# Patient Record
Sex: FEMALE | Race: WHITE | NOT HISPANIC OR LATINO | Employment: OTHER | URBAN - METROPOLITAN AREA
[De-identification: names, ages, dates, MRNs, and addresses within clinical notes are randomized per-mention and may not be internally consistent; named-entity substitution may affect disease eponyms.]

---

## 2017-08-04 ENCOUNTER — TELEPHONE (OUTPATIENT)
Dept: PRIMARY CARE CLINIC | Facility: CLINIC | Age: 62
End: 2017-08-04

## 2017-08-04 DIAGNOSIS — I10 ESSENTIAL HYPERTENSION, BENIGN: ICD-10-CM

## 2017-08-04 DIAGNOSIS — Z00.00 ANNUAL PHYSICAL EXAM: Primary | ICD-10-CM

## 2017-08-04 DIAGNOSIS — E78.2 MIXED HYPERLIPIDEMIA: ICD-10-CM

## 2017-08-04 NOTE — TELEPHONE ENCOUNTER
----- Message from Oma Huynh sent at 8/1/2017  4:28 PM CDT -----  Contact: self  Patient has appt on 8/16 for annual.  Would like to have lab work done prior to appt.  Please call back at [phone] (home)

## 2017-08-14 ENCOUNTER — TELEPHONE (OUTPATIENT)
Dept: FAMILY MEDICINE | Facility: CLINIC | Age: 62
End: 2017-08-14

## 2017-08-14 RX ORDER — ESTRADIOL 2 MG/1
2 TABLET ORAL DAILY
COMMUNITY
End: 2018-01-16 | Stop reason: SDUPTHER

## 2017-08-14 RX ORDER — OMEPRAZOLE 40 MG/1
40 CAPSULE, DELAYED RELEASE ORAL DAILY
COMMUNITY
End: 2017-11-02 | Stop reason: SDUPTHER

## 2017-08-14 RX ORDER — AMOXICILLIN 500 MG
1 CAPSULE ORAL DAILY
COMMUNITY

## 2017-08-14 RX ORDER — ATORVASTATIN CALCIUM 20 MG/1
20 TABLET, FILM COATED ORAL DAILY
COMMUNITY
End: 2017-11-02 | Stop reason: SDUPTHER

## 2017-08-14 RX ORDER — CELECOXIB 200 MG/1
200 CAPSULE ORAL DAILY
COMMUNITY
End: 2017-08-16

## 2017-08-14 RX ORDER — AMLODIPINE BESYLATE 10 MG/1
10 TABLET ORAL DAILY
COMMUNITY
End: 2017-11-02 | Stop reason: SDUPTHER

## 2017-08-16 ENCOUNTER — OFFICE VISIT (OUTPATIENT)
Dept: PRIMARY CARE CLINIC | Facility: CLINIC | Age: 62
End: 2017-08-16
Payer: COMMERCIAL

## 2017-08-16 VITALS
RESPIRATION RATE: 18 BRPM | SYSTOLIC BLOOD PRESSURE: 136 MMHG | OXYGEN SATURATION: 96 % | BODY MASS INDEX: 34.39 KG/M2 | TEMPERATURE: 98 F | WEIGHT: 214 LBS | DIASTOLIC BLOOD PRESSURE: 84 MMHG | HEIGHT: 66 IN | HEART RATE: 68 BPM

## 2017-08-16 DIAGNOSIS — I10 ESSENTIAL HYPERTENSION, BENIGN: Primary | ICD-10-CM

## 2017-08-16 DIAGNOSIS — M51.36 DDD (DEGENERATIVE DISC DISEASE), LUMBAR: ICD-10-CM

## 2017-08-16 DIAGNOSIS — R53.83 FATIGUE, UNSPECIFIED TYPE: ICD-10-CM

## 2017-08-16 DIAGNOSIS — E78.2 MIXED HYPERLIPIDEMIA: ICD-10-CM

## 2017-08-16 DIAGNOSIS — Z13.820 SCREENING FOR OSTEOPOROSIS: ICD-10-CM

## 2017-08-16 DIAGNOSIS — Z87.19 HISTORY OF ESOPHAGEAL STRICTURE: ICD-10-CM

## 2017-08-16 DIAGNOSIS — T75.3XXA SEA SICKNESS, INITIAL ENCOUNTER: ICD-10-CM

## 2017-08-16 DIAGNOSIS — R63.5 WEIGHT GAIN: ICD-10-CM

## 2017-08-16 DIAGNOSIS — M19.031 PRIMARY OSTEOARTHRITIS OF BOTH WRISTS: Chronic | ICD-10-CM

## 2017-08-16 DIAGNOSIS — M17.0 PRIMARY OSTEOARTHRITIS OF BOTH KNEES: ICD-10-CM

## 2017-08-16 DIAGNOSIS — Z83.3 FAMILY HISTORY OF DIABETES MELLITUS: ICD-10-CM

## 2017-08-16 DIAGNOSIS — M19.032 PRIMARY OSTEOARTHRITIS OF BOTH WRISTS: Chronic | ICD-10-CM

## 2017-08-16 DIAGNOSIS — R13.19 ESOPHAGEAL DYSPHAGIA: ICD-10-CM

## 2017-08-16 PROBLEM — K21.9 GERD (GASTROESOPHAGEAL REFLUX DISEASE): Status: ACTIVE | Noted: 2017-08-16

## 2017-08-16 PROBLEM — M51.369 DDD (DEGENERATIVE DISC DISEASE), LUMBAR: Status: ACTIVE | Noted: 2017-08-16

## 2017-08-16 PROCEDURE — 99214 OFFICE O/P EST MOD 30 MIN: CPT | Mod: S$GLB,,, | Performed by: FAMILY MEDICINE

## 2017-08-16 PROCEDURE — 3008F BODY MASS INDEX DOCD: CPT | Mod: S$GLB,,, | Performed by: FAMILY MEDICINE

## 2017-08-16 RX ORDER — DIAZEPAM 5 MG/1
5 TABLET ORAL DAILY PRN
COMMUNITY
End: 2019-04-18

## 2017-08-16 RX ORDER — SCOLOPAMINE TRANSDERMAL SYSTEM 1 MG/1
1 PATCH, EXTENDED RELEASE TRANSDERMAL
Qty: 4 PATCH | Refills: 1 | Status: SHIPPED | OUTPATIENT
Start: 2017-08-16 | End: 2017-09-11

## 2017-08-16 NOTE — PROGRESS NOTES
Subjective:       Patient ID: Lashanda Monaco is a 61 y.o. female.    Chief Complaint: Results (lab results ); Other (wants to know if you can document her ROM of wrist so she doesn't have to go to ortho in [place] ); and Fatigue    Recent labs all ok except slightly elevated TGs (179). HDL 64, CBC normal, CMP normal  Has been extremely fatigued, gaining weight despite diet (low-carb) and exercise  Chronic disseminated arthritic/orthopedic issues. Worsening mobility due to lumbar DDD and knee arthritis. Bilateral wrist pain and ROM limitations, has had some type of nerve ablation to left wrist w/o improvement. Also having cervical DJD issues, will ultimately need to see spine specialist/neurosurgeon  Also c/o recurrent dysphagia with solids past few months, no problem with liquids. No choking or SoB, no dyspepsia or heartburn. Hx of prior esophageal strictures with dilatation x 2, hasn't seen Dr. Keating in a few years.      Review of Systems   Constitutional: Positive for fatigue and unexpected weight change. Negative for chills and fever.   HENT: Negative for congestion.    Eyes: Negative for visual disturbance.   Respiratory: Negative for cough and shortness of breath.    Cardiovascular: Negative for chest pain.   Gastrointestinal: Negative for abdominal pain, nausea and vomiting.   Genitourinary: Positive for frequency.   Musculoskeletal: Positive for arthralgias, back pain, neck pain and neck stiffness.   Skin: Negative for rash.   Neurological: Positive for dizziness.   Psychiatric/Behavioral: Negative for sleep disturbance.       Objective:      Physical Exam   Constitutional: She is oriented to person, place, and time. She appears well-developed and well-nourished.   HENT:   Head: Normocephalic and atraumatic.   Neck: Neck supple.   Cardiovascular: Normal rate, regular rhythm and normal heart sounds.    Pulmonary/Chest: Effort normal and breath sounds normal.   Musculoskeletal: She exhibits no edema.         Right wrist: She exhibits decreased range of motion. She exhibits no swelling and no effusion.        Left wrist: She exhibits decreased range of motion. She exhibits no swelling, no effusion and no deformity.   Right wrist 15 degrees of flexion and extension; Left wrist <15 degrees flexion and extension   Neurological: She is alert and oriented to person, place, and time.   Skin: Skin is warm and dry.   Psychiatric: She has a normal mood and affect.   Vitals reviewed.      Assessment:       1. Essential hypertension, benign    2. Mixed hyperlipidemia Well controlled   3. DDD (degenerative disc disease), lumbar    4. Primary osteoarthritis of both knees    5. Primary osteoarthritis of both wrists    6. Fatigue, unspecified type    7. Weight gain    8. Screening for osteoporosis    9. Family history of diabetes mellitus    10. Sea sickness, initial encounter    11. History of esophageal stricture    12. Esophageal dysphagia        Plan:       Essential hypertension, benign  Comments:  continue current rx, monitor BP    Mixed hyperlipidemia  Comments:  continue current rx    DDD (degenerative disc disease), lumbar    Primary osteoarthritis of both knees    Primary osteoarthritis of both wrists    Fatigue, unspecified type  -     T4, free; Future; Expected date: 08/16/2017  -     TSH; Future; Expected date: 08/16/2017    Weight gain  -     T4, free; Future; Expected date: 08/16/2017  -     TSH; Future; Expected date: 08/16/2017  -     Hemoglobin A1c; Future; Expected date: 08/16/2017    Screening for osteoporosis  -     DXA Bone Density Spine And Hip; Future; Expected date: 08/23/2017    Family history of diabetes mellitus  -     Hemoglobin A1c; Future; Expected date: 08/16/2017    Sea sickness, initial encounter  -     scopolamine (TRANSDERM-SCOP) 1.5 mg (1 mg over 3 days); Place 1 patch (1.5 mg total) onto the skin every 72 hours.  Dispense: 4 patch; Refill: 1    History of esophageal stricture  -     Ambulatory  referral to General Surgery    Esophageal dysphagia  -     Ambulatory referral to General Surgery

## 2017-09-08 ENCOUNTER — TELEPHONE (OUTPATIENT)
Dept: PRIMARY CARE CLINIC | Facility: CLINIC | Age: 62
End: 2017-09-08

## 2017-09-08 ENCOUNTER — CLINICAL SUPPORT (OUTPATIENT)
Dept: PRIMARY CARE CLINIC | Facility: CLINIC | Age: 62
End: 2017-09-08
Payer: COMMERCIAL

## 2017-09-08 VITALS — DIASTOLIC BLOOD PRESSURE: 85 MMHG | SYSTOLIC BLOOD PRESSURE: 125 MMHG | HEART RATE: 78 BPM

## 2017-09-08 NOTE — TELEPHONE ENCOUNTER
Patient arrives to clinic elevated /94 in left arm.. Denies SOB, jaw or  chest pain. She took an extra dose of Norvasc 20 mg.. Was having symptoms lightheaded and headache.  3am tylenol and 9:30 am  and 10:30 am    12:23 pm right arm /72 [place] 77  12:31pm Left arm /85 [place] 78

## 2017-09-08 NOTE — PROGRESS NOTES
Patient arrives to clinic reports elevated BP  Checked /94, BP taken in left arm.. Denies SOB, jaw or  chest pain. There is no sign of distress..  She took an extra dose of Norvasc 20 mg.. Reports having symptoms lightheaded and frontal headache, took at 3am tylenol and Norvasc at 9:30 am  and extra dose of Norvasc taken at 10:30 am    12:23 pm right arm /72 DC 77  12:31pm Left arm /85 [place] 78  Advised per Dr Conteh patient is not to taken extra medication without contacting clinic. Advised to report to ED if symptoms become worse including increased HA pain, SOB, jaw, arm or chest pain or numbness in face or arm.. Appointment made for 9/11/17 at 8am..Verbalized understanding

## 2017-09-11 ENCOUNTER — OFFICE VISIT (OUTPATIENT)
Dept: PRIMARY CARE CLINIC | Facility: CLINIC | Age: 62
End: 2017-09-11
Payer: COMMERCIAL

## 2017-09-11 VITALS
SYSTOLIC BLOOD PRESSURE: 114 MMHG | BODY MASS INDEX: 33.12 KG/M2 | DIASTOLIC BLOOD PRESSURE: 77 MMHG | TEMPERATURE: 98 F | RESPIRATION RATE: 18 BRPM | OXYGEN SATURATION: 97 % | HEIGHT: 67 IN | WEIGHT: 211 LBS | HEART RATE: 81 BPM

## 2017-09-11 DIAGNOSIS — J01.00 ACUTE NON-RECURRENT MAXILLARY SINUSITIS: Primary | ICD-10-CM

## 2017-09-11 PROCEDURE — 99213 OFFICE O/P EST LOW 20 MIN: CPT | Mod: 25,S$GLB,, | Performed by: FAMILY MEDICINE

## 2017-09-11 PROCEDURE — 3008F BODY MASS INDEX DOCD: CPT | Mod: S$GLB,,, | Performed by: FAMILY MEDICINE

## 2017-09-11 PROCEDURE — 96372 THER/PROPH/DIAG INJ SC/IM: CPT | Mod: S$GLB,,, | Performed by: FAMILY MEDICINE

## 2017-09-11 RX ORDER — BENZONATATE 200 MG/1
200 CAPSULE ORAL 3 TIMES DAILY PRN
Qty: 30 CAPSULE | Refills: 1 | Status: SHIPPED | OUTPATIENT
Start: 2017-09-11 | End: 2018-01-16 | Stop reason: SDUPTHER

## 2017-09-11 RX ORDER — AMOXICILLIN AND CLAVULANATE POTASSIUM 875; 125 MG/1; MG/1
1 TABLET, FILM COATED ORAL EVERY 12 HOURS
Qty: 20 TABLET | Refills: 0 | Status: SHIPPED | OUTPATIENT
Start: 2017-09-11 | End: 2017-09-21

## 2017-09-11 RX ORDER — BETAMETHASONE SODIUM PHOSPHATE AND BETAMETHASONE ACETATE 3; 3 MG/ML; MG/ML
12 INJECTION, SUSPENSION INTRA-ARTICULAR; INTRALESIONAL; INTRAMUSCULAR; SOFT TISSUE
Status: COMPLETED | OUTPATIENT
Start: 2017-09-11 | End: 2017-09-11

## 2017-09-11 RX ADMIN — BETAMETHASONE SODIUM PHOSPHATE AND BETAMETHASONE ACETATE 12 MG: 3; 3 INJECTION, SUSPENSION INTRA-ARTICULAR; INTRALESIONAL; INTRAMUSCULAR; SOFT TISSUE at 08:09

## 2017-09-11 NOTE — PROGRESS NOTES
Subjective:       Patient ID: Lashanda Monaco is a 62 y.o. female.    Chief Complaint: Cough; Sore Throat; and Otalgia    Sinusitis   This is a new problem. The current episode started in the past 7 days. The problem has been gradually worsening since onset. There has been no fever. Associated symptoms include congestion, coughing, ear pain, headaches, a hoarse voice, sinus pressure and a sore throat. Pertinent negatives include no shortness of breath. Past treatments include oral decongestants. The treatment provided mild relief.     Review of Systems   HENT: Positive for congestion, ear pain, hoarse voice, sinus pressure and sore throat.    Respiratory: Positive for cough. Negative for shortness of breath.    Neurological: Positive for headaches.       Objective:       Vitals:    09/11/17 0816   BP: 114/77   Pulse: 81   Resp: 18   Temp: 97.8 °F (36.6 °C)       Physical Exam   Constitutional: She is oriented to person, place, and time. She appears well-developed and well-nourished.   HENT:   Head: Normocephalic and atraumatic.   Right Ear: Tympanic membrane normal.   Left Ear: Tympanic membrane normal.   Nose: Right sinus exhibits maxillary sinus tenderness. Left sinus exhibits maxillary sinus tenderness.   Mouth/Throat: Posterior oropharyngeal edema present. No oropharyngeal exudate.   Cardiovascular: Normal rate, regular rhythm and normal heart sounds.    Pulmonary/Chest: Effort normal and breath sounds normal.   Musculoskeletal: She exhibits no edema.   Neurological: She is alert and oriented to person, place, and time.   Skin: Skin is warm and dry.   Vitals reviewed.      Assessment:       1. Acute non-recurrent maxillary sinusitis        Plan:       Acute non-recurrent maxillary sinusitis  -     amoxicillin-clavulanate 875-125mg (AUGMENTIN) 875-125 mg per tablet; Take 1 tablet by mouth every 12 (twelve) hours.  Dispense: 20 tablet; Refill: 0  -     benzonatate (TESSALON) 200 MG capsule; Take 1 capsule (200  mg total) by mouth 3 (three) times daily as needed for Cough.  Dispense: 30 capsule; Refill: 1  -     betamethasone acetate-betamethasone sodium phosphate injection 12 mg; Inject 2 mLs (12 mg total) into the muscle one time.

## 2017-09-11 NOTE — PROGRESS NOTES
Patient ID by name and  injection of Celestone 12mg IM given right gluteal, tolerated well aseptic tech used.  Lot [id number], exp. Aug 2018. Patient site with no bleeding noted 15 min wait time with no adverse reaction noted.

## 2017-10-09 ENCOUNTER — TELEPHONE (OUTPATIENT)
Dept: PRIMARY CARE CLINIC | Facility: CLINIC | Age: 62
End: 2017-10-09

## 2017-11-02 RX ORDER — AMLODIPINE BESYLATE 10 MG/1
TABLET ORAL
Qty: 90 TABLET | Refills: 1 | Status: SHIPPED | OUTPATIENT
Start: 2017-11-02 | End: 2018-01-16 | Stop reason: SDUPTHER

## 2017-11-02 RX ORDER — OMEPRAZOLE 40 MG/1
CAPSULE, DELAYED RELEASE ORAL
Qty: 90 CAPSULE | Refills: 1 | Status: SHIPPED | OUTPATIENT
Start: 2017-11-02 | End: 2018-01-16 | Stop reason: SDUPTHER

## 2017-11-02 RX ORDER — ATORVASTATIN CALCIUM 20 MG/1
TABLET, FILM COATED ORAL
Qty: 90 TABLET | Refills: 1 | Status: SHIPPED | OUTPATIENT
Start: 2017-11-02 | End: 2018-01-16 | Stop reason: SDUPTHER

## 2018-01-16 ENCOUNTER — OFFICE VISIT (OUTPATIENT)
Dept: PRIMARY CARE CLINIC | Facility: CLINIC | Age: 63
End: 2018-01-16
Payer: COMMERCIAL

## 2018-01-16 VITALS
WEIGHT: 214 LBS | DIASTOLIC BLOOD PRESSURE: 79 MMHG | RESPIRATION RATE: 18 BRPM | HEIGHT: 66 IN | OXYGEN SATURATION: 96 % | SYSTOLIC BLOOD PRESSURE: 120 MMHG | HEART RATE: 78 BPM | TEMPERATURE: 98 F | BODY MASS INDEX: 34.39 KG/M2

## 2018-01-16 DIAGNOSIS — E78.2 MIXED HYPERLIPIDEMIA: ICD-10-CM

## 2018-01-16 DIAGNOSIS — J01.00 ACUTE NON-RECURRENT MAXILLARY SINUSITIS: Primary | ICD-10-CM

## 2018-01-16 DIAGNOSIS — Z83.3 FAMILY HISTORY OF DIABETES MELLITUS: ICD-10-CM

## 2018-01-16 DIAGNOSIS — R63.5 WEIGHT GAIN: ICD-10-CM

## 2018-01-16 DIAGNOSIS — K21.9 GASTROESOPHAGEAL REFLUX DISEASE, ESOPHAGITIS PRESENCE NOT SPECIFIED: ICD-10-CM

## 2018-01-16 DIAGNOSIS — I10 ESSENTIAL HYPERTENSION, BENIGN: ICD-10-CM

## 2018-01-16 PROCEDURE — 96372 THER/PROPH/DIAG INJ SC/IM: CPT | Mod: S$GLB,,, | Performed by: FAMILY MEDICINE

## 2018-01-16 PROCEDURE — 99214 OFFICE O/P EST MOD 30 MIN: CPT | Mod: 25,S$GLB,, | Performed by: FAMILY MEDICINE

## 2018-01-16 PROCEDURE — 99999 PR PBB SHADOW E&M-EST. PATIENT-LVL III: CPT | Mod: PBBFAC,,, | Performed by: FAMILY MEDICINE

## 2018-01-16 RX ORDER — BETAMETHASONE SODIUM PHOSPHATE AND BETAMETHASONE ACETATE 3; 3 MG/ML; MG/ML
12 INJECTION, SUSPENSION INTRA-ARTICULAR; INTRALESIONAL; INTRAMUSCULAR; SOFT TISSUE
Status: COMPLETED | OUTPATIENT
Start: 2018-01-16 | End: 2018-01-16

## 2018-01-16 RX ORDER — OMEPRAZOLE 40 MG/1
40 CAPSULE, DELAYED RELEASE ORAL DAILY
Qty: 90 CAPSULE | Refills: 1 | Status: SHIPPED | OUTPATIENT
Start: 2018-01-16 | End: 2018-08-02 | Stop reason: SDUPTHER

## 2018-01-16 RX ORDER — AMLODIPINE BESYLATE 10 MG/1
10 TABLET ORAL DAILY
Qty: 90 TABLET | Refills: 1 | Status: SHIPPED | OUTPATIENT
Start: 2018-01-16 | End: 2018-08-02 | Stop reason: SDUPTHER

## 2018-01-16 RX ORDER — PREDNISONE 20 MG/1
20 TABLET ORAL DAILY
Qty: 3 TABLET | Refills: 0 | Status: SHIPPED | OUTPATIENT
Start: 2018-01-16 | End: 2018-01-19

## 2018-01-16 RX ORDER — AMOXICILLIN AND CLAVULANATE POTASSIUM 875; 125 MG/1; MG/1
1 TABLET, FILM COATED ORAL EVERY 12 HOURS
Qty: 20 TABLET | Refills: 0 | Status: SHIPPED | OUTPATIENT
Start: 2018-01-16 | End: 2018-01-26

## 2018-01-16 RX ORDER — ESTRADIOL 2 MG/1
2 TABLET ORAL DAILY
Qty: 90 TABLET | Refills: 1 | Status: SHIPPED | OUTPATIENT
Start: 2018-01-16 | End: 2018-08-02 | Stop reason: SDUPTHER

## 2018-01-16 RX ORDER — ATORVASTATIN CALCIUM 20 MG/1
20 TABLET, FILM COATED ORAL DAILY
Qty: 90 TABLET | Refills: 1 | Status: SHIPPED | OUTPATIENT
Start: 2018-01-16 | End: 2018-08-02 | Stop reason: SDUPTHER

## 2018-01-16 RX ORDER — BENZONATATE 200 MG/1
200 CAPSULE ORAL 3 TIMES DAILY PRN
Qty: 30 CAPSULE | Refills: 1 | Status: SHIPPED | OUTPATIENT
Start: 2018-01-16 | End: 2019-04-18

## 2018-01-16 RX ADMIN — BETAMETHASONE SODIUM PHOSPHATE AND BETAMETHASONE ACETATE 12 MG: 3; 3 INJECTION, SUSPENSION INTRA-ARTICULAR; INTRALESIONAL; INTRAMUSCULAR; SOFT TISSUE at 08:01

## 2018-01-16 NOTE — PROGRESS NOTES
"Subjective:       Patient ID: Lasahnda Monaco is a 62 y.o. female.    Chief Complaint: Medication Refill; Otalgia; Cough (x17 days ); and Headache    Scratchy throat, congestion for 2+ weeks. Started with HA and myalgia, but no fever. Progressed to bilateral ear pain, sneezing and coughing. Symptoms markedly worse at night. Ear pain better. Cough minimally productive, but blowing out copious amounts of nasal mucus with streaks of blood.      Review of Systems   Constitutional: Positive for fatigue. Negative for fever.   HENT: Positive for congestion and ear pain.    Eyes: Negative for visual disturbance.   Respiratory: Positive for cough and shortness of breath. Negative for wheezing.    Cardiovascular: Negative for chest pain.   Gastrointestinal: Negative for nausea and vomiting.   Genitourinary: Negative for difficulty urinating.   Musculoskeletal: Negative for joint swelling.   Skin: Negative for rash.   Neurological: Positive for headaches.   Hematological: Negative for adenopathy. Does not bruise/bleed easily.   Psychiatric/Behavioral: Negative for agitation and confusion.       Objective:      Vitals:    01/16/18 0806   BP: 120/79   BP Location: Left arm   Patient Position: Sitting   BP Method: Large (Automatic)   Pulse: 78   Resp: 18   Temp: 97.7 °F (36.5 °C)   TempSrc: Oral   SpO2: 96%   Weight: 97.1 kg (214 lb)   Height: 5' 6" (1.676 m)     Physical Exam   Constitutional: She is oriented to person, place, and time. She appears well-developed and well-nourished.   HENT:   Head: Normocephalic and atraumatic.   Right Ear: Tympanic membrane is bulging. Tympanic membrane is not erythematous.   Left Ear: Tympanic membrane normal.   Nose: Right sinus exhibits maxillary sinus tenderness. Left sinus exhibits maxillary sinus tenderness.   Mouth/Throat: Oropharynx is clear and moist.   Eyes: EOM are normal. Pupils are equal, round, and reactive to light.   Neck: Neck supple. No JVD present.   Cardiovascular: Normal " rate, regular rhythm and normal heart sounds.    Pulmonary/Chest: Effort normal and breath sounds normal.   Musculoskeletal: She exhibits no edema.   Neurological: She is alert and oriented to person, place, and time.   Skin: Skin is warm and dry.   Nursing note and vitals reviewed.      Assessment:       1. Acute non-recurrent maxillary sinusitis    2. Gastroesophageal reflux disease, esophagitis presence not specified    3. Mixed hyperlipidemia    4. Essential hypertension, benign    5. Weight gain    6. Family history of diabetes mellitus        Plan:       Acute non-recurrent maxillary sinusitis  -     benzonatate (TESSALON) 200 MG capsule; Take 1 capsule (200 mg total) by mouth 3 (three) times daily as needed for Cough.  Dispense: 30 capsule; Refill: 1  -     betamethasone acetate-betamethasone sodium phosphate injection 12 mg; Inject 2 mLs (12 mg total) into the muscle one time.  -     amoxicillin-clavulanate 875-125mg (AUGMENTIN) 875-125 mg per tablet; Take 1 tablet by mouth every 12 (twelve) hours.  Dispense: 20 tablet; Refill: 0  -     predniSONE (DELTASONE) 20 MG tablet; Take 1 tablet (20 mg total) by mouth once daily. Start on 1/17  Dispense: 3 tablet; Refill: 0    Gastroesophageal reflux disease, esophagitis presence not specified  -     omeprazole (PRILOSEC) 40 MG capsule; Take 1 capsule (40 mg total) by mouth once daily.  Dispense: 90 capsule; Refill: 1    Mixed hyperlipidemia  -     atorvastatin (LIPITOR) 20 MG tablet; Take 1 tablet (20 mg total) by mouth once daily.  Dispense: 90 tablet; Refill: 1  -     Comprehensive metabolic panel; Future; Expected date: 01/16/2018  -     Lipid panel; Future; Expected date: 01/16/2018  -     TSH; Future; Expected date: 01/16/2018  -     T4, free; Future; Expected date: 01/16/2018    Essential hypertension, benign  -     amLODIPine (NORVASC) 10 MG tablet; Take 1 tablet (10 mg total) by mouth once daily.  Dispense: 90 tablet; Refill: 1  -     CBC auto differential;  Future; Expected date: 01/16/2018  -     TSH; Future; Expected date: 01/16/2018  -     T4, free; Future; Expected date: 01/16/2018    Weight gain  -     Hemoglobin A1c; Future; Expected date: 01/16/2018  -     TSH; Future; Expected date: 01/16/2018  -     T4, free; Future; Expected date: 01/16/2018    Family history of diabetes mellitus  -     Hemoglobin A1c; Future; Expected date: 01/16/2018    Other orders  -     estradiol (ESTRACE) 2 MG tablet; Take 1 tablet (2 mg total) by mouth once daily.  Dispense: 90 tablet; Refill: 1      Medication List with Changes/Refills   New Medications    AMOXICILLIN-CLAVULANATE 875-125MG (AUGMENTIN) 875-125 MG PER TABLET    Take 1 tablet by mouth every 12 (twelve) hours.    PREDNISONE (DELTASONE) 20 MG TABLET    Take 1 tablet (20 mg total) by mouth once daily. Start on 1/17   Current Medications    DIAZEPAM (VALIUM) 5 MG TABLET    Take 5 mg by mouth daily as needed for Anxiety.    FISH OIL-OMEGA-3 FATTY ACIDS 300-1,000 MG CAPSULE    Take 1 g by mouth once daily.   Changed and/or Refilled Medications    Modified Medication Previous Medication    AMLODIPINE (NORVASC) 10 MG TABLET amLODIPine (NORVASC) 10 MG tablet       Take 1 tablet (10 mg total) by mouth once daily.    TAKE ONE TABLET BY MOUTH ONCE DAILY    ATORVASTATIN (LIPITOR) 20 MG TABLET atorvastatin (LIPITOR) 20 MG tablet       Take 1 tablet (20 mg total) by mouth once daily.    TAKE ONE TABLET BY MOUTH ONCE DAILY    BENZONATATE (TESSALON) 200 MG CAPSULE benzonatate (TESSALON) 200 MG capsule       Take 1 capsule (200 mg total) by mouth 3 (three) times daily as needed for Cough.    Take 1 capsule (200 mg total) by mouth 3 (three) times daily as needed for Cough.    ESTRADIOL (ESTRACE) 2 MG TABLET estradiol (ESTRACE) 2 MG tablet       Take 1 tablet (2 mg total) by mouth once daily.    Take 2 mg by mouth once daily.    OMEPRAZOLE (PRILOSEC) 40 MG CAPSULE omeprazole (PRILOSEC) 40 MG capsule       Take 1 capsule (40 mg total) by  mouth once daily.    TAKE ONE CAPSULE BY MOUTH ONCE DAILY

## 2018-01-16 NOTE — PROGRESS NOTES
Patient ID by name and . Celestone 12 mg IM injection given in right ventrogluteal using aseptic technique. Aspirated with no blood noted. Patient tolerated well. Given per physicians order. No adverse reaction noted.

## 2018-08-02 DIAGNOSIS — K21.9 GASTROESOPHAGEAL REFLUX DISEASE, ESOPHAGITIS PRESENCE NOT SPECIFIED: ICD-10-CM

## 2018-08-02 DIAGNOSIS — E78.2 MIXED HYPERLIPIDEMIA: ICD-10-CM

## 2018-08-02 DIAGNOSIS — I10 ESSENTIAL HYPERTENSION, BENIGN: ICD-10-CM

## 2018-08-02 RX ORDER — AMLODIPINE BESYLATE 10 MG/1
TABLET ORAL
Qty: 90 TABLET | Refills: 1 | Status: SHIPPED | OUTPATIENT
Start: 2018-08-02 | End: 2019-01-29 | Stop reason: SDUPTHER

## 2018-08-02 RX ORDER — OMEPRAZOLE 40 MG/1
CAPSULE, DELAYED RELEASE ORAL
Qty: 90 CAPSULE | Refills: 1 | Status: SHIPPED | OUTPATIENT
Start: 2018-08-02 | End: 2019-01-29 | Stop reason: SDUPTHER

## 2018-08-02 RX ORDER — ATORVASTATIN CALCIUM 20 MG/1
TABLET, FILM COATED ORAL
Qty: 90 TABLET | Refills: 1 | Status: SHIPPED | OUTPATIENT
Start: 2018-08-02 | End: 2019-01-29 | Stop reason: SDUPTHER

## 2018-08-02 RX ORDER — ESTRADIOL 2 MG/1
TABLET ORAL
Qty: 90 TABLET | Refills: 1 | Status: SHIPPED | OUTPATIENT
Start: 2018-08-02 | End: 2019-01-29 | Stop reason: SDUPTHER

## 2018-11-29 ENCOUNTER — PES CALL (OUTPATIENT)
Dept: ADMINISTRATIVE | Facility: CLINIC | Age: 63
End: 2018-11-29

## 2019-01-29 DIAGNOSIS — K21.9 GASTROESOPHAGEAL REFLUX DISEASE, ESOPHAGITIS PRESENCE NOT SPECIFIED: ICD-10-CM

## 2019-01-29 DIAGNOSIS — I10 ESSENTIAL HYPERTENSION, BENIGN: ICD-10-CM

## 2019-01-29 DIAGNOSIS — E78.2 MIXED HYPERLIPIDEMIA: ICD-10-CM

## 2019-01-29 RX ORDER — ESTRADIOL 2 MG/1
2 TABLET ORAL DAILY
Qty: 90 TABLET | Refills: 0 | Status: SHIPPED | OUTPATIENT
Start: 2019-01-29 | End: 2019-07-12 | Stop reason: SDUPTHER

## 2019-01-29 RX ORDER — ATORVASTATIN CALCIUM 20 MG/1
20 TABLET, FILM COATED ORAL DAILY
Qty: 90 TABLET | Refills: 0 | Status: SHIPPED | OUTPATIENT
Start: 2019-01-29 | End: 2019-05-01 | Stop reason: SDUPTHER

## 2019-01-29 RX ORDER — OMEPRAZOLE 40 MG/1
40 CAPSULE, DELAYED RELEASE ORAL DAILY
Qty: 90 CAPSULE | Refills: 0 | Status: SHIPPED | OUTPATIENT
Start: 2019-01-29 | End: 2019-05-01 | Stop reason: SDUPTHER

## 2019-01-29 RX ORDER — AMLODIPINE BESYLATE 10 MG/1
10 TABLET ORAL DAILY
Qty: 90 TABLET | Refills: 0 | Status: SHIPPED | OUTPATIENT
Start: 2019-01-29 | End: 2019-05-01 | Stop reason: SDUPTHER

## 2019-01-29 NOTE — TELEPHONE ENCOUNTER
----- Message from Brenda Campos sent at 1/29/2019  1:22 PM CST -----  Type:  RX Refill Request    Who Called:  Patient  Refill or New Rx:  refill  RX Name and Strength:    amLODIPine (NORVASC) 10 MG tablet    estradiol (ESTRACE) 2 MG tablet   omeprazole (PRILOSEC) 40 MG capsule  atorvastatin (LIPITOR) 20 MG tablet  How is the patient currently taking it? (ex. 1XDay):Na  Is this a 30 day or 90 day RX:  90  Preferred Pharmacy with phone number:    [facility] - [address] [address]  Phone: [phone] Fax: [phone]    Local or Mail Order: mail order  Ordering Provider:  Tamika Calderon Call Back Number:  [phone] (home)     Additional Information:  Stating she is almost out of her prescriptions, please sent to her pharmacy, mail order

## 2019-02-22 DIAGNOSIS — Z12.39 BREAST CANCER SCREENING: ICD-10-CM

## 2019-02-22 DIAGNOSIS — Z12.11 COLON CANCER SCREENING: ICD-10-CM

## 2019-04-18 ENCOUNTER — OFFICE VISIT (OUTPATIENT)
Dept: SURGERY | Facility: CLINIC | Age: 64
End: 2019-04-18
Payer: MEDICARE

## 2019-04-18 VITALS
TEMPERATURE: 98 F | HEIGHT: 66 IN | SYSTOLIC BLOOD PRESSURE: 160 MMHG | WEIGHT: 214 LBS | BODY MASS INDEX: 34.39 KG/M2 | HEART RATE: 71 BPM | DIASTOLIC BLOOD PRESSURE: 94 MMHG

## 2019-04-18 DIAGNOSIS — D24.1 INTRADUCTAL PAPILLOMA OF BREAST, RIGHT: ICD-10-CM

## 2019-04-18 DIAGNOSIS — N63.41 SUBAREOLAR MASS OF RIGHT BREAST: Primary | ICD-10-CM

## 2019-04-18 PROCEDURE — 3080F PR MOST RECENT DIASTOLIC BLOOD PRESSURE >= 90 MM HG: ICD-10-PCS | Mod: ,,, | Performed by: SURGERY

## 2019-04-18 PROCEDURE — 3080F DIAST BP >= 90 MM HG: CPT | Mod: ,,, | Performed by: SURGERY

## 2019-04-18 PROCEDURE — 3077F PR MOST RECENT SYSTOLIC BLOOD PRESSURE >= 140 MM HG: ICD-10-PCS | Mod: ,,, | Performed by: SURGERY

## 2019-04-18 PROCEDURE — 3077F SYST BP >= 140 MM HG: CPT | Mod: ,,, | Performed by: SURGERY

## 2019-04-18 PROCEDURE — 3008F BODY MASS INDEX DOCD: CPT | Mod: ,,, | Performed by: SURGERY

## 2019-04-18 PROCEDURE — 99203 PR OFFICE/OUTPT VISIT, NEW, LEVL III, 30-44 MIN: ICD-10-PCS | Mod: ,,, | Performed by: SURGERY

## 2019-04-18 PROCEDURE — 3008F PR BODY MASS INDEX (BMI) DOCUMENTED: ICD-10-PCS | Mod: ,,, | Performed by: SURGERY

## 2019-04-18 PROCEDURE — 99203 OFFICE O/P NEW LOW 30 MIN: CPT | Mod: ,,, | Performed by: SURGERY

## 2019-04-18 RX ORDER — MELOXICAM 15 MG/1
15 TABLET ORAL DAILY
COMMUNITY
End: 2019-05-01 | Stop reason: SDUPTHER

## 2019-04-18 NOTE — LETTER
April 19, 2019      Wayne Lundberg MD  [address]  [address]  [place] [address]           [facility] General Surgery  [address]  [place] [address]  Phone: [phone]  Fax: [phone]          Patient: Lashanda Monaco   MR Number: [id number]   YOB: 1955   Date of Visit: 4/18/2019       Dear Dr. Wayne Lundberg:    Thank you for referring Lashanda Monaco to me for evaluation. Attached you will find relevant portions of my assessment and plan of care.    If you have questions, please do not hesitate to call me. I look forward to following Lashanda Monaco along with you.    Sincerely,    Richar Lowe III, MD    Enclosure  CC:  No Recipients    If you would like to receive this communication electronically, please contact [email or web address] or [phone] to request more information on [facility] access.    For providers and/or their staff who would like to refer a patient to [facility], please contact us through our one-stop-shop provider referral line, [facility], at [phone].    If you feel you have received this communication in error or would no longer like to receive these types of communications, please e-mail [email or web address]

## 2019-04-18 NOTE — PROGRESS NOTES
Subjective:       Patient ID: Lashanda Monaco is a 63 y.o. female.    Chief Complaint: Consult ([facility] referred for right breast bx )      HPI:  Patient is 63 year old female referred to the office with an enlarging right sided retro areolar mass.  It is a known lesion dating back to 2007.  At that time she had right sided blood discharge.  The lesion was biopsied and found to be and intraductal papilloma.  The lesion has been followed with imaging.  She had resolution of the blood discharge at that time.  She has had recurrent discharge from the right nipple starting seven months ago.  Repeat imaging showed enlargement of the lesion now measuring 17 mm and classified as BI RADS 4.  She stares the recurrent discharge is not bloody this time.  It is serous.  She has associated pain at the nipple and at the right axilla.  She cannot feel a mass at the nipple.  She has no family history of breast cancer.  She is scheduled for image guided biopsy of the lesion today.     Past Medical History:   Diagnosis Date    Arthritis     Hypercholesterolemia     Hypertension      Past Surgical History:   Procedure Laterality Date    CARPAL TUNNEL RELEASE Left     PARTIAL HIP ARTHROPLASTY Left     RHINOPLASTY      ROTATOR CUFF REPAIR Right     TONSILLECTOMY, ADENOIDECTOMY      TOTAL ABDOMINAL HYSTERECTOMY W/ BILATERAL SALPINGOOPHORECTOMY       Review of patient's allergies indicates:   Allergen Reactions    Hydrocodone bitartrate     Kiwi (actinidia chinensis)      Medication List with Changes/Refills   Current Medications    AMLODIPINE (NORVASC) 10 MG TABLET    Take 1 tablet (10 mg total) by mouth once daily.    ATORVASTATIN (LIPITOR) 20 MG TABLET    Take 1 tablet (20 mg total) by mouth once daily.    ESTRADIOL (ESTRACE) 2 MG TABLET    Take 1 tablet (2 mg total) by mouth once daily.    FISH OIL-OMEGA-3 FATTY ACIDS 300-1,000 MG CAPSULE    Take 1 g by mouth once daily.    MELOXICAM (MOBIC) 15 MG TABLET    Take 15 mg by  mouth once daily.    OMEPRAZOLE (PRILOSEC) 40 MG CAPSULE    Take 1 capsule (40 mg total) by mouth once daily.    TURMERIC ORAL    Take 1 tablet by mouth once daily.   Discontinued Medications    BENZONATATE (TESSALON) 200 MG CAPSULE    Take 1 capsule (200 mg total) by mouth 3 (three) times daily as needed for Cough.    DIAZEPAM (VALIUM) 5 MG TABLET    Take 5 mg by mouth daily as needed for Anxiety.     Family History   Problem Relation Age of Onset    Heart disease Sister     Lymphoma Brother     Heart disease Brother      Social History     Socioeconomic History    Marital status:      Spouse name: Not on file    Number of children: Not on file    Years of education: Not on file    Highest education level: Not on file   Occupational History    Not on file   Social Needs    Financial resource strain: Not on file    Food insecurity:     Worry: Not on file     Inability: Not on file    Transportation needs:     Medical: Not on file     Non-medical: Not on file   Tobacco Use    Smoking status: Never Smoker    Smokeless tobacco: Never Used   Substance and Sexual Activity    Alcohol use: Yes     Comment: Occasionally     Drug use: No    Sexual activity: Yes     Partners: Male   Lifestyle    Physical activity:     Days per week: Not on file     Minutes per session: Not on file    Stress: Not on file   Relationships    Social connections:     Talks on phone: Not on file     Gets together: Not on file     Attends Advent service: Not on file     Active member of club or organization: Not on file     Attends meetings of clubs or organizations: Not on file     Relationship status: Not on file   Other Topics Concern    Not on file   Social History Narrative    Not on file         Review of Systems   Constitutional: Negative for appetite change, chills, fever and unexpected weight change.   HENT: Negative for hearing loss, rhinorrhea, sore throat and voice change.    Eyes: Negative for photophobia  and visual disturbance.   Respiratory: Negative for cough, choking and shortness of breath.    Cardiovascular: Negative for chest pain, palpitations and leg swelling.   Gastrointestinal: Negative for abdominal pain, blood in stool, constipation, diarrhea, nausea and vomiting.   Endocrine: Negative for cold intolerance, heat intolerance, polydipsia and polyuria.   Musculoskeletal: Negative for arthralgias, back pain, joint swelling and neck stiffness.   Skin: Negative for color change, pallor and rash.   Neurological: Negative for dizziness, seizures, syncope and headaches.   Hematological: Negative for adenopathy. Does not bruise/bleed easily.   Psychiatric/Behavioral: Negative for agitation, behavioral problems and confusion.       Objective:      Physical Exam   Constitutional: She is oriented to person, place, and time. She appears well-developed and well-nourished.  Non-toxic appearance. No distress.   HENT:   Head: Normocephalic and atraumatic. Head is without abrasion and without laceration.   Right Ear: External ear normal.   Left Ear: External ear normal.   Nose: Nose normal.   Mouth/Throat: Oropharynx is clear and moist.   Eyes: Pupils are equal, round, and reactive to light. EOM are normal.   Neck: Trachea normal. Neck supple. No tracheal deviation and normal range of motion present.   Cardiovascular: Normal rate and regular rhythm.   Pulmonary/Chest: Effort normal. No accessory muscle usage. No tachypnea. No respiratory distress. Right breast exhibits nipple discharge and tenderness. Right breast exhibits no inverted nipple, no mass and no skin change. Left breast exhibits no inverted nipple, no mass and no skin change. No breast tenderness or discharge. Breasts are symmetrical.   Serous drainage from the right nipple.  No skin changes.  I did not appreciate a definite mass under the areola.  She has a lot of tenderness at the nipple and also in the axilla.  I did not appreciate any LAD.    Abdominal:  Soft. Normal appearance and bowel sounds are normal. She exhibits no distension and no mass. There is no tenderness. There is no rigidity, no rebound and no guarding.   Lymphadenopathy:     She has no cervical adenopathy.     She has no axillary adenopathy.        Right: No supraclavicular adenopathy present.        Left: No supraclavicular adenopathy present.   Neurological: She is alert and oriented to person, place, and time. Coordination and gait normal.   Skin: Skin is warm and intact.   Psychiatric: She has a normal mood and affect. Her speech is normal and behavior is normal.       Assessment/Plan:   Lashanda was seen today for consult.    Diagnoses and all orders for this visit:    Subareolar mass of right breast    Intraductal papilloma of breast, right    Lashanda was seen today for consult.    Diagnoses and all orders for this visit:    Subareolar mass of right breast    Intraductal papilloma of breast, right      She has image guided biopsy scheduled for today.  The lesion was biopsied in 2007 and was found to be benign. It is now BI RADS 4 on repeat imaging. Will plan resection of the lesion but agree with core biopsy first to see if there is now any malignant component.  She will follow up after biopsy to review results and schedule surgery.

## 2019-04-22 ENCOUNTER — TELEPHONE (OUTPATIENT)
Dept: SURGERY | Facility: CLINIC | Age: 64
End: 2019-04-22

## 2019-04-24 NOTE — TELEPHONE ENCOUNTER
Per Dr. Lowe, path benign. Just showed intraductal papilloma. Pt would like it to be removed, but will call back to schedule appt with office.

## 2019-05-01 ENCOUNTER — OFFICE VISIT (OUTPATIENT)
Dept: PRIMARY CARE CLINIC | Facility: CLINIC | Age: 64
End: 2019-05-01
Payer: MEDICARE

## 2019-05-01 ENCOUNTER — CLINICAL SUPPORT (OUTPATIENT)
Dept: PRIMARY CARE CLINIC | Facility: CLINIC | Age: 64
End: 2019-05-01
Payer: MEDICARE

## 2019-05-01 VITALS
DIASTOLIC BLOOD PRESSURE: 84 MMHG | HEART RATE: 75 BPM | OXYGEN SATURATION: 96 % | WEIGHT: 213 LBS | RESPIRATION RATE: 18 BRPM | BODY MASS INDEX: 34.23 KG/M2 | HEIGHT: 66 IN | SYSTOLIC BLOOD PRESSURE: 124 MMHG

## 2019-05-01 DIAGNOSIS — E78.2 MIXED HYPERLIPIDEMIA: ICD-10-CM

## 2019-05-01 DIAGNOSIS — Z11.59 NEED FOR HEPATITIS C SCREENING TEST: ICD-10-CM

## 2019-05-01 DIAGNOSIS — M51.36 DDD (DEGENERATIVE DISC DISEASE), LUMBAR: ICD-10-CM

## 2019-05-01 DIAGNOSIS — I10 ESSENTIAL HYPERTENSION, BENIGN: ICD-10-CM

## 2019-05-01 DIAGNOSIS — Z23 NEED FOR VACCINATION: ICD-10-CM

## 2019-05-01 DIAGNOSIS — Z00.00 ANNUAL PHYSICAL EXAM: ICD-10-CM

## 2019-05-01 DIAGNOSIS — Z00.00 ANNUAL PHYSICAL EXAM: Primary | ICD-10-CM

## 2019-05-01 DIAGNOSIS — F33.1 MODERATE EPISODE OF RECURRENT MAJOR DEPRESSIVE DISORDER: ICD-10-CM

## 2019-05-01 DIAGNOSIS — K21.9 GASTROESOPHAGEAL REFLUX DISEASE, ESOPHAGITIS PRESENCE NOT SPECIFIED: ICD-10-CM

## 2019-05-01 LAB
ALBUMIN SERPL BCP-MCNC: 3.8 G/DL (ref 3.5–5.2)
ALP SERPL-CCNC: 83 U/L (ref 38–126)
ALT SERPL W/O P-5'-P-CCNC: 16 U/L (ref 14–54)
ANION GAP SERPL CALC-SCNC: 8 MMOL/L (ref 8–16)
AST SERPL-CCNC: 23 U/L (ref 15–41)
BASOPHILS # BLD AUTO: 0 K/UL (ref 0–0.2)
BASOPHILS NFR BLD: 0.3 % (ref 0–1.9)
BILIRUB SERPL-MCNC: 0.8 MG/DL (ref 0.3–1.2)
BUN SERPL-MCNC: 19 MG/DL (ref 8–23)
CALCIUM SERPL-MCNC: 9.2 MG/DL (ref 8.6–10)
CHLORIDE SERPL-SCNC: 102 MMOL/L (ref 101–111)
CHOLEST SERPL-MCNC: 199 MG/DL (ref 80–200)
CHOLEST/HDLC SERPL: 2.5 {RATIO} (ref 2–5)
CO2 SERPL-SCNC: 27 MMOL/L (ref 23–29)
CREAT SERPL-MCNC: 0.9 MG/DL (ref 0.5–1.4)
DIFFERENTIAL METHOD: ABNORMAL
EOSINOPHIL # BLD AUTO: 0.1 K/UL (ref 0–0.5)
EOSINOPHIL NFR BLD: 1.3 % (ref 0–8)
ERYTHROCYTE [DISTWIDTH] IN BLOOD BY AUTOMATED COUNT: 13.6 % (ref 11.5–14.5)
EST. GFR  (AFRICAN AMERICAN): >60 ML/MIN/1.73 M^2
EST. GFR  (NON AFRICAN AMERICAN): >60 ML/MIN/1.73 M^2
GLUCOSE SERPL-MCNC: 90 MG/DL (ref 74–118)
HCT VFR BLD AUTO: 45.2 % (ref 37–48.5)
HDLC SERPL-MCNC: 81 MG/DL (ref 40–75)
HDLC SERPL: 40.7 % (ref 20–50)
HGB BLD-MCNC: 15 G/DL (ref 12–16)
LDLC SERPL CALC-MCNC: 85 MG/DL
LYMPHOCYTES # BLD AUTO: 1.5 K/UL (ref 1–4.8)
LYMPHOCYTES NFR BLD: 20.3 % (ref 18–48)
MCH RBC QN AUTO: 32.5 PG (ref 27–31)
MCHC RBC AUTO-ENTMCNC: 33.3 G/DL (ref 32–36)
MCV RBC AUTO: 98 FL (ref 82–98)
MONOCYTES # BLD AUTO: 0.8 K/UL (ref 0.3–1)
MONOCYTES NFR BLD: 10.5 % (ref 4–15)
NEUTROPHILS # BLD AUTO: 5 K/UL (ref 1.8–7.7)
NEUTROPHILS NFR BLD: 67.6 % (ref 38–73)
NONHDLC SERPL-MCNC: 118 MG/DL
PLATELET # BLD AUTO: 221 K/UL (ref 150–350)
PMV BLD AUTO: 8.9 FL (ref 9.2–12.9)
POTASSIUM SERPL-SCNC: 3.9 MMOL/L (ref 3.5–5.1)
PROT SERPL-MCNC: 7.7 G/DL (ref 6–8.4)
RBC # BLD AUTO: 4.62 M/UL (ref 4–5.4)
SODIUM SERPL-SCNC: 137 MMOL/L (ref 136–145)
TRIGL SERPL-MCNC: 164 MG/DL (ref 30–150)
TSH SERPL DL<=0.005 MIU/L-ACNC: 1.56 UIU/ML (ref 0.45–5.33)
WBC # BLD AUTO: 7.5 K/UL (ref 3.9–12.7)

## 2019-05-01 PROCEDURE — 93010 ELECTROCARDIOGRAM REPORT: CPT | Mod: S$GLB,,, | Performed by: INTERNAL MEDICINE

## 2019-05-01 PROCEDURE — 99999 PR PBB SHADOW E&M-EST. PATIENT-LVL IV: CPT | Mod: PBBFAC,,, | Performed by: FAMILY MEDICINE

## 2019-05-01 PROCEDURE — 3079F DIAST BP 80-89 MM HG: CPT | Mod: S$GLB,,, | Performed by: FAMILY MEDICINE

## 2019-05-01 PROCEDURE — 99214 OFFICE O/P EST MOD 30 MIN: CPT | Mod: 25,S$GLB,, | Performed by: FAMILY MEDICINE

## 2019-05-01 PROCEDURE — 80061 LIPID PANEL: CPT

## 2019-05-01 PROCEDURE — 80053 COMPREHEN METABOLIC PANEL: CPT

## 2019-05-01 PROCEDURE — 93010 EKG 12-LEAD: ICD-10-PCS | Mod: S$GLB,,, | Performed by: INTERNAL MEDICINE

## 2019-05-01 PROCEDURE — 90714 TD VACC NO PRESV 7 YRS+ IM: CPT | Mod: S$GLB,,, | Performed by: FAMILY MEDICINE

## 2019-05-01 PROCEDURE — 90471 TD VACCINE GREATER THAN OR EQUAL TO 7YO PRESERVATIVE FREE IM: ICD-10-PCS | Mod: S$GLB,,, | Performed by: FAMILY MEDICINE

## 2019-05-01 PROCEDURE — 99214 PR OFFICE/OUTPT VISIT, EST, LEVL IV, 30-39 MIN: ICD-10-PCS | Mod: 25,S$GLB,, | Performed by: FAMILY MEDICINE

## 2019-05-01 PROCEDURE — 90714 TD VACCINE GREATER THAN OR EQUAL TO 7YO PRESERVATIVE FREE IM: ICD-10-PCS | Mod: S$GLB,,, | Performed by: FAMILY MEDICINE

## 2019-05-01 PROCEDURE — 93005 EKG 12-LEAD: ICD-10-PCS | Mod: S$GLB,,, | Performed by: FAMILY MEDICINE

## 2019-05-01 PROCEDURE — 3079F PR MOST RECENT DIASTOLIC BLOOD PRESSURE 80-89 MM HG: ICD-10-PCS | Mod: S$GLB,,, | Performed by: FAMILY MEDICINE

## 2019-05-01 PROCEDURE — 3074F PR MOST RECENT SYSTOLIC BLOOD PRESSURE < 130 MM HG: ICD-10-PCS | Mod: S$GLB,,, | Performed by: FAMILY MEDICINE

## 2019-05-01 PROCEDURE — 90471 IMMUNIZATION ADMIN: CPT | Mod: S$GLB,,, | Performed by: FAMILY MEDICINE

## 2019-05-01 PROCEDURE — 99999 PR PBB SHADOW E&M-EST. PATIENT-LVL IV: ICD-10-PCS | Mod: PBBFAC,,, | Performed by: FAMILY MEDICINE

## 2019-05-01 PROCEDURE — 85025 COMPLETE CBC W/AUTO DIFF WBC: CPT

## 2019-05-01 PROCEDURE — 86803 HEPATITIS C AB TEST: CPT

## 2019-05-01 PROCEDURE — 84443 ASSAY THYROID STIM HORMONE: CPT

## 2019-05-01 PROCEDURE — 93005 ELECTROCARDIOGRAM TRACING: CPT | Mod: S$GLB,,, | Performed by: FAMILY MEDICINE

## 2019-05-01 PROCEDURE — 3074F SYST BP LT 130 MM HG: CPT | Mod: S$GLB,,, | Performed by: FAMILY MEDICINE

## 2019-05-01 RX ORDER — AMLODIPINE BESYLATE 10 MG/1
10 TABLET ORAL DAILY
Qty: 90 TABLET | Refills: 1 | Status: SHIPPED | OUTPATIENT
Start: 2019-05-01 | End: 2019-10-15 | Stop reason: SDUPTHER

## 2019-05-01 RX ORDER — DULOXETIN HYDROCHLORIDE 30 MG/1
30 CAPSULE, DELAYED RELEASE ORAL DAILY
Qty: 90 CAPSULE | Refills: 1 | Status: SHIPPED | OUTPATIENT
Start: 2019-05-01 | End: 2019-08-13

## 2019-05-01 RX ORDER — ATORVASTATIN CALCIUM 20 MG/1
20 TABLET, FILM COATED ORAL DAILY
Qty: 90 TABLET | Refills: 1 | Status: SHIPPED | OUTPATIENT
Start: 2019-05-01 | End: 2019-10-15 | Stop reason: SDUPTHER

## 2019-05-01 RX ORDER — MELOXICAM 15 MG/1
15 TABLET ORAL DAILY
Qty: 90 TABLET | Refills: 1 | Status: SHIPPED | OUTPATIENT
Start: 2019-05-01

## 2019-05-01 RX ORDER — OMEPRAZOLE 40 MG/1
40 CAPSULE, DELAYED RELEASE ORAL DAILY
Qty: 90 CAPSULE | Refills: 1 | Status: SHIPPED | OUTPATIENT
Start: 2019-05-01 | End: 2019-10-15 | Stop reason: SDUPTHER

## 2019-05-01 NOTE — PROGRESS NOTES
"Subjective:       Patient ID: Lashanda Monaco is a 63 y.o. female.    Chief Complaint: Depression and Check Up (fasting for labs )    Worsening depression over the past few years, feeling overwhelmed by her 's declining health, her son's issues. Feels like she is at the point where she can't take care of them adequately until she takes care of herself. Has been turning to food as a coping mechanism, eating very poorly especially late at night. Took Lexapro after Annamaria, says it helped but made her feel "numb."  Has been having nonexertional chest pain, thinks due to stress, sometimes makes her throw up. Chronic exertional dyspnea. Has an upcoming appointment with cardiologist for further eval. No known hx of heart disease.    Review of Systems   Constitutional: Positive for fatigue and unexpected weight change. Negative for chills and fever.   HENT: Negative for congestion.    Eyes: Negative for visual disturbance.   Respiratory: Negative for cough and shortness of breath.    Cardiovascular: Negative for chest pain.   Gastrointestinal: Negative for abdominal pain, nausea and vomiting.   Genitourinary: Negative for difficulty urinating.   Musculoskeletal: Positive for arthralgias and back pain.   Skin: Negative for rash.   Neurological: Negative for dizziness.   Psychiatric/Behavioral: Positive for dysphoric mood. Negative for self-injury and suicidal ideas.       Objective:      Vitals:    05/01/19 1127   BP: 124/84   BP Location: Left arm   Patient Position: Sitting   BP Method: Large (Automatic)   Pulse: 75   Resp: 18   SpO2: 96%   Weight: 96.6 kg (213 lb)   Height: 5' 6" (1.676 m)     Physical Exam   Constitutional: She is oriented to person, place, and time. She appears well-developed and well-nourished.   HENT:   Head: Normocephalic and atraumatic.   Eyes: Pupils are equal, round, and reactive to light. EOM are normal.   Neck: Neck supple. No JVD present. Carotid bruit is not present. "   Cardiovascular: Normal rate, regular rhythm and normal heart sounds.   Pulses:       Radial pulses are 2+ on the right side, and 2+ on the left side.   Pulmonary/Chest: Effort normal and breath sounds normal.   Abdominal: Soft. Bowel sounds are normal. She exhibits no distension. There is no tenderness.   Musculoskeletal: She exhibits no edema.   Neurological: She is alert and oriented to person, place, and time.   Skin: Skin is warm and dry.   Psychiatric: Her speech is normal and behavior is normal. Judgment and thought content normal. Cognition and memory are normal. She exhibits a depressed mood.   Nursing note and vitals reviewed.      Assessment:       1. Annual physical exam    2. Essential hypertension, benign    3. Mixed hyperlipidemia    4. Gastroesophageal reflux disease, esophagitis presence not specified    5. DDD (degenerative disc disease), lumbar    6. Moderate episode of recurrent major depressive disorder    7. Need for vaccination    8. Need for hepatitis C screening test        Plan:       Annual physical exam  -     CBC auto differential; Future; Expected date: 05/01/2019  -     Comprehensive metabolic panel; Future; Expected date: 05/01/2019  -     Lipid panel; Future; Expected date: 05/01/2019  -     TSH; Future; Expected date: 05/01/2019  -     Hepatitis C antibody; Future; Expected date: 05/01/2019  -     EKG 12-lead    Essential hypertension, benign  -     amLODIPine (NORVASC) 10 MG tablet; Take 1 tablet (10 mg total) by mouth once daily.  Dispense: 90 tablet; Refill: 1  -     CBC auto differential; Future; Expected date: 05/01/2019  -     Comprehensive metabolic panel; Future; Expected date: 05/01/2019  -     TSH; Future; Expected date: 05/01/2019  -     EKG 12-lead    Mixed hyperlipidemia  -     atorvastatin (LIPITOR) 20 MG tablet; Take 1 tablet (20 mg total) by mouth once daily.  Dispense: 90 tablet; Refill: 1  -     Comprehensive metabolic panel; Future; Expected date: 05/01/2019  -      Lipid panel; Future; Expected date: 05/01/2019    Gastroesophageal reflux disease, esophagitis presence not specified  -     omeprazole (PRILOSEC) 40 MG capsule; Take 1 capsule (40 mg total) by mouth once daily.  Dispense: 90 capsule; Refill: 1  -     CBC auto differential; Future; Expected date: 05/01/2019    DDD (degenerative disc disease), lumbar  -     meloxicam (MOBIC) 15 MG tablet; Take 1 tablet (15 mg total) by mouth once daily.  Dispense: 90 tablet; Refill: 1    Moderate episode of recurrent major depressive disorder  -     TSH; Future; Expected date: 05/01/2019  -     DULoxetine (CYMBALTA) 30 MG capsule; Take 1 capsule (30 mg total) by mouth once daily.  Dispense: 90 capsule; Refill: 1  -     Ambulatory referral to Psychology    Need for vaccination  -     Td Vaccine (Adult) - Preservative Free    Need for hepatitis C screening test  -     Hepatitis C antibody; Future; Expected date: 05/01/2019      Medication List with Changes/Refills   New Medications    DULOXETINE (CYMBALTA) 30 MG CAPSULE    Take 1 capsule (30 mg total) by mouth once daily.   Current Medications    ESTRADIOL (ESTRACE) 2 MG TABLET    Take 1 tablet (2 mg total) by mouth once daily.    FISH OIL-OMEGA-3 FATTY ACIDS 300-1,000 MG CAPSULE    Take 1 g by mouth once daily.    TURMERIC ORAL    Take 1 tablet by mouth once daily.   Changed and/or Refilled Medications    Modified Medication Previous Medication    AMLODIPINE (NORVASC) 10 MG TABLET amLODIPine (NORVASC) 10 MG tablet       Take 1 tablet (10 mg total) by mouth once daily.    Take 1 tablet (10 mg total) by mouth once daily.    ATORVASTATIN (LIPITOR) 20 MG TABLET atorvastatin (LIPITOR) 20 MG tablet       Take 1 tablet (20 mg total) by mouth once daily.    Take 1 tablet (20 mg total) by mouth once daily.    MELOXICAM (MOBIC) 15 MG TABLET meloxicam (MOBIC) 15 MG tablet       Take 1 tablet (15 mg total) by mouth once daily.    Take 15 mg by mouth once daily.    OMEPRAZOLE (PRILOSEC) 40 MG  CAPSULE omeprazole (PRILOSEC) 40 MG capsule       Take 1 capsule (40 mg total) by mouth once daily.    Take 1 capsule (40 mg total) by mouth once daily.

## 2019-05-01 NOTE — PROGRESS NOTES
Patient ID verified by name and . Allergies reviewed with patient. Tetanus vaccine administered IM in right deltoid using aseptic technique. Aspirated with no blood noted. Patient tolerated well. Given per physicians order. No adverse reaction noted.

## 2019-05-02 ENCOUNTER — TELEPHONE (OUTPATIENT)
Dept: PRIMARY CARE CLINIC | Facility: CLINIC | Age: 64
End: 2019-05-02

## 2019-05-02 LAB — HCV AB SERPL QL IA: NEGATIVE

## 2019-05-02 RX ORDER — LORAZEPAM 1 MG/1
1 TABLET ORAL DAILY PRN
Qty: 30 TABLET | Refills: 1 | Status: SHIPPED | OUTPATIENT
Start: 2019-05-02 | End: 2020-06-29 | Stop reason: CLARIF

## 2019-05-02 NOTE — TELEPHONE ENCOUNTER
----- Message from Wayne Lundberg MD sent at 5/2/2019  4:00 PM CDT -----  Labs all look good, continue current medications.  Follow-up as scheduled.

## 2019-06-04 ENCOUNTER — PATIENT OUTREACH (OUTPATIENT)
Dept: ADMINISTRATIVE | Facility: HOSPITAL | Age: 64
End: 2019-06-04

## 2019-06-04 NOTE — PROGRESS NOTES
Immunizations reviewed. Legacy reviewed. Placed call to patient to discuss overdue HM. Spoke w/ patient. Verified with patient that patient received Fit Kit. Inquired if patient has any questions and patient advised no. Patient states she will submit Fit Kit this week. Advised patient to contact clinic should she have any questions or concerns and patient verbalized understanding. Pre-visit chart review completed.

## 2019-06-12 ENCOUNTER — LAB VISIT (OUTPATIENT)
Dept: LAB | Facility: HOSPITAL | Age: 64
End: 2019-06-12
Attending: FAMILY MEDICINE
Payer: MEDICARE

## 2019-06-12 DIAGNOSIS — Z12.11 COLON CANCER SCREENING: ICD-10-CM

## 2019-06-12 PROCEDURE — 82274 ASSAY TEST FOR BLOOD FECAL: CPT

## 2019-06-14 LAB — HEMOCCULT STL QL IA: NEGATIVE

## 2019-07-12 RX ORDER — ESTRADIOL 2 MG/1
2 TABLET ORAL DAILY
Qty: 90 TABLET | Refills: 1 | Status: SHIPPED | OUTPATIENT
Start: 2019-07-12

## 2019-07-12 NOTE — TELEPHONE ENCOUNTER
----- Message from Sridevi Gordillo sent at 7/12/2019 10:53 AM CDT -----  Contact: Patient  Type:  RX Refill Request    Who Called:  Lashanda, patient  Refill or New Rx:  Refill  RX Name and Strength:  estradiol (ESTRACE) 2 MG tablet  How is the patient currently taking it? (ex. 1XDay):  Take 1 tablet (2 mg total) by mouth once daily  Is this a 30 day or 90 day RX:  90  Preferred Pharmacy with phone number:   [facility] [address] [address]  Phone: [phone] Fax: [phone]   Local or Mail Order:  Mail order  Ordering Provider:  Dr Tamika Calderon Call Back Number:  [phone]  Additional Information:  Please advise. Thanks.

## 2019-08-02 ENCOUNTER — PATIENT MESSAGE (OUTPATIENT)
Dept: PRIMARY CARE CLINIC | Facility: CLINIC | Age: 64
End: 2019-08-02

## 2019-08-13 ENCOUNTER — OFFICE VISIT (OUTPATIENT)
Dept: PRIMARY CARE CLINIC | Facility: CLINIC | Age: 64
End: 2019-08-13
Payer: MEDICARE

## 2019-08-13 VITALS
RESPIRATION RATE: 18 BRPM | WEIGHT: 216 LBS | HEART RATE: 93 BPM | TEMPERATURE: 98 F | DIASTOLIC BLOOD PRESSURE: 73 MMHG | OXYGEN SATURATION: 97 % | SYSTOLIC BLOOD PRESSURE: 106 MMHG | HEIGHT: 65 IN | BODY MASS INDEX: 35.99 KG/M2

## 2019-08-13 DIAGNOSIS — Z01.818 PREOPERATIVE EXAMINATION: Primary | ICD-10-CM

## 2019-08-13 DIAGNOSIS — M16.11 ARTHRITIS OF RIGHT HIP: ICD-10-CM

## 2019-08-13 PROCEDURE — 99213 PR OFFICE/OUTPT VISIT, EST, LEVL III, 20-29 MIN: ICD-10-PCS | Mod: S$GLB,,, | Performed by: FAMILY MEDICINE

## 2019-08-13 PROCEDURE — 3078F DIAST BP <80 MM HG: CPT | Mod: S$GLB,,, | Performed by: FAMILY MEDICINE

## 2019-08-13 PROCEDURE — 3008F PR BODY MASS INDEX (BMI) DOCUMENTED: ICD-10-PCS | Mod: S$GLB,,, | Performed by: FAMILY MEDICINE

## 2019-08-13 PROCEDURE — 3074F SYST BP LT 130 MM HG: CPT | Mod: S$GLB,,, | Performed by: FAMILY MEDICINE

## 2019-08-13 PROCEDURE — 99999 PR PBB SHADOW E&M-EST. PATIENT-LVL III: CPT | Mod: PBBFAC,,, | Performed by: FAMILY MEDICINE

## 2019-08-13 PROCEDURE — 99213 OFFICE O/P EST LOW 20 MIN: CPT | Mod: S$GLB,,, | Performed by: FAMILY MEDICINE

## 2019-08-13 PROCEDURE — 3078F PR MOST RECENT DIASTOLIC BLOOD PRESSURE < 80 MM HG: ICD-10-PCS | Mod: S$GLB,,, | Performed by: FAMILY MEDICINE

## 2019-08-13 PROCEDURE — 99999 PR PBB SHADOW E&M-EST. PATIENT-LVL III: ICD-10-PCS | Mod: PBBFAC,,, | Performed by: FAMILY MEDICINE

## 2019-08-13 PROCEDURE — 3074F PR MOST RECENT SYSTOLIC BLOOD PRESSURE < 130 MM HG: ICD-10-PCS | Mod: S$GLB,,, | Performed by: FAMILY MEDICINE

## 2019-08-13 PROCEDURE — 3008F BODY MASS INDEX DOCD: CPT | Mod: S$GLB,,, | Performed by: FAMILY MEDICINE

## 2019-08-13 RX ORDER — ASPIRIN 81 MG/1
81 TABLET ORAL DAILY
COMMUNITY
End: 2021-11-22

## 2019-08-13 NOTE — PROGRESS NOTES
"Subjective:       Patient ID: Lashanda Monaco is a 63 y.o. female.    Chief Complaint: Pre-op Exam (here to be cleared for hip sx )    Scheduled for right hip arthroplasty next Monday.  No recent illness.  Labs done at [facility] a few days ago reviewed, CBC and BMP unremarkable.  Cardiology records reviewed, as well, no acute abnormalities.    Review of Systems   Constitutional: Negative for activity change and unexpected weight change.   HENT: Negative for hearing loss, rhinorrhea and trouble swallowing.    Eyes: Negative for discharge and visual disturbance.   Respiratory: Negative for chest tightness and wheezing.    Cardiovascular: Negative for chest pain and palpitations.   Gastrointestinal: Negative for blood in stool, constipation, diarrhea and vomiting.   Endocrine: Negative for polydipsia and polyuria.   Genitourinary: Negative for difficulty urinating, dysuria, hematuria and menstrual problem.   Musculoskeletal: Positive for arthralgias and joint swelling. Negative for neck pain.   Skin: Negative for rash and wound.   Neurological: Negative for weakness and headaches.   Hematological: Does not bruise/bleed easily.   Psychiatric/Behavioral: Positive for dysphoric mood. Negative for confusion.       Objective:      Vitals:    08/13/19 1548   BP: 106/73   BP Location: Left arm   Patient Position: Sitting   BP Method: Large (Automatic)   Pulse: 93   Resp: 18   Temp: 97.8 °F (36.6 °C)   TempSrc: Oral   SpO2: 97%   Weight: 98 kg (216 lb)   Height: 5' 4.5" (1.638 m)     Physical Exam   Constitutional: She is oriented to person, place, and time. She appears well-developed and well-nourished.   HENT:   Head: Normocephalic and atraumatic.   Cardiovascular: Normal rate, regular rhythm and normal heart sounds.   Pulmonary/Chest: Effort normal and breath sounds normal.   Musculoskeletal: She exhibits no edema.   Neurological: She is alert and oriented to person, place, and time.   Skin: Skin is warm and dry. "   Psychiatric: She has a normal mood and affect. Her behavior is normal.   Nursing note and vitals reviewed.      Assessment:       1. Preoperative examination    2. Arthritis of right hip        Plan:       Preoperative examination  -     X-Ray Chest PA And Lateral; Future; Expected date: 08/13/2019  Labs, EKG, echo and chest x-ray all look good.  Patient is medically cleared for right hip arthroplasty.  Arthritis of right hip      Medication List with Changes/Refills   Current Medications    AMLODIPINE (NORVASC) 10 MG TABLET    Take 1 tablet (10 mg total) by mouth once daily.    ASPIRIN (ECOTRIN) 81 MG EC TABLET    Take 81 mg by mouth once daily.    ATORVASTATIN (LIPITOR) 20 MG TABLET    Take 1 tablet (20 mg total) by mouth once daily.    ESTRADIOL (ESTRACE) 2 MG TABLET    Take 1 tablet (2 mg total) by mouth once daily.    FISH OIL-OMEGA-3 FATTY ACIDS 300-1,000 MG CAPSULE    Take 1 g by mouth once daily.    LORAZEPAM (ATIVAN) 1 MG TABLET    Take 1 tablet (1 mg total) by mouth daily as needed for Anxiety.    MELOXICAM (MOBIC) 15 MG TABLET    Take 1 tablet (15 mg total) by mouth once daily.    OMEPRAZOLE (PRILOSEC) 40 MG CAPSULE    Take 1 capsule (40 mg total) by mouth once daily.    TURMERIC ORAL    Take 1 tablet by mouth once daily.   Discontinued Medications    DULOXETINE (CYMBALTA) 30 MG CAPSULE    Take 1 capsule (30 mg total) by mouth once daily.

## 2019-10-15 DIAGNOSIS — E78.2 MIXED HYPERLIPIDEMIA: ICD-10-CM

## 2019-10-15 DIAGNOSIS — K21.9 GASTROESOPHAGEAL REFLUX DISEASE, ESOPHAGITIS PRESENCE NOT SPECIFIED: ICD-10-CM

## 2019-10-15 DIAGNOSIS — I10 ESSENTIAL HYPERTENSION, BENIGN: ICD-10-CM

## 2019-10-15 RX ORDER — AMLODIPINE BESYLATE 10 MG/1
TABLET ORAL
Qty: 90 TABLET | Refills: 3 | Status: SHIPPED | OUTPATIENT
Start: 2019-10-15 | End: 2020-10-02

## 2019-10-15 RX ORDER — OMEPRAZOLE 40 MG/1
CAPSULE, DELAYED RELEASE ORAL
Qty: 90 CAPSULE | Refills: 3 | Status: SHIPPED | OUTPATIENT
Start: 2019-10-15 | End: 2020-07-07

## 2019-10-15 RX ORDER — ATORVASTATIN CALCIUM 20 MG/1
TABLET, FILM COATED ORAL
Qty: 90 TABLET | Refills: 3 | Status: SHIPPED | OUTPATIENT
Start: 2019-10-15 | End: 2020-10-02

## 2020-03-19 ENCOUNTER — PATIENT MESSAGE (OUTPATIENT)
Dept: PRIMARY CARE CLINIC | Facility: CLINIC | Age: 65
End: 2020-03-19

## 2020-03-21 ENCOUNTER — PATIENT MESSAGE (OUTPATIENT)
Dept: PRIMARY CARE CLINIC | Facility: CLINIC | Age: 65
End: 2020-03-21

## 2020-04-23 DIAGNOSIS — R13.10 DYSPHAGIA, UNSPECIFIED: Primary | ICD-10-CM

## 2020-05-01 ENCOUNTER — HOSPITAL ENCOUNTER (OUTPATIENT)
Dept: RADIOLOGY | Facility: HOSPITAL | Age: 65
Discharge: HOME | End: 2020-05-01
Attending: INTERNAL MEDICINE
Payer: MEDICARE

## 2020-05-01 DIAGNOSIS — R13.10 DYSPHAGIA, UNSPECIFIED: ICD-10-CM

## 2020-05-01 PROCEDURE — 74220 X-RAY XM ESOPHAGUS 1CNTRST: CPT | Mod: TC

## 2020-05-21 DIAGNOSIS — I10 ESSENTIAL HYPERTENSION, BENIGN: ICD-10-CM

## 2020-06-18 DIAGNOSIS — Z12.11 COLON CANCER SCREENING: ICD-10-CM

## 2020-07-07 ENCOUNTER — OFFICE VISIT (OUTPATIENT)
Dept: PRIMARY CARE CLINIC | Facility: CLINIC | Age: 65
End: 2020-07-07
Payer: MEDICARE

## 2020-07-07 VITALS
WEIGHT: 210.56 LBS | DIASTOLIC BLOOD PRESSURE: 84 MMHG | RESPIRATION RATE: 16 BRPM | HEIGHT: 64 IN | BODY MASS INDEX: 35.95 KG/M2 | HEART RATE: 78 BPM | OXYGEN SATURATION: 94 % | SYSTOLIC BLOOD PRESSURE: 122 MMHG | TEMPERATURE: 98 F

## 2020-07-07 DIAGNOSIS — R51.9 ACUTE NONINTRACTABLE HEADACHE, UNSPECIFIED HEADACHE TYPE: Primary | ICD-10-CM

## 2020-07-07 DIAGNOSIS — K21.9 GASTROESOPHAGEAL REFLUX DISEASE, ESOPHAGITIS PRESENCE NOT SPECIFIED: ICD-10-CM

## 2020-07-07 PROCEDURE — 99214 OFFICE O/P EST MOD 30 MIN: CPT | Mod: S$GLB,,, | Performed by: FAMILY MEDICINE

## 2020-07-07 PROCEDURE — 99214 PR OFFICE/OUTPT VISIT, EST, LEVL IV, 30-39 MIN: ICD-10-PCS | Mod: S$GLB,,, | Performed by: FAMILY MEDICINE

## 2020-07-07 PROCEDURE — 99999 PR PBB SHADOW E&M-EST. PATIENT-LVL IV: ICD-10-PCS | Mod: PBBFAC,,, | Performed by: FAMILY MEDICINE

## 2020-07-07 PROCEDURE — 3079F DIAST BP 80-89 MM HG: CPT | Mod: S$GLB,,, | Performed by: FAMILY MEDICINE

## 2020-07-07 PROCEDURE — 3008F PR BODY MASS INDEX (BMI) DOCUMENTED: ICD-10-PCS | Mod: S$GLB,,, | Performed by: FAMILY MEDICINE

## 2020-07-07 PROCEDURE — 99999 PR PBB SHADOW E&M-EST. PATIENT-LVL IV: CPT | Mod: PBBFAC,,, | Performed by: FAMILY MEDICINE

## 2020-07-07 PROCEDURE — 1101F PR PT FALLS ASSESS DOC 0-1 FALLS W/OUT INJ PAST YR: ICD-10-PCS | Mod: S$GLB,,, | Performed by: FAMILY MEDICINE

## 2020-07-07 PROCEDURE — 3074F PR MOST RECENT SYSTOLIC BLOOD PRESSURE < 130 MM HG: ICD-10-PCS | Mod: S$GLB,,, | Performed by: FAMILY MEDICINE

## 2020-07-07 PROCEDURE — 3008F BODY MASS INDEX DOCD: CPT | Mod: S$GLB,,, | Performed by: FAMILY MEDICINE

## 2020-07-07 PROCEDURE — 3079F PR MOST RECENT DIASTOLIC BLOOD PRESSURE 80-89 MM HG: ICD-10-PCS | Mod: S$GLB,,, | Performed by: FAMILY MEDICINE

## 2020-07-07 PROCEDURE — 3074F SYST BP LT 130 MM HG: CPT | Mod: S$GLB,,, | Performed by: FAMILY MEDICINE

## 2020-07-07 PROCEDURE — 1101F PT FALLS ASSESS-DOCD LE1/YR: CPT | Mod: S$GLB,,, | Performed by: FAMILY MEDICINE

## 2020-07-07 RX ORDER — OMEPRAZOLE 40 MG/1
40 CAPSULE, DELAYED RELEASE ORAL
Qty: 180 CAPSULE | Refills: 1 | Status: SHIPPED | OUTPATIENT
Start: 2020-07-07 | End: 2021-01-22

## 2020-07-07 NOTE — PROGRESS NOTES
"Subjective:       Patient ID: Lashanda Monaco is a 64 y.o. female.    Chief Complaint: Follow-up (from ER for severe headaches, nausea)    Has been having HA's off and on for the past several months, thinks may be related to weaning off HRT. Woke up last Thursday with severe HA worse than anything she has ever felt, so went to ER. Workup unremarkable, including labs, EKG, CXR and head CT. Called GYN, and started back on HRT the next day. Headaches are diffuse, non-localizing. Has been feeling lightheaded and dizzy for the past week. Nauseated, no vomiting. Has hx of migraines in the past, says this HA was worse and felt different. BP elevated in ER, and has been monitoring at home, mostly 140's/80s. Has been prescribed Fioricet, but hasn't taken    Review of Systems   Constitutional: Negative for fever.   HENT: Negative for tinnitus.    Eyes: Positive for photophobia. Negative for visual disturbance.   Respiratory: Negative for shortness of breath.    Cardiovascular: Negative for chest pain.   Gastrointestinal: Positive for nausea.   Genitourinary: Negative for difficulty urinating.   Skin: Negative for rash and wound.   Allergic/Immunologic: Negative for immunocompromised state.   Neurological: Positive for light-headedness and headaches.   Hematological: Does not bruise/bleed easily.   Psychiatric/Behavioral: Negative for agitation and confusion.       Objective:      Vitals:    07/07/20 1324   BP: 122/84   BP Location: Right arm   Patient Position: Sitting   BP Method: Large (Manual)   Pulse: 78   Resp: 16   Temp: 98.3 °F (36.8 °C)   TempSrc: Oral   SpO2: (!) 94%   Weight: 95.5 kg (210 lb 8.6 oz)   Height: 5' 4" (1.626 m)     Physical Exam  Vitals signs and nursing note reviewed.   Constitutional:       Appearance: She is well-developed.   HENT:      Head: Normocephalic and atraumatic.   Eyes:      Pupils: Pupils are equal, round, and reactive to light.   Neck:      Musculoskeletal: Neck supple.      Vascular: " No carotid bruit or JVD.   Cardiovascular:      Rate and Rhythm: Normal rate and regular rhythm.      Pulses:           Radial pulses are 2+ on the right side and 2+ on the left side.      Heart sounds: Normal heart sounds.   Pulmonary:      Effort: Pulmonary effort is normal.      Breath sounds: Normal breath sounds.   Abdominal:      General: Bowel sounds are normal. There is no distension.      Palpations: Abdomen is soft.      Tenderness: There is no abdominal tenderness.   Skin:     General: Skin is warm and dry.   Neurological:      Mental Status: She is alert and oriented to person, place, and time.   Psychiatric:         Mood and Affect: Mood normal.         Behavior: Behavior normal.         Lab Results   Component Value Date    WBC 7.50 06/29/2020    HGB 15.0 06/29/2020    HCT 44.7 06/29/2020     06/29/2020    CHOL 199 05/01/2019    TRIG 164 (H) 05/01/2019    HDL 81 (H) 05/01/2019    ALT 21 06/29/2020    AST 27 06/29/2020     (L) 06/29/2020    K 3.7 06/29/2020    CL 99 (L) 06/29/2020    CREATININE 0.7 06/29/2020    BUN 14 06/29/2020    CO2 25 06/29/2020    TSH 1.56 05/01/2019      Assessment:       1. Acute nonintractable headache, unspecified headache type    2. Gastroesophageal reflux disease, esophagitis presence not specified        Plan:       Acute nonintractable headache, unspecified headache type  -     MRI Brain W WO Contrast; Future; Expected date: 07/07/2020  -     Ambulatory referral/consult to Neurology; Future; Expected date: 07/14/2020  Advised to continue to monitoring BP closely. Needs additional neuroimaging, neurology f/u. Ok to use Fioricet as needed  Gastroesophageal reflux disease, esophagitis presence not specified  -     omeprazole (PRILOSEC) 40 MG capsule; Take 1 capsule (40 mg total) by mouth 2 (two) times daily before meals.  Dispense: 180 capsule; Refill: 1      Medication List with Changes/Refills   Current Medications    AMLODIPINE (NORVASC) 10 MG TABLET    TAKE 1  TABLET DAILY    ASPIRIN (ECOTRIN) 81 MG EC TABLET    Take 81 mg by mouth once daily.    ATORVASTATIN (LIPITOR) 20 MG TABLET    TAKE 1 TABLET DAILY    BUTALBITAL-ACETAMINOPHEN-CAFFEINE -40 MG (FIORICET, ESGIC) -40 MG PER TABLET    Take 1 tablet by mouth every 4 (four) hours as needed for Pain.    ESTRADIOL (ESTRACE) 2 MG TABLET    Take 1 tablet (2 mg total) by mouth once daily.    FISH OIL-OMEGA-3 FATTY ACIDS 300-1,000 MG CAPSULE    Take 1 g by mouth once daily.    MELOXICAM (MOBIC) 15 MG TABLET    Take 1 tablet (15 mg total) by mouth once daily.    ONDANSETRON (ZOFRAN) 4 MG TABLET    Take 1 tablet (4 mg total) by mouth every 6 (six) hours.    TURMERIC ORAL    Take 1 tablet by mouth once daily.   Changed and/or Refilled Medications    Modified Medication Previous Medication    OMEPRAZOLE (PRILOSEC) 40 MG CAPSULE omeprazole (PRILOSEC) 40 MG capsule       Take 1 capsule (40 mg total) by mouth 2 (two) times daily before meals.    TAKE 1 CAPSULE DAILY

## 2020-07-29 ENCOUNTER — NURSE TRIAGE (OUTPATIENT)
Dept: ADMINISTRATIVE | Facility: CLINIC | Age: 65
End: 2020-07-29

## 2020-07-29 NOTE — TELEPHONE ENCOUNTER
Covid-19 symptom tracker call back, patient reports SOB with minimal activity. Denies chest pain or pressure. C/o cough, improving. Denies fever and chills. Care advice given per protocol to go to ED for evaluation. Patient states that she has no one to bring her there at this time. Connected to PCP office for phone consultation.     Reason for Disposition   MILD difficulty breathing (e.g., minimal/no SOB at rest, SOB with walking, pulse <100)    Additional Information   Negative: SEVERE difficulty breathing (e.g., struggling for each breath, speaks in single words)   Negative: Difficult to awaken or acting confused (e.g., disoriented, slurred speech)   Negative: Bluish (or gray) lips or face now   Negative: Shock suspected (e.g., cold/pale/clammy skin, too weak to stand, low BP, rapid pulse)   Negative: Sounds like a life-threatening emergency to the triager   Negative: [1] COVID-19 exposure AND [2] NO symptoms   Negative: COVID-19 and Breastfeeding, questions about   Negative: [1] Adult with possible COVID-19 symptoms AND [2] triager concerned about severity of symptoms or other causes   Negative: SEVERE or constant chest pain or pressure (Exception: mild central chest pain, present only when coughing)   Negative: MODERATE difficulty breathing (e.g., speaks in phrases, SOB even at rest, pulse 100-120)    Protocols used: CORONAVIRUS (COVID-19) DIAGNOSED OR [id number]

## 2020-08-11 ENCOUNTER — PATIENT OUTREACH (OUTPATIENT)
Dept: ADMINISTRATIVE | Facility: OTHER | Age: 65
End: 2020-08-11

## 2020-08-11 DIAGNOSIS — Z12.31 BREAST CANCER SCREENING BY MAMMOGRAM: Primary | ICD-10-CM

## 2020-08-12 ENCOUNTER — OFFICE VISIT (OUTPATIENT)
Dept: OTOLARYNGOLOGY | Facility: CLINIC | Age: 65
End: 2020-08-12
Payer: MEDICARE

## 2020-08-12 VITALS
SYSTOLIC BLOOD PRESSURE: 125 MMHG | WEIGHT: 214.88 LBS | HEART RATE: 79 BPM | HEIGHT: 64 IN | DIASTOLIC BLOOD PRESSURE: 86 MMHG | BODY MASS INDEX: 36.69 KG/M2 | TEMPERATURE: 98 F

## 2020-08-12 DIAGNOSIS — R05.9 COUGH: Primary | ICD-10-CM

## 2020-08-12 DIAGNOSIS — Z86.16 HISTORY OF 2019 NOVEL CORONAVIRUS DISEASE (COVID-19): ICD-10-CM

## 2020-08-12 DIAGNOSIS — Z87.19 HISTORY OF GASTROESOPHAGEAL REFLUX (GERD): ICD-10-CM

## 2020-08-12 PROCEDURE — 99205 OFFICE O/P NEW HI 60 MIN: CPT | Mod: S$GLB,,, | Performed by: OTOLARYNGOLOGY

## 2020-08-12 PROCEDURE — 3074F SYST BP LT 130 MM HG: CPT | Mod: S$GLB,,, | Performed by: OTOLARYNGOLOGY

## 2020-08-12 PROCEDURE — 99205 PR OFFICE/OUTPT VISIT, NEW, LEVL V, 60-74 MIN: ICD-10-PCS | Mod: S$GLB,,, | Performed by: OTOLARYNGOLOGY

## 2020-08-12 PROCEDURE — 1101F PT FALLS ASSESS-DOCD LE1/YR: CPT | Mod: S$GLB,,, | Performed by: OTOLARYNGOLOGY

## 2020-08-12 PROCEDURE — 3008F PR BODY MASS INDEX (BMI) DOCUMENTED: ICD-10-PCS | Mod: S$GLB,,, | Performed by: OTOLARYNGOLOGY

## 2020-08-12 PROCEDURE — 3079F PR MOST RECENT DIASTOLIC BLOOD PRESSURE 80-89 MM HG: ICD-10-PCS | Mod: S$GLB,,, | Performed by: OTOLARYNGOLOGY

## 2020-08-12 PROCEDURE — 3074F PR MOST RECENT SYSTOLIC BLOOD PRESSURE < 130 MM HG: ICD-10-PCS | Mod: S$GLB,,, | Performed by: OTOLARYNGOLOGY

## 2020-08-12 PROCEDURE — 3008F BODY MASS INDEX DOCD: CPT | Mod: S$GLB,,, | Performed by: OTOLARYNGOLOGY

## 2020-08-12 PROCEDURE — 1101F PR PT FALLS ASSESS DOC 0-1 FALLS W/OUT INJ PAST YR: ICD-10-PCS | Mod: S$GLB,,, | Performed by: OTOLARYNGOLOGY

## 2020-08-12 PROCEDURE — 3079F DIAST BP 80-89 MM HG: CPT | Mod: S$GLB,,, | Performed by: OTOLARYNGOLOGY

## 2020-08-12 RX ORDER — MONTELUKAST SODIUM 10 MG/1
10 TABLET ORAL DAILY
Qty: 30 TABLET | Refills: 2 | Status: SHIPPED | OUTPATIENT
Start: 2020-08-12 | End: 2020-09-11

## 2020-08-12 RX ORDER — LEVOFLOXACIN 500 MG/1
500 TABLET, FILM COATED ORAL DAILY
Qty: 10 TABLET | Refills: 0 | Status: SHIPPED | OUTPATIENT
Start: 2020-08-12 | End: 2020-08-22

## 2020-08-12 RX ORDER — MV-MIN/VIT C/GLUT/LYSINE/HB124 250-12.5MG
TABLET,CHEWABLE ORAL
COMMUNITY
End: 2023-03-01

## 2020-08-12 NOTE — PROGRESS NOTES
Chart reviewed.   Immunizations: Triggered Imm Registry     Orders placed: Mammo  Upcoming appts to satisfy LY topics: n/a

## 2020-08-12 NOTE — PATIENT INSTRUCTIONS
Take Levaquin as prescribed once daily.  Take montelukast 10 mg daily midday.  Continue omeprazole twice a day.  Reflux precautions.  Continue cough suppressant per PCP.  Okay to add OTC Mucinex DM.  Follow up in 2 - 3 weeks.    Keep PCP follow up.

## 2020-09-01 NOTE — PROGRESS NOTES
"Subjective:       Patient ID: Lashanda Monaco is a 64 y.o. female.    Chief Complaint: Other (cough/had COVID in July)    She is a new patient for me here today complaining of persistent cough since diagnosed with COVID infection on 7/21/20 per ED where received medrol dose pack and Tessalon.  Prior to this had taken 10 day course of Augmentin for "summer cold".  States she and her  had symptoms around the same time, but he has cleared and her symptoms persist.  States released from quarantine as of 08/01/2020 but continues to generally stay isolated.  Symptoms include headaches, sore throat, lots of very dry coughing which is worse in the evening.  Some dyspnea on exertion which is improving.  No wheezing or personal or family history of asthma.  Positive history of GERD on omeprazole for years, recently increased to twice daily about 4 months ago.  No sinonasal complaints except tendency for dry nose for several years.  Had carpet removed about 2 months ago.  No pets.  No tobacco ever.      Evaluated for headache end of June with negative COVID swab at that time, and being evaluated by outside neuro for this and for abnormal MRI possibly developmental in nature.      Interestingly her  tested positive for influenza in March, and she empirically took Tamiflu as well at that time due to cough and headache.  Also around that time in March, their adult son tested positive for COVID-19.           Review of Systems     Constitutional: Negative for chills and fever.      HENT: Negative for ear discharge, ear infection, hearing loss, nosebleeds, runny nose, stuffy nose and trouble swallowing.      Eyes:  Negative for change in eyesight.     Respiratory:  Positive for cough and shortness of breath.      Cardiovascular:  Positive for chest pain.     Gastrointestinal:  Positive for acid reflux and heartburn. Negative for abdominal pain.     Genitourinary: Negative for difficulty urinating.     Musc: Positive " for aching joints.     Neurological: Negative for seizures.                Objective:        Vitals:    08/12/20 1028   BP: 125/86   Pulse: 79   Temp: 97.5 °F (36.4 °C)     Body mass index is 36.89 kg/m².  Physical Exam  Constitutional:       General: She is not in acute distress.     Appearance: She is well-developed.   HENT:      Head: Normocephalic and atraumatic.      Right Ear: Tympanic membrane, ear canal and external ear normal. There is impacted cerumen.      Left Ear: Tympanic membrane, ear canal and external ear normal. There is impacted cerumen.      Nose: No nasal deformity, mucosal edema or rhinorrhea.      Mouth/Throat:      Mouth: No oral lesions.      Pharynx: No oropharyngeal exudate, posterior oropharyngeal erythema or uvula swelling.   Neck:      Musculoskeletal: Neck supple.      Trachea: Phonation normal.   Pulmonary:      Effort: Pulmonary effort is normal. No respiratory distress.      Breath sounds: Normal breath sounds.   Lymphadenopathy:      Cervical: No cervical adenopathy.   Skin:     General: Skin is warm and dry.   Neurological:      Mental Status: She is alert and oriented to person, place, and time.   Psychiatric:         Mood and Affect: Mood normal.         Behavior: Behavior normal.         Tests / Results:    CT of head without contrast done 06/29/2020 reviewed as well as MRI of brain with and without contrast done 07/09/2020.  CXR done 6/29/20 no acute findings.        Assessment:       1. Cough    2. History of 2019 novel coronavirus disease (COVID-19)    3. History of gastroesophageal reflux (GERD)        Plan:       Reviewed all above and considerations and recommendations and answered questions.  Will proceed as follows:  Take Levaquin as prescribed once daily.  Take montelukast 10 mg daily midday.  Continue omeprazole twice a day.  Reflux precautions.  Continue cough suppressant per PCP.  Okay to add OTC Mucinex DM.  Follow up in 2 - 3 weeks.    Keep PCP follow up.       Face-to-face time greater than 1 hour with majority of time spent counseling regarding all of the above.

## 2020-09-02 ENCOUNTER — OFFICE VISIT (OUTPATIENT)
Dept: OTOLARYNGOLOGY | Facility: CLINIC | Age: 65
End: 2020-09-02
Payer: MEDICARE

## 2020-09-02 VITALS
HEART RATE: 75 BPM | DIASTOLIC BLOOD PRESSURE: 86 MMHG | SYSTOLIC BLOOD PRESSURE: 132 MMHG | WEIGHT: 214 LBS | BODY MASS INDEX: 36.54 KG/M2 | TEMPERATURE: 97 F | HEIGHT: 64 IN

## 2020-09-02 DIAGNOSIS — Z87.09 HISTORY OF SINUSITIS: ICD-10-CM

## 2020-09-02 DIAGNOSIS — R05.9 COUGH: Primary | ICD-10-CM

## 2020-09-02 DIAGNOSIS — Z86.16 HISTORY OF 2019 NOVEL CORONAVIRUS DISEASE (COVID-19): ICD-10-CM

## 2020-09-02 DIAGNOSIS — H61.23 BILATERAL IMPACTED CERUMEN: ICD-10-CM

## 2020-09-02 DIAGNOSIS — Z87.19 HISTORY OF GASTROESOPHAGEAL REFLUX (GERD): ICD-10-CM

## 2020-09-02 PROCEDURE — 99214 PR OFFICE/OUTPT VISIT, EST, LEVL IV, 30-39 MIN: ICD-10-PCS | Mod: 25,S$GLB,, | Performed by: OTOLARYNGOLOGY

## 2020-09-02 PROCEDURE — 69210 REMOVE IMPACTED EAR WAX UNI: CPT | Mod: S$GLB,,, | Performed by: OTOLARYNGOLOGY

## 2020-09-02 PROCEDURE — 1101F PT FALLS ASSESS-DOCD LE1/YR: CPT | Mod: S$GLB,,, | Performed by: OTOLARYNGOLOGY

## 2020-09-02 PROCEDURE — 99214 OFFICE O/P EST MOD 30 MIN: CPT | Mod: 25,S$GLB,, | Performed by: OTOLARYNGOLOGY

## 2020-09-02 PROCEDURE — 3075F PR MOST RECENT SYSTOLIC BLOOD PRESS GE 130-139MM HG: ICD-10-PCS | Mod: S$GLB,,, | Performed by: OTOLARYNGOLOGY

## 2020-09-02 PROCEDURE — 3079F PR MOST RECENT DIASTOLIC BLOOD PRESSURE 80-89 MM HG: ICD-10-PCS | Mod: S$GLB,,, | Performed by: OTOLARYNGOLOGY

## 2020-09-02 PROCEDURE — 3079F DIAST BP 80-89 MM HG: CPT | Mod: S$GLB,,, | Performed by: OTOLARYNGOLOGY

## 2020-09-02 PROCEDURE — 69210 EAR CERUMEN REMOVAL: ICD-10-PCS | Mod: S$GLB,,, | Performed by: OTOLARYNGOLOGY

## 2020-09-02 PROCEDURE — 1101F PR PT FALLS ASSESS DOC 0-1 FALLS W/OUT INJ PAST YR: ICD-10-PCS | Mod: S$GLB,,, | Performed by: OTOLARYNGOLOGY

## 2020-09-02 PROCEDURE — 3075F SYST BP GE 130 - 139MM HG: CPT | Mod: S$GLB,,, | Performed by: OTOLARYNGOLOGY

## 2020-09-02 PROCEDURE — 3008F BODY MASS INDEX DOCD: CPT | Mod: S$GLB,,, | Performed by: OTOLARYNGOLOGY

## 2020-09-02 PROCEDURE — 3008F PR BODY MASS INDEX (BMI) DOCUMENTED: ICD-10-PCS | Mod: S$GLB,,, | Performed by: OTOLARYNGOLOGY

## 2020-09-02 RX ORDER — OFLOXACIN 3 MG/ML
5 SOLUTION AURICULAR (OTIC) 2 TIMES DAILY
Qty: 10 ML | Refills: 0 | Status: SHIPPED | OUTPATIENT
Start: 2020-09-02 | End: 2021-10-18

## 2020-09-02 NOTE — PATIENT INSTRUCTIONS
Continue montelukast daily but take midday.  Continue omeprazole twice a day.  Continue to work on reflux precautions.    Moisturizing measures for the nose including saline nasal spray and Ayr nasal gel frequently/as needed.     Environmental controls.     Follow up in 3 weeks unless problems prior.    Take prescription ear drops as prescribed.

## 2020-09-02 NOTE — PROGRESS NOTES
Subjective:       Patient ID: Lashanda Monaco is a 64 y.o. female.    Chief Complaint: Follow-up (Much better)    Returns for follow-up.  States doing much better.  Slight mild cough mainly in the morning which is so much better.  Has been taking montelukast daily since last visit and course of Levaquin without difficulty as well as working on anti reflux measures.  GERD is better as well with the recommendations discussed at her last visit.  Sleeping better also.  Secretions remain clear.  Nose remains open with no rhinorrhea or AR symptoms.  No throat soreness or swallowing problems.  No wheezing or chest tightness.  Dyspnea on exertion back to baseline.  No new complaints.  Tendency for nasal dryness for years and using Ayr gel fairly regularly.        Review of Systems     Constitutional: Negative for chills and fever.      HENT: Negative for ear discharge, ear infection, hearing loss, nosebleeds, runny nose, sore throat, stuffy nose and trouble swallowing.      Eyes:  Negative for change in eyesight.     Respiratory:  Positive for cough.      Cardiovascular:  Negative for chest pain.     Gastrointestinal:  Positive for acid reflux. Negative for abdominal pain.     Genitourinary: Negative for difficulty urinating.     Musc: Positive for aching joints.     Neurological: Negative for seizures.      Psychiatric: Negative for sleep disturbance.                Objective:        Vitals:    09/02/20 1002   BP: 132/86   Pulse: 75   Temp: 97.4 °F (36.3 °C)     Body mass index is 36.73 kg/m².  Physical Exam  Constitutional:       General: She is not in acute distress.     Appearance: She is well-developed.   HENT:      Head: Normocephalic and atraumatic.      Right Ear: Tympanic membrane and external ear normal. There is impacted cerumen.      Left Ear: Tympanic membrane and external ear normal. There is impacted cerumen.      Ears:      Comments: See procedure note.     Nose: No nasal deformity, mucosal edema or  rhinorrhea.      Mouth/Throat:      Mouth: No oral lesions.      Pharynx: No oropharyngeal exudate, posterior oropharyngeal erythema or uvula swelling.   Neck:      Musculoskeletal: Neck supple.      Trachea: Phonation normal.   Pulmonary:      Effort: Pulmonary effort is normal. No respiratory distress.      Breath sounds: Normal breath sounds.   Lymphadenopathy:      Cervical: No cervical adenopathy.   Skin:     General: Skin is warm and dry.   Neurological:      Mental Status: She is alert and oriented to person, place, and time.   Psychiatric:         Mood and Affect: Mood normal.         Behavior: Behavior normal.         Tests / Results:    Previously noted CT of head done 06/29/2020 with air-fluid level left sphenoid.  Then MRI of brain done 07/09/2020 with mucosal thickening and had Augmentin in the interim.          Assessment:       1. Cough    2. History of 2019 novel coronavirus disease (COVID-19)    3. History of gastroesophageal reflux (GERD)    4. History of sinusitis    5. Bilateral impacted cerumen        Plan:       Reviewed all above and considerations and recommendations and answered questions.  Will proceed as follows:   Continue montelukast daily but take midday.  Continue omeprazole twice a day.  Continue to work on reflux precautions.    Moisturizing measures for the nose including saline nasal spray and Ayr nasal gel frequently/as needed.     Environmental controls.     Follow up in 3 weeks unless problems prior.    Ears cleaned as per procedure note.  See procedure note.  Take prescription ear drops as prescribed.

## 2020-09-04 NOTE — PROCEDURES
Ear Cerumen Removal    Date/Time: 9/2/2020 10:10 AM  Performed by: Nafisa Monaco MD  Authorized by: Nafisa Monaco MD     Consent Done?:  Yes (Verbal)  Location details:  Both ears  Cerumen  Removal Results:  Cerumen completely removed  Patient tolerance:  Patient tolerated the procedure well with no immediate complications     Ear canals occluded by cerumen, left much greater than right cleared with a combination of curette, Domeboro wash, and suction.  This was tolerated well with immediate improvement in hearing noted, and otherwise canals and TMs within normal limits AU.

## 2020-09-17 ENCOUNTER — PATIENT MESSAGE (OUTPATIENT)
Dept: ADMINISTRATIVE | Facility: HOSPITAL | Age: 65
End: 2020-09-17

## 2020-10-05 ENCOUNTER — PATIENT MESSAGE (OUTPATIENT)
Dept: ADMINISTRATIVE | Facility: HOSPITAL | Age: 65
End: 2020-10-05

## 2021-01-20 DIAGNOSIS — N63.10 MASS OF RIGHT BREAST: Primary | ICD-10-CM

## 2021-01-20 DIAGNOSIS — D24.1 BENIGN NEOPLASM OF RIGHT BREAST: ICD-10-CM

## 2021-01-21 DIAGNOSIS — K21.9 GASTROESOPHAGEAL REFLUX DISEASE: ICD-10-CM

## 2021-01-22 RX ORDER — OMEPRAZOLE 40 MG/1
CAPSULE, DELAYED RELEASE ORAL
Qty: 180 CAPSULE | Refills: 1 | Status: SHIPPED | OUTPATIENT
Start: 2021-01-22 | End: 2021-07-21

## 2021-02-02 ENCOUNTER — OFFICE VISIT (OUTPATIENT)
Dept: SURGERY | Facility: CLINIC | Age: 66
End: 2021-02-02
Payer: MEDICARE

## 2021-02-02 VITALS
WEIGHT: 214 LBS | SYSTOLIC BLOOD PRESSURE: 150 MMHG | HEART RATE: 78 BPM | HEIGHT: 64 IN | BODY MASS INDEX: 36.54 KG/M2 | TEMPERATURE: 97 F | DIASTOLIC BLOOD PRESSURE: 90 MMHG | RESPIRATION RATE: 20 BRPM

## 2021-02-02 DIAGNOSIS — R22.2 SUBCUTANEOUS NODULE OF ABDOMINAL WALL: ICD-10-CM

## 2021-02-02 DIAGNOSIS — D36.9 INTRADUCTAL PAPILLOMA: Primary | ICD-10-CM

## 2021-02-02 PROCEDURE — 1159F PR MEDICATION LIST DOCUMENTED IN MEDICAL RECORD: ICD-10-PCS | Mod: S$GLB,,, | Performed by: SURGERY

## 2021-02-02 PROCEDURE — 1101F PR PT FALLS ASSESS DOC 0-1 FALLS W/OUT INJ PAST YR: ICD-10-PCS | Mod: S$GLB,,, | Performed by: SURGERY

## 2021-02-02 PROCEDURE — 3080F PR MOST RECENT DIASTOLIC BLOOD PRESSURE >= 90 MM HG: ICD-10-PCS | Mod: S$GLB,,, | Performed by: SURGERY

## 2021-02-02 PROCEDURE — 3077F PR MOST RECENT SYSTOLIC BLOOD PRESSURE >= 140 MM HG: ICD-10-PCS | Mod: S$GLB,,, | Performed by: SURGERY

## 2021-02-02 PROCEDURE — 1126F PR PAIN SEVERITY QUANTIFIED, NO PAIN PRESENT: ICD-10-PCS | Mod: S$GLB,,, | Performed by: SURGERY

## 2021-02-02 PROCEDURE — 3288F FALL RISK ASSESSMENT DOCD: CPT | Mod: S$GLB,,, | Performed by: SURGERY

## 2021-02-02 PROCEDURE — 3077F SYST BP >= 140 MM HG: CPT | Mod: S$GLB,,, | Performed by: SURGERY

## 2021-02-02 PROCEDURE — 3008F BODY MASS INDEX DOCD: CPT | Mod: S$GLB,,, | Performed by: SURGERY

## 2021-02-02 PROCEDURE — 3288F PR FALLS RISK ASSESSMENT DOCUMENTED: ICD-10-PCS | Mod: S$GLB,,, | Performed by: SURGERY

## 2021-02-02 PROCEDURE — 99203 OFFICE O/P NEW LOW 30 MIN: CPT | Mod: S$GLB,,, | Performed by: SURGERY

## 2021-02-02 PROCEDURE — 3008F PR BODY MASS INDEX (BMI) DOCUMENTED: ICD-10-PCS | Mod: S$GLB,,, | Performed by: SURGERY

## 2021-02-02 PROCEDURE — 99203 PR OFFICE/OUTPT VISIT, NEW, LEVL III, 30-44 MIN: ICD-10-PCS | Mod: S$GLB,,, | Performed by: SURGERY

## 2021-02-02 PROCEDURE — 1126F AMNT PAIN NOTED NONE PRSNT: CPT | Mod: S$GLB,,, | Performed by: SURGERY

## 2021-02-02 PROCEDURE — 3080F DIAST BP >= 90 MM HG: CPT | Mod: S$GLB,,, | Performed by: SURGERY

## 2021-02-02 PROCEDURE — 1159F MED LIST DOCD IN RCRD: CPT | Mod: S$GLB,,, | Performed by: SURGERY

## 2021-02-02 PROCEDURE — 1101F PT FALLS ASSESS-DOCD LE1/YR: CPT | Mod: S$GLB,,, | Performed by: SURGERY

## 2021-02-02 RX ORDER — TRAMADOL HYDROCHLORIDE 50 MG/1
TABLET ORAL
COMMUNITY
Start: 2021-01-26 | End: 2021-10-18

## 2021-02-10 ENCOUNTER — HOSPITAL ENCOUNTER (OUTPATIENT)
Dept: RADIOLOGY | Facility: HOSPITAL | Age: 66
Discharge: HOME | End: 2021-02-10
Attending: SPECIALIST
Payer: MEDICARE

## 2021-02-10 VITALS — BODY MASS INDEX: 36.55 KG/M2 | WEIGHT: 214.06 LBS | HEIGHT: 64 IN

## 2021-02-10 DIAGNOSIS — N63.10 MASS OF RIGHT BREAST: ICD-10-CM

## 2021-02-10 DIAGNOSIS — D24.1 BENIGN NEOPLASM OF RIGHT BREAST: ICD-10-CM

## 2021-02-10 PROCEDURE — 77062 BREAST TOMOSYNTHESIS BI: CPT | Mod: TC,PO

## 2021-02-10 PROCEDURE — 76642 ULTRASOUND BREAST LIMITED: CPT | Mod: TC,PO,RT

## 2021-02-25 ENCOUNTER — IMMUNIZATION (OUTPATIENT)
Dept: PRIMARY CARE CLINIC | Facility: CLINIC | Age: 66
End: 2021-02-25
Payer: MEDICARE

## 2021-02-25 ENCOUNTER — TELEPHONE (OUTPATIENT)
Dept: PRIMARY CARE CLINIC | Facility: CLINIC | Age: 66
End: 2021-02-25

## 2021-02-25 ENCOUNTER — CLINICAL SUPPORT (OUTPATIENT)
Dept: PRIMARY CARE CLINIC | Facility: CLINIC | Age: 66
End: 2021-02-25
Payer: MEDICARE

## 2021-02-25 VITALS — OXYGEN SATURATION: 98 % | HEART RATE: 94 BPM | SYSTOLIC BLOOD PRESSURE: 130 MMHG | DIASTOLIC BLOOD PRESSURE: 80 MMHG

## 2021-02-25 DIAGNOSIS — Z23 NEED FOR VACCINATION: Primary | ICD-10-CM

## 2021-02-25 DIAGNOSIS — I10 ESSENTIAL HYPERTENSION, BENIGN: Primary | ICD-10-CM

## 2021-02-25 PROCEDURE — 99999 PR PBB SHADOW E&M-EST. PATIENT-LVL III: ICD-10-PCS | Mod: PBBFAC,,,

## 2021-02-25 PROCEDURE — 99999 PR PBB SHADOW E&M-EST. PATIENT-LVL III: CPT | Mod: PBBFAC,,,

## 2021-03-25 ENCOUNTER — IMMUNIZATION (OUTPATIENT)
Dept: PRIMARY CARE CLINIC | Facility: CLINIC | Age: 66
End: 2021-03-25
Payer: MEDICARE

## 2021-03-25 DIAGNOSIS — Z23 NEED FOR VACCINATION: Primary | ICD-10-CM

## 2021-03-25 PROCEDURE — 0012A COVID-19, MRNA, LNP-S, PF, 100 MCG/0.5 ML DOSE VACCINE: CPT | Mod: PBBFAC | Performed by: FAMILY MEDICINE

## 2021-04-05 ENCOUNTER — PATIENT MESSAGE (OUTPATIENT)
Dept: ADMINISTRATIVE | Facility: HOSPITAL | Age: 66
End: 2021-04-05

## 2021-08-25 ENCOUNTER — PATIENT MESSAGE (OUTPATIENT)
Dept: PRIMARY CARE CLINIC | Facility: CLINIC | Age: 66
End: 2021-08-25

## 2021-08-25 DIAGNOSIS — R35.0 URINARY FREQUENCY: Primary | ICD-10-CM

## 2021-08-27 ENCOUNTER — PATIENT OUTREACH (OUTPATIENT)
Dept: ADMINISTRATIVE | Facility: OTHER | Age: 66
End: 2021-08-27

## 2021-08-27 ENCOUNTER — OFFICE VISIT (OUTPATIENT)
Dept: UROLOGY | Facility: CLINIC | Age: 66
End: 2021-08-27
Payer: MEDICARE

## 2021-08-27 VITALS
WEIGHT: 208.56 LBS | SYSTOLIC BLOOD PRESSURE: 148 MMHG | HEART RATE: 86 BPM | HEIGHT: 64 IN | BODY MASS INDEX: 35.61 KG/M2 | DIASTOLIC BLOOD PRESSURE: 104 MMHG

## 2021-08-27 DIAGNOSIS — N39.41 URGE INCONTINENCE OF URINE: Primary | ICD-10-CM

## 2021-08-27 DIAGNOSIS — R35.1 NOCTURIA: ICD-10-CM

## 2021-08-27 DIAGNOSIS — Z12.11 COLON CANCER SCREENING: Primary | ICD-10-CM

## 2021-08-27 DIAGNOSIS — R31.29 MICROSCOPIC HEMATURIA: ICD-10-CM

## 2021-08-27 DIAGNOSIS — B37.89 CANDIDA RASH OF GROIN: ICD-10-CM

## 2021-08-27 DIAGNOSIS — R35.0 URINARY FREQUENCY: ICD-10-CM

## 2021-08-27 LAB
BACTERIA #/AREA URNS AUTO: NORMAL /HPF
MICROSCOPIC COMMENT: NORMAL
RBC #/AREA URNS AUTO: 1 /HPF (ref 0–4)
SQUAMOUS #/AREA URNS AUTO: 0 /HPF
WBC #/AREA URNS AUTO: 1 /HPF (ref 0–5)

## 2021-08-27 PROCEDURE — 81001 URINALYSIS AUTO W/SCOPE: CPT | Performed by: PHYSICIAN ASSISTANT

## 2021-08-27 PROCEDURE — 3080F DIAST BP >= 90 MM HG: CPT | Mod: S$GLB,,, | Performed by: PHYSICIAN ASSISTANT

## 2021-08-27 PROCEDURE — 3008F PR BODY MASS INDEX (BMI) DOCUMENTED: ICD-10-PCS | Mod: S$GLB,,, | Performed by: PHYSICIAN ASSISTANT

## 2021-08-27 PROCEDURE — 99203 PR OFFICE/OUTPT VISIT, NEW, LEVL III, 30-44 MIN: ICD-10-PCS | Mod: S$GLB,,, | Performed by: PHYSICIAN ASSISTANT

## 2021-08-27 PROCEDURE — 1101F PR PT FALLS ASSESS DOC 0-1 FALLS W/OUT INJ PAST YR: ICD-10-PCS | Mod: S$GLB,,, | Performed by: PHYSICIAN ASSISTANT

## 2021-08-27 PROCEDURE — 1101F PT FALLS ASSESS-DOCD LE1/YR: CPT | Mod: S$GLB,,, | Performed by: PHYSICIAN ASSISTANT

## 2021-08-27 PROCEDURE — 1159F PR MEDICATION LIST DOCUMENTED IN MEDICAL RECORD: ICD-10-PCS | Mod: S$GLB,,, | Performed by: PHYSICIAN ASSISTANT

## 2021-08-27 PROCEDURE — 3288F FALL RISK ASSESSMENT DOCD: CPT | Mod: S$GLB,,, | Performed by: PHYSICIAN ASSISTANT

## 2021-08-27 PROCEDURE — 87086 URINE CULTURE/COLONY COUNT: CPT | Performed by: PHYSICIAN ASSISTANT

## 2021-08-27 PROCEDURE — 1160F RVW MEDS BY RX/DR IN RCRD: CPT | Mod: S$GLB,,, | Performed by: PHYSICIAN ASSISTANT

## 2021-08-27 PROCEDURE — 1159F MED LIST DOCD IN RCRD: CPT | Mod: S$GLB,,, | Performed by: PHYSICIAN ASSISTANT

## 2021-08-27 PROCEDURE — 3008F BODY MASS INDEX DOCD: CPT | Mod: S$GLB,,, | Performed by: PHYSICIAN ASSISTANT

## 2021-08-27 PROCEDURE — 99999 PR PBB SHADOW E&M-EST. PATIENT-LVL III: ICD-10-PCS | Mod: PBBFAC,,, | Performed by: PHYSICIAN ASSISTANT

## 2021-08-27 PROCEDURE — 99999 PR PBB SHADOW E&M-EST. PATIENT-LVL III: CPT | Mod: PBBFAC,,, | Performed by: PHYSICIAN ASSISTANT

## 2021-08-27 PROCEDURE — 99203 OFFICE O/P NEW LOW 30 MIN: CPT | Mod: S$GLB,,, | Performed by: PHYSICIAN ASSISTANT

## 2021-08-27 PROCEDURE — 3288F PR FALLS RISK ASSESSMENT DOCUMENTED: ICD-10-PCS | Mod: S$GLB,,, | Performed by: PHYSICIAN ASSISTANT

## 2021-08-27 PROCEDURE — 3077F SYST BP >= 140 MM HG: CPT | Mod: S$GLB,,, | Performed by: PHYSICIAN ASSISTANT

## 2021-08-27 PROCEDURE — 1160F PR REVIEW ALL MEDS BY PRESCRIBER/CLIN PHARMACIST DOCUMENTED: ICD-10-PCS | Mod: S$GLB,,, | Performed by: PHYSICIAN ASSISTANT

## 2021-08-27 PROCEDURE — 3080F PR MOST RECENT DIASTOLIC BLOOD PRESSURE >= 90 MM HG: ICD-10-PCS | Mod: S$GLB,,, | Performed by: PHYSICIAN ASSISTANT

## 2021-08-27 PROCEDURE — 3077F PR MOST RECENT SYSTOLIC BLOOD PRESSURE >= 140 MM HG: ICD-10-PCS | Mod: S$GLB,,, | Performed by: PHYSICIAN ASSISTANT

## 2021-08-27 RX ORDER — TROSPIUM CHLORIDE ER 60 MG/1
60 CAPSULE ORAL DAILY
Qty: 30 CAPSULE | Refills: 11 | Status: SHIPPED | OUTPATIENT
Start: 2021-08-27 | End: 2023-03-01

## 2021-08-29 LAB — BACTERIA UR CULT: NO GROWTH

## 2021-10-05 ENCOUNTER — PATIENT MESSAGE (OUTPATIENT)
Dept: ADMINISTRATIVE | Facility: HOSPITAL | Age: 66
End: 2021-10-05

## 2021-10-18 ENCOUNTER — OFFICE VISIT (OUTPATIENT)
Dept: PRIMARY CARE CLINIC | Facility: CLINIC | Age: 66
End: 2021-10-18
Payer: MEDICARE

## 2021-10-18 VITALS
TEMPERATURE: 100 F | RESPIRATION RATE: 20 BRPM | DIASTOLIC BLOOD PRESSURE: 82 MMHG | WEIGHT: 208.31 LBS | BODY MASS INDEX: 35.56 KG/M2 | HEIGHT: 64 IN | SYSTOLIC BLOOD PRESSURE: 138 MMHG | OXYGEN SATURATION: 95 % | HEART RATE: 125 BPM

## 2021-10-18 DIAGNOSIS — E66.01 SEVERE OBESITY (BMI 35.0-39.9) WITH COMORBIDITY: ICD-10-CM

## 2021-10-18 DIAGNOSIS — M79.10 MUSCLE PAIN: ICD-10-CM

## 2021-10-18 DIAGNOSIS — R68.83 CHILLS: ICD-10-CM

## 2021-10-18 DIAGNOSIS — R06.02 SHORTNESS OF BREATH: ICD-10-CM

## 2021-10-18 DIAGNOSIS — F33.1 MODERATE EPISODE OF RECURRENT MAJOR DEPRESSIVE DISORDER: ICD-10-CM

## 2021-10-18 DIAGNOSIS — J18.9 PNEUMONIA OF RIGHT LUNG DUE TO INFECTIOUS ORGANISM, UNSPECIFIED PART OF LUNG: Primary | ICD-10-CM

## 2021-10-18 PROCEDURE — 1159F PR MEDICATION LIST DOCUMENTED IN MEDICAL RECORD: ICD-10-PCS | Mod: S$GLB,,, | Performed by: FAMILY MEDICINE

## 2021-10-18 PROCEDURE — 1159F MED LIST DOCD IN RCRD: CPT | Mod: S$GLB,,, | Performed by: FAMILY MEDICINE

## 2021-10-18 PROCEDURE — 1125F PR PAIN SEVERITY QUANTIFIED, PAIN PRESENT: ICD-10-PCS | Mod: S$GLB,,, | Performed by: FAMILY MEDICINE

## 2021-10-18 PROCEDURE — 99999 PR PBB SHADOW E&M-EST. PATIENT-LVL IV: CPT | Mod: PBBFAC,,, | Performed by: FAMILY MEDICINE

## 2021-10-18 PROCEDURE — 1160F RVW MEDS BY RX/DR IN RCRD: CPT | Mod: S$GLB,,, | Performed by: FAMILY MEDICINE

## 2021-10-18 PROCEDURE — 99999 PR PBB SHADOW E&M-EST. PATIENT-LVL IV: ICD-10-PCS | Mod: PBBFAC,,, | Performed by: FAMILY MEDICINE

## 2021-10-18 PROCEDURE — 99214 PR OFFICE/OUTPT VISIT, EST, LEVL IV, 30-39 MIN: ICD-10-PCS | Mod: S$GLB,,, | Performed by: FAMILY MEDICINE

## 2021-10-18 PROCEDURE — 1160F PR REVIEW ALL MEDS BY PRESCRIBER/CLIN PHARMACIST DOCUMENTED: ICD-10-PCS | Mod: S$GLB,,, | Performed by: FAMILY MEDICINE

## 2021-10-18 PROCEDURE — 3008F PR BODY MASS INDEX (BMI) DOCUMENTED: ICD-10-PCS | Mod: S$GLB,,, | Performed by: FAMILY MEDICINE

## 2021-10-18 PROCEDURE — 3008F BODY MASS INDEX DOCD: CPT | Mod: S$GLB,,, | Performed by: FAMILY MEDICINE

## 2021-10-18 PROCEDURE — 3288F PR FALLS RISK ASSESSMENT DOCUMENTED: ICD-10-PCS | Mod: S$GLB,,, | Performed by: FAMILY MEDICINE

## 2021-10-18 PROCEDURE — 3075F PR MOST RECENT SYSTOLIC BLOOD PRESS GE 130-139MM HG: ICD-10-PCS | Mod: S$GLB,,, | Performed by: FAMILY MEDICINE

## 2021-10-18 PROCEDURE — 3079F PR MOST RECENT DIASTOLIC BLOOD PRESSURE 80-89 MM HG: ICD-10-PCS | Mod: S$GLB,,, | Performed by: FAMILY MEDICINE

## 2021-10-18 PROCEDURE — 1101F PT FALLS ASSESS-DOCD LE1/YR: CPT | Mod: S$GLB,,, | Performed by: FAMILY MEDICINE

## 2021-10-18 PROCEDURE — U0005 INFEC AGEN DETEC AMPLI PROBE: HCPCS | Performed by: FAMILY MEDICINE

## 2021-10-18 PROCEDURE — 1125F AMNT PAIN NOTED PAIN PRSNT: CPT | Mod: S$GLB,,, | Performed by: FAMILY MEDICINE

## 2021-10-18 PROCEDURE — 99214 OFFICE O/P EST MOD 30 MIN: CPT | Mod: S$GLB,,, | Performed by: FAMILY MEDICINE

## 2021-10-18 PROCEDURE — 1101F PR PT FALLS ASSESS DOC 0-1 FALLS W/OUT INJ PAST YR: ICD-10-PCS | Mod: S$GLB,,, | Performed by: FAMILY MEDICINE

## 2021-10-18 PROCEDURE — U0003 INFECTIOUS AGENT DETECTION BY NUCLEIC ACID (DNA OR RNA); SEVERE ACUTE RESPIRATORY SYNDROME CORONAVIRUS 2 (SARS-COV-2) (CORONAVIRUS DISEASE [COVID-19]), AMPLIFIED PROBE TECHNIQUE, MAKING USE OF HIGH THROUGHPUT TECHNOLOGIES AS DESCRIBED BY CMS-2020-01-R: HCPCS | Performed by: FAMILY MEDICINE

## 2021-10-18 PROCEDURE — 3079F DIAST BP 80-89 MM HG: CPT | Mod: S$GLB,,, | Performed by: FAMILY MEDICINE

## 2021-10-18 PROCEDURE — 3075F SYST BP GE 130 - 139MM HG: CPT | Mod: S$GLB,,, | Performed by: FAMILY MEDICINE

## 2021-10-18 PROCEDURE — 3288F FALL RISK ASSESSMENT DOCD: CPT | Mod: S$GLB,,, | Performed by: FAMILY MEDICINE

## 2021-10-18 RX ORDER — ONDANSETRON 4 MG/1
4 TABLET, FILM COATED ORAL EVERY 6 HOURS PRN
Qty: 20 TABLET | Refills: 2 | Status: SHIPPED | OUTPATIENT
Start: 2021-10-18 | End: 2023-08-29

## 2021-10-18 RX ORDER — LEVOFLOXACIN 500 MG/1
500 TABLET, FILM COATED ORAL DAILY
Qty: 7 TABLET | Refills: 0 | Status: SHIPPED | OUTPATIENT
Start: 2021-10-18 | End: 2021-10-25

## 2021-10-19 LAB
SARS-COV-2 RNA RESP QL NAA+PROBE: NOT DETECTED
SARS-COV-2- CYCLE NUMBER: NORMAL

## 2021-10-20 ENCOUNTER — PATIENT MESSAGE (OUTPATIENT)
Dept: PRIMARY CARE CLINIC | Facility: CLINIC | Age: 66
End: 2021-10-20

## 2021-10-20 ENCOUNTER — PATIENT MESSAGE (OUTPATIENT)
Dept: PRIMARY CARE CLINIC | Facility: CLINIC | Age: 66
End: 2021-10-20
Payer: MEDICARE

## 2021-10-20 RX ORDER — ZOLPIDEM TARTRATE 5 MG/1
5 TABLET ORAL NIGHTLY PRN
Qty: 10 TABLET | Refills: 0 | Status: SHIPPED | OUTPATIENT
Start: 2021-10-20 | End: 2022-05-20

## 2021-10-27 ENCOUNTER — OFFICE VISIT (OUTPATIENT)
Dept: PRIMARY CARE CLINIC | Facility: CLINIC | Age: 66
End: 2021-10-27
Payer: MEDICARE

## 2021-10-27 VITALS
WEIGHT: 209 LBS | HEART RATE: 85 BPM | OXYGEN SATURATION: 99 % | RESPIRATION RATE: 20 BRPM | HEIGHT: 65 IN | SYSTOLIC BLOOD PRESSURE: 132 MMHG | BODY MASS INDEX: 34.82 KG/M2 | TEMPERATURE: 98 F | DIASTOLIC BLOOD PRESSURE: 82 MMHG

## 2021-10-27 DIAGNOSIS — J16.8 PNEUMONIA DUE TO OTHER SPECIFIED INFECTIOUS ORGANISMS: ICD-10-CM

## 2021-10-27 DIAGNOSIS — Z13.6 ENCOUNTER FOR SCREENING FOR CARDIOVASCULAR DISORDERS: ICD-10-CM

## 2021-10-27 DIAGNOSIS — G47.00 INSOMNIA, UNSPECIFIED TYPE: ICD-10-CM

## 2021-10-27 DIAGNOSIS — K59.00 CONSTIPATION, UNSPECIFIED CONSTIPATION TYPE: ICD-10-CM

## 2021-10-27 DIAGNOSIS — Z00.00 ROUTINE PHYSICAL EXAMINATION: ICD-10-CM

## 2021-10-27 DIAGNOSIS — J18.9 PNEUMONIA OF RIGHT LUNG DUE TO INFECTIOUS ORGANISM, UNSPECIFIED PART OF LUNG: ICD-10-CM

## 2021-10-27 DIAGNOSIS — R06.09 DYSPNEA ON EXERTION: ICD-10-CM

## 2021-10-27 DIAGNOSIS — R35.0 URINARY FREQUENCY: Primary | ICD-10-CM

## 2021-10-27 PROCEDURE — 1126F PR PAIN SEVERITY QUANTIFIED, NO PAIN PRESENT: ICD-10-PCS | Mod: S$GLB,,, | Performed by: NURSE PRACTITIONER

## 2021-10-27 PROCEDURE — 1101F PR PT FALLS ASSESS DOC 0-1 FALLS W/OUT INJ PAST YR: ICD-10-PCS | Mod: S$GLB,,, | Performed by: NURSE PRACTITIONER

## 2021-10-27 PROCEDURE — 1159F MED LIST DOCD IN RCRD: CPT | Mod: S$GLB,,, | Performed by: NURSE PRACTITIONER

## 2021-10-27 PROCEDURE — 1126F AMNT PAIN NOTED NONE PRSNT: CPT | Mod: S$GLB,,, | Performed by: NURSE PRACTITIONER

## 2021-10-27 PROCEDURE — 99999 PR PBB SHADOW E&M-EST. PATIENT-LVL V: CPT | Mod: PBBFAC,,, | Performed by: NURSE PRACTITIONER

## 2021-10-27 PROCEDURE — 99214 PR OFFICE/OUTPT VISIT, EST, LEVL IV, 30-39 MIN: ICD-10-PCS | Mod: S$GLB,,, | Performed by: NURSE PRACTITIONER

## 2021-10-27 PROCEDURE — 99999 PR PBB SHADOW E&M-EST. PATIENT-LVL V: ICD-10-PCS | Mod: PBBFAC,,, | Performed by: NURSE PRACTITIONER

## 2021-10-27 PROCEDURE — 3288F FALL RISK ASSESSMENT DOCD: CPT | Mod: S$GLB,,, | Performed by: NURSE PRACTITIONER

## 2021-10-27 PROCEDURE — 3079F DIAST BP 80-89 MM HG: CPT | Mod: S$GLB,,, | Performed by: NURSE PRACTITIONER

## 2021-10-27 PROCEDURE — 1159F PR MEDICATION LIST DOCUMENTED IN MEDICAL RECORD: ICD-10-PCS | Mod: S$GLB,,, | Performed by: NURSE PRACTITIONER

## 2021-10-27 PROCEDURE — 1160F RVW MEDS BY RX/DR IN RCRD: CPT | Mod: S$GLB,,, | Performed by: NURSE PRACTITIONER

## 2021-10-27 PROCEDURE — 3288F PR FALLS RISK ASSESSMENT DOCUMENTED: ICD-10-PCS | Mod: S$GLB,,, | Performed by: NURSE PRACTITIONER

## 2021-10-27 PROCEDURE — 3008F PR BODY MASS INDEX (BMI) DOCUMENTED: ICD-10-PCS | Mod: S$GLB,,, | Performed by: NURSE PRACTITIONER

## 2021-10-27 PROCEDURE — 3075F SYST BP GE 130 - 139MM HG: CPT | Mod: S$GLB,,, | Performed by: NURSE PRACTITIONER

## 2021-10-27 PROCEDURE — 3075F PR MOST RECENT SYSTOLIC BLOOD PRESS GE 130-139MM HG: ICD-10-PCS | Mod: S$GLB,,, | Performed by: NURSE PRACTITIONER

## 2021-10-27 PROCEDURE — 1101F PT FALLS ASSESS-DOCD LE1/YR: CPT | Mod: S$GLB,,, | Performed by: NURSE PRACTITIONER

## 2021-10-27 PROCEDURE — 3008F BODY MASS INDEX DOCD: CPT | Mod: S$GLB,,, | Performed by: NURSE PRACTITIONER

## 2021-10-27 PROCEDURE — 1160F PR REVIEW ALL MEDS BY PRESCRIBER/CLIN PHARMACIST DOCUMENTED: ICD-10-PCS | Mod: S$GLB,,, | Performed by: NURSE PRACTITIONER

## 2021-10-27 PROCEDURE — 3079F PR MOST RECENT DIASTOLIC BLOOD PRESSURE 80-89 MM HG: ICD-10-PCS | Mod: S$GLB,,, | Performed by: NURSE PRACTITIONER

## 2021-10-27 PROCEDURE — 99214 OFFICE O/P EST MOD 30 MIN: CPT | Mod: S$GLB,,, | Performed by: NURSE PRACTITIONER

## 2021-10-27 RX ORDER — TRAZODONE HYDROCHLORIDE 50 MG/1
50 TABLET ORAL NIGHTLY
Qty: 30 TABLET | Refills: 11 | Status: SHIPPED | OUTPATIENT
Start: 2021-10-27 | End: 2022-05-20

## 2021-11-02 ENCOUNTER — PATIENT OUTREACH (OUTPATIENT)
Dept: ADMINISTRATIVE | Facility: OTHER | Age: 66
End: 2021-11-02
Payer: MEDICARE

## 2021-11-04 ENCOUNTER — LAB VISIT (OUTPATIENT)
Dept: LAB | Facility: HOSPITAL | Age: 66
End: 2021-11-04
Attending: INTERNAL MEDICINE
Payer: MEDICARE

## 2021-11-04 DIAGNOSIS — R19.7 DIARRHEA OF PRESUMED INFECTIOUS ORIGIN: Primary | ICD-10-CM

## 2021-11-04 PROCEDURE — 87045 FECES CULTURE AEROBIC BACT: CPT | Performed by: INTERNAL MEDICINE

## 2021-11-04 PROCEDURE — 87177 OVA AND PARASITES SMEARS: CPT | Performed by: INTERNAL MEDICINE

## 2021-11-04 PROCEDURE — 87329 GIARDIA AG IA: CPT | Performed by: INTERNAL MEDICINE

## 2021-11-04 PROCEDURE — 87328 CRYPTOSPORIDIUM AG IA: CPT | Performed by: INTERNAL MEDICINE

## 2021-11-04 PROCEDURE — 87209 SMEAR COMPLEX STAIN: CPT | Performed by: INTERNAL MEDICINE

## 2021-11-04 PROCEDURE — 36415 COLL VENOUS BLD VENIPUNCTURE: CPT | Performed by: INTERNAL MEDICINE

## 2021-11-04 PROCEDURE — 30000890 LABCORP MISCELLANEOUS TEST: Mod: 59 | Performed by: INTERNAL MEDICINE

## 2021-11-04 PROCEDURE — 87046 STOOL CULTR AEROBIC BACT EA: CPT | Mod: 59 | Performed by: INTERNAL MEDICINE

## 2021-11-04 PROCEDURE — 30000890 HC MISC. SEND OUT TEST: Mod: 59

## 2021-11-04 PROCEDURE — 87015 SPECIMEN INFECT AGNT CONCNTJ: CPT | Mod: 59 | Performed by: INTERNAL MEDICINE

## 2021-11-04 PROCEDURE — 83993 ASSAY FOR CALPROTECTIN FECAL: CPT | Performed by: INTERNAL MEDICINE

## 2021-11-04 PROCEDURE — 87206 SMEAR FLUORESCENT/ACID STAI: CPT | Mod: 59

## 2021-11-04 PROCEDURE — 82656 EL-1 FECAL QUAL/SEMIQ: CPT | Performed by: INTERNAL MEDICINE

## 2021-11-05 ENCOUNTER — OFFICE VISIT (OUTPATIENT)
Dept: UROLOGY | Facility: CLINIC | Age: 66
End: 2021-11-05
Payer: MEDICARE

## 2021-11-05 VITALS
SYSTOLIC BLOOD PRESSURE: 147 MMHG | HEART RATE: 83 BPM | HEIGHT: 65 IN | WEIGHT: 212.75 LBS | BODY MASS INDEX: 35.45 KG/M2 | DIASTOLIC BLOOD PRESSURE: 90 MMHG

## 2021-11-05 DIAGNOSIS — N81.10 FEMALE CYSTOCELE: Primary | ICD-10-CM

## 2021-11-05 DIAGNOSIS — R35.0 URINARY FREQUENCY: ICD-10-CM

## 2021-11-05 PROCEDURE — 1101F PR PT FALLS ASSESS DOC 0-1 FALLS W/OUT INJ PAST YR: ICD-10-PCS | Mod: S$GLB,,, | Performed by: NURSE PRACTITIONER

## 2021-11-05 PROCEDURE — 3077F PR MOST RECENT SYSTOLIC BLOOD PRESSURE >= 140 MM HG: ICD-10-PCS | Mod: S$GLB,,, | Performed by: NURSE PRACTITIONER

## 2021-11-05 PROCEDURE — 1160F PR REVIEW ALL MEDS BY PRESCRIBER/CLIN PHARMACIST DOCUMENTED: ICD-10-PCS | Mod: S$GLB,,, | Performed by: NURSE PRACTITIONER

## 2021-11-05 PROCEDURE — 1160F RVW MEDS BY RX/DR IN RCRD: CPT | Mod: S$GLB,,, | Performed by: NURSE PRACTITIONER

## 2021-11-05 PROCEDURE — 1159F PR MEDICATION LIST DOCUMENTED IN MEDICAL RECORD: ICD-10-PCS | Mod: S$GLB,,, | Performed by: NURSE PRACTITIONER

## 2021-11-05 PROCEDURE — 3080F DIAST BP >= 90 MM HG: CPT | Mod: S$GLB,,, | Performed by: NURSE PRACTITIONER

## 2021-11-05 PROCEDURE — 3008F BODY MASS INDEX DOCD: CPT | Mod: S$GLB,,, | Performed by: NURSE PRACTITIONER

## 2021-11-05 PROCEDURE — 3008F PR BODY MASS INDEX (BMI) DOCUMENTED: ICD-10-PCS | Mod: S$GLB,,, | Performed by: NURSE PRACTITIONER

## 2021-11-05 PROCEDURE — 1126F PR PAIN SEVERITY QUANTIFIED, NO PAIN PRESENT: ICD-10-PCS | Mod: S$GLB,,, | Performed by: NURSE PRACTITIONER

## 2021-11-05 PROCEDURE — 3288F FALL RISK ASSESSMENT DOCD: CPT | Mod: S$GLB,,, | Performed by: NURSE PRACTITIONER

## 2021-11-05 PROCEDURE — 99999 PR PBB SHADOW E&M-EST. PATIENT-LVL V: CPT | Mod: PBBFAC,,, | Performed by: NURSE PRACTITIONER

## 2021-11-05 PROCEDURE — 3077F SYST BP >= 140 MM HG: CPT | Mod: S$GLB,,, | Performed by: NURSE PRACTITIONER

## 2021-11-05 PROCEDURE — 3080F PR MOST RECENT DIASTOLIC BLOOD PRESSURE >= 90 MM HG: ICD-10-PCS | Mod: S$GLB,,, | Performed by: NURSE PRACTITIONER

## 2021-11-05 PROCEDURE — 3288F PR FALLS RISK ASSESSMENT DOCUMENTED: ICD-10-PCS | Mod: S$GLB,,, | Performed by: NURSE PRACTITIONER

## 2021-11-05 PROCEDURE — 99213 OFFICE O/P EST LOW 20 MIN: CPT | Mod: S$GLB,,, | Performed by: NURSE PRACTITIONER

## 2021-11-05 PROCEDURE — 1159F MED LIST DOCD IN RCRD: CPT | Mod: S$GLB,,, | Performed by: NURSE PRACTITIONER

## 2021-11-05 PROCEDURE — 1126F AMNT PAIN NOTED NONE PRSNT: CPT | Mod: S$GLB,,, | Performed by: NURSE PRACTITIONER

## 2021-11-05 PROCEDURE — 99999 PR PBB SHADOW E&M-EST. PATIENT-LVL V: ICD-10-PCS | Mod: PBBFAC,,, | Performed by: NURSE PRACTITIONER

## 2021-11-05 PROCEDURE — 1101F PT FALLS ASSESS-DOCD LE1/YR: CPT | Mod: S$GLB,,, | Performed by: NURSE PRACTITIONER

## 2021-11-05 PROCEDURE — 99213 PR OFFICE/OUTPT VISIT, EST, LEVL III, 20-29 MIN: ICD-10-PCS | Mod: S$GLB,,, | Performed by: NURSE PRACTITIONER

## 2021-11-06 LAB
STOOL CULTURE: NORMAL
STOOL CULTURE: NORMAL

## 2021-11-08 LAB
CRYPTOSP AG STL QL IA: NEGATIVE
GIARDIA ANTIGEN - EIA: NEGATIVE
GIARDIA LAMBLIA AG SOURCE: 0

## 2021-11-09 LAB — CALPROTECTIN STL-MCNT: 73 UG/G (ref 0–120)

## 2021-11-10 LAB
LABCORP MISC TEST CODE: NORMAL
LABCORP MISC TEST NAME: NORMAL
LABCORP MISCELLANEOUS TEST: NORMAL

## 2021-11-15 LAB — ELASTASE PANC STL-MCNT: 371 UG ELAST./G

## 2021-11-22 ENCOUNTER — OFFICE VISIT (OUTPATIENT)
Dept: UROGYNECOLOGY | Facility: CLINIC | Age: 66
End: 2021-11-22
Payer: MEDICARE

## 2021-11-22 VITALS
DIASTOLIC BLOOD PRESSURE: 82 MMHG | BODY MASS INDEX: 36.39 KG/M2 | WEIGHT: 218.38 LBS | HEIGHT: 65 IN | SYSTOLIC BLOOD PRESSURE: 128 MMHG

## 2021-11-22 DIAGNOSIS — K59.00 CONSTIPATION, UNSPECIFIED CONSTIPATION TYPE: ICD-10-CM

## 2021-11-22 DIAGNOSIS — R06.83 SNORING: ICD-10-CM

## 2021-11-22 DIAGNOSIS — R35.1 NOCTURIA: ICD-10-CM

## 2021-11-22 DIAGNOSIS — N81.10 FEMALE CYSTOCELE: ICD-10-CM

## 2021-11-22 DIAGNOSIS — N39.46 MIXED INCONTINENCE URGE AND STRESS: Primary | ICD-10-CM

## 2021-11-22 DIAGNOSIS — R39.15 URINARY URGENCY: ICD-10-CM

## 2021-11-22 DIAGNOSIS — M62.89 PELVIC FLOOR TENSION: ICD-10-CM

## 2021-11-22 LAB
MICROSCOPIC COMMENT: NORMAL
RBC #/AREA URNS AUTO: 0 /HPF (ref 0–4)
SQUAMOUS #/AREA URNS AUTO: 2 /HPF
WBC #/AREA URNS AUTO: 0 /HPF (ref 0–5)

## 2021-11-22 PROCEDURE — 81001 URINALYSIS AUTO W/SCOPE: CPT | Performed by: NURSE PRACTITIONER

## 2021-11-22 PROCEDURE — 87086 URINE CULTURE/COLONY COUNT: CPT | Performed by: NURSE PRACTITIONER

## 2021-11-22 PROCEDURE — 99999 PR PBB SHADOW E&M-EST. PATIENT-LVL V: CPT | Mod: PBBFAC,,, | Performed by: NURSE PRACTITIONER

## 2021-11-22 PROCEDURE — 99214 PR OFFICE/OUTPT VISIT, EST, LEVL IV, 30-39 MIN: ICD-10-PCS | Mod: 25,S$GLB,, | Performed by: NURSE PRACTITIONER

## 2021-11-22 PROCEDURE — 99999 PR PBB SHADOW E&M-EST. PATIENT-LVL V: ICD-10-PCS | Mod: PBBFAC,,, | Performed by: NURSE PRACTITIONER

## 2021-11-22 PROCEDURE — 99214 OFFICE O/P EST MOD 30 MIN: CPT | Mod: 25,S$GLB,, | Performed by: NURSE PRACTITIONER

## 2021-11-22 PROCEDURE — 51701 PR INSERTION OF NON-INDWELLING BLADDER CATHETERIZATION FOR RESIDUAL UR: ICD-10-PCS | Mod: S$GLB,,, | Performed by: NURSE PRACTITIONER

## 2021-11-22 PROCEDURE — 51701 INSERT BLADDER CATHETER: CPT | Mod: S$GLB,,, | Performed by: NURSE PRACTITIONER

## 2021-11-22 RX ORDER — ESTRADIOL 0.1 MG/G
CREAM VAGINAL
Qty: 42.5 G | Refills: 3 | Status: SHIPPED | OUTPATIENT
Start: 2021-11-22 | End: 2022-11-22

## 2021-11-23 LAB — BACTERIA UR CULT: NO GROWTH

## 2021-11-24 ENCOUNTER — PATIENT MESSAGE (OUTPATIENT)
Dept: UROGYNECOLOGY | Facility: CLINIC | Age: 66
End: 2021-11-24
Payer: MEDICARE

## 2021-11-26 DIAGNOSIS — Z12.11 COLON CANCER SCREENING: Primary | ICD-10-CM

## 2021-12-09 ENCOUNTER — PATIENT OUTREACH (OUTPATIENT)
Dept: ADMINISTRATIVE | Facility: HOSPITAL | Age: 66
End: 2021-12-09
Payer: MEDICARE

## 2021-12-15 PROBLEM — M79.18 MYALGIA OF PELVIC FLOOR: Status: ACTIVE | Noted: 2021-12-15

## 2021-12-15 PROBLEM — R27.9 LACK OF COORDINATION: Status: ACTIVE | Noted: 2021-12-15

## 2021-12-17 ENCOUNTER — TELEPHONE (OUTPATIENT)
Dept: ORTHOPEDICS | Facility: CLINIC | Age: 66
End: 2021-12-17
Payer: MEDICARE

## 2021-12-17 ENCOUNTER — PATIENT MESSAGE (OUTPATIENT)
Dept: ORTHOPEDICS | Facility: CLINIC | Age: 66
End: 2021-12-17
Payer: MEDICARE

## 2021-12-21 ENCOUNTER — CLINICAL SUPPORT (OUTPATIENT)
Dept: REHABILITATION | Facility: OTHER | Age: 66
End: 2021-12-21
Payer: MEDICARE

## 2021-12-21 DIAGNOSIS — M62.89 PELVIC FLOOR TENSION: ICD-10-CM

## 2021-12-21 DIAGNOSIS — K59.00 CONSTIPATION, UNSPECIFIED CONSTIPATION TYPE: ICD-10-CM

## 2021-12-21 DIAGNOSIS — R27.9 LACK OF COORDINATION: ICD-10-CM

## 2021-12-21 DIAGNOSIS — M79.18 MYALGIA OF PELVIC FLOOR: ICD-10-CM

## 2021-12-21 DIAGNOSIS — M62.81 MUSCLE WEAKNESS: ICD-10-CM

## 2021-12-21 DIAGNOSIS — N39.46 MIXED INCONTINENCE URGE AND STRESS: ICD-10-CM

## 2021-12-21 PROCEDURE — 97161 PT EVAL LOW COMPLEX 20 MIN: CPT

## 2021-12-21 PROCEDURE — 97530 THERAPEUTIC ACTIVITIES: CPT

## 2022-01-21 NOTE — PROGRESS NOTES
Pelvic Health Physical Therapy   Treatment Note     [id number]    Therapy Diagnosis:   Encounter Diagnoses   Name Primary?    Myalgia of pelvic floor Yes    Lack of coordination     Muscle weakness      Physician: Michelle Slaughter NP    Visit Date: 1/25/2022    Physician Orders: PT Eval and Treat  Medical Diagnosis from Referral: N39.46 (ICD-10-CM) - Mixed incontinence urge and stress M62.89 (ICD-10-CM) - Pelvic floor tension K59.00 (ICD-10-CM) - Constipation, unspecified constipation type  Evaluation Date: 12/21/2021  Authorization Period Expiration: 1/11/2023  Plan of Care Expiration: 3/21/2022  Visit # / Visits authorized: 1/ 1 (pending)    Time In: 1300  Time Out: 1400  Total Billable Time: 60 minutes    Precautions: PMHx HTN    Subjective     Pt reports: that bowel movements have been more regular and of a better consistency; still reports occasional constipation due to hard stool. She attributes this to diet. She notes that main dietary triggers are bread or decreased vegetable intake. Has been drinking hot tea to help her have a bowel movement in the morning. Notes decrease pelvic pressure which she believes is related to better bowel habits. Pt reports that Myrbetriq was giving her dry eye and she discontinued use. She states that if she takes a sleep aid she does not wake up to urinate. Continues to use bedside commode. Pt states that she would rather have surgery to address POP than use a pessary. She reports increased stress from caring for  with dementia.     She was compliant with home exercise program.  Response to previous treatment: no adverse effects reported  Functional change: none     Pain: 0/10  Location: NA    Constitutional Symptoms Review: The patient denies having any constitutional symptoms.     Objective   Pt verbally consents to intravaginal treatment today.  Signed consent form already on file.     ABDOMINAL WALL  ASSESSMENT  Palpation: hypotonic   Abdominal strength: 2/5  Scarring: none  Pelvic Floor Muscle and Transverse Abdominus Synergy: absent  Diastasis: doming with active contraction     BREATHING MECHANICS ASSESSMENT   Thorax Assessment During Quiet Respiration: Decreased excursion of abdominal wall  and Decreased excursion bilaterally of lateral ribs   Thorax Assessment During Deep Respiration: Decreased excursion bilaterally of lateral ribs     VAGINAL PELVIC FLOOR EXAM    EXTERNAL ASSESSMENT  Introitus: WNL  Skin condition: WNL  Scarring: none   Sensation: WNL   Pain: none  Voluntary contraction: visible lift and accessory muscle use  Voluntary relaxation: visible drop  Involuntary contraction: visible drop  Bearing down: visible drop  Perineal descent: absent      INTERNAL ASSESSMENT  Pain: tender areas noted as follows: L posterior wall of levator ani    Sensation: able to localized pressure appropriately   Vaginal vault: roomy   Muscle Bulk: atrophy   Muscle Power: 1/5  Muscle Endurance: NT sec  # Reps To Fatigue: NT    Fast Contractions in 10 seconds: NT     Quality of contraction: NT  Specificity: patient contracts: glutes and abdominals   Coordination: tends to hold breath during PFM contration and cannot isolate PFM from abdominals   Prolapse check: Grade 3 cystocele and Grade 1 rectocele    Lashanda received therapeutic exercises to develop  strength and endurance for 15 minutes including:  Hip ADD ball squeeze w/ kegel - 2x10  Bridge w/ kegel - 1x10     Lashanda participated in neuromuscular re-education activities to develop Coordination and Control for 10 minutes including:   Kegel edu and practice.  Increased time spent on edu on proper technique.  Pt improves with practice and verbal cues.    Lashanda participated in dynamic functional therapeutic activities to improve functional performance for 35 minutes, including:  Edu on prolapse management techniques   3 tiered strategy for determining appropriate  exercise: 1) can I do this without pressure/symptoms, if no, then 2) can I modify for position and/or coordination with breath to do without symptoms, if no, then 3) exercise for a later date after she has built up strength  Coordination of exercise/exertion with exhalation and pelvic floor contraction if able, or just exhalation or at least no breath holding  Explained concepts of pressure management and excessive activation of upper abdominals with need to downtrain upper/uptrain lower for balanced pressure mgmt  Edu on strategies for decreasing nocturia including: limiting fluids 2 hours before bed and LE elevation to decrease fluid retention     Home Exercises Provided and Patient Education Provided     Education provided:   - anatomy/physiology of pelvic floor, posture/body mechanices, bladder retraining, kegels, fluid intake/dietary modifications, behavior modifications and Coordination of kegels with functional activities such as cough, laugh, sneeze, lift, etc.   Discussed progression of plan of care with patient; educated pt in activity modification; reviewed HEP with pt. Pt demonstrated and verbalized understanding of all instruction and was provided with a handout of HEP (see Patient Instructions).    Written Home Exercises Provided: yes.  Exercises were reviewed and Lashanda was able to demonstrate them prior to the end of the session.  Lashanda demonstrated good  understanding of the education provided.     See EMR under Patient Instructions for exercises provided 1/25/2022.    Assessment     Assessment reveals abdominal doming with active contraction as well as poor breathing mechanics. Pt initially reports sharp sensation with palpation of posterior wall of L levator ani, decreased as examination progressed. Pt demonstrates decreased PFM activation, likely due to substitution by gluteals and abdominals. Grade 3 cystocele and grade 1 rectocele present upon bearing down in supine. Discussed pelvic organ  prolapse management strategies, including exhale or PFM activation with exertion, gradual introduction of new exercises in order to monitor for exacerbation of symptoms, and position modifications to improve symptoms. Initiated hip overflow exercises to assist with PFM strengthening. Also reviewed strategies for decreasing nocturia. Pt to f/u with sleep clinic to address possible sleep apnea.     Lashanda Is progressing well towards her goals.   Pt prognosis is Excellent.     Pt will continue to benefit from skilled outpatient physical therapy to address the deficits listed in the problem list box on initial evaluation, provide pt/family education and to maximize pt's level of independence in the home and community environment.     Pt's spiritual, cultural and educational needs considered and pt agreeable to plan of care and goals.     Anticipated barriers to physical therapy: none    Goals:   Short Term Goals: 6 weeks   Pt indep in HEP  Pt to increase hip and pelvic floor strength by 1/2 a grade to improve stability with ADLs to decrease episodes of FI.  Pt to demonstrate being able to correctly and consistently perform a kegel which is needed for continence.   to be edu pelvic muscle bracing and be able to consistently perform correctly and quickly to help decrease incontinence with squatting and lifting.  Pt will report minimal to no pain with single digit pelvic assessment and display relaxation during this assessment in order to facilitate a return to pain-free intercourse with .     Long Term Goals: 12 weeks   Pt indep in progressive HEP  Pt reports 0 episodes of urine leakage in at least 2 weeks for improved ADL participation  Nocturia no more than 1x/night for improved quality of sleep  Pt to report a decrease in episodes of FI to no more than 1x every 2 weeks to improve confidence needed to participate in social outings.  Pt to report a decrease in pelvic pressure and fullness to no more than 10% of  the time to demonstrate improving pelvic support of pelvic structures.  Pt reports able to have painfree intercourse for improved participation in ADLs and improved intimacy with partner    Plan     Plan of care Certification: 12/21/2021 to 3/21/2022.     Outpatient Physical Therapy 1 times weekly for 12 weeks to include the following interventions: therapeutic exercises, therapeutic activity, neuromuscular re-education, manual therapy, patient/family education and self care/home management     Progress kegel strengthening program. Discuss bladder supports, bladder irritants, and bladder health. Teach double voiding and urge suppression. Discuss use of lubrication with intercourse to decrease pain. Discuss TrA activation.     Anna Kimura, PT

## 2022-01-25 ENCOUNTER — CLINICAL SUPPORT (OUTPATIENT)
Dept: REHABILITATION | Facility: OTHER | Age: 67
End: 2022-01-25
Attending: NURSE PRACTITIONER
Payer: MEDICARE

## 2022-01-25 DIAGNOSIS — M62.81 MUSCLE WEAKNESS: ICD-10-CM

## 2022-01-25 DIAGNOSIS — M79.18 MYALGIA OF PELVIC FLOOR: Primary | ICD-10-CM

## 2022-01-25 DIAGNOSIS — R27.9 LACK OF COORDINATION: ICD-10-CM

## 2022-01-25 PROCEDURE — 97530 THERAPEUTIC ACTIVITIES: CPT

## 2022-01-25 PROCEDURE — 97110 THERAPEUTIC EXERCISES: CPT

## 2022-01-25 NOTE — PATIENT INSTRUCTIONS
"Nighttime Urination:   1. Stop drinking fluids 2 hours before bedtime.   2. Elevate legs using 1-2 pillows for 10-15 minutes before bed.          FUNCTIONAL KEGELS    1. Lie comfortably with legs and buttocks relaxed.  2. Squeeze and LIFT the muscles that stop the flow of urine and passage of gas.  Keep legs and buttock muscles quiet.  3. Sneeze, cough, or laugh while maintaining the pelvic floor contraction.   4. Release the Kegel fully.   5. Practice this motion so that it becomes automatic when you have to sneeze or cough.     KNEE SQUEEZES     With ball or folded pillow between knees, tighten deep core then squeeze knees together and hold 5 seconds. Do 2 sets of 10.    BRIDGING W/ KEGEL    While lying on your back with knees bent, tighten your pelvic floor, squeeze your buttocks and then raise your buttocks off the floor/bed as creating a "Bridge" with your body. Hold for 3-5 seconds and then lower your back flat on the ground. Release the Kegel. Repeat 10 times, twice a day.    EXERCISE STRATEGY:   When performing exercises, start to notice:   1) Can I do this without pressure/symptoms? If no, then:   2) Can I modify for position and/or coordination with breath to do without symptoms? (such as exhaling as you exert yourself) If no, then:  3) Save the exercise for a later date after you have built up strength      "

## 2022-01-31 ENCOUNTER — PATIENT MESSAGE (OUTPATIENT)
Dept: REHABILITATION | Facility: OTHER | Age: 67
End: 2022-01-31
Payer: MEDICARE

## 2022-02-16 ENCOUNTER — DOCUMENTATION ONLY (OUTPATIENT)
Dept: REHABILITATION | Facility: OTHER | Age: 67
End: 2022-02-16
Payer: MEDICARE

## 2022-02-16 NOTE — PROGRESS NOTES
Physical Therapy No Show Note       Patient was scheduled for physical therapy at [facility] for 2/16/2022. Pt failed to appear for appointment without prior notification for today.    No Show: 1/3      Anna Kimura, PT

## 2022-05-20 ENCOUNTER — OFFICE VISIT (OUTPATIENT)
Dept: PRIMARY CARE CLINIC | Facility: CLINIC | Age: 67
End: 2022-05-20
Payer: MEDICARE

## 2022-05-20 DIAGNOSIS — G47.00 INSOMNIA, UNSPECIFIED TYPE: ICD-10-CM

## 2022-05-20 DIAGNOSIS — F41.1 GAD (GENERALIZED ANXIETY DISORDER): ICD-10-CM

## 2022-05-20 DIAGNOSIS — F33.1 MODERATE EPISODE OF RECURRENT MAJOR DEPRESSIVE DISORDER: ICD-10-CM

## 2022-05-20 PROCEDURE — 99442 PR PHYSICIAN TELEPHONE EVALUATION 11-20 MIN: ICD-10-PCS | Mod: 95,,, | Performed by: FAMILY MEDICINE

## 2022-05-20 PROCEDURE — 99442 PR PHYSICIAN TELEPHONE EVALUATION 11-20 MIN: CPT | Mod: 95,,, | Performed by: FAMILY MEDICINE

## 2022-05-20 RX ORDER — SERTRALINE HYDROCHLORIDE 25 MG/1
25 TABLET, FILM COATED ORAL DAILY
Qty: 14 TABLET | Refills: 0 | Status: SHIPPED | OUTPATIENT
Start: 2022-05-20 | End: 2022-06-03

## 2022-05-20 RX ORDER — SERTRALINE HYDROCHLORIDE 50 MG/1
50 TABLET, FILM COATED ORAL DAILY
Qty: 90 TABLET | Refills: 1 | Status: SHIPPED | OUTPATIENT
Start: 2022-06-03 | End: 2023-01-25 | Stop reason: SDUPTHER

## 2022-05-20 RX ORDER — TRAZODONE HYDROCHLORIDE 50 MG/1
50-100 TABLET ORAL NIGHTLY PRN
Start: 2022-05-20 | End: 2023-08-29

## 2022-05-20 NOTE — PROGRESS NOTES
Established Patient - Audio Only Telehealth Visit     The patient location is:  [place]  The chief complaint leading to consultation is:  Anxiety issues  Visit type: Virtual visit with audio only (telephone)  Total time spent with patient:  14 minutes       The reason for the audio only service rather than synchronous audio and video virtual visit was related to technical difficulties or patient preference/necessity.     Each patient to whom I provide medical services by telemedicine is:  (1) informed of the relationship between the physician and patient and the respective role of any other health care provider with respect to management of the patient; and (2) notified that they may decline to receive medical services by telemedicine and may withdraw from such care at any time. Patient verbally consented to receive this service via voice-only telephone call.       HPI:  Patient complains of progressively worsening anxiety and insomnia.  Most of her stress is centered around her 's progressive dementia, she says I do not know how to cope.  Says her  is very argumentative and irritable.  Says she has trouble sleeping and often times will go several days without sleep.  Has tried Ambien without improvement.  Says she took Lexapro in the past, but it made her not care about anything.     Assessment and plan:  MARIBEL (generalized anxiety disorder)  -     sertraline (ZOLOFT) 25 MG tablet; Take 1 tablet (25 mg total) by mouth once daily. for 14 days  Dispense: 14 tablet; Refill: 0  -     sertraline (ZOLOFT) 50 MG tablet; Take 1 tablet (50 mg total) by mouth once daily.  Dispense: 90 tablet; Refill: 1  -     Ambulatory referral/consult to Psychology; Future; Expected date: 05/27/2022  Start low-dose sertraline, increase to 50 mg after 2 weeks.  Would benefit from CBT.  Reassess in 6 weeks  Moderate episode of recurrent major depressive disorder  -     sertraline (ZOLOFT) 25 MG tablet; Take 1 tablet (25 mg  total) by mouth once daily. for 14 days  Dispense: 14 tablet; Refill: 0  -     sertraline (ZOLOFT) 50 MG tablet; Take 1 tablet (50 mg total) by mouth once daily.  Dispense: 90 tablet; Refill: 1  -     Ambulatory referral/consult to Psychology; Future; Expected date: 05/27/2022    Insomnia, unspecified type  -     traZODone (DESYREL) 50 MG tablet; Take 1-2 tablets ( mg total) by mouth nightly as needed for Insomnia.                            This service was not originating from a related E/M service provided within the previous 7 days nor will  to an E/M service or procedure within the next 24 hours or my soonest available appointment.  Prevailing standard of care was able to be met in this audio-only visit.

## 2022-05-27 ENCOUNTER — TELEPHONE (OUTPATIENT)
Dept: PSYCHOLOGY | Facility: CLINIC | Age: 67
End: 2022-05-27
Payer: MEDICARE

## 2022-05-27 NOTE — TELEPHONE ENCOUNTER
----- Message from Ghaazl Miller, PhD sent at 5/27/2022  8:06 AM CDT -----  Regarding: RE: Moderate episode of recurrent major depressive disorder  Please refer  ----- Message -----  From: Ysabel Marquis LPN  Sent: 5/26/2022   4:33 PM CDT  To: Ghazal Miller, PhD  Subject: FW: Moderate episode of recurrent major depr#    Hey are you taking anymore new patients, or shall I refer her to [facility]  ----- Message -----  From: Magdaleno Rhoades  Sent: 5/26/2022   4:21 PM CDT  To: Angela SHEIKH Staff  Subject: Moderate episode of recurrent major depressi#    Dr. Wayne Lundberg has a referral/consult to Psychology for this patient. Please schedule and call patient. Thank you!

## 2023-01-05 ENCOUNTER — OFFICE VISIT (OUTPATIENT)
Dept: DERMATOLOGY | Facility: CLINIC | Age: 68
End: 2023-01-05
Payer: MEDICARE

## 2023-01-05 DIAGNOSIS — L21.9 SEBORRHEIC DERMATITIS, UNSPECIFIED: ICD-10-CM

## 2023-01-05 DIAGNOSIS — L82.1 SK (SEBORRHEIC KERATOSIS): ICD-10-CM

## 2023-01-05 DIAGNOSIS — L30.9 DERMATITIS: Primary | ICD-10-CM

## 2023-01-05 DIAGNOSIS — L81.4 LENTIGO: ICD-10-CM

## 2023-01-05 DIAGNOSIS — L81.7 PIGMENTED PURPURIC DERMATOSIS: ICD-10-CM

## 2023-01-05 DIAGNOSIS — D22.9 NEVUS: ICD-10-CM

## 2023-01-05 DIAGNOSIS — L82.0 INFLAMED SEBORRHEIC KERATOSIS: ICD-10-CM

## 2023-01-05 DIAGNOSIS — L57.0 AK (ACTINIC KERATOSIS): ICD-10-CM

## 2023-01-05 PROCEDURE — 99204 PR OFFICE/OUTPT VISIT, NEW, LEVL IV, 45-59 MIN: ICD-10-PCS | Mod: 25,S$GLB,, | Performed by: DERMATOLOGY

## 2023-01-05 PROCEDURE — 99999 PR PBB SHADOW E&M-EST. PATIENT-LVL III: CPT | Mod: PBBFAC,,, | Performed by: DERMATOLOGY

## 2023-01-05 PROCEDURE — 17003 DESTRUCTION, PREMALIGNANT LESIONS; SECOND THROUGH 14 LESIONS: ICD-10-PCS | Mod: 59,S$GLB,, | Performed by: DERMATOLOGY

## 2023-01-05 PROCEDURE — 17000 DESTRUCT PREMALG LESION: CPT | Mod: 59,S$GLB,, | Performed by: DERMATOLOGY

## 2023-01-05 PROCEDURE — 99204 OFFICE O/P NEW MOD 45 MIN: CPT | Mod: 25,S$GLB,, | Performed by: DERMATOLOGY

## 2023-01-05 PROCEDURE — 3288F FALL RISK ASSESSMENT DOCD: CPT | Mod: CPTII,S$GLB,, | Performed by: DERMATOLOGY

## 2023-01-05 PROCEDURE — 17000 PR DESTRUCTION(LASER SURGERY,CRYOSURGERY,CHEMOSURGERY),PREMALIGNANT LESIONS,FIRST LESION: ICD-10-PCS | Mod: 59,S$GLB,, | Performed by: DERMATOLOGY

## 2023-01-05 PROCEDURE — 1101F PT FALLS ASSESS-DOCD LE1/YR: CPT | Mod: CPTII,S$GLB,, | Performed by: DERMATOLOGY

## 2023-01-05 PROCEDURE — 17003 DESTRUCT PREMALG LES 2-14: CPT | Mod: 59,S$GLB,, | Performed by: DERMATOLOGY

## 2023-01-05 PROCEDURE — 1101F PR PT FALLS ASSESS DOC 0-1 FALLS W/OUT INJ PAST YR: ICD-10-PCS | Mod: CPTII,S$GLB,, | Performed by: DERMATOLOGY

## 2023-01-05 PROCEDURE — 1159F PR MEDICATION LIST DOCUMENTED IN MEDICAL RECORD: ICD-10-PCS | Mod: CPTII,S$GLB,, | Performed by: DERMATOLOGY

## 2023-01-05 PROCEDURE — 1126F PR PAIN SEVERITY QUANTIFIED, NO PAIN PRESENT: ICD-10-PCS | Mod: CPTII,S$GLB,, | Performed by: DERMATOLOGY

## 2023-01-05 PROCEDURE — 17110 DESTRUCTION B9 LES UP TO 14: CPT | Mod: S$GLB,,, | Performed by: DERMATOLOGY

## 2023-01-05 PROCEDURE — 1159F MED LIST DOCD IN RCRD: CPT | Mod: CPTII,S$GLB,, | Performed by: DERMATOLOGY

## 2023-01-05 PROCEDURE — 17110 PR DESTRUCTION BENIGN LESIONS UP TO 14: ICD-10-PCS | Mod: S$GLB,,, | Performed by: DERMATOLOGY

## 2023-01-05 PROCEDURE — 1160F PR REVIEW ALL MEDS BY PRESCRIBER/CLIN PHARMACIST DOCUMENTED: ICD-10-PCS | Mod: CPTII,S$GLB,, | Performed by: DERMATOLOGY

## 2023-01-05 PROCEDURE — 99999 PR PBB SHADOW E&M-EST. PATIENT-LVL III: ICD-10-PCS | Mod: PBBFAC,,, | Performed by: DERMATOLOGY

## 2023-01-05 PROCEDURE — 3288F PR FALLS RISK ASSESSMENT DOCUMENTED: ICD-10-PCS | Mod: CPTII,S$GLB,, | Performed by: DERMATOLOGY

## 2023-01-05 PROCEDURE — 1160F RVW MEDS BY RX/DR IN RCRD: CPT | Mod: CPTII,S$GLB,, | Performed by: DERMATOLOGY

## 2023-01-05 PROCEDURE — 1126F AMNT PAIN NOTED NONE PRSNT: CPT | Mod: CPTII,S$GLB,, | Performed by: DERMATOLOGY

## 2023-01-05 RX ORDER — KETOCONAZOLE 20 MG/G
CREAM TOPICAL
Qty: 30 G | Refills: 3 | Status: SHIPPED | OUTPATIENT
Start: 2023-01-05 | End: 2023-03-01

## 2023-01-05 RX ORDER — TRIAMCINOLONE ACETONIDE 1 MG/G
CREAM TOPICAL
Qty: 60 G | Refills: 3 | Status: SHIPPED | OUTPATIENT
Start: 2023-01-05 | End: 2023-03-01

## 2023-01-05 NOTE — PROGRESS NOTES
"  Subjective:       Patient ID:  Lashanda Monaco is a 67 y.o. female who presents for   Chief Complaint   Patient presents with    Skin Check     tbse     Patient is here today for a "TBSE" check.   Pt has a history of  extensive sun exposure in the past.   Pt recalls several blistering sunburns in the past- yes  Pt has history of tanning bed use- no  Pt has  had moles removed in the past- no  Pt has history of melanoma in first degree relatives-  brother     Pt c/o lesions on left arm- scaly, raised , itchy then resolved after a few months. Lesions tend to come and go  Pt also has lesion on mid chest. Flares and remits and has similar lesions on face.   C/o lesion on right cheek.  Raised and occ painful and irritated. No tx  C/o rash on legs. Worse in the summer when it flares  C/o scaling on nose and forehead. Comes and goes. No tx.       Review of Systems   Skin:  Positive for itching, rash, dry skin and tendency to form keloidal scars. Negative for daily sunscreen use and activity-related sunscreen use.   Hematologic/Lymphatic: Bruises/bleeds easily.      Objective:    Physical Exam   Constitutional: She appears well-developed and well-nourished. No distress.   Neurological: She is alert and oriented to person, place, and time. She is not disoriented.   Psychiatric: She has a normal mood and affect.   Skin:   Areas Examined (abnormalities noted in diagram):   Scalp / Hair Palpated and Inspected  Head / Face Inspection Performed  Neck Inspection Performed  Chest / Axilla Inspection Performed  Abdomen Inspection Performed  Genitals / Buttocks / Groin Inspection Performed  Back Inspection Performed  RUE Inspected  LUE Inspection Performed  RLE Inspected  LLE Inspection Performed  Nails and Digits Inspection Performed                     Diagram Legend     Erythematous scaling macule/papule c/w actinic keratosis       Vascular papule c/w angioma      Pigmented verrucoid papule/plaque c/w seborrheic keratosis      " Yellow umbilicated papule c/w sebaceous hyperplasia      Irregularly shaped tan macule c/w lentigo     1-2 mm smooth white papules consistent with Milia      Movable subcutaneous cyst with punctum c/w epidermal inclusion cyst      Subcutaneous movable cyst c/w pilar cyst      Firm pink to brown papule c/w dermatofibroma      Pedunculated fleshy papule(s) c/w skin tag(s)      Evenly pigmented macule c/w junctional nevus     Mildly variegated pigmented, slightly irregular-bordered macule c/w mildly atypical nevus      Flesh colored to evenly pigmented papule c/w intradermal nevus       Pink pearly papule/plaque c/w basal cell carcinoma      Erythematous hyperkeratotic cursted plaque c/w SCC      Surgical scar with no sign of skin cancer recurrence      Open and closed comedones      Inflammatory papules and pustules      Verrucoid papule consistent consistent with wart     Erythematous eczematous patches and plaques     Dystrophic onycholytic nail with subungual debris c/w onychomycosis     Umbilicated papule    Erythematous-base heme-crusted tan verrucoid plaque consistent with inflamed seborrheic keratosis     Erythematous Silvery Scaling Plaque c/w Psoriasis     See annotation      Assessment / Plan:        Dermatitis  -     triamcinolone acetonide 0.1% (KENALOG) 0.1 % cream; Aaa qd- bid prn flare on arms and legs. Not more than 2 weeks straight in the same location. Avoid use on face and groin  Dispense: 60 g; Refill: 3  Discussed with patient good skin care regimen including avoiding fragranced products and very hot showers.  Recommended dove sensitive skin bar soap or cerave hydrating cleanser or bar.  Recommend Cerave cream  For moisturization daily -2x daily.       Pigmented purpuric dermatosis  -     triamcinolone acetonide 0.1% (KENALOG) 0.1 % cream; Aaa qd- bid prn flare on arms and legs. Not more than 2 weeks straight in the same location. Avoid use on face and groin  Dispense: 60 g; Refill:  3  Legs  Reassurance  This is secondary to increased pressure and inflammation on capillaries forcing fluid into skin   Blood extravasating out of vessels deposits iron which contributes to brown discoloration  Compression hose if on feet regularly, leg elevation, Vit C 500 mg po BID may be beneficial  Triamcinolone cream as needed    Seborrheic dermatitis, unspecified  -     ketoconazole (NIZORAL) 2 % cream; aaa bid prn flare to face/nose  Dispense: 30 g; Refill: 3    AK (actinic keratosis)  Cryosurgery Procedure Note    Verbal consent from the patient is obtained including, but not limited to, risk of hypopigmentation/hyperpigmentation, scar, recurrence of lesion. The patient is aware of the precancerous quality and need for treatment of these lesions. Liquid nitrogen cryosurgery is applied to the 3 actinic keratoses, as detailed in the physical exam, to produce a freeze injury. The patient is aware that blisters may form and is instructed on wound care with gentle cleansing and use of vaseline ointment to keep moist until healed. The patient is supplied a handout on cryosurgery and is instructed to call if lesions do not completely resolve.    SK (seborrheic keratosis)  These are benign inherited growths without a malignant potential. Reassurance given to patient. No treatment is necessary.     Lentigo  This is a benign hyperpigmented sun induced lesion. Recommend daily sun protection/avoidance and use of at least SPF 30, broad spectrum sunscreen (OTC drug) will reduce the number of new lesions. Treatment of these benign lesions are considered cosmetic.  The nature of sun-induced photo-aging and skin cancers is discussed.  Sun avoidance, protective clothing, and the use of 30-SPF sunscreens is advised. Observe closely for skin damage/changes, and call if such occurs.  Cerave am and pm     Nevus  Discussed ABCDE's of nevi.  Monitor for new mole or moles that are becoming bigger, darker, irritated, or developing  irregular borders. Brochure provided. Instructed patient to observe lesion(s) for changes and follow up in clinic if changes are noted. Patient to monitor skin at home for new or changing lesions.     Inflamed seborrheic keratosis  Cryosurgery procedure note:    Verbal consent from the patient is obtained including, but not limited to, risk of hypopigmentation/hyperpigmentation, scar, recurrence of lesion. Liquid nitrogen cryosurgery is applied to 1 lesions to produce a freeze injury. The patient is aware that blisters may form and is instructed on wound care with gentle cleansing and use of vaseline ointment to keep moist until healed. The patient is supplied a handout on cryosurgery and is instructed to call if lesions do not completely resolve.      Total body skin examination performed today including at least 12 points as noted in physical examination. No lesions suspicious for malignancy noted.    Recommend daily sun protection/avoidance, use of at least SPF 30, broad spectrum sunscreen (OTC drug), skin self examinations, and routine physician surveillance to optimize early detection             Follow up in about 1 year (around 1/5/2024) for TBSE.

## 2023-01-05 NOTE — PATIENT INSTRUCTIONS
Discussed with patient good skin care regimen including avoiding fragranced products and very hot showers.  Recommended dove sensitive skin bar soap or cerave hydrating cleanser or bar.  Recommend Cerave cream  For moisturization daily -2x daily.     Legs  Reassurance  This is secondary to increased pressure and inflammation on capillaries forcing fluid into skin   Blood extravasating out of vessels deposits iron which contributes to brown discoloration  Compression hose if on feet regularly, leg elevation, Vit C 500 mg po BID may be beneficial  Triamcinolone cream as needed    We would like to see you back in the clinic in 12 months.  You will be able to schedule this appointment by calling or by using your [facility] portal 3 months before this time. Because our schedule fills so quickly, please set a reminder in your phone or on your calendar to schedule 3 months before you are due to come in so that we can see you in a timely fashion.  You should also receive a reminder from us in the mail. This will help us ensure we can continue to provide excellent healthcare for you. Thank you.

## 2023-01-25 DIAGNOSIS — F33.1 MODERATE EPISODE OF RECURRENT MAJOR DEPRESSIVE DISORDER: ICD-10-CM

## 2023-01-25 DIAGNOSIS — F41.1 GAD (GENERALIZED ANXIETY DISORDER): ICD-10-CM

## 2023-01-25 RX ORDER — SERTRALINE HYDROCHLORIDE 50 MG/1
50 TABLET, FILM COATED ORAL DAILY
Qty: 90 TABLET | Refills: 0 | Status: SHIPPED | OUTPATIENT
Start: 2023-01-25 | End: 2023-05-09

## 2023-01-25 NOTE — TELEPHONE ENCOUNTER
No new care gaps identified.  [facility] Embedded Care Gaps. Reference number: [id number]. 1/25/2023   10:12:10 AM CST

## 2023-01-25 NOTE — TELEPHONE ENCOUNTER
----- Message from Sveta Laws sent at 1/25/2023  9:58 AM CST -----  Contact: [phone]  Requesting an RX refill or new RX.  Is this a refill or new RX: Refill  RX name and strength (copy/paste from chart):  sertraline (ZOLOFT) 50 MG tablet  Is this a 30 day or 90 day RX: 90  Pharmacy name and phone # (copy/paste from chart):    [address] [place]  [address] [facility]  [address] [facility]  [place] [address]  Phone: [phone] Fax: [phone]    Patient is scheduled for her annual on 3/1/2023    Please call and advise.    Thank You

## 2023-02-16 ENCOUNTER — PATIENT OUTREACH (OUTPATIENT)
Dept: ADMINISTRATIVE | Facility: HOSPITAL | Age: 68
End: 2023-02-16
Payer: MEDICARE

## 2023-02-16 DIAGNOSIS — Z78.0 MENOPAUSE: Primary | ICD-10-CM

## 2023-02-16 NOTE — PROGRESS NOTES
Health Maintenance Due   Topic Date Due    Hemoglobin A1c (Diabetic Prevention Screening)  Never done    DEXA Scan  Never done    Shingles Vaccine (2 of 3) 03/03/2014    Pneumococcal Vaccines (Age 65+) (1 - PCV) Never done    COVID-19 Vaccine (3 - Booster for Moderna series) 05/20/2021    Mammogram  02/10/2022    Influenza Vaccine (1) Never done        Chart review done.   HM updated.   Immunizations reviewed & updated.   Care Everywhere updated.   DIS reviewed   [facility] reviewed   Orders entered:   DEXA

## 2023-03-01 ENCOUNTER — OFFICE VISIT (OUTPATIENT)
Dept: PRIMARY CARE CLINIC | Facility: CLINIC | Age: 68
End: 2023-03-01
Payer: MEDICARE

## 2023-03-01 VITALS
OXYGEN SATURATION: 95 % | WEIGHT: 200.5 LBS | HEART RATE: 89 BPM | SYSTOLIC BLOOD PRESSURE: 122 MMHG | HEIGHT: 65 IN | TEMPERATURE: 98 F | DIASTOLIC BLOOD PRESSURE: 72 MMHG | BODY MASS INDEX: 33.41 KG/M2 | RESPIRATION RATE: 18 BRPM

## 2023-03-01 DIAGNOSIS — F33.1 MODERATE EPISODE OF RECURRENT MAJOR DEPRESSIVE DISORDER: ICD-10-CM

## 2023-03-01 DIAGNOSIS — R73.9 HYPERGLYCEMIA: ICD-10-CM

## 2023-03-01 DIAGNOSIS — Z23 NEED FOR VACCINATION: ICD-10-CM

## 2023-03-01 DIAGNOSIS — E78.2 MIXED HYPERLIPIDEMIA: Primary | ICD-10-CM

## 2023-03-01 DIAGNOSIS — I10 ESSENTIAL HYPERTENSION, BENIGN: ICD-10-CM

## 2023-03-01 DIAGNOSIS — Z12.31 ENCOUNTER FOR SCREENING MAMMOGRAM FOR BREAST CANCER: ICD-10-CM

## 2023-03-01 PROBLEM — E66.01 SEVERE OBESITY (BMI 35.0-39.9) WITH COMORBIDITY: Status: RESOLVED | Noted: 2021-10-18 | Resolved: 2023-03-01

## 2023-03-01 PROCEDURE — 99214 PR OFFICE/OUTPT VISIT, EST, LEVL IV, 30-39 MIN: ICD-10-PCS | Mod: S$GLB,,, | Performed by: FAMILY MEDICINE

## 2023-03-01 PROCEDURE — 1101F PT FALLS ASSESS-DOCD LE1/YR: CPT | Mod: CPTII,S$GLB,, | Performed by: FAMILY MEDICINE

## 2023-03-01 PROCEDURE — 3074F SYST BP LT 130 MM HG: CPT | Mod: CPTII,S$GLB,, | Performed by: FAMILY MEDICINE

## 2023-03-01 PROCEDURE — 3288F PR FALLS RISK ASSESSMENT DOCUMENTED: ICD-10-PCS | Mod: CPTII,S$GLB,, | Performed by: FAMILY MEDICINE

## 2023-03-01 PROCEDURE — 3008F BODY MASS INDEX DOCD: CPT | Mod: CPTII,S$GLB,, | Performed by: FAMILY MEDICINE

## 2023-03-01 PROCEDURE — 1159F MED LIST DOCD IN RCRD: CPT | Mod: CPTII,S$GLB,, | Performed by: FAMILY MEDICINE

## 2023-03-01 PROCEDURE — 3078F PR MOST RECENT DIASTOLIC BLOOD PRESSURE < 80 MM HG: ICD-10-PCS | Mod: CPTII,S$GLB,, | Performed by: FAMILY MEDICINE

## 2023-03-01 PROCEDURE — 3074F PR MOST RECENT SYSTOLIC BLOOD PRESSURE < 130 MM HG: ICD-10-PCS | Mod: CPTII,S$GLB,, | Performed by: FAMILY MEDICINE

## 2023-03-01 PROCEDURE — 1160F RVW MEDS BY RX/DR IN RCRD: CPT | Mod: CPTII,S$GLB,, | Performed by: FAMILY MEDICINE

## 2023-03-01 PROCEDURE — 1126F PR PAIN SEVERITY QUANTIFIED, NO PAIN PRESENT: ICD-10-PCS | Mod: CPTII,S$GLB,, | Performed by: FAMILY MEDICINE

## 2023-03-01 PROCEDURE — G0009 PNEUMOCOCCAL CONJUGATE VACCINE 20-VALENT: ICD-10-PCS | Mod: S$GLB,,, | Performed by: FAMILY MEDICINE

## 2023-03-01 PROCEDURE — 1160F PR REVIEW ALL MEDS BY PRESCRIBER/CLIN PHARMACIST DOCUMENTED: ICD-10-PCS | Mod: CPTII,S$GLB,, | Performed by: FAMILY MEDICINE

## 2023-03-01 PROCEDURE — 99999 PR PBB SHADOW E&M-EST. PATIENT-LVL IV: ICD-10-PCS | Mod: PBBFAC,,, | Performed by: FAMILY MEDICINE

## 2023-03-01 PROCEDURE — 90677 PCV20 VACCINE IM: CPT | Mod: S$GLB,,, | Performed by: FAMILY MEDICINE

## 2023-03-01 PROCEDURE — 1159F PR MEDICATION LIST DOCUMENTED IN MEDICAL RECORD: ICD-10-PCS | Mod: CPTII,S$GLB,, | Performed by: FAMILY MEDICINE

## 2023-03-01 PROCEDURE — 3288F FALL RISK ASSESSMENT DOCD: CPT | Mod: CPTII,S$GLB,, | Performed by: FAMILY MEDICINE

## 2023-03-01 PROCEDURE — 99999 PR PBB SHADOW E&M-EST. PATIENT-LVL IV: CPT | Mod: PBBFAC,,, | Performed by: FAMILY MEDICINE

## 2023-03-01 PROCEDURE — G0009 ADMIN PNEUMOCOCCAL VACCINE: HCPCS | Mod: S$GLB,,, | Performed by: FAMILY MEDICINE

## 2023-03-01 PROCEDURE — 1126F AMNT PAIN NOTED NONE PRSNT: CPT | Mod: CPTII,S$GLB,, | Performed by: FAMILY MEDICINE

## 2023-03-01 PROCEDURE — 3008F PR BODY MASS INDEX (BMI) DOCUMENTED: ICD-10-PCS | Mod: CPTII,S$GLB,, | Performed by: FAMILY MEDICINE

## 2023-03-01 PROCEDURE — 99214 OFFICE O/P EST MOD 30 MIN: CPT | Mod: S$GLB,,, | Performed by: FAMILY MEDICINE

## 2023-03-01 PROCEDURE — 3078F DIAST BP <80 MM HG: CPT | Mod: CPTII,S$GLB,, | Performed by: FAMILY MEDICINE

## 2023-03-01 PROCEDURE — 90677 PNEUMOCOCCAL CONJUGATE VACCINE 20-VALENT: ICD-10-PCS | Mod: S$GLB,,, | Performed by: FAMILY MEDICINE

## 2023-03-01 PROCEDURE — 1101F PR PT FALLS ASSESS DOC 0-1 FALLS W/OUT INJ PAST YR: ICD-10-PCS | Mod: CPTII,S$GLB,, | Performed by: FAMILY MEDICINE

## 2023-03-01 RX ORDER — CYANOCOBALAMIN (VITAMIN B-12) 500 MCG
TABLET ORAL NIGHTLY
COMMUNITY
End: 2024-03-01

## 2023-03-01 NOTE — PROGRESS NOTES
Verified pt by name and . Allergies verified by pt.  Per physician orders pt was administered Prevnar 20 0.5 mL IM to right deltoid using aseptic technique. Pt tolerated well. No adverse effects or pain reported. MD notified.

## 2023-03-01 NOTE — PROGRESS NOTES
"Subjective:       Patient ID: Lashanda Monaco is a 67 y.o. female.    Chief Complaint: Annual Exam (Pt in for wellness exam)    Under lot of stress due to family conflict, son recently overdosed and  has been drinking and driving.  No recent falls or injury.  Compliant with medications    Review of Systems   Constitutional:  Negative for chills, fatigue and fever.   HENT:  Negative for congestion and trouble swallowing.    Eyes:  Negative for visual disturbance.   Respiratory:  Negative for cough and shortness of breath.    Cardiovascular:  Negative for chest pain.   Gastrointestinal:  Negative for abdominal pain, blood in stool, nausea and vomiting.   Genitourinary:  Negative for difficulty urinating.   Musculoskeletal:  Positive for arthralgias.   Skin:  Negative for rash.   Allergic/Immunologic: Negative for immunocompromised state.   Neurological:  Negative for dizziness.   Hematological:  Does not bruise/bleed easily.   Psychiatric/Behavioral:  Negative for sleep disturbance.      Objective:      Vitals:    03/01/23 0906   BP: 122/72   BP Location: Right arm   Patient Position: Sitting   BP Method: Medium (Manual)   Pulse: 89   Resp: 18   Temp: 97.8 °F (36.6 °C)   TempSrc: Temporal   SpO2: 95%   Weight: 90.9 kg (200 lb 8.1 oz)   Height: 5' 5" (1.651 m)     Physical Exam  Vitals and nursing note reviewed.   Constitutional:       General: She is not in acute distress.     Appearance: Normal appearance. She is well-developed.   HENT:      Head: Normocephalic and atraumatic.   Cardiovascular:      Rate and Rhythm: Normal rate and regular rhythm.      Heart sounds: Normal heart sounds.   Pulmonary:      Effort: Pulmonary effort is normal.      Breath sounds: Normal breath sounds.   Musculoskeletal:      Right lower leg: No edema.      Left lower leg: No edema.   Skin:     General: Skin is warm and dry.   Neurological:      Mental Status: She is alert and oriented to person, place, and time.   Psychiatric:   "       Mood and Affect: Mood normal.         Behavior: Behavior normal.       Lab Results   Component Value Date    WBC 7.50 06/29/2020    HGB 15.0 06/29/2020    HCT 44.7 06/29/2020     06/29/2020    CHOL 199 05/01/2019    TRIG 164 (H) 05/01/2019    HDL 81 (H) 05/01/2019    ALT 21 06/29/2020    AST 27 06/29/2020     (L) 06/29/2020    K 3.7 06/29/2020    CL 99 (L) 06/29/2020    CREATININE 0.7 06/29/2020    BUN 14 06/29/2020    CO2 25 06/29/2020    TSH 1.56 05/01/2019      Assessment:       1. Mixed hyperlipidemia    2. Moderate episode of recurrent major depressive disorder    3. Essential hypertension, benign    4. Hyperglycemia    5. Encounter for screening mammogram for breast cancer    6. Need for vaccination          Plan:       Mixed hyperlipidemia  -     Comprehensive Metabolic Panel; Future; Expected date: 03/01/2023  -     Lipid Panel; Future; Expected date: 03/01/2023  -     TSH; Future; Expected date: 03/01/2023  Continue statin  Moderate episode of recurrent major depressive disorder  -     TSH; Future; Expected date: 03/01/2023  Stable, continue SSRI  Essential hypertension, benign  -     CBC Auto Differential; Future; Expected date: 03/01/2023  -     Comprehensive Metabolic Panel; Future; Expected date: 03/01/2023  -     TSH; Future; Expected date: 03/01/2023  Stable on current regimen  Hyperglycemia  -     Hemoglobin A1C; Future; Expected date: 03/01/2023    Encounter for screening mammogram for breast cancer  -     Mammo Digital Screening Bilat w/ Jhonatan; Future; Expected date: 03/01/2023    Need for vaccination  -     Pneumococcal Conjugate Vaccine (20 Valent) (IM)      Medication List with Changes/Refills   Current Medications    AMLODIPINE (NORVASC) 10 MG TABLET    TAKE 1 TABLET DAILY    ATORVASTATIN (LIPITOR) 20 MG TABLET    TAKE 1 TABLET DAILY    ESTRADIOL (ESTRACE) 2 MG TABLET    Take 1 tablet (2 mg total) by mouth once daily.    FISH OIL-OMEGA-3 FATTY ACIDS 300-1,000 MG CAPSULE     Take 1 g by mouth once daily.    HYDROCODONE-ACETAMINOPHEN (NORCO) 5-325 MG PER TABLET    Take 1 tablet by mouth every 6 (six) hours as needed for Pain.    MELATONIN (MELATIN)    Take by mouth every evening.    MELOXICAM (MOBIC) 15 MG TABLET    Take 1 tablet (15 mg total) by mouth once daily.    OMEPRAZOLE (PRILOSEC) 40 MG CAPSULE    TAKE 1 CAPSULE TWICE A DAY BEFORE MEALS    ONDANSETRON (ZOFRAN) 4 MG TABLET    Take 1 tablet (4 mg total) by mouth every 6 (six) hours as needed for Nausea.    SERTRALINE (ZOLOFT) 50 MG TABLET    Take 1 tablet (50 mg total) by mouth once daily.    TRAZODONE (DESYREL) 50 MG TABLET    Take 1-2 tablets ( mg total) by mouth nightly as needed for Insomnia.    TURMERIC ORAL    Take 1 tablet by mouth once daily.   Discontinued Medications    KETOCONAZOLE (NIZORAL) 2 % CREAM    aaa bid prn flare to face/nose    MIRABEGRON (MYRBETRIQ) 50 MG TB24    Take 1 tablet (50 mg total) by mouth once daily.    [id number]- (AIRBORNE, ASCORBATE SODIUM,) 334-1.7 MG CHEW    Take by mouth.    TRIAMCINOLONE ACETONIDE 0.1% (KENALOG) 0.1 % CREAM    Aaa qd- bid prn flare on arms and legs. Not more than 2 weeks straight in the same location. Avoid use on face and groin    TROSPIUM (SANCTURA XR) 60 MG CP24 CAPSULE    Take 1 capsule (60 mg total) by mouth once daily.

## 2023-03-02 ENCOUNTER — TELEPHONE (OUTPATIENT)
Dept: PRIMARY CARE CLINIC | Facility: CLINIC | Age: 68
End: 2023-03-02
Payer: MEDICARE

## 2023-03-02 NOTE — TELEPHONE ENCOUNTER
----- Message from Marsha Rodríguez NP sent at 3/1/2023  5:05 PM CST -----  Lab work looks fine so far.  No evidence of diabetes.  Liver and kidney function looks fine.

## 2023-03-29 ENCOUNTER — HOSPITAL ENCOUNTER (OUTPATIENT)
Dept: RADIOLOGY | Facility: HOSPITAL | Age: 68
Discharge: HOME | End: 2023-03-29
Attending: FAMILY MEDICINE
Payer: MEDICARE

## 2023-03-29 DIAGNOSIS — Z12.31 ENCOUNTER FOR SCREENING MAMMOGRAM FOR BREAST CANCER: ICD-10-CM

## 2023-03-29 PROCEDURE — 77063 MAMMO DIGITAL SCREENING BILAT WITH TOMO: ICD-10-PCS | Mod: 26,,, | Performed by: FAMILY MEDICINE

## 2023-03-29 PROCEDURE — 77067 MAMMO DIGITAL SCREENING BILAT WITH TOMO: ICD-10-PCS | Mod: 26,,, | Performed by: FAMILY MEDICINE

## 2023-03-29 PROCEDURE — 77063 BREAST TOMOSYNTHESIS BI: CPT | Mod: 26,,, | Performed by: FAMILY MEDICINE

## 2023-03-29 PROCEDURE — 77067 SCR MAMMO BI INCL CAD: CPT | Mod: 26,,, | Performed by: FAMILY MEDICINE

## 2023-03-29 PROCEDURE — 77067 SCR MAMMO BI INCL CAD: CPT | Mod: TC

## 2023-05-29 DIAGNOSIS — E78.2 MIXED HYPERLIPIDEMIA: ICD-10-CM

## 2023-05-29 DIAGNOSIS — I10 ESSENTIAL HYPERTENSION, BENIGN: ICD-10-CM

## 2023-05-30 RX ORDER — ATORVASTATIN CALCIUM 20 MG/1
TABLET, FILM COATED ORAL
Qty: 90 TABLET | Refills: 3 | Status: SHIPPED | OUTPATIENT
Start: 2023-05-30

## 2023-05-30 RX ORDER — AMLODIPINE BESYLATE 10 MG/1
TABLET ORAL
Qty: 90 TABLET | Refills: 3 | Status: SHIPPED | OUTPATIENT
Start: 2023-05-30

## 2023-05-30 NOTE — TELEPHONE ENCOUNTER
Refill Decision Note   Lashanda Monaco  is requesting a refill authorization.  Brief Assessment and Rationale for Refill:  Approve     Medication Therapy Plan:         Comments:     Note composed:9:18 AM 05/30/2023             Appointments     Last Visit   3/1/2023 Wayne Lundberg MD   Next Visit   Visit date not found Wayne Lundberg MD

## 2023-05-30 NOTE — TELEPHONE ENCOUNTER
No care due was identified.  [facility] Embedded Care Due Messages. Reference number: [id number].   5/29/2023 11:15:46 PM CDT

## 2023-07-07 ENCOUNTER — PATIENT MESSAGE (OUTPATIENT)
Dept: DERMATOLOGY | Facility: CLINIC | Age: 68
End: 2023-07-07
Payer: MEDICARE

## 2023-07-10 ENCOUNTER — PATIENT MESSAGE (OUTPATIENT)
Dept: SPINE | Facility: CLINIC | Age: 68
End: 2023-07-10
Payer: MEDICARE

## 2023-07-10 ENCOUNTER — PATIENT MESSAGE (OUTPATIENT)
Dept: ADMINISTRATIVE | Facility: OTHER | Age: 68
End: 2023-07-10
Payer: MEDICARE

## 2023-07-11 DIAGNOSIS — K21.9 GASTROESOPHAGEAL REFLUX DISEASE: ICD-10-CM

## 2023-07-11 RX ORDER — OMEPRAZOLE 40 MG/1
CAPSULE, DELAYED RELEASE ORAL
Qty: 60 CAPSULE | Refills: 1 | Status: SHIPPED | OUTPATIENT
Start: 2023-07-11

## 2023-07-11 NOTE — TELEPHONE ENCOUNTER
Refill Routing Note   Medication(s) are not appropriate for processing by [facility] for the following reason(s):      Medication outside of protocol    [facility] action(s):  Route None identified     Medication Therapy Plan: omeprazole 40 mg BID dose outside protocol      Appointments  past 12m or future 3m with PCP    Date Provider   Last Visit   3/1/2023 Wayne Lundberg MD   Next Visit   Visit date not found Wayne Lundberg MD   ED visits in past 90 days: 0        Note composed:7:11 AM 07/11/2023

## 2023-07-11 NOTE — TELEPHONE ENCOUNTER
No care due was identified.  [facility] Embedded Care Due Messages. Reference number: [id number].   7/11/2023 12:16:14 AM CDT

## 2023-07-24 ENCOUNTER — PATIENT MESSAGE (OUTPATIENT)
Dept: PRIMARY CARE CLINIC | Facility: CLINIC | Age: 68
End: 2023-07-24
Payer: MEDICARE

## 2023-07-24 DIAGNOSIS — M54.50 LUMBAR BACK PAIN: Primary | ICD-10-CM

## 2023-07-24 NOTE — TELEPHONE ENCOUNTER
Called pt regarding message. Pt stated she has been having lower back pain for 3 weeks. Pt is requesting PT referral.

## 2023-07-26 ENCOUNTER — CLINICAL SUPPORT (OUTPATIENT)
Dept: REHABILITATION | Facility: OTHER | Age: 68
End: 2023-07-26
Payer: MEDICARE

## 2023-07-26 DIAGNOSIS — R29.898 WEAKNESS OF BOTH HIPS: ICD-10-CM

## 2023-07-26 DIAGNOSIS — M54.50 CHRONIC RIGHT-SIDED LOW BACK PAIN WITHOUT SCIATICA: ICD-10-CM

## 2023-07-26 DIAGNOSIS — M54.50 LUMBAR BACK PAIN: ICD-10-CM

## 2023-07-26 DIAGNOSIS — Z74.09 IMPAIRED FUNCTIONAL MOBILITY AND ACTIVITY TOLERANCE: ICD-10-CM

## 2023-07-26 DIAGNOSIS — G89.29 CHRONIC RIGHT-SIDED LOW BACK PAIN WITHOUT SCIATICA: ICD-10-CM

## 2023-07-26 PROCEDURE — 97110 THERAPEUTIC EXERCISES: CPT | Mod: PN

## 2023-07-26 PROCEDURE — 97162 PT EVAL MOD COMPLEX 30 MIN: CPT | Mod: PN

## 2023-07-26 NOTE — PATIENT INSTRUCTIONS
Access Code: [email or web address]  URL: [email or web address]  Date: 07/26/2023  Prepared by: Nasreen Maria    Exercises  - Supine Bridge  - 1 x daily - 2 sets - 10 reps - 3 hold  - Supine Double Knee to Chest Modified  - 1 x daily - 1 sets - 3 reps - 30 hold  - Standing Lumbar Extension at Wall - Forearms  - 1 x daily - 1 sets - 10 reps - 10 hold  - Standing Lumbar Extension  - 1 x daily - 2 sets - 10 reps - 3-5 hold  - Supine Posterior Pelvic Tilt  - 1 x daily - 20 reps - 5 hold  - Hooklying Clamshell with Resistance  - 1 x daily - 2 sets - 10 reps - 5 hold  - Sidelying Hip Abduction  - 1 x daily - 2 sets - 10 reps

## 2023-07-26 NOTE — PLAN OF CARE
"[facility]  Physical Therapy Initial Evaluation    [facility] Number: [id number]    Therapy Diagnosis:   Encounter Diagnoses   Name Primary?    Lumbar back pain     Chronic right-sided low back pain without sciatica     Weakness of both hips     Impaired functional mobility and activity tolerance      Physician: Marsha Rodríguez, NP    Physician Orders: PT Eval and Treat   Medical Diagnosis from Referral: Lumbar back pain (M54.50)  Evaluation Date: 7/26/2023  Authorization Period Expiration: 07/23/2023  Plan of Care Expiration: 10/20/2023  Visit # / Visits authorized: 1/ 1  Re-assessment: due 8/26/2023  FOTO: 7/26/2023 (1/5; 1)     Time In: 1:48 pm  Time Out: 2:35 pm  Total Billable Time: 15 minutes    Precautions: Standard and HTN, arthritis    Subjective   Date of onset: chronic condition with constant pain starting about 2-3 weeks ago  History of current condition - Lashanda reports: having some back and sciatic pain.  "I have just lived with it so far".  She has a history of bilateral total hip, knee injections with schedule for knee replacement but had to cancel due to family situation.  She notes that it flared up a few weeks ago and bothers her when she walks.   She has received an injection that lasted 1 day.  Pain is on the right side. It will start in the low back and hip and radiate down the back of the leg into the calf and groin area, and her big toe is constantly numb. Insidious onset of RADHA.  Admits to having issues with her BP and is currently being monitored.  Bowel movements relieve some of the pain in the low back.  Chronic history of low back and sciatic pain.  Driving is okay however lying on right side is uncomfortable.  She is able to sleep throughout the night with the pain pill but not without.  She also endorses waking throughout the night due to bladder issues.      Burning, tingling, numbness: big toe only  Falls in the last 6 months: " none  Popping, clicking, locking, feeling of instability at the hip: no    Patient denies dizziness, headaches, blurry vision, nausea, vomiting, impaired sensations in groin and saddle region and changes in bowel/bladder.        Medical History:   Past Medical History:   Diagnosis Date    Arthritis     Hypercholesterolemia     Hypertension        Surgical History:   Lashanda Monaco  has a past surgical history that includes TONSILLECTOMY, ADENOIDECTOMY; Rhinoplasty; Partial hip arthroplasty (Left); Total abdominal hysterectomy w/ bilateral salpingoophorectomy; Rotator cuff repair (Right); Carpal tunnel release (Left); Breast biopsy (Right); hip repl (Right, 08/2019); Hysterectomy; and Shoulder arthroscopy (Right, 05/2022).    Medications:   Lashanda has a current medication list which includes the following prescription(s): amlodipine, atorvastatin, estradiol, fish oil-omega-3 fatty acids, hydrocodone-acetaminophen, melatonin, meloxicam, omeprazole, ondansetron, sertraline, trazodone, and turmeric.    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   Current Level of Function: difficulty cleaning house, dressing, bathing, performing care-taking duties, bending/sweeping/mopping, cooking, walking/standing    Pain:  Current 2/10, worst 9/10, best 2/10   Location: right low back and hip, can radiate down into groin and foot   Description:  Tight and Sharp  Aggravating Factors: Standing, Bending, and Walking  Easing Factors: pain medication, bowel movement, and hot bath    Pts goals: reduce pain    Objective     Observation:   Antalgic gait; decreased gait speed with lateral trunk lean and slight circumduction    Palpation:  TTP over right PSIS    Flexibility:   Tightness with piriformis more on left than right, but also may be attributed to bilateral hip replacements    Range of Motion:     Thoracolumbar AROM Pain/Dysfunction with Movement   Flexion knees  With tightness in hamstring and calf; to toes with knees bent   Extension 50% limited  Feels better   Right side bending 1 inch above knee  Tightness on R   Left side bending To knee     Right Rotation 25% limited   fair hip rotation   Left Rotation 50% limited  Pain on right with poor hip rotation       MMT/Strength:    Right Left Pain/Dysfunction with Movement   Hip Flexion 3+/5 3+/5    Hip Extension 3-/5 with hip rotation 3+/5    Hip ER 3-/5 3-/5  mm pain/soreness   Hip Abduction 3/5 3-/5  mm  pain/soreness         Joint Mobility:   - Lumbar: hypomobile P/A    Special Tests:  Bridge test: (+) with pain on right; (+) with hamstring cramp and hip drop on left   SLR: (-)  Slump test: (-)  DKTC: no change in symptoms    Other:     5x sit<>stand: 16 seconds    Normal:   60-69: 11.4 seconds  70-79: 12.6 seconds  80-89: 12.7 seconds    30 seconds sit<>stand: 9 reps    Normal:   Age Male Female   60-64 17 15   65-69 16 15   70-74 15 14   75-79 14 13   80-84 13 12   85-89 11 11   90-94 9 9        Sensation: diminshed over right lateral thigh    Modified Oswestry: 32.0 (higher score = greater disability)    CMS Impairment/Limitation/Restriction for FOTO Lumbar Spine Survey    Therapist reviewed FOTO scores for Lashanda Monaco on 7/26/2023.   FOTO documents entered into [employer] - see Media section.    Intake Score: 51%           TREATMENT   Treatment Time In: 2:20 pm  Treatment Time Out: 2:35 pm  Total  Treatment time separate from Evaluation: 15 minutes    Lashanda received therapeutic exercises to develop strength, posture, and core stabilization for 15 minutes including:  HEP review, performance and education  Bridge  DKTC  Standing lumbar extension (2 ways)  PPT  Hooklying clamshell  SL hip ABD     Home Exercises and Patient Education Provided    Education provided:   Patient was educated on initial evaluation findings and expectations as well as future PT services, procedures, and expectations for optimal compliance with therapy.   Realistic goals, pain management, transferring clinic if desired to be closer to her house  HEP  Various contributing factors for back pain      Written Home Exercises Provided: Yes, see patient instructions.     Assessment   Lashanda is a 67 y.o. female referred to outpatient Physical Therapy with a medical diagnosis of Lumbar back pain. Pt presents with decreased ROM, strength, flexibility, positive special tests, TTP with moderate palpation, and increased pain causing decreased participation with recreational and household duties.  Patient is closer to [facility] where original referral was placed and may want to transfer as this is closer to her house.     Pt prognosis is Good.   Pt will benefit from skilled outpatient Physical Therapy to address the deficits stated above and in the chart below, provide pt/family education, and to maximize pt's level of independence to return to [facility].     Plan of care discussed with patient: Yes  Pt's spiritual, cultural and educational needs considered and patient is agreeable to the plan of care and goals as stated below:     Anticipated Barriers for therapy: chronicity of symptoms, clinic location not convenient for patient    Medical Necessity is demonstrated by the following  History  Co-morbidities and personal factors that may impact the plan of care [] LOW: no personal factors / co-morbidities  [x] MODERATE: 1-2 personal factors  / co-morbidities  [] HIGH: 3+ personal factors / co-morbidities    Moderate / High Support Documentation:   Co-morbidities affecting plan of care: bilateral hip replacements (right anterior, left posterior approaches 5+ years ago); Knee OA right>left (cx TKA), HTN, arthritis, difficulty sleeping, knowledge of current condition, high BMI    Personal Factors:   age  coping style  attitudes     Examination  Body Structures and Functions, activity limitations and participation restrictions that may impact the plan of care [] LOW: addressing 1-2 elements  [x] MODERATE: 3+ elements  [] HIGH: 4+ elements (please support below)    Moderate / High Support Documentation: RANGE OF MOTION, strength, flexibility, decreased tolerance for standing, ADL performance, taking care of others, household chores     Clinical Presentation [] LOW: stable  [x] MODERATE: Evolving  [] HIGH: Unstable     Decision Making/ Complexity Score: moderate       Goals:    In 6 weeks,   Goal Status   Patient will be independent with HEP to promote improved therapy outcomes.  STG    2. Patient will perform palloff press with good control to demonstrate improved core strength STG    3. Patient will improve bilateral hip MMT to 4-/5 to demonstrate improved strength for functional tasks.  STG    4. Patient will improve lumbar ROM by 25%  to improve functional mobility.  STG        In 12 weeks,  Goal Status   5. Patient will be independent with progressed HEP to self manage symptoms.  LTG    6. Patient will improve bilateral hip MMT to 4/5 to improve strength for functional tasks.  LTG    7. Patient will report walking for 30 minutes without increase in symptoms to improve community ambulation LTG    8. Patient will improve Modified Oswestry score from 32.0 to 24.0 to decrease perceived limitation with maintaining/changing body position.  LTG    9. Patient will improve 5x sit<>stand to 11 seconds to improve functional strength and promote mobility.   LTG     10. Patient will perform normal household chores without increase in symptoms to return to [facility] and care for her family.  LTG        Plan   Plan of care Certification: 7/26/2023 to 10/20/2023.    Outpatient Physical Therapy 2 times weekly for 24 visits to include the following interventions: Cervical/Lumbar Traction, Gait Training, Manual Therapy, Moist Heat/ Ice, Neuromuscular Re-ed, Patient Education, Therapeutic Activities, Therapeutic Exercise, and dry needling .   Pt will be seen by a physical therapist minimally every 6th visit or every 30 days.    Patient is closer to [facility] where original referral was placed.  Patient may want to transfer as this is closer to her house.       Nasreen Maria, PT

## 2023-07-27 PROBLEM — Z74.09 IMPAIRED FUNCTIONAL MOBILITY AND ACTIVITY TOLERANCE: Status: ACTIVE | Noted: 2023-07-27

## 2023-07-27 PROBLEM — M54.50 CHRONIC RIGHT-SIDED LOW BACK PAIN WITHOUT SCIATICA: Status: ACTIVE | Noted: 2023-07-27

## 2023-07-27 PROBLEM — G89.29 CHRONIC RIGHT-SIDED LOW BACK PAIN WITHOUT SCIATICA: Status: ACTIVE | Noted: 2023-07-27

## 2023-07-27 PROBLEM — R29.898 WEAKNESS OF BOTH HIPS: Status: ACTIVE | Noted: 2023-07-27

## 2023-07-31 ENCOUNTER — OFFICE VISIT (OUTPATIENT)
Dept: SPINE | Facility: CLINIC | Age: 68
End: 2023-07-31
Payer: MEDICARE

## 2023-07-31 VITALS
TEMPERATURE: 98 F | SYSTOLIC BLOOD PRESSURE: 148 MMHG | DIASTOLIC BLOOD PRESSURE: 86 MMHG | BODY MASS INDEX: 35.25 KG/M2 | RESPIRATION RATE: 19 BRPM | WEIGHT: 211.56 LBS | HEART RATE: 83 BPM | HEIGHT: 65 IN

## 2023-07-31 DIAGNOSIS — M79.10 MYALGIA: ICD-10-CM

## 2023-07-31 DIAGNOSIS — M70.61 TROCHANTERIC BURSITIS OF RIGHT HIP: Primary | ICD-10-CM

## 2023-07-31 PROCEDURE — 1125F PR PAIN SEVERITY QUANTIFIED, PAIN PRESENT: ICD-10-PCS | Mod: CPTII,S$GLB,, | Performed by: ANESTHESIOLOGY

## 2023-07-31 PROCEDURE — 3079F DIAST BP 80-89 MM HG: CPT | Mod: CPTII,S$GLB,, | Performed by: ANESTHESIOLOGY

## 2023-07-31 PROCEDURE — 1101F PR PT FALLS ASSESS DOC 0-1 FALLS W/OUT INJ PAST YR: ICD-10-PCS | Mod: CPTII,S$GLB,, | Performed by: ANESTHESIOLOGY

## 2023-07-31 PROCEDURE — 99204 PR OFFICE/OUTPT VISIT, NEW, LEVL IV, 45-59 MIN: ICD-10-PCS | Mod: S$GLB,,, | Performed by: ANESTHESIOLOGY

## 2023-07-31 PROCEDURE — 3008F PR BODY MASS INDEX (BMI) DOCUMENTED: ICD-10-PCS | Mod: CPTII,S$GLB,, | Performed by: ANESTHESIOLOGY

## 2023-07-31 PROCEDURE — 1101F PT FALLS ASSESS-DOCD LE1/YR: CPT | Mod: CPTII,S$GLB,, | Performed by: ANESTHESIOLOGY

## 2023-07-31 PROCEDURE — 99999 PR PBB SHADOW E&M-EST. PATIENT-LVL III: CPT | Mod: PBBFAC,,, | Performed by: ANESTHESIOLOGY

## 2023-07-31 PROCEDURE — 3044F PR MOST RECENT HEMOGLOBIN A1C LEVEL <7.0%: ICD-10-PCS | Mod: CPTII,S$GLB,, | Performed by: ANESTHESIOLOGY

## 2023-07-31 PROCEDURE — 3077F PR MOST RECENT SYSTOLIC BLOOD PRESSURE >= 140 MM HG: ICD-10-PCS | Mod: CPTII,S$GLB,, | Performed by: ANESTHESIOLOGY

## 2023-07-31 PROCEDURE — 3044F HG A1C LEVEL LT 7.0%: CPT | Mod: CPTII,S$GLB,, | Performed by: ANESTHESIOLOGY

## 2023-07-31 PROCEDURE — 3077F SYST BP >= 140 MM HG: CPT | Mod: CPTII,S$GLB,, | Performed by: ANESTHESIOLOGY

## 2023-07-31 PROCEDURE — 1159F MED LIST DOCD IN RCRD: CPT | Mod: CPTII,S$GLB,, | Performed by: ANESTHESIOLOGY

## 2023-07-31 PROCEDURE — 99204 OFFICE O/P NEW MOD 45 MIN: CPT | Mod: S$GLB,,, | Performed by: ANESTHESIOLOGY

## 2023-07-31 PROCEDURE — 3288F FALL RISK ASSESSMENT DOCD: CPT | Mod: CPTII,S$GLB,, | Performed by: ANESTHESIOLOGY

## 2023-07-31 PROCEDURE — 3288F PR FALLS RISK ASSESSMENT DOCUMENTED: ICD-10-PCS | Mod: CPTII,S$GLB,, | Performed by: ANESTHESIOLOGY

## 2023-07-31 PROCEDURE — 99999 PR PBB SHADOW E&M-EST. PATIENT-LVL III: ICD-10-PCS | Mod: PBBFAC,,, | Performed by: ANESTHESIOLOGY

## 2023-07-31 PROCEDURE — 1159F PR MEDICATION LIST DOCUMENTED IN MEDICAL RECORD: ICD-10-PCS | Mod: CPTII,S$GLB,, | Performed by: ANESTHESIOLOGY

## 2023-07-31 PROCEDURE — 3079F PR MOST RECENT DIASTOLIC BLOOD PRESSURE 80-89 MM HG: ICD-10-PCS | Mod: CPTII,S$GLB,, | Performed by: ANESTHESIOLOGY

## 2023-07-31 PROCEDURE — 3008F BODY MASS INDEX DOCD: CPT | Mod: CPTII,S$GLB,, | Performed by: ANESTHESIOLOGY

## 2023-07-31 PROCEDURE — 1125F AMNT PAIN NOTED PAIN PRSNT: CPT | Mod: CPTII,S$GLB,, | Performed by: ANESTHESIOLOGY

## 2023-07-31 RX ORDER — CYCLOBENZAPRINE HCL 5 MG
5 TABLET ORAL NIGHTLY
Qty: 30 TABLET | Refills: 1 | Status: SHIPPED | OUTPATIENT
Start: 2023-07-31

## 2023-07-31 NOTE — PROGRESS NOTES
PCP: Wayne Lundberg MD    REFERRING PHYSICIAN: Wayne Lundberg MD    CHIEF COMPLAINT: Low back pain    Original HISTORY OF PRESENT ILLNESS: Lashanda Monaco presents to the clinic for the evaluation of the above pain. The pain started in June, 2023 after no particular event. She does have a 10 year history of low back pain. She has no history of back injury or surgery.     Original Pain Description:  The pain is located in the right low back and radiates to the right buttock and leg. The pain is described as sharp. Exacerbating factors: Standing and Walking. Mitigating factors heat. Symptoms interfere with daily activity. The patient feels like symptoms have been unchanged. Patient denies night fever/night sweats, urinary incontinence, bowel incontinence, significant weight loss, significant motor weakness, and loss of sensations.    Original PAIN SCORES:  Best: Pain is 4  Worst: Pain is 10  Current: Pain is 6    INTERVAL HISTORY:     6 weeks of Conservative therapy:  PT: July-August, 2023 (Must include dates)  Chiro: No  HEP:       Treatments / Medications: (Ice/Heat/NSAIDS/APAP/etc):  Heat  Flexeril  Steroid Depot  Oxycodone      Interventional Pain Procedures: (Previous injections)  Dr. Johnson Injections around 2010 for similar pain: Sounds like LMBB and RFA on the left    Past Medical History:   Diagnosis Date    Arthritis     Hypercholesterolemia     Hypertension      Past Surgical History:   Procedure Laterality Date    BREAST BIOPSY Right     2010 & 2021    CARPAL TUNNEL RELEASE Left     hip repl Right 08/2019    HYSTERECTOMY      PARTIAL HIP ARTHROPLASTY Left     RHINOPLASTY      ROTATOR CUFF REPAIR Right     SHOULDER ARTHROSCOPY Right 05/2022    right shoulder sx with bicep repair    TONSILLECTOMY, ADENOIDECTOMY      TOTAL ABDOMINAL HYSTERECTOMY W/ BILATERAL SALPINGOOPHORECTOMY       Social History     Socioeconomic History    Marital status:    Tobacco Use    Smoking status: Never    Smokeless  tobacco: Never   Substance and Sexual Activity    Alcohol use: Yes     Comment: Occasionally     Drug use: No    Sexual activity: Yes     Partners: Male     Family History   Problem Relation Age of Onset    Heart disease Sister     Lymphoma Brother     Heart disease Brother        Review of patient's allergies indicates:   Allergen Reactions    Kiwi (actinidia chinensis)     Hydrocodone bitartrate Nausea Only       Current Outpatient Medications   Medication Sig    amLODIPine (NORVASC) 10 MG tablet TAKE 1 TABLET DAILY    atorvastatin (LIPITOR) 20 MG tablet TAKE 1 TABLET DAILY    estradiol (ESTRACE) 2 MG tablet Take 1 tablet (2 mg total) by mouth once daily.    fish oil-omega-3 fatty acids 300-1,000 mg capsule Take 1 g by mouth once daily.    HYDROcodone-acetaminophen (NORCO) 5-325 mg per tablet Take 1 tablet by mouth every 6 (six) hours as needed for Pain.    melatonin (MELATIN) Take by mouth every evening.    meloxicam (MOBIC) 15 MG tablet Take 1 tablet (15 mg total) by mouth once daily.    omeprazole (PRILOSEC) 40 MG capsule TAKE 1 CAPSULE TWICE A DAY BEFORE MEALS    ondansetron (ZOFRAN) 4 MG tablet Take 1 tablet (4 mg total) by mouth every 6 (six) hours as needed for Nausea.    sertraline (ZOLOFT) 50 MG tablet Take 1 tablet by mouth once daily    traZODone (DESYREL) 50 MG tablet Take 1-2 tablets ( mg total) by mouth nightly as needed for Insomnia.    TURMERIC ORAL Take 1 tablet by mouth once daily.     No current facility-administered medications for this visit.       ROS:  GENERAL: No fever. No chills. No fatigue. Denies weight loss. Denies weight gain.  HEENT: Denies headaches. Denies vision change. Denies eye pain. Denies double vision. Denies ear pain.   CV: Denies chest pain.   PULM: Denies of shortness of breath.  GI: Denies constipation. No diarrhea. No abdominal pain. Denies nausea. Denies vomiting. No blood in stool.  HEME: Denies bleeding problems.  : Denies urgency. No painful urination. No  "blood in urine.  MS: Denies joint stiffness. Denies joint swelling.  + back pain.  SKIN: Denies rash.   NEURO: Denies seizures. No weakness.  PSYCH:  Denies difficulty sleeping. No anxiety. Denies depression. No suicidal thoughts.       VITALS:   Vitals:    07/31/23 1040   BP: (!) 148/86   Pulse: 83   Resp: 19   Temp: 98 °F (36.7 °C)   TempSrc: Oral   Weight: 95.9 kg (211 lb 8.5 oz)   Height: 5' 5" (1.651 m)   PainSc:   5   PainLoc: Hip         PHYSICAL EXAM:   GENERAL: Well appearing, in no acute distress, alert and oriented x3.  PSYCH:  Mood and affect appropriate.  SKIN: Skin color, texture, turgor normal, no rashes or lesions.  HEENT:  Normocephalic, atraumatic. Cranial nerves grossly intact.  PULM: No evidence of respiratory difficulty, symmetric chest rise.  GI:  Non-distended  BACK: Normal range of motion. Tender to palpation in the lumbar spine. Pain reproduced with palpation of the right GTB and Piriformis.   EXTREMITIES: No deformities, edema, or skin discoloration.   MUSCULOSKELETAL: Shoulder, hip, and knee provocative maneuvers are negative. No atrophy is noted.  NEURO: Sensation is equal and appropriate bilaterally. Bilateral upper and lower extremity strength is normal and symmetric. Bilateral upper and lower extremity coordination and muscle stretch reflexes are physiologic and symmetric. Plantar response are downgoing. Straight leg raising in the supine position is negative to radicular pain.   GAIT: Significantly altered gait.      LABS:      IMAGING:          ASSESSMENT: 67 y.o. year old female with pain, consistent with:    Encounter Diagnoses   Name Primary?    Trochanteric bursitis of right hip Yes    Myalgia        DISCUSSION: Ms. Monaco comes to us for right low back pain. On exam her pain is reproduced with palpation of the right GTB and piriformis.     PLAN:  Continue Heat and massage  Continue HEP/PT  Try not to sleep on right side  Prescribe Flexeril QHS #30 1RF  Follow up in 4 weeks to " consider right GTB and Piriformis injection      I would like to thank Wayne Lundberg MD for the opportunity to assist in the care of this patient. We had a very nice visit and I look forward to continuing their care. Please let me know if I can be of further assistance.     Malathi Candelaria  07/31/2023

## 2023-08-02 ENCOUNTER — OFFICE VISIT (OUTPATIENT)
Dept: DERMATOLOGY | Facility: CLINIC | Age: 68
End: 2023-08-02
Payer: MEDICARE

## 2023-08-02 DIAGNOSIS — L28.0 LSC (LICHEN SIMPLEX CHRONICUS): ICD-10-CM

## 2023-08-02 DIAGNOSIS — L21.9 SEBORRHEIC DERMATITIS: Primary | ICD-10-CM

## 2023-08-02 PROCEDURE — 99214 PR OFFICE/OUTPT VISIT, EST, LEVL IV, 30-39 MIN: ICD-10-PCS | Mod: S$GLB,,, | Performed by: DERMATOLOGY

## 2023-08-02 PROCEDURE — 1159F PR MEDICATION LIST DOCUMENTED IN MEDICAL RECORD: ICD-10-PCS | Mod: CPTII,S$GLB,, | Performed by: DERMATOLOGY

## 2023-08-02 PROCEDURE — 99214 OFFICE O/P EST MOD 30 MIN: CPT | Mod: S$GLB,,, | Performed by: DERMATOLOGY

## 2023-08-02 PROCEDURE — 1160F PR REVIEW ALL MEDS BY PRESCRIBER/CLIN PHARMACIST DOCUMENTED: ICD-10-PCS | Mod: CPTII,S$GLB,, | Performed by: DERMATOLOGY

## 2023-08-02 PROCEDURE — 1160F RVW MEDS BY RX/DR IN RCRD: CPT | Mod: CPTII,S$GLB,, | Performed by: DERMATOLOGY

## 2023-08-02 PROCEDURE — 1159F MED LIST DOCD IN RCRD: CPT | Mod: CPTII,S$GLB,, | Performed by: DERMATOLOGY

## 2023-08-02 PROCEDURE — 3044F HG A1C LEVEL LT 7.0%: CPT | Mod: CPTII,S$GLB,, | Performed by: DERMATOLOGY

## 2023-08-02 PROCEDURE — 3044F PR MOST RECENT HEMOGLOBIN A1C LEVEL <7.0%: ICD-10-PCS | Mod: CPTII,S$GLB,, | Performed by: DERMATOLOGY

## 2023-08-02 RX ORDER — KETOCONAZOLE 20 MG/G
CREAM TOPICAL 2 TIMES DAILY
Qty: 60 G | Refills: 11 | Status: SHIPPED | OUTPATIENT
Start: 2023-08-02 | End: 2023-08-29

## 2023-08-02 RX ORDER — KETOCONAZOLE 20 MG/ML
SHAMPOO, SUSPENSION TOPICAL
Qty: 120 ML | Refills: 5 | Status: SHIPPED | OUTPATIENT
Start: 2023-08-02 | End: 2023-08-29

## 2023-08-02 RX ORDER — CLOBETASOL PROPIONATE 0.5 MG/G
OINTMENT TOPICAL 2 TIMES DAILY
Qty: 45 G | Refills: 2 | Status: SHIPPED | OUTPATIENT
Start: 2023-08-02 | End: 2023-08-29

## 2023-08-02 RX ORDER — CLOBETASOL PROPIONATE 0.46 MG/ML
SOLUTION TOPICAL DAILY
Qty: 50 ML | Refills: 2 | Status: SHIPPED | OUTPATIENT
Start: 2023-08-02 | End: 2023-08-29

## 2023-08-02 NOTE — PROGRESS NOTES
Subjective:      Patient ID:  Lashanda Monaco is a 67 y.o. female who presents for No chief complaint on file.    66yo F presents for initial evaluation of lesions near neck. Reports they have been present for months as red spots that become intensely itchy. Endorses picking/scratching at them until they bleed. Reports they have gotten smaller since she first noticed them. Not using any treatments right now. Also with itchy scalp and occasional flaking around temples and nose. States today is a good day for her.     Review of Systems   Constitutional:  Negative for fever, chills and night sweats.     Objective:   Physical Exam   Constitutional: She appears well-developed and well-nourished. No distress.   Neurological: She is alert and oriented to person, place, and time. She is not disoriented.   Psychiatric: She has a normal mood and affect.   Skin:   Areas Examined (abnormalities noted in diagram):   Scalp / Hair Palpated and Inspected  Head / Face Inspection Performed  Neck Inspection Performed  Chest / Axilla Inspection Performed          Diagram Legend     Erythematous scaling macule/papule c/w actinic keratosis       Vascular papule c/w angioma      Pigmented verrucoid papule/plaque c/w seborrheic keratosis      Yellow umbilicated papule c/w sebaceous hyperplasia      Irregularly shaped tan macule c/w lentigo     1-2 mm smooth white papules consistent with Milia      Movable subcutaneous cyst with punctum c/w epidermal inclusion cyst      Subcutaneous movable cyst c/w pilar cyst      Firm pink to brown papule c/w dermatofibroma      Pedunculated fleshy papule(s) c/w skin tag(s)      Evenly pigmented macule c/w junctional nevus     Mildly variegated pigmented, slightly irregular-bordered macule c/w mildly atypical nevus      Flesh colored to evenly pigmented papule c/w intradermal nevus       Pink pearly papule/plaque c/w basal cell carcinoma      Erythematous hyperkeratotic cursted plaque c/w SCC       Surgical scar with no sign of skin cancer recurrence      Open and closed comedones      Inflammatory papules and pustules      Verrucoid papule consistent consistent with wart     Erythematous eczematous patches and plaques     Dystrophic onycholytic nail with subungual debris c/w onychomycosis     Umbilicated papule    Erythematous-base heme-crusted tan verrucoid plaque consistent with inflamed seborrheic keratosis     Erythematous Silvery Scaling Plaque c/w Psoriasis     See annotation      Assessment / Plan:        Seborrheic dermatitis  -     ketoconazole (NIZORAL) 2 % cream; Apply topically 2 (two) times daily. For scaly, red itchy spots on face  Dispense: 60 g; Refill: 11  -     ketoconazole (NIZORAL) 2 % shampoo; Wash hair with medicated shampoo at least 2x/week - let sit on scalp at least 5 minutes prior to rinsing  Dispense: 120 mL; Refill: 5  -     clobetasoL (TEMOVATE) 0.05 % external solution; Apply topically once daily. As needed for itch and scale  Dispense: 50 mL; Refill: 2      LSC (lichen simplex chronicus)  Discussed good skin care regimen including using a mild soap and moisturizing cream 1 - 2 times per day. Limit to one bath/shower per day and use lukewarm (not hot) water. Discussed with patient the importance of not manipulating skin lesions. Trauma often exacerbates condition. Trimming nails is recommended to avoid puncturing skin. Use ice to relieve intense pruritus.  -     clobetasol 0.05% (TEMOVATE) 0.05 % Oint; Apply topically 2 (two) times daily until resolution. For scaly spot on back  Dispense: 45 g; Refill: 2

## 2023-08-03 ENCOUNTER — DOCUMENTATION ONLY (OUTPATIENT)
Dept: REHABILITATION | Facility: OTHER | Age: 68
End: 2023-08-03

## 2023-08-14 ENCOUNTER — PATIENT MESSAGE (OUTPATIENT)
Dept: SPINE | Facility: CLINIC | Age: 68
End: 2023-08-14
Payer: MEDICARE

## 2023-08-14 ENCOUNTER — CLINICAL SUPPORT (OUTPATIENT)
Dept: REHABILITATION | Facility: OTHER | Age: 68
End: 2023-08-14
Payer: MEDICARE

## 2023-08-14 DIAGNOSIS — Z74.09 IMPAIRED FUNCTIONAL MOBILITY AND ACTIVITY TOLERANCE: ICD-10-CM

## 2023-08-14 DIAGNOSIS — M54.50 CHRONIC RIGHT-SIDED LOW BACK PAIN WITHOUT SCIATICA: Primary | ICD-10-CM

## 2023-08-14 DIAGNOSIS — G89.29 CHRONIC RIGHT-SIDED LOW BACK PAIN WITHOUT SCIATICA: Primary | ICD-10-CM

## 2023-08-14 DIAGNOSIS — R29.898 WEAKNESS OF BOTH HIPS: ICD-10-CM

## 2023-08-14 PROCEDURE — 97110 THERAPEUTIC EXERCISES: CPT | Mod: PN

## 2023-08-14 PROCEDURE — 97530 THERAPEUTIC ACTIVITIES: CPT | Mod: PN

## 2023-08-14 NOTE — PROGRESS NOTES
[facility]   Physical Therapy Treatment Note     [facility]  [facility] [id number]    Therapy Diagnosis:   Encounter Diagnoses   Name Primary?    Chronic right-sided low back pain without sciatica Yes    Weakness of both hips     Impaired functional mobility and activity tolerance      Physician: Marsha Rodríguez NP    Visit Date: 8/14/2023  Physician Orders: PT Eval and Treat   Medical Diagnosis from Referral: Lumbar back pain (M54.50)  Evaluation Date: 7/26/2023  Authorization Period Expiration: 07/23/2023  Plan of Care Expiration: 10/20/2023  Visit # / Visits authorized: 2 (1/ 1)  Re-assessment: due 8/26/2023  FOTO: 7/26/2023 (2/5; 1)   PTA Visit #: 0/5     Time In: 1:35 pm  Time Out: 2:22 pm  Total Billable Time: 47 minutes    Precautions: Standard and HTN, arthritis    Subjective   Pt reports: she is not doing too well today. States the pain has moved from her R to the L and a combination of both. States she did go to Pain Management and he diagnosed her pain as bursitis. Says her max pain is a 7-8/10. States is not going not going down her leg. States she could not move on Saturday. Difficulty sleeping. Took Gabapentin which helped her sleep and the pain eased up, but the after effects of the Gabapentin took a day to wear off.    She was compliant with home exercise program.  Response to previous treatment: no adverse effects  Functional change: some increased pain in her L low back and L buttocks    Pain: 7/10  Location: back  and buttocks  left    OBJECTIVE     Objective Measures updated at progress report unless specified.     Treatment     Lashanda received therapeutic exercises to develop strength, ROM, flexibility, posture, and core stabilization for 15 minutes including:  +Piriformis stretch 3 x 30"  +SLR 2 x 10 reps  +standing hip flex with red band across feet 20 reps each  +PPT 20 x 5"  Bridge 2 x 10 reps    DKTC  Standing lumbar extension (2 " "ways)  PPT  Hooklying clamshell  SL hip ABD     Lashanda received the following manual therapy techniques: Soft tissue Mobilization were applied to the: L LE for 5 minutes, including:  The stick stm L glutes and L piriformis    Lashanda participated in neuromuscular re-education activities to improve: Balance, Sense, Proprioception, and Posture for 5 minutes. The following activities were included:  +NBOS on foam 3 x 30" EO/EC    Lashanda participated in dynamic functional therapeutic activities to improve functional performance for 20  minutes, including:  +lateral stepping 2 x 30 ft  +hesitation walk 2 x 30 ft  +sit to stand 2 x 10 reps with pad on chair      Home Exercises Provided and Patient Education Provided     Education provided:   - continue with current HEP    Written Home Exercises Provided: Patient instructed to cont prior HEP.  Exercises were reviewed and Lashanda was able to demonstrate them prior to the end of the session.  Lashanda demonstrated good  understanding of the education provided.     See EMR under Patient Instructions for exercises provided prior visit.    Assessment     Attempting long sitting hip flex, but unable to lift R LE. Proceeded with SLR. Patient presenting with B LE weakness affecting gait performance and safety as well as affecting low back and LE pain. Ambulating with compensated Trendelenberg gait pattern.    Lashanda Is progressing well towards her goals.   Pt prognosis is Good.     Pt will continue to benefit from skilled outpatient physical therapy to address the deficits listed in the problem list box on initial evaluation, provide pt/family education and to maximize pt's level of independence in the home and community environment.     Pt's spiritual, cultural and educational needs considered and pt agreeable to plan of care and goals.    Anticipated barriers to physical therapy: chronicity of symptoms, clinic location not convenient for patient    Goals:   In 6 weeks,    Goal " Status   Patient will be independent with HEP to promote improved therapy outcomes.  STG  in progress   2. Patient will perform palloff press with good control to demonstrate improved core strength STG   in progress   3. Patient will improve bilateral hip MMT to 4-/5 to demonstrate improved strength for functional tasks.  STG   in progress   4. Patient will improve lumbar ROM by 25%  to improve functional mobility.  STG   in progress         In 12 weeks,   Goal Status   5. Patient will be independent with progressed HEP to self manage symptoms.  LTG   in progress   6. Patient will improve bilateral hip MMT to 4/5 to improve strength for functional tasks.  LTG   in progress   7. Patient will report walking for 30 minutes without increase in symptoms to improve community ambulation LTG   in progress   8. Patient will improve Modified Oswestry score from 32.0 to 24.0 to decrease perceived limitation with maintaining/changing body position.  LTG   in progress   9. Patient will improve 5x sit<>stand to 11 seconds to improve functional strength and promote mobility.   LTG   in progress   10. Patient will perform normal household chores without increase in symptoms to return to [facility] and care for her family.  LTG   in progress          Plan     Continue with core strengthening, balance training, postural reeducation, and B LE strengthening and stretching.    Willie Tavarez, PT

## 2023-08-17 NOTE — PROGRESS NOTES
"[facility] OUTPATIENT THERAPY AND WELLNESS   Physical Therapy Treatment Note     [facility]  [facility] [id number]    Therapy Diagnosis:   Encounter Diagnoses   Name Primary?    Chronic right-sided low back pain without sciatica Yes    Weakness of both hips     Impaired functional mobility and activity tolerance      Physician: Marsha Rodríguez NP    Visit Date: 8/18/2023  Physician Orders: PT Eval and Treat   Medical Diagnosis from Referral: Lumbar back pain (M54.50)  Evaluation Date: 7/26/2023  Authorization Period Expiration: 07/23/2023  Plan of Care Expiration: 10/20/2023  Visit # / Visits authorized: 3 (2/ 20)  Re-assessment: due 8/26/2023  FOTO: 7/26/2023 (2/5; 1)     PTA Visit #: 1/5     Time In: 10:10 am  (Late arrival)  Time Out: 10:48 am  Total Billable Time: 38 minutes    Precautions: Standard and HTN, arthritis    Subjective   Pt reports: Feeling about the same as last time. Sometimes has to get into the hot tub at 2 in the morning to relief pain.    She was compliant with home exercise program.  Response to previous treatment: Felt good  Functional change: some increased pain in her L low back and L buttocks    Pain: 4/10  Location: back  and buttocks  left    OBJECTIVE     Objective Measures updated at progress report unless specified.     Treatment     Lashanda received therapeutic exercises to develop strength, ROM, flexibility, posture, and core stabilization for 23 minutes including:  Piriformis stretch 3 x 30"  SLR 2 x 10 reps  Standing hip flex with red band across feet 20 reps each  PPT 20 x 5"  Bridge 2 x 10 reps  +Hamstring stretch 2 x 30  +Standing hip ext x 20    DKTC  Standing lumbar extension (2 ways)  PPT  Hooklying clamshell  SL hip ABD     Lashanda received the following manual therapy techniques: Soft tissue Mobilization were applied to the: L LE for 00 minutes, including:  The stick stm L glutes and L piriformis    Lashanda participated in neuromuscular re-education " "activities to improve: Balance, Sense, Proprioception, and Posture for 00 minutes. The following activities were included:  NBOS on foam 3 x 30" EO/EC    Lashanda participated in dynamic functional therapeutic activities to improve functional performance for 15 minutes, including:  Lateral stepping 2 x 30 ft  Hesitation walk 2 x 30 ft  Sit to stand 2 x 10 reps with pad on chair      Home Exercises Provided and Patient Education Provided     Education provided:   - continue with current HEP    Written Home Exercises Provided: Patient instructed to cont prior HEP.  Exercises were reviewed and Lashanda was able to demonstrate them prior to the end of the session.  Lashanda demonstrated good  understanding of the education provided.     See EMR under Patient Instructions for exercises provided prior visit.    Assessment   Abbreviated session due to late arrival. Continued with low back stretches and core/hip strengthening today with no increase in symptoms. Pt displayed some difficulty with balance during hesitation marches, but got better as exercise continued. Gait compensation is still present with Trendelenburg gait pattern. Will continue to progress per pt tolerance.    Lashanda Is progressing well towards her goals.   Pt prognosis is Good.     Pt will continue to benefit from skilled outpatient physical therapy to address the deficits listed in the problem list box on initial evaluation, provide pt/family education and to maximize pt's level of independence in the home and community environment.     Pt's spiritual, cultural and educational needs considered and pt agreeable to plan of care and goals.    Anticipated barriers to physical therapy: chronicity of symptoms, clinic location not convenient for patient    Goals:   In 6 weeks,    Goal Status   Patient will be independent with HEP to promote improved therapy outcomes.  STG  in progress   2. Patient will perform palloff press with good control to demonstrate " improved core strength STG   in progress   3. Patient will improve bilateral hip MMT to 4-/5 to demonstrate improved strength for functional tasks.  STG   in progress   4. Patient will improve lumbar ROM by 25%  to improve functional mobility.  STG   in progress         In 12 weeks,   Goal Status   5. Patient will be independent with progressed HEP to self manage symptoms.  LTG   in progress   6. Patient will improve bilateral hip MMT to 4/5 to improve strength for functional tasks.  LTG   in progress   7. Patient will report walking for 30 minutes without increase in symptoms to improve community ambulation LTG   in progress   8. Patient will improve Modified Oswestry score from 32.0 to 24.0 to decrease perceived limitation with maintaining/changing body position.  LTG   in progress   9. Patient will improve 5x sit<>stand to 11 seconds to improve functional strength and promote mobility.   LTG   in progress   10. Patient will perform normal household chores without increase in symptoms to return to [facility] and care for her family.  LTG   in progress          Plan     Continue with core strengthening, balance training, postural reeducation, and B LE strengthening and stretching.    Celeste Hill III, PTA

## 2023-08-18 ENCOUNTER — CLINICAL SUPPORT (OUTPATIENT)
Dept: REHABILITATION | Facility: OTHER | Age: 68
End: 2023-08-18
Payer: MEDICARE

## 2023-08-18 ENCOUNTER — DOCUMENTATION ONLY (OUTPATIENT)
Dept: REHABILITATION | Facility: OTHER | Age: 68
End: 2023-08-18

## 2023-08-18 DIAGNOSIS — G89.29 CHRONIC RIGHT-SIDED LOW BACK PAIN WITHOUT SCIATICA: Primary | ICD-10-CM

## 2023-08-18 DIAGNOSIS — R29.898 WEAKNESS OF BOTH HIPS: ICD-10-CM

## 2023-08-18 DIAGNOSIS — M54.50 CHRONIC RIGHT-SIDED LOW BACK PAIN WITHOUT SCIATICA: Primary | ICD-10-CM

## 2023-08-18 DIAGNOSIS — Z74.09 IMPAIRED FUNCTIONAL MOBILITY AND ACTIVITY TOLERANCE: ICD-10-CM

## 2023-08-18 PROCEDURE — 97530 THERAPEUTIC ACTIVITIES: CPT | Mod: PN,CQ

## 2023-08-18 PROCEDURE — 97110 THERAPEUTIC EXERCISES: CPT | Mod: PN,CQ

## 2023-08-18 NOTE — PROGRESS NOTES
PT/PTA met face to face to discuss pt's treatment plan and progress towards established goals. Pt will be seen by a physical therapist minimally every 6th visit or every 30 days.    Celeste Hill III, PTA

## 2023-08-25 NOTE — PROGRESS NOTES
[facility]   Physical Therapy Treatment Note     [facility]  [facility] [id number]    Therapy Diagnosis:   Encounter Diagnoses   Name Primary?    Chronic right-sided low back pain without sciatica Yes    Weakness of both hips     Impaired functional mobility and activity tolerance      Physician: Marsha Rodríguez NP    Visit Date: 8/28/2023  Physician Orders: PT Eval and Treat   Medical Diagnosis from Referral: Lumbar back pain (M54.50)  Evaluation Date: 7/26/2023  Authorization Period Expiration: 07/23/2023  Plan of Care Expiration: 10/20/2023  Visit # / Visits authorized: 4 (3/ 20)  Re-assessment: due 8/26/2023  FOTO: 7/26/2023 (3/5; 1)     PTA Visit #: 2/5     Time In: 10:07 am  (Late arrival)  Time Out: 11:02 am  Total Billable Time: 55 minutes    Precautions: Standard and HTN, arthritis    Subjective   Pt reports: Is taking muscle relaxers to help with recent flare ups in pain on left side. Feels it more in the back as opposed to the glute. She is having difficulty sleeping    She was compliant with home exercise program.  Response to previous treatment: "I felt pretty good"  Functional change: some increased pain in her L low back and L buttocks    Pain: 2-3/10  Location: back and buttocks  left    OBJECTIVE     Objective Measures updated at progress report unless specified.     Treatment     Lashanda received therapeutic exercises to develop strength, ROM, flexibility, posture, and core stabilization for 26 minutes including:  Piriformis stretch 3 x 30"  +LTR on PB x 2'  SLR 2 x 10 reps  Standing hip flex with red band across feet 20 reps each  PPT 20 x 5"  Bridge 2 x 10 reps  Hamstring stretch 2 x 30  Standing hip ext x 20    DKTC on PB x 2'  Standing lumbar extension (2 ways)  PPT  Hooklying clamshell  SL hip ABD     Lashanda received the following manual therapy techniques: Soft tissue Mobilization were applied to the: L LE for 2 minutes, including:  The stick stm " "L glutes and L piriformis  +LLD L LE      Lashanda participated in neuromuscular re-education activities to improve: Balance, Sense, Proprioception, and Posture for 12 minutes. The following activities were included:  NBOS on foam 3 x 30" EO/EC  +Seated trunk ext pink cook band x 15  +Pallof press RTT x 15      Lashanda participated in dynamic functional therapeutic activities to improve functional performance for 15 minutes, including:  Lateral stepping 2 x 30 ft  Hesitation walk 2 x 30 ft  Sit to stand 2 x 10 reps with pad on chair      Home Exercises Provided and Patient Education Provided     Education provided:   - continue with current HEP    Written Home Exercises Provided: Patient instructed to cont prior HEP.  Exercises were reviewed and Lashanda was able to demonstrate them prior to the end of the session.  Lashanda demonstrated good  understanding of the education provided.     See EMR under Patient Instructions for exercises provided prior visit.    Assessment   Pt tolerated today's treatment well with no increase in symptoms reported. Added more core strengthening and low back stretches today due to recent increase in symptoms reported in subjective. Balance is still challenged with hesitation marches requiring close SBA. Pt noted good relief of symptoms following manual therapy.    Lashanda Is progressing well towards her goals.   Pt prognosis is Good.     Pt will continue to benefit from skilled outpatient physical therapy to address the deficits listed in the problem list box on initial evaluation, provide pt/family education and to maximize pt's level of independence in the home and community environment.     Pt's spiritual, cultural and educational needs considered and pt agreeable to plan of care and goals.    Anticipated barriers to physical therapy: chronicity of symptoms, clinic location not convenient for patient    Goals:   In 6 weeks,    Goal Status   Patient will be independent with HEP to " promote improved therapy outcomes.  STG  in progress   2. Patient will perform palloff press with good control to demonstrate improved core strength STG   in progress   3. Patient will improve bilateral hip MMT to 4-/5 to demonstrate improved strength for functional tasks.  STG   in progress   4. Patient will improve lumbar ROM by 25%  to improve functional mobility.  STG   in progress         In 12 weeks,   Goal Status   5. Patient will be independent with progressed HEP to self manage symptoms.  LTG   in progress   6. Patient will improve bilateral hip MMT to 4/5 to improve strength for functional tasks.  LTG   in progress   7. Patient will report walking for 30 minutes without increase in symptoms to improve community ambulation LTG   in progress   8. Patient will improve Modified Oswestry score from 32.0 to 24.0 to decrease perceived limitation with maintaining/changing body position.  LTG   in progress   9. Patient will improve 5x sit<>stand to 11 seconds to improve functional strength and promote mobility.   LTG   in progress   10. Patient will perform normal household chores without increase in symptoms to return to [facility] and care for her family.  LTG   in progress      Plan     Continue with core strengthening, balance training, postural reeducation, and B LE strengthening and stretching.    Celeste Hill III, PTA

## 2023-08-28 ENCOUNTER — CLINICAL SUPPORT (OUTPATIENT)
Dept: REHABILITATION | Facility: OTHER | Age: 68
End: 2023-08-28
Payer: MEDICARE

## 2023-08-28 DIAGNOSIS — R29.898 WEAKNESS OF BOTH HIPS: ICD-10-CM

## 2023-08-28 DIAGNOSIS — Z74.09 IMPAIRED FUNCTIONAL MOBILITY AND ACTIVITY TOLERANCE: ICD-10-CM

## 2023-08-28 DIAGNOSIS — M54.50 CHRONIC RIGHT-SIDED LOW BACK PAIN WITHOUT SCIATICA: Primary | ICD-10-CM

## 2023-08-28 DIAGNOSIS — G89.29 CHRONIC RIGHT-SIDED LOW BACK PAIN WITHOUT SCIATICA: Primary | ICD-10-CM

## 2023-08-28 PROCEDURE — 97530 THERAPEUTIC ACTIVITIES: CPT | Mod: PN,CQ

## 2023-08-28 PROCEDURE — 97112 NEUROMUSCULAR REEDUCATION: CPT | Mod: PN,CQ

## 2023-08-28 PROCEDURE — 97110 THERAPEUTIC EXERCISES: CPT | Mod: PN,CQ

## 2023-08-29 ENCOUNTER — TELEPHONE (OUTPATIENT)
Dept: SURGERY | Facility: CLINIC | Age: 68
End: 2023-08-29
Payer: MEDICARE

## 2023-08-29 ENCOUNTER — OFFICE VISIT (OUTPATIENT)
Dept: PRIMARY CARE CLINIC | Facility: CLINIC | Age: 68
End: 2023-08-29
Payer: MEDICARE

## 2023-08-29 ENCOUNTER — PATIENT MESSAGE (OUTPATIENT)
Dept: BEHAVIORAL HEALTH | Facility: CLINIC | Age: 68
End: 2023-08-29
Payer: MEDICARE

## 2023-08-29 VITALS
WEIGHT: 213.94 LBS | TEMPERATURE: 97 F | RESPIRATION RATE: 18 BRPM | HEIGHT: 65 IN | HEART RATE: 85 BPM | OXYGEN SATURATION: 97 % | SYSTOLIC BLOOD PRESSURE: 138 MMHG | BODY MASS INDEX: 35.64 KG/M2 | DIASTOLIC BLOOD PRESSURE: 82 MMHG

## 2023-08-29 DIAGNOSIS — I10 ESSENTIAL HYPERTENSION, BENIGN: ICD-10-CM

## 2023-08-29 DIAGNOSIS — E66.01 SEVERE OBESITY (BMI 35.0-39.9) WITH COMORBIDITY: ICD-10-CM

## 2023-08-29 DIAGNOSIS — Z63.6 CAREGIVER STRESS: ICD-10-CM

## 2023-08-29 DIAGNOSIS — F41.1 GAD (GENERALIZED ANXIETY DISORDER): ICD-10-CM

## 2023-08-29 DIAGNOSIS — F51.04 PSYCHOPHYSIOLOGIC INSOMNIA: ICD-10-CM

## 2023-08-29 DIAGNOSIS — F33.1 MODERATE EPISODE OF RECURRENT MAJOR DEPRESSIVE DISORDER: Primary | ICD-10-CM

## 2023-08-29 PROCEDURE — 99214 PR OFFICE/OUTPT VISIT, EST, LEVL IV, 30-39 MIN: ICD-10-PCS | Mod: S$GLB,,, | Performed by: FAMILY MEDICINE

## 2023-08-29 PROCEDURE — 3079F PR MOST RECENT DIASTOLIC BLOOD PRESSURE 80-89 MM HG: ICD-10-PCS | Mod: CPTII,S$GLB,, | Performed by: FAMILY MEDICINE

## 2023-08-29 PROCEDURE — 3008F PR BODY MASS INDEX (BMI) DOCUMENTED: ICD-10-PCS | Mod: CPTII,S$GLB,, | Performed by: FAMILY MEDICINE

## 2023-08-29 PROCEDURE — 1159F MED LIST DOCD IN RCRD: CPT | Mod: CPTII,S$GLB,, | Performed by: FAMILY MEDICINE

## 2023-08-29 PROCEDURE — 3079F DIAST BP 80-89 MM HG: CPT | Mod: CPTII,S$GLB,, | Performed by: FAMILY MEDICINE

## 2023-08-29 PROCEDURE — 3044F HG A1C LEVEL LT 7.0%: CPT | Mod: CPTII,S$GLB,, | Performed by: FAMILY MEDICINE

## 2023-08-29 PROCEDURE — 99999 PR PBB SHADOW E&M-EST. PATIENT-LVL IV: CPT | Mod: PBBFAC,,, | Performed by: FAMILY MEDICINE

## 2023-08-29 PROCEDURE — 3288F PR FALLS RISK ASSESSMENT DOCUMENTED: ICD-10-PCS | Mod: CPTII,S$GLB,, | Performed by: FAMILY MEDICINE

## 2023-08-29 PROCEDURE — 1101F PT FALLS ASSESS-DOCD LE1/YR: CPT | Mod: CPTII,S$GLB,, | Performed by: FAMILY MEDICINE

## 2023-08-29 PROCEDURE — 3044F PR MOST RECENT HEMOGLOBIN A1C LEVEL <7.0%: ICD-10-PCS | Mod: CPTII,S$GLB,, | Performed by: FAMILY MEDICINE

## 2023-08-29 PROCEDURE — 1160F RVW MEDS BY RX/DR IN RCRD: CPT | Mod: CPTII,S$GLB,, | Performed by: FAMILY MEDICINE

## 2023-08-29 PROCEDURE — 3288F FALL RISK ASSESSMENT DOCD: CPT | Mod: CPTII,S$GLB,, | Performed by: FAMILY MEDICINE

## 2023-08-29 PROCEDURE — 1101F PR PT FALLS ASSESS DOC 0-1 FALLS W/OUT INJ PAST YR: ICD-10-PCS | Mod: CPTII,S$GLB,, | Performed by: FAMILY MEDICINE

## 2023-08-29 PROCEDURE — 1159F PR MEDICATION LIST DOCUMENTED IN MEDICAL RECORD: ICD-10-PCS | Mod: CPTII,S$GLB,, | Performed by: FAMILY MEDICINE

## 2023-08-29 PROCEDURE — 99999 PR PBB SHADOW E&M-EST. PATIENT-LVL IV: ICD-10-PCS | Mod: PBBFAC,,, | Performed by: FAMILY MEDICINE

## 2023-08-29 PROCEDURE — 1125F AMNT PAIN NOTED PAIN PRSNT: CPT | Mod: CPTII,S$GLB,, | Performed by: FAMILY MEDICINE

## 2023-08-29 PROCEDURE — 1125F PR PAIN SEVERITY QUANTIFIED, PAIN PRESENT: ICD-10-PCS | Mod: CPTII,S$GLB,, | Performed by: FAMILY MEDICINE

## 2023-08-29 PROCEDURE — 3075F PR MOST RECENT SYSTOLIC BLOOD PRESS GE 130-139MM HG: ICD-10-PCS | Mod: CPTII,S$GLB,, | Performed by: FAMILY MEDICINE

## 2023-08-29 PROCEDURE — 3075F SYST BP GE 130 - 139MM HG: CPT | Mod: CPTII,S$GLB,, | Performed by: FAMILY MEDICINE

## 2023-08-29 PROCEDURE — 3008F BODY MASS INDEX DOCD: CPT | Mod: CPTII,S$GLB,, | Performed by: FAMILY MEDICINE

## 2023-08-29 PROCEDURE — 1160F PR REVIEW ALL MEDS BY PRESCRIBER/CLIN PHARMACIST DOCUMENTED: ICD-10-PCS | Mod: CPTII,S$GLB,, | Performed by: FAMILY MEDICINE

## 2023-08-29 PROCEDURE — 99214 OFFICE O/P EST MOD 30 MIN: CPT | Mod: S$GLB,,, | Performed by: FAMILY MEDICINE

## 2023-08-29 RX ORDER — TRAZODONE HYDROCHLORIDE 50 MG/1
50-100 TABLET ORAL NIGHTLY PRN
Qty: 60 TABLET | Refills: 5 | Status: SHIPPED | OUTPATIENT
Start: 2023-08-29

## 2023-08-29 RX ORDER — BUTALBITAL, ACETAMINOPHEN AND CAFFEINE 50; 325; 40 MG/1; MG/1; MG/1
1 TABLET ORAL EVERY 4 HOURS PRN
COMMUNITY

## 2023-08-29 RX ORDER — OFLOXACIN 3 MG/ML
SOLUTION AURICULAR (OTIC)
COMMUNITY
End: 2024-03-01

## 2023-08-29 SDOH — SOCIAL DETERMINANTS OF HEALTH (SDOH): DEPENDENT RELATIVE NEEDING CARE AT HOME: Z63.6

## 2023-08-30 ENCOUNTER — PATIENT MESSAGE (OUTPATIENT)
Dept: SPINE | Facility: CLINIC | Age: 68
End: 2023-08-30
Payer: MEDICARE

## 2023-08-31 ENCOUNTER — TELEPHONE (OUTPATIENT)
Dept: BARIATRICS | Facility: CLINIC | Age: 68
End: 2023-08-31
Payer: MEDICARE

## 2023-09-01 ENCOUNTER — DOCUMENTATION ONLY (OUTPATIENT)
Dept: REHABILITATION | Facility: OTHER | Age: 68
End: 2023-09-01
Payer: MEDICARE

## 2023-09-01 ENCOUNTER — TELEPHONE (OUTPATIENT)
Dept: BEHAVIORAL HEALTH | Facility: CLINIC | Age: 68
End: 2023-09-01
Payer: MEDICARE

## 2023-09-01 NOTE — PROGRESS NOTES
Patient was scheduled for a physical therapy treatment appointment at [facility] on 9/1/2023. Patient failed to appear for the appointment without prior notification today.     Celeste Hill III, PTA

## 2023-09-01 NOTE — PROGRESS NOTES
Contacted patient patient is on a conference call, cannot talk but will call back at [phone].

## 2023-09-07 ENCOUNTER — PATIENT MESSAGE (OUTPATIENT)
Dept: BEHAVIORAL HEALTH | Facility: CLINIC | Age: 68
End: 2023-09-07
Payer: MEDICARE

## 2023-09-18 ENCOUNTER — PATIENT MESSAGE (OUTPATIENT)
Dept: PRIMARY CARE CLINIC | Facility: CLINIC | Age: 68
End: 2023-09-18
Payer: MEDICARE

## 2023-09-26 NOTE — PROGRESS NOTES
Subjective     Patient ID: Lashanda Monaco is a 68 y.o. female.    Chief Complaint: Consult    CC: weight    New pt to me, referred by Self, Aaareferral  No address on file , with Patient Active Problem List:     Mixed hyperlipidemia     Essential hypertension, benign     GERD (gastroesophageal reflux disease)     DDD (degenerative disc disease), lumbar     Primary osteoarthritis of both knees     Primary osteoarthritis of both wrists     Moderate episode of recurrent major depressive disorder     Arthritis of right hip     Severe obesity (BMI 35.0-39.9) with comorbidity     Myalgia of pelvic floor     Lack of coordination     Muscle weakness     MARIBEL (generalized anxiety disorder)     Chronic right-sided low back pain without sciatica     Weakness of both hips     Impaired functional mobility and activity tolerance     Caregiver stress     Psychophysiologic insomnia      Lab Results       Component                Value               Date                       HGBA1C                   5.5                 03/01/2023            Lab Results       Component                Value               Date                       LDLCALC                  97.0                03/01/2023                 CREATININE               0.8                 03/01/2023                Current attempts at weight loss: States she is trying to watch what she eats. Using pedal exerciser. Needs knee replacement.     Previous diet attempts: keto earlier in the year, lost some weight,but gained it back. WW in 2005. Did well with it.     History of medication for loss:  denies.  checked today. Frequent fills for opiates.     Heaviest weight:  215#    Lightest weight: 120#    Goal weight: 150#      Last eye exam:  2 months ago. No glaucoma per pt.                                      Provider:    Typical eating patterns:  Retired. Lives with  with dementia. Pt cooks.   Breakfast: egg, fall, sausage. Oatmeal (flavored). Cereal.     lunch:  cauliflower bowl. Salad. Ham sandwich.  Fast food- tacos    dinner: ground meat, chicken with stuffing and veg. Red beans(no rice). If out- stuffed crab with pasta.     snacks: cheese. Nuts. Snack cake. Candy. PB. Eggs. Fruit.     Beverages:  water, unsweetened tea. ETOH- not often.     Willingness to change:  10/10    Cardiac studies:   Normal sinus rhythm   Normal ECG   When compared with ECG of 01-MAY-2019 12:32,   Premature ventricular complexes are no longer Present    BMR:1475    PBF:  47.7%    Review of Systems       Objective   BP (!) 148/79 (BP Location: Right arm, Patient Position: Sitting)   Pulse 92   Wt 97.9 kg (215 lb 12.8 oz)   SpO2 99%   BMI 35.91 kg/m²     Physical Exam  Vitals reviewed.   Constitutional:       General: She is not in acute distress.     Appearance: She is well-developed. She is obese.   HENT:      Head: Normocephalic and atraumatic.   Eyes:      General: No scleral icterus.     Pupils: Pupils are equal, round, and reactive to light.   Neck:      Thyroid: No thyromegaly.   Cardiovascular:      Rate and Rhythm: Normal rate.      Heart sounds: Normal heart sounds. No murmur heard.     No friction rub. No gallop.   Pulmonary:      Effort: Pulmonary effort is normal. No respiratory distress.      Breath sounds: Normal breath sounds. No wheezing.   Abdominal:      General: Bowel sounds are normal. There is no distension.      Palpations: Abdomen is soft.      Tenderness: There is no abdominal tenderness.   Musculoskeletal:         General: Normal range of motion.      Cervical back: Normal range of motion and neck supple.      Right lower leg: Edema present.      Left lower leg: Edema present.   Skin:     General: Skin is warm and dry.      Findings: No erythema.   Neurological:      Mental Status: She is alert and oriented to person, place, and time.      Cranial Nerves: No cranial nerve deficit.   Psychiatric:         Behavior: Behavior normal.         Judgment: Judgment normal.             Assessment and Plan     1. Class 2 severe obesity with body mass index (BMI) of 35 to 39.9 with serious comorbidity    2. Essential hypertension, benign    3. Mixed hyperlipidemia    4. DDD (degenerative disc disease), lumbar    5. Primary osteoarthritis of both knees    6. Arthritis of right hip    7. Moderate episode of recurrent major depressive disorder    Other orders  -     topiramate (TOPAMAX) 25 MG tablet; Take 1 tablet (25 mg total) by mouth 2 (two) times daily.  Dispense: 60 tablet; Refill: 1        1. Class 2 severe obesity with body mass index (BMI) of 35 to 39.9 with serious comorbidity  Patient was informed that topiramate is used for migraine prevention and seizures. Weight loss is a common side effect that is well documented. S/he understands this. S/he was informed of the potential side effects such as serious and possibly fatal rash in which case the medication should be discontinued immediately. Paresthesias, forgetfulness, fatigue, kidney stones, GI symptoms, and changes in lab values such as electrolytes, blood counts and kidney function.    Start topiramate  in the evening for 1 week, then morning and evening.     Increase low impact activity as tolerated.  Avoid high impact activity, very heavy lifting or other exercise regimens that may cause discomfort.     1000 elsie pb meal planner, meal ideas and seated exercises given,     2. Essential hypertension, benign  Expect improvement with weight loss. The current medical regimen is effective;  continue present plan and medications.     3. Mixed hyperlipidemia  Recheck after 10% TBW lost.  Expect improvement with weight loss.     4. DDD (degenerative disc disease), lumbar  Optimize BMI. Increase low impact activity as tolerated.  Avoid high impact activity, very heavy lifting or other exercise regimens that may cause discomfort.      5. Primary osteoarthritis of both knees  Optimize BMI. Increase low impact activity as tolerated.  Avoid  high impact activity, very heavy lifting or other exercise regimens that may cause discomfort.      6. Arthritis of right hip  Optimize BMI. Increase low impact activity as tolerated.  Avoid high impact activity, very heavy lifting or other exercise regimens that may cause discomfort.      7. Moderate episode of recurrent major depressive disorder  Watch for and report mood changes with weight loss medications.              No follow-ups on file.

## 2023-09-27 ENCOUNTER — OFFICE VISIT (OUTPATIENT)
Dept: BARIATRICS | Facility: CLINIC | Age: 68
End: 2023-09-27
Payer: MEDICARE

## 2023-09-27 VITALS
WEIGHT: 215.81 LBS | SYSTOLIC BLOOD PRESSURE: 148 MMHG | OXYGEN SATURATION: 99 % | DIASTOLIC BLOOD PRESSURE: 79 MMHG | HEART RATE: 92 BPM | BODY MASS INDEX: 35.91 KG/M2

## 2023-09-27 DIAGNOSIS — M16.11 ARTHRITIS OF RIGHT HIP: ICD-10-CM

## 2023-09-27 DIAGNOSIS — M51.36 DDD (DEGENERATIVE DISC DISEASE), LUMBAR: ICD-10-CM

## 2023-09-27 DIAGNOSIS — F33.1 MODERATE EPISODE OF RECURRENT MAJOR DEPRESSIVE DISORDER: ICD-10-CM

## 2023-09-27 DIAGNOSIS — E66.01 CLASS 2 SEVERE OBESITY WITH BODY MASS INDEX (BMI) OF 35 TO 39.9 WITH SERIOUS COMORBIDITY: Primary | ICD-10-CM

## 2023-09-27 DIAGNOSIS — M17.0 PRIMARY OSTEOARTHRITIS OF BOTH KNEES: ICD-10-CM

## 2023-09-27 DIAGNOSIS — E78.2 MIXED HYPERLIPIDEMIA: ICD-10-CM

## 2023-09-27 DIAGNOSIS — I10 ESSENTIAL HYPERTENSION, BENIGN: ICD-10-CM

## 2023-09-27 PROCEDURE — 3288F FALL RISK ASSESSMENT DOCD: CPT | Mod: CPTII,S$GLB,, | Performed by: INTERNAL MEDICINE

## 2023-09-27 PROCEDURE — 1101F PT FALLS ASSESS-DOCD LE1/YR: CPT | Mod: CPTII,S$GLB,, | Performed by: INTERNAL MEDICINE

## 2023-09-27 PROCEDURE — 3077F PR MOST RECENT SYSTOLIC BLOOD PRESSURE >= 140 MM HG: ICD-10-PCS | Mod: CPTII,S$GLB,, | Performed by: INTERNAL MEDICINE

## 2023-09-27 PROCEDURE — 3008F BODY MASS INDEX DOCD: CPT | Mod: CPTII,S$GLB,, | Performed by: INTERNAL MEDICINE

## 2023-09-27 PROCEDURE — 3078F DIAST BP <80 MM HG: CPT | Mod: CPTII,S$GLB,, | Performed by: INTERNAL MEDICINE

## 2023-09-27 PROCEDURE — 99215 PR OFFICE/OUTPT VISIT, EST, LEVL V, 40-54 MIN: ICD-10-PCS | Mod: S$GLB,,, | Performed by: INTERNAL MEDICINE

## 2023-09-27 PROCEDURE — 1126F AMNT PAIN NOTED NONE PRSNT: CPT | Mod: CPTII,S$GLB,, | Performed by: INTERNAL MEDICINE

## 2023-09-27 PROCEDURE — 1126F PR PAIN SEVERITY QUANTIFIED, NO PAIN PRESENT: ICD-10-PCS | Mod: CPTII,S$GLB,, | Performed by: INTERNAL MEDICINE

## 2023-09-27 PROCEDURE — 99999 PR PBB SHADOW E&M-EST. PATIENT-LVL IV: CPT | Mod: PBBFAC,,, | Performed by: INTERNAL MEDICINE

## 2023-09-27 PROCEDURE — 1160F RVW MEDS BY RX/DR IN RCRD: CPT | Mod: CPTII,S$GLB,, | Performed by: INTERNAL MEDICINE

## 2023-09-27 PROCEDURE — 3288F PR FALLS RISK ASSESSMENT DOCUMENTED: ICD-10-PCS | Mod: CPTII,S$GLB,, | Performed by: INTERNAL MEDICINE

## 2023-09-27 PROCEDURE — 1101F PR PT FALLS ASSESS DOC 0-1 FALLS W/OUT INJ PAST YR: ICD-10-PCS | Mod: CPTII,S$GLB,, | Performed by: INTERNAL MEDICINE

## 2023-09-27 PROCEDURE — 3044F HG A1C LEVEL LT 7.0%: CPT | Mod: CPTII,S$GLB,, | Performed by: INTERNAL MEDICINE

## 2023-09-27 PROCEDURE — 99215 OFFICE O/P EST HI 40 MIN: CPT | Mod: S$GLB,,, | Performed by: INTERNAL MEDICINE

## 2023-09-27 PROCEDURE — 3044F PR MOST RECENT HEMOGLOBIN A1C LEVEL <7.0%: ICD-10-PCS | Mod: CPTII,S$GLB,, | Performed by: INTERNAL MEDICINE

## 2023-09-27 PROCEDURE — 1159F PR MEDICATION LIST DOCUMENTED IN MEDICAL RECORD: ICD-10-PCS | Mod: CPTII,S$GLB,, | Performed by: INTERNAL MEDICINE

## 2023-09-27 PROCEDURE — 1159F MED LIST DOCD IN RCRD: CPT | Mod: CPTII,S$GLB,, | Performed by: INTERNAL MEDICINE

## 2023-09-27 PROCEDURE — 1160F PR REVIEW ALL MEDS BY PRESCRIBER/CLIN PHARMACIST DOCUMENTED: ICD-10-PCS | Mod: CPTII,S$GLB,, | Performed by: INTERNAL MEDICINE

## 2023-09-27 PROCEDURE — 3077F SYST BP >= 140 MM HG: CPT | Mod: CPTII,S$GLB,, | Performed by: INTERNAL MEDICINE

## 2023-09-27 PROCEDURE — 3008F PR BODY MASS INDEX (BMI) DOCUMENTED: ICD-10-PCS | Mod: CPTII,S$GLB,, | Performed by: INTERNAL MEDICINE

## 2023-09-27 PROCEDURE — 3078F PR MOST RECENT DIASTOLIC BLOOD PRESSURE < 80 MM HG: ICD-10-PCS | Mod: CPTII,S$GLB,, | Performed by: INTERNAL MEDICINE

## 2023-09-27 PROCEDURE — 99999 PR PBB SHADOW E&M-EST. PATIENT-LVL IV: ICD-10-PCS | Mod: PBBFAC,,, | Performed by: INTERNAL MEDICINE

## 2023-09-27 RX ORDER — TOPIRAMATE 25 MG/1
25 TABLET ORAL 2 TIMES DAILY
Qty: 60 TABLET | Refills: 1 | Status: SHIPPED | OUTPATIENT
Start: 2023-09-27 | End: 2024-09-26

## 2023-09-27 NOTE — PATIENT INSTRUCTIONS
"Patient was informed that topiramate is used for migraine prevention and seizures. Weight loss is a common side effect that is well documented. S/he understands this. S/he was informed of the potential side effects such as serious and possibly fatal rash in which case the medication should be discontinued immediately. Paresthesias, forgetfulness, fatigue, kidney stones, GI symptoms, and changes in lab values such as electrolytes, blood counts and kidney function.    Start topiramate  in the evening for 1 week, then morning and evening.     Increase low impact activity as tolerated.  Avoid high impact activity, very heavy lifting or other exercise regimens that may cause discomfort.             1000 calorie  Meal Plan  STARCHES 80 CALORIES PER SERVING 15g CARB, 3g PROTEIN, 1g FAT  Servings per day   bread   tortilla   crackers   cooked cereals   dry cereals   pasta   rice   corn   popcorn   potato (small)   potato, mashed   sweet potato   squash, winter   cooked beans, peas, lentils (add 1 meat exchange)   1 slice   1 (6")   4-6 (3/4 oz)   1/2 cup   3/4 cup   1/2 cup   1/3 cup  1/2 cup   1 small light bag  1 (3 oz)   1/2 cup   1/3 cup   1 cup   1/2 cup Most starches are a good source of B vitamins   Choose whole grain foods such as 100% whole wheat bread and flour, brown rice, tortillas, etc. for nutrients and fiber.   Combine beans (starch & meat) with grains (starch) for their complimentary proteins and fiber   Combine grains (starch) with milk (milk) or cheese (meat) to compliment proteins     2   FRUIT 60 CALORIES PER SERVING 15g CARB    fresh fruit   banana  melon (cubes)   berries  canned fruit   dried fruit    1 small   1/2  ½ cup   ¾ cup  ½ cup   ¼ cup    Choose whole fruits for fiber   No fruit juices   2   DAIRY  CALORIES PER SERVING 12g CARB, 8g PROTEIN, 0-8g FAT    milk   yogurt  Protein soy or almond milk 1 cup   1 cup   1 cup Use unsweetened almond or soy milk with added protein  Avoid chocolate " or flavored milk  Avoid yogurt with more than 8 gms of sugar. Gms of protein should be higher than grams of sugar               1   MEAT AND SUBSTITUES  CALORIES PER SERVING 7g Protein, 0-13g FAT    Meat,Seafood and fish   cheese   cottage cheese   egg   peanut butter   tofu or tempeh  cooked beans, peas, lentils (add 1 starch)  Quinoa (add 1 starch)   Nuts and seeds (½ serving protein + 1 fat)  Nutritional yeast  Morning Star grillers 1 oz     1 oz  1/4 cup     1   1.5 Tbsp   4 oz (1/2 cup)   1/2 cup              ¼ cup            2 tablespoons    1 norah or ½ cup      Choose fish, seafood and lower fat cheeses  Limit frying or adding fat.      8   FATS 45 CALORIES PER SERVING     oil   mayonnaise   cream cheese   salad dressing   peanuts   avocado   butter or margarine    1 tsp   1 tsp   1 Tbsp   1 Tbsp   10   1/8   1 tsp Eat less fat.   Eat less saturated fat such as animal fat found in fatter meat, cheese, and butter. Also eat less hydrogenated fat.      2-3   VEGETABLES 25 CALORIES PER SERVING 5 g CARB, 2g PROTEIN    raw vegetables   cooked vegetables   tomato or vegetable juice 1 cup   1/2 cup     1/2 cup Choose dark green leafy and deep yellow vegetables such as spinach, zuchinni, squash, mushrooms, cauliflower ,broccoli, carrots, and peppers.   Unlimited       1000 CALORIE MEAL PLAN  Eat 3 small meals per day with 1-2 small snacks to keep you full throughout the day  Aim for at least 15 gms of protein at breakfast, at least 25 grams at lunch and at least 25 grams of protein at dinner.  Snacks should contain at least 5 grams of protein  Limit starches to 1 serving per meal or snack.  Keep your carbs whole grain or whole wheat  Limit your intake of refined sugar including sugary beverages ie sweet tea, lemonade, fruit punch             Fruit Protein Dairy Starch Fats Calories   Breakfast 1 2    370   Lunch  3  1 1 290   Dinner 1 3  1 1 315   Snack   1   120   Total 2 8 1 2 2 1085     Sample breakfasts:  2  scrambled eggs (use spray), 1 cup Protein Silk soy milk, 1 slice whole wheat toast, 1 small orange  Omelet made with 1 egg, 1-ounce low fat cheese and non-starchy veggies, 1 low fat plain yogurt with ½ banana  Plain yogurt with ¾ cup unsweetened cold cereal and ½ cup fresh fruit salad, 2 hardboiled eggs  Sample lunches:  ½ whole wheat bagel topped with 3 ounces of low fat cheese melted in oven with a wild green salad with 1 TBSP dressing  ¾ cup cooked beans with 6 whole grain crackers, 10 roasted peanuts  ½ medium baked potato with 3 ounces of low fat cheddar cheese and 1 TBSP  sour cream  1 small wheat tortilla brushed with 1 tsp olive oil then brushed with pizza sauce and 4 ounces low fat cheese, sliced mushrooms and green peppers broiled until cheese is melted.  ½ cup chopped melon    Sample Dinners:  4 ounces of tuna pan seared in 1 tsp olive oil, ½ baked small sweet potato and 2 small plums  ½ cup chick peas, 1 cup cauliflower sauteed with 1 tbsp chopped onion, 1 tsp oil, and 2 tsp angulo powder. Add low sodium vegetable stock to desired consistency. Serve with ½ cup brown rice  Red beans seasoned with onions and bell peppers served over cauliflower rice  1 cup cooked green or brown lentils served with ½ cup cooked quinoa and chopped tomatoes and cucumbers and 1 tbsp feta cheese  Sample Snacks:  1 cup of Protein Silk Soy milk with 3 squares meghana crackers  3/4 ounce Triscuits with 1 ounce cubed cheese  Chobani Triple Zero yogurt with ¾ cup unsweetened cereal  ½ cup edamame (shelled)      Meal Ideas for Regular Bariatric Diet  *Recipes and products available at [email or web address]      Breakfast: (15-20g protein)    - Egg white omelet: 2 egg whites or ½ cup Egg Beaters. (Optional proteins: cheese, shrimp, black beans, chicken, sliced turkey) (Optional veggies: tomatoes, salsa, spinach, mushrooms, onions, green peppers, or small slice avocado)     - Egg and sausage: 1 egg or ¼ cup Egg Beaters (any variety),  with 1 norah or 2 links of Turkey sausage or Veggie breakfast sausage ([employer] or Preact)    - Crust-less breakfast quiche: To make a glass pie dish, mix 4oz part skim Ricotta, 1 cup skim milk, and 2 eggs as your base. Add protein: shredded cheese, sliced lean ham or turkey, turkey fall/sausage. Add veggies: tomato, onion, green onion, mushroom, green pepper, spinach, etc.    - Yogurt parfait: Mix 1 - 6oz container Dannon Light N Fit vanilla yogurt, with ¼ cup Kashi Go Lean cereal    - Cottage cheese and fruit: ½ cup part-skim cottage cheese or ricotta cheese topped with fresh fruit or sugar free preserves     - Daphneyhung Barrientos's Vanilla Egg custard* (add 2 Tbsp instant coffee granules to make Cappuccino Custard*)    - Hi-Protein café latte (skim milk, decaf coffee, 1 scoop protein powder). Optional to add Sugar free syrup or extract flavoring.    Lunch: (20-30g protein)    - ½ cup Black bean soup (Homemade or Progresso), with ¼ cup shredded low-fat cheese. Top with chopped tomato or fresh salsa.     - Lean deli turkey breast and low-fat sliced cheese, mustard or light rangel to moisten, rolled up together, or wrapped in a Kameron lettuce leaf    - Chicken salad made from dinner leftovers, moisten with low-fat salad dressing or light rangel. Also try leftover salmon, shrimp, tuna or boiled eggs. Serve ½ cup over dark green salad    - Fat-free canned refried beans, topped with ¼ cup shredded low-fat cheese. Top with chopped tomato or fresh salsa.     - Greek salad: Top mixed greens with 1-2oz grilled chicken, tomatoes, red onions, 2-3 kalamata olives, and sprinkle lightly with feta cheese. Spritz with Balsamic vinegar to taste.     - Crust-less lunch quiche: To make a glass pie dish, mix 4oz part skim Ricotta, 1 cup skim milk, and 2 eggs as your base. Add protein: shredded cheese, sliced lean ham or turkey, shrimp, chicken. Add veggies: tomato, onion, green onion, mushroom, green pepper, spinach, artichoke,  broccoli, etc.    - Pizza bake: tomato sauce, low-fat shredded mozzarella and turkey pepperoni or Shiner fall. Add any veggies.    - Cucumber crab bites: Spread ¼ cup crab dip (lump crabmeat + light cream cheese and green onions) over sliced cucumber.     - Chicken with light spinach and artichoke dip*: Puree in : 6oz cooked and drained spinach, 2 cloves garlic, 1 can cannelloni beans, ½ cup chopped green onions, 1 can drained artichoke hearts (not marinated in oil), lemon juice and basil. Mix in 2oz chopped up chicken.    Supper: (20-30g protein)    - Serve grilled fish over dark green salad tossed with low-fat dressing, served with grilled asparagus palmer     - Rotisserie chicken salad: served with sliced strawberries, walnuts, fat-free feta cheese crumbles and 1 tbsp Alonsos Own Light Raspberry [place] Vinaigrette    - Shrimp cocktail: Dip cold boiled shrimp in homemade low-sugar cocktail sauce (1/2 cup Alanis One Carb ketchup, 2 tbsp horseradish, 1/4 tsp hot sauce, 1 tsp Worcestershire sauce, 1 tbsp freshly-squeezed lemon juice). Serve with dark green salad, walnuts, and crumbled blue cheese drizzled with olive oil and Balsamic vinegar    - Tuna Melt: Spread tuna salad onto 2 thick slices of tomato. Top with low-fat cheese and broil until cheese is melted. May also be made with chicken salad of shrimp salad. [place] with different types of cheeses.    - Homemade low-fat Chili using extra lean ground beef or ground turkey. Top with shredded cheese and salsa as desired. May add dollop fat-free sour cream if desired    - Dinner Omelet with shrimp or chicken and onion, green peppers and chives.    - No noodle lasagna: Use sliced zucchini or eggplant in place of noodles.  Layer with part skim ricotta cheese and low sugar meat sauce (use very lean ground beef or ground turkey).    - Mexican chicken bake: Bake chunks of chicken breast or thigh with taco seasoning, Pace brand enchilada sauce, green  onions and low-fat cheese. Serve with ¼ cup black beans or fat free refried beans topped with chopped tomatoes or salsa.    - Jamir frozen meatballs, simmered in Classico Marinara sauce. Different flavors of salsa or spaghetti sauce create different dishes! Sprinkle with parmesan cheese. Serve with grilled or steamed veggies, or a dark green salad.    - Simmer boneless skinless chicken thigh chunks in Classico Marinara sauce or roasted salsa until tender with chopped onion, bell pepper, garlic, mushrooms, spinach, etc.     - Hamburger, without the bun, dressed the way you like. Served with grilled or steamed veggies.    - Eggplant parmesan: Bake slices of eggplant at 350 degrees for 15 minutes. Layer tomato sauce, sliced eggplant and low-fat mozzarella cheese in a baking dish and cover with foil. Bake 30-40 more minutes or until bubbly. Uncover and bake at 400 degrees for about 15 more minutes, or until top is slightly crisp.    - Fish tacos: grilled/baked white fish, wrapped in Kameron lettuce leaf, topped with salsa, shredded low-fat cheese, and light coleslaw.    Snacks: (100-200 calories; >5g protein)    - 1 low-fat cheese stick with 8 cherry tomatoes or 1 serving fresh fruit  - 4 thin slices fat-free turkey breast and 1 slice low-fat cheese  - 4 thin slices fat-free honey ham with wedge of melon  - 1/4 cup unsalted nuts with ½ cup fruit  - 6-oz container Dannon Light n Fit vanilla yogurt, topped with 1oz unsalted nuts         - apple, celery or baby carrots spread with 2 Tbsp natural peanut butter or almond butter   - apple slices with 1 oz slice low-fat cheese  - celery, cucumber, bell pepper or baby carrots dipped in ¼ cup hummus bean spread or light spinach and artichoke dip (*recipe in lunch section)  - 100 calorie bag microwave light popcorn with 3 tbsp grated parmesan cheese  - Mahamed Links Beef Steak - 14g protein! (similar to beef jerky)  - 2 wedges Laughing Cow - Light Herb & Garlic Cheese with  sliced cucumber or green bell pepper  - 1/2 cup low-fat cottage cheese with ¼ cup fruit or ¼ cup salsa  - RTD Protein drinks: Atkins, Low Carb Slim Fast, EAS light, Muscle Milk Light, etc.  - Homemade Protein drinks: GNC Soy95, Isopure, Nectar, UNJURY, Whey Gourmet, etc. Mix 1 scoop powder with 8oz skim/1% milk or light soymilk.  - Protein bars: Atkins, EAS, Pure Protein, Think Thin, Detour, etc. Must have 0-4 grams sugar - Read the label.    Takeout Options: No more than twice/week  Deli - Salads (no pasta or rice), meats, cheeses. Roasted chicken. Lox (salmon)    Mexican - Platters which don't include tortillas, chips, or rice. Go easy on the beans. Example: Fajitas without the tortillas. Ask the  not to bring chips to the table if they are too tempting.    Greek - Meat or fish and vegetable, but no bread or rice. Including hummus, baba ganoush, etc, is OK. Most sit-down Greek restaurants can provide you with cucumber slices for dipping instead of eduarda bread.    Fast Food (Avoid as much as possible) - Salads (no croutons and limit salad dressing to 2 tbsp), grilled chicken sandwich without the bun and ask for no rangel. Ashleys low fat chili or Taco Bell pintos and cheese.    BBQ - The meats are fine if you ask for sauces on the side, but most of the traditional side dishes are loaded with carbs. Anil slaw, baked beans and BBQ sauce are typically made with sugar.    Chinese - Nothing deep-fried, no rice or noodles. Many Chinese sauces have starch and sugar in them, so you'll have to use your judgement. If you find that these sauces trigger cravings, or cause Dumping, you can ask for the sauce to be made without sugar or just use soy sauce.            SEATED RESISTANCE BAND EXERCISES     If you do not have a resistance band, or do not feel comfortable using a resistance band, these exercises can also be done holding a light hand weight or water bottle.  If you are just starting to exercise, you may want to go  through the motions without any weights or resistance till you become comfortable with the movements.     Do each of the movements shown 10 times (10 repetitions). You can repeat the exercises a second or  third time as well for greater benefit. The amount of tension on the resistance bands should be adjusted so  you can complete one set of 10 repetitions with effort. Increase the tension every few weeks. Do these  exercises 2 or 3 times a week.     * If an exercise hurts your back or joints, stop doing that particular exercise, but keep doing all the others *        CHEST EXERCISE          Start Position:   Sit tall, with feet shoulder width apart and feet in front of knees.   Belly pulled in.   Place the band around your upper back, grasp band in each hand, knuckles (rings) facing front. Arms  should be bent so that knuckles are in front of elbows   Slide shoulder blades down your back and slightly together (as if making a V).   Relax your neck.     To Perform This Exercise:   Press hands forward to lengthen arms using chest muscles (not arms!).   Dont arch your back!)   Return to start position and repeat 10 times.   Breathe!           BACK EXERCISE          Start Position:   Sit tall, with feet shoulder width apart and feet in front of knees.   Belly pulled in.   Grasp band in each hand and raise overhead. Arms should be slightly wider than shoulder width and no  slack in the band.   Slide shoulder blades down your back and slightly together (as if making a V).   Relax your neck.     To Perform This Exercise:   Open arms pulling down towards chest using upper back muscles (not arms!).   Squeeze through shoulder blades at the bottom of the movement (dont arch your back!).   Return to start position and repeat 10 times.   Breathe!           SHOULDER EXERCISE          Start Position:   Sit tall, with feet shoulder width apart and feet in front of knees.   Belly pulled in.   Sit on band so that you can grasp  band in one hand with tension on the band, but not so much tension that  you cannot straighten the arm. It may take a few tries to find the right amount of tension.   Slide shoulder blades down your back and slightly together (as if making a V).   Relax your neck.     To Perform This Exercise:   Press fist to the ceiling, slightly in front of the body.   SLOWLY return to start position and repeat 10 times.   Switch sides and repeat on the other side.   Breathe!           TRICEPS EXERCISE          Start Position:   Sit tall, with feet shoulder width apart and feet in front of knees.   Belly pulled in.   Sit on one end of the band. Grasp other end of band in one hand.   Point elbow directly toward the ceiling (if this is difficult, you may support the upper arm with the opposite  hand)   Be sure there is no slack in the band in the starting position.   Slide shoulder blades down your back and slightly together (as if making a V).   Relax your neck.     To Perform This Exercise:   Extend your arm up to the ceiling, as shown.   Squeeze through shoulder blades at the bottom of the movement (dont arch your back!).   SLOWLY return to start position and repeat 10 times.   Repeat on the other side.   Breathe!           BICEP EXERCISE          Start Position:   Sit tall, with feet shoulder width apart and feet in front of knees.   Belly pulled in.   Place center of exercise band under one foot and step the end of the band under the other foot.   Grasp each end of the band with one hand. Make sure there is no slack between your foot and the hand  that holds the band.   Hold your elbows to your sides.   Pull abdominals in, lift chest, press shoulders down and back.     To Perform This Exercise:   As you curl up, keep your wrist from changing position in relation to your forearm and your arm stable  from the shoulder to the elbow.   Bend and straighten your elbow in a slow and controlled movement. Repeat this motion 10  times.   Repeat on the other side.   Breathe!

## 2023-10-18 ENCOUNTER — PATIENT MESSAGE (OUTPATIENT)
Dept: CARDIOLOGY | Facility: CLINIC | Age: 68
End: 2023-10-18
Payer: MEDICARE

## 2023-10-31 ENCOUNTER — PATIENT MESSAGE (OUTPATIENT)
Dept: BEHAVIORAL HEALTH | Facility: CLINIC | Age: 68
End: 2023-10-31
Payer: MEDICARE

## 2023-11-03 ENCOUNTER — TELEPHONE (OUTPATIENT)
Dept: BEHAVIORAL HEALTH | Facility: CLINIC | Age: 68
End: 2023-11-03
Payer: MEDICARE

## 2023-11-03 ENCOUNTER — CLINICAL SUPPORT (OUTPATIENT)
Dept: PRIMARY CARE CLINIC | Facility: CLINIC | Age: 68
End: 2023-11-03
Payer: MEDICARE

## 2023-11-03 VITALS
HEART RATE: 85 BPM | BODY MASS INDEX: 35.82 KG/M2 | TEMPERATURE: 98 F | OXYGEN SATURATION: 96 % | HEIGHT: 65 IN | WEIGHT: 215 LBS | DIASTOLIC BLOOD PRESSURE: 92 MMHG | SYSTOLIC BLOOD PRESSURE: 154 MMHG | RESPIRATION RATE: 18 BRPM

## 2023-11-03 DIAGNOSIS — I10 ESSENTIAL HYPERTENSION, BENIGN: Primary | ICD-10-CM

## 2023-11-03 PROCEDURE — 99999 PR PBB SHADOW E&M-EST. PATIENT-LVL III: ICD-10-PCS | Mod: PBBFAC,,,

## 2023-11-03 PROCEDURE — 99999 PR PBB SHADOW E&M-EST. PATIENT-LVL III: CPT | Mod: PBBFAC,,,

## 2023-11-03 RX ORDER — HYDROCHLOROTHIAZIDE 25 MG/1
25 TABLET ORAL DAILY
Qty: 90 TABLET | Refills: 0 | Status: SHIPPED | OUTPATIENT
Start: 2023-11-03 | End: 2024-01-30

## 2023-11-03 NOTE — PROGRESS NOTES
Primary Care Behavioral Health Integration: Initial  Date:  11/07/2023  Patient Name: Lashanda Monaco  Referral Source:  Wayne Lundberg MD  Type of Visit:  Video Session    Visit type: Virtual visit with synchronous audio and video  The patient location is:  [place] [address]  The patient location [place] is:  [facility]  The patient phone number is: [phone]    Each patient to whom he or she provides medical services by telemedicine is:  (1) informed of the relationship between the physician and patient and the respective role of any other health care provider with respect to management of the patient; and (2) notified that he or she may decline to receive medical services by telemedicine and may withdraw from such care at any time.    Chief complaint/reason for encounter: depression and anxiety      History of Present Illness:  Lashanda Monaco, a 68 y.o.  female with history of MAJOR DEPRESSIVE DISORDER, SINGLE EPISODE, Moderate (F32.1) referred by Wayne Lundberg MD.  Patient was seen, examined and chart was reviewed.    Met with patient.       Patient was six minutes late to this Initial Assessment.  Patient began this Initial Assessment by stating she is the caretaker for her .  Reported her  was diagnosed with dementia, and he has been living with that condition for approximately five years.  Stated her son has been diagnosed with a mental health condition in addition to being diagnosed with colon cancer. Noted son is a  and has moved to [place], [place].  Patient claims son has caused financial stressors for her and  because of chemotherapy treatment.  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  However, funds have been diminishing over time.  Reported she does not know how to handle her 's dementia.  Stated  has engaged in behaviors  "that she finds both disturbing and sad.   used to maintain his personal hygiene, whereas now, he neglects it.  Stated this has put an emotional toll on her.  Reported she does not sleep.  Other reported symptoms include excessive worrying, feeling on edge, fatigue, guilt, and feeling hopeless.  Patient noted she engages in "emotional eating."  Patient reported she does not have suicidal ideation nor does she have a plan to harm herself, but she did state, "I feel like I do not want to be here, and I'd rather be in a box all to myself."  Reported she would like resources for coping with 's dementia.  Stated she is willing to engage in short term therapy in the CBT program.  LPC sent patient Tool 1 - Identification of Triggers to Anxiety worksheet of the CBT Toolbox workbook.  Will review patient's completed worksheet during follow-up session on November 24, 2023.          Past Medical History:   Diagnosis Date    Arthritis     Hypercholesterolemia     Hypertension          Current Outpatient Medications:     amLODIPine (NORVASC) 10 MG tablet, TAKE 1 TABLET DAILY, Disp: 90 tablet, Rfl: 3    atorvastatin (LIPITOR) 20 MG tablet, TAKE 1 TABLET DAILY, Disp: 90 tablet, Rfl: 3    butalbital-acetaminophen-caffeine -40 mg (FIORICET, ESGIC) -40 mg per tablet, Take 1 tablet by mouth every 4 (four) hours as needed., Disp: , Rfl:     cyclobenzaprine (FLEXERIL) 5 MG tablet, Take 1 tablet (5 mg total) by mouth nightly., Disp: 30 tablet, Rfl: 1    estradiol (ESTRACE) 2 MG tablet, Take 1 tablet (2 mg total) by mouth once daily., Disp: 90 tablet, Rfl: 1    fish oil-omega-3 fatty acids 300-1,000 mg capsule, Take 1 g by mouth once daily., Disp: , Rfl:     hydroCHLOROthiazide (HYDRODIURIL) 25 MG tablet, Take 1 tablet (25 mg total) by mouth once daily., Disp: 90 tablet, Rfl: 0    melatonin (MELATIN), Take by mouth every evening., Disp: , Rfl:     meloxicam (MOBIC) 15 MG tablet, Take 1 tablet (15 mg total) by " mouth once daily., Disp: 90 tablet, Rfl: 1    ofloxacin (FLOXIN) 0.3 % otic solution, , Disp: , Rfl:     omeprazole (PRILOSEC) 40 MG capsule, TAKE 1 CAPSULE TWICE A DAY BEFORE MEALS, Disp: 60 capsule, Rfl: 1    sertraline (ZOLOFT) 50 MG tablet, Take 1 tablet by mouth once daily, Disp: 90 tablet, Rfl: 3    topiramate (TOPAMAX) 25 MG tablet, Take 1 tablet (25 mg total) by mouth 2 (two) times daily., Disp: 60 tablet, Rfl: 1    traZODone (DESYREL) 50 MG tablet, Take 1-2 tablets ( mg total) by mouth nightly as needed for Insomnia., Disp: 60 tablet, Rfl: 5    TURMERIC ORAL, Take 1 tablet by mouth once daily., Disp: , Rfl:     Current Symptoms:  Depression: insomnia, worthlessness/guilt, hopelessness, fatigue, difficulty concentrating, and increased appetite.    Anxiety: excessive worrying.    Sleep: difficulty falling asleep and non-restful sleep.    Martha/Hypomania:  denies.    Psychosis: denies .  Suicidal thoughts/behaviors: Denied Confirmed    Patient noted agreement to call 911/and or present to the ED if she experienced suicidal or homicidal ideation, plan or intent.    Self-injury:  Denied. Confirmed.  Trauma: Denied. Confirmed.    Risk Assessment:  Patient reports no suicidal ideation  Patient reports no homicidal ideation  Patient reports no self-injurious behavior  Patient reports no violent behavior    Patient advised to call 911/[phone] or present the the nearest ED if they experience suicidal or homicidal ideation, plan or intent.    Discussed and agreed on a safety plan.    Psychiatric History:  Is the patient taking psychiatric medication: No - Zoloft 50   They are interested in medication changes.  Previous Medication Trials: No   Previous Psychiatric Outpatient Treatment:  No  Previous Psychiatric Hospitalizations:  No  Previous Suicide Attempts:  No  History of Trauma:  No  Access to a Firearm:  No    Substance Use History:  Tobacco/Nicotine:  No   Alcohol: none  Illicit Substances: Yes - [facility]  gummies  Misuse of Prescription Medications:  No  Caffeine: Yes - coffee, tea     Mental Status Exam  General Appearance:  age appropriate, overweight     Speech: normal tone, normal rate, normal pitch, normal volume         Level of Cooperation: cooperative        Thought Processes: normal and logical      Mood: steady        Thought Content: normal, no suicidality, no homicidality, delusions, or paranoia     Affect: congruent and appropriate    Orientation: Oriented x3     Memory: recent >  intact, remote >  intact     Attention Span & Concentration: intact     Fund of General Knowledge: intact and appropriate to age and level of education   Abstract Reasoning: interpretation of similarities was concrete   Judgment & Insight: good       Language:  intact            No data to display                     11/3/2023     1:44 PM   GAD7   1. Feeling nervous, anxious, or on edge? 1   2. Not being able to stop or control worrying? 1   3. Worrying too much about different things? 1   4. Trouble relaxing? 1   5. Being so restless that it is hard to sit still? 1   6. Becoming easily annoyed or irritable? 1   7. Feeling afraid as if something awful might happen? 1   8. If you checked off any problems, how difficult have these problems made it for you to do your work, take care of things at home, or get along with other people? 1   MARIBEL-7 Score 7       Impression:   My diagnostic impression is patient experiences excessive worrying, difficulty relaxing, difficulty concentrating, feeling on edge, depressed mood, and fatigue as evidenced by fear of uncertainty, racing and intrusive thoughts, and irritability.  These symptoms are making it difficult for patient to function effectively.    Provisional Diagnosis:  No diagnosis found.    Treatment Goals and Plan: Initial appointment focused on gathering history, identifying treatment goals and developing a treatment plan.      Anxiety: reducing negative automatic thoughts, reducing  physical symptoms of anxiety, and reducing time spent worrying (<30 minutes/day)  Depression: reducing excessive guilt, reducing fatigue, and reducing negative automatic thoughts    Future treatment will utilize CBT, Problem-solving Therapy, and Solution-focused Therapy.      Return to Clinic: 2 weeks    Plan to discuss case with [facility] consulting psychiatrist and further recommendations to be potentially be made at that time.  Refer to psychiatry    Length of Appointment:  35 minutes spent face-to-face and 14 minutes spent in non face-to-face clinical care.

## 2023-11-03 NOTE — PROGRESS NOTES
Behavioral Health Community Health Worker  Initial Assessment  Completed by:  Philomena Limon     Date:  11/3/2023    Patient Enrollment in Behavioral Health Program:  Patient verbalized understanding of Behavioral Health Integration services to include:  Patient understands that CHW, LCSW, PharmD and consulting Psychiatrist are members of the care team working collaboratively with his/her primary care provider: Yes  Patient understands that activation of their [facility] patient portal account is required for accessing the full scope of team services: Yes  Patient understands that some counseling sessions may occur via video: Yes  Clinic visits with the psychiatrist may be subject to a co-pay based on your insurance: Yes  Patient consents to enroll in BHI program: Yes    Assessments     Single Item Health Literacy Scale:  How often do you need to have someone help you read instructions, pamphlets or other written material from your doctor or pharmacy?: Never    Promis 10:  Promis 10 Responses  In general, would you say your health is: Fair  In general, would you say your quality of life is: Fair  In general, how would you rate your physical health?: Fair  In general, how would you rate your mental health, including your mood and your ability to think?: Fair  In general, how would you rate your satisfaction with your social activities and relationships?: Fair  In general, please rate how well you carry out your usual social activities and roles. (This includes activities at home, at work and in your community, and responsibilities as a parent, child, spouse, employee, friend, etc.): Good  To what extent are you able to carry out your everyday physical activities such as walking, climbing stairs, carrying groceries, or moving a chair? : Moderately  How often have you been bothered by emotional problems such as feeling anxious, depressed or irritable?: Sometimes  In the past 7 days, how would you rate your fatigue on  "average?: Moderate  In the past 7 days, on a scale of 0 to 10 (where 0 is no pain and 10 is the worst pain imaginable) how would you rate your pain on average?: 5  Global Physical Health: 13  Global Mental health Score: 9    Depression PHQ9 on 11/3/2023 at 1:38pm score is "10"       No data to display                  Generalized Anxiety Disorder 7-Item Scale:      11/3/2023     1:44 PM   GAD7   1. Feeling nervous, anxious, or on edge? 1   2. Not being able to stop or control worrying? 1   3. Worrying too much about different things? 1   4. Trouble relaxing? 1   5. Being so restless that it is hard to sit still? 1   6. Becoming easily annoyed or irritable? 1   7. Feeling afraid as if something awful might happen? 1   8. If you checked off any problems, how difficult have these problems made it for you to do your work, take care of things at home, or get along with other people? 1   MARIBEL-7 Score 7       History     Social History     Socioeconomic History    Marital status:    Tobacco Use    Smoking status: Never    Smokeless tobacco: Never   Substance and Sexual Activity    Alcohol use: Yes     Comment: Occasionally     Drug use: No    Sexual activity: Yes     Partners: Male       Call Summary     Patient was referred to the BHI (Non-opioid) program by Primary Care Provider, Dr. Wayne Lundberg.  CHW contacted Lashanda Monaco who reports depression and anxiety that limits her activities of daily living (ADLs).   Patient scored "10" on the PHQ9 and "7" on the MARIBEL 7. Based on these scores patient is eligible for the Behavioral Health Integration (Non-opioid) Program. CHW completed the intake and scheduled a virtual appointment for patient with Sammy Bella LPC on Tuesday, 11/7/20023 at 1:30pm. Patient denies SI/HI.   "

## 2023-11-03 NOTE — PROGRESS NOTES
Pt arrived in clinic for blood pressure check. No chief complaints or pain reported. Pt's vitals as follows: /92, P 85, R 18, T 97.8, and O2 saturation 96%. Repeated /92.  MD notified.

## 2023-11-06 ENCOUNTER — TELEPHONE (OUTPATIENT)
Dept: BEHAVIORAL HEALTH | Facility: CLINIC | Age: 68
End: 2023-11-06
Payer: MEDICARE

## 2023-11-06 NOTE — PROGRESS NOTES
CHW reached out to pt to remind her of virtual appointment with Sammy Bella LPC tomorrow. Pt confirmed the appointment.

## 2023-11-07 ENCOUNTER — TELEPHONE (OUTPATIENT)
Dept: BEHAVIORAL HEALTH | Facility: CLINIC | Age: 68
End: 2023-11-07
Payer: MEDICARE

## 2023-11-07 ENCOUNTER — PATIENT MESSAGE (OUTPATIENT)
Dept: BEHAVIORAL HEALTH | Facility: CLINIC | Age: 68
End: 2023-11-07
Payer: MEDICARE

## 2023-11-07 ENCOUNTER — PATIENT MESSAGE (OUTPATIENT)
Dept: BEHAVIORAL HEALTH | Facility: CLINIC | Age: 68
End: 2023-11-07

## 2023-11-07 ENCOUNTER — CLINICAL SUPPORT (OUTPATIENT)
Dept: BEHAVIORAL HEALTH | Facility: CLINIC | Age: 68
End: 2023-11-07
Payer: MEDICARE

## 2023-11-07 DIAGNOSIS — Z63.6 CAREGIVER STRESS: ICD-10-CM

## 2023-11-07 DIAGNOSIS — F32.1 MAJOR DEPRESSIVE DISORDER, SINGLE EPISODE, MODERATE: Primary | ICD-10-CM

## 2023-11-07 PROCEDURE — 90791 PSYCH DIAGNOSTIC EVALUATION: CPT | Mod: 95,,, | Performed by: COUNSELOR

## 2023-11-07 PROCEDURE — 90791 PR PSYCHIATRIC DIAGNOSTIC EVALUATION: ICD-10-PCS | Mod: 95,,, | Performed by: COUNSELOR

## 2023-11-07 SDOH — SOCIAL DETERMINANTS OF HEALTH (SDOH): DEPENDENT RELATIVE NEEDING CARE AT HOME: Z63.6

## 2023-11-07 NOTE — PROGRESS NOTES
CHW reached out to pt to offer a different virtual appointment time for patient who request a virtual follow-up with Sammy Bella LPC on Friday, 11/24/2023 at 1:30pm but pt is already scheduled on 11/24/2023 with Dr. Wayne Lundberg at 1:45pm, LVM to pls call if she can be seen at 12:30pm or 2:30pm on 11/24.Sent patient portal message to pls call.

## 2023-11-08 ENCOUNTER — PATIENT MESSAGE (OUTPATIENT)
Dept: BEHAVIORAL HEALTH | Facility: CLINIC | Age: 68
End: 2023-11-08
Payer: MEDICARE

## 2023-11-17 ENCOUNTER — PATIENT MESSAGE (OUTPATIENT)
Dept: ORTHOPEDICS | Facility: CLINIC | Age: 68
End: 2023-11-17
Payer: MEDICARE

## 2023-11-17 DIAGNOSIS — M17.11 PRIMARY OSTEOARTHRITIS OF RIGHT KNEE: Primary | ICD-10-CM

## 2023-11-22 ENCOUNTER — TELEPHONE (OUTPATIENT)
Dept: BEHAVIORAL HEALTH | Facility: CLINIC | Age: 68
End: 2023-11-22
Payer: MEDICARE

## 2023-11-22 NOTE — PROGRESS NOTES
CHW reached out to pt to remind her of virtual appointment with Sammy Bella LPC Friday. Pt confirmed the appointment.

## 2023-11-27 NOTE — PROGRESS NOTES
"Subjective:       Patient ID: Lashanda Monaco is a 67 y.o. female.    Chief Complaint: Insomnia, Headache, and Follow-up (BP)    More anxious and on-edge, can't sleep, feels like she can't turn her brain off.  has worsening dementia, son struggling with mental health issues and was recently diagnosed with colon CA. Not seeing a therapist, and worried about finding the time for this. Interested in finding dementia support group for caregivers. Has been eating poorly, feels like she is stress eating  Diffuse arthralgias, especially back and knees. BP followed by digital HTN program      Review of Systems   Constitutional:  Positive for appetite change and unexpected weight change.   Musculoskeletal:  Positive for arthralgias.   Neurological:  Positive for headaches.   Psychiatric/Behavioral:  Positive for sleep disturbance. The patient is nervous/anxious.        Objective:      Vitals:    08/29/23 1109   BP: 138/82   BP Location: Right arm   Patient Position: Sitting   BP Method: Large (Manual)   Pulse: 85   Resp: 18   Temp: 97.3 °F (36.3 °C)   TempSrc: Temporal   SpO2: 97%   Weight: 97 kg (213 lb 15.3 oz)   Height: 5' 5" (1.651 m)     BP Readings from Last 5 Encounters:   08/29/23 138/82   07/31/23 (!) 148/86   03/01/23 122/72   12/21/22 137/65   11/22/21 128/82     Wt Readings from Last 5 Encounters:   08/29/23 97 kg (213 lb 15.3 oz)   07/31/23 95.9 kg (211 lb 8.5 oz)   03/01/23 90.9 kg (200 lb 8.1 oz)   11/22/21 99.1 kg (218 lb 6.4 oz)   11/05/21 96.5 kg (212 lb 11.9 oz)     Physical Exam  Vitals and nursing note reviewed.   Constitutional:       General: She is not in acute distress.     Appearance: Normal appearance. She is well-developed.   HENT:      Head: Normocephalic and atraumatic.   Cardiovascular:      Rate and Rhythm: Normal rate and regular rhythm.      Heart sounds: Normal heart sounds.   Pulmonary:      Effort: Pulmonary effort is normal.      Breath sounds: Normal breath sounds. " Subjective     Patient ID: Ankit Petersen is a 2 y.o. male.    Chief Complaint: Well Child and Hospital Follow Up    HPI 2-year-old male presents to clinic today to get established after recent hospitalization for pneumonia Saturday developed fever persistent mom's concern the next day when his temperature went to 104.5 with the new development of cough with back to the emergency department where chest x-ray was performed diagnosed with pneumonia treated with IV Rocephin and then sent out with amoxicillin.  Admitted on 11/22 and discharged on the 25th.  Out reach on 11/25.  Mom reports that fever stopped on Friday cough is gradually improving.  Appetite and activity also gradually improving.  No significant past medical history.  Immunizations up-to-date.  Review of Systems  Otherwise negative     Objective     Physical Exam  See Vital signs and growth parameters/charts in OCW  Developmental:[place] PDQ reviewed and appropriate.  See in OCW.  General: Well-appearing, well-nourished. No distress.  Nontoxic appearing cooperative  HEENT: Normocephalic and atraumatic.  Conjunctivae are normal.  Pupils are equal and reative to light. TM's are clear and intact bilaterally. Hearing is grossly normal. Nasopharynx is clear. Oropharynx is clear.  Neck: Supple. No thyroidmegaly. No bruits.   Lymph: No cervical or supraclavicular adenopathy.  Heart: Regular rate and rhythm, without murmur, rub or gallop.  Lungs: Clear to auscultation; respiratory effort normal.  Abdomen: Soft, nontender, nondistended. Normoactive bowel sounds. No hepatomegaly. No masses.  Extremities: Good distal pulses. No edema.         Assessment and Plan     1. Pneumonia due to infectious organism, unspecified laterality, unspecified part of lung  -     X-Ray Chest PA And Lateral; Future; Expected date: 11/27/2023    Clinically improving continue course of antibiotics.  Continue fluids and monitor for any signs of setbacks or concerns.  Precautions    Musculoskeletal:      Right lower leg: No edema.      Left lower leg: No edema.   Skin:     General: Skin is warm and dry.   Neurological:      Mental Status: She is alert and oriented to person, place, and time.   Psychiatric:         Mood and Affect: Mood normal.         Behavior: Behavior normal.         Lab Results   Component Value Date    WBC 7.28 03/01/2023    HGB 13.7 03/01/2023    HCT 42.9 03/01/2023     03/01/2023    CHOL 199 03/01/2023    TRIG 130 03/01/2023    HDL 76 (H) 03/01/2023    ALT 14 03/01/2023    AST 15 03/01/2023     03/01/2023    K 4.1 03/01/2023     03/01/2023    CREATININE 0.8 03/01/2023    BUN 22 03/01/2023    CO2 24 03/01/2023    TSH 1.293 03/01/2023    HGBA1C 5.5 03/01/2023      Assessment:       1. Moderate episode of recurrent major depressive disorder    2. Caregiver stress    3. Essential hypertension, benign    4. MARIBEL (generalized anxiety disorder)    5. Severe obesity (BMI 35.0-39.9) with comorbidity    6. Psychophysiologic insomnia        Plan:       Moderate episode of recurrent major depressive disorder  -     Ambulatory referral/consult to Primary Care Behavioral Health (Non-Opioids); Future; Expected date: 09/05/2023    Caregiver stress  -     Ambulatory referral/consult to Primary Care Behavioral Health (Non-Opioids); Future; Expected date: 09/05/2023    Essential hypertension, benign  Stable for now, continue to monitor via digital program  MARIBEL (generalized anxiety disorder)  -     Ambulatory referral/consult to Primary Care Behavioral Health (Non-Opioids); Future; Expected date: 09/05/2023    Severe obesity (BMI 35.0-39.9) with comorbidity  Lifestyle modification  Psychophysiologic insomnia  -     traZODone (DESYREL) 50 MG tablet; Take 1-2 tablets ( mg total) by mouth nightly as needed for Insomnia.  Dispense: 60 tablet; Refill: 5      Medication List with Changes/Refills   New Medications    TRAZODONE (DESYREL) 50 MG TABLET    Take 1-2 tablets  given.  Recommended repeat chest x-ray in 1 month.  Wellness checkup will be due in August.    Ankit was seen today for well child and hospital follow up.    Diagnoses and all orders for this visit:    Pneumonia due to infectious organism, unspecified laterality, unspecified part of lung  -     X-Ray Chest PA And Lateral; Future              No follow-ups on file.     ( mg total) by mouth nightly as needed for Insomnia.   Current Medications    AMLODIPINE (NORVASC) 10 MG TABLET    TAKE 1 TABLET DAILY    ATORVASTATIN (LIPITOR) 20 MG TABLET    TAKE 1 TABLET DAILY    BUTALBITAL-ACETAMINOPHEN-CAFFEINE -40 MG (FIORICET, ESGIC) -40 MG PER TABLET    Take 1 tablet by mouth every 4 (four) hours as needed.    CYCLOBENZAPRINE (FLEXERIL) 5 MG TABLET    Take 1 tablet (5 mg total) by mouth nightly.    ESTRADIOL (ESTRACE) 2 MG TABLET    Take 1 tablet (2 mg total) by mouth once daily.    FISH OIL-OMEGA-3 FATTY ACIDS 300-1,000 MG CAPSULE    Take 1 g by mouth once daily.    MELATONIN (MELATIN)    Take by mouth every evening.    MELOXICAM (MOBIC) 15 MG TABLET    Take 1 tablet (15 mg total) by mouth once daily.    OFLOXACIN (FLOXIN) 0.3 % OTIC SOLUTION        OMEPRAZOLE (PRILOSEC) 40 MG CAPSULE    TAKE 1 CAPSULE TWICE A DAY BEFORE MEALS    SERTRALINE (ZOLOFT) 50 MG TABLET    Take 1 tablet by mouth once daily    TURMERIC ORAL    Take 1 tablet by mouth once daily.   Discontinued Medications    CLOBETASOL (TEMOVATE) 0.05 % EXTERNAL SOLUTION    Apply topically once daily. As needed for itch and scale    CLOBETASOL 0.05% (TEMOVATE) 0.05 % OINT    Apply topically 2 (two) times daily. For scaly spot on back    HYDROCODONE-ACETAMINOPHEN (NORCO) 5-325 MG PER TABLET    Take 1 tablet by mouth every 6 (six) hours as needed for Pain.    KETOCONAZOLE (NIZORAL) 2 % CREAM    Apply topically 2 (two) times daily. For scaly, red itchy spots on face    KETOCONAZOLE (NIZORAL) 2 % SHAMPOO    Wash hair with medicated shampoo at least 2x/week - let sit on scalp at least 5 minutes prior to rinsing    ONDANSETRON (ZOFRAN) 4 MG TABLET    Take 1 tablet (4 mg total) by mouth every 6 (six) hours as needed for Nausea.    TRAZODONE (DESYREL) 50 MG TABLET    Take 1-2 tablets ( mg total) by mouth nightly as needed for Insomnia.

## 2023-12-05 ENCOUNTER — OFFICE VISIT (OUTPATIENT)
Dept: ORTHOPEDICS | Facility: CLINIC | Age: 68
End: 2023-12-05
Payer: MEDICARE

## 2023-12-05 ENCOUNTER — HOSPITAL ENCOUNTER (OUTPATIENT)
Dept: RADIOLOGY | Facility: HOSPITAL | Age: 68
Discharge: HOME | End: 2023-12-05
Attending: ORTHOPAEDIC SURGERY
Payer: MEDICARE

## 2023-12-05 VITALS — HEIGHT: 64 IN | WEIGHT: 212.88 LBS | BODY MASS INDEX: 36.34 KG/M2

## 2023-12-05 DIAGNOSIS — G89.29 CHRONIC BILATERAL LOW BACK PAIN WITH RIGHT-SIDED SCIATICA: ICD-10-CM

## 2023-12-05 DIAGNOSIS — M17.11 PRIMARY OSTEOARTHRITIS OF RIGHT KNEE: ICD-10-CM

## 2023-12-05 DIAGNOSIS — Z96.641 PRESENCE OF RIGHT ARTIFICIAL HIP JOINT: ICD-10-CM

## 2023-12-05 DIAGNOSIS — M54.41 CHRONIC BILATERAL LOW BACK PAIN WITH RIGHT-SIDED SCIATICA: ICD-10-CM

## 2023-12-05 DIAGNOSIS — M17.11 PRIMARY OSTEOARTHRITIS OF RIGHT KNEE: Primary | ICD-10-CM

## 2023-12-05 DIAGNOSIS — Z96.642 PRESENCE OF LEFT ARTIFICIAL HIP JOINT: ICD-10-CM

## 2023-12-05 DIAGNOSIS — M21.371 RIGHT FOOT DROP: Primary | ICD-10-CM

## 2023-12-05 PROCEDURE — 73562 XR KNEE ORTHO RIGHT WITH FLEXION: ICD-10-PCS | Mod: 26,59,LT, | Performed by: RADIOLOGY

## 2023-12-05 PROCEDURE — 3288F FALL RISK ASSESSMENT DOCD: CPT | Mod: CPTII,S$GLB,, | Performed by: ORTHOPAEDIC SURGERY

## 2023-12-05 PROCEDURE — 3008F BODY MASS INDEX DOCD: CPT | Mod: CPTII,S$GLB,, | Performed by: ORTHOPAEDIC SURGERY

## 2023-12-05 PROCEDURE — 99999 PR PBB SHADOW E&M-EST. PATIENT-LVL III: CPT | Mod: PBBFAC,,, | Performed by: ORTHOPAEDIC SURGERY

## 2023-12-05 PROCEDURE — 1101F PT FALLS ASSESS-DOCD LE1/YR: CPT | Mod: CPTII,S$GLB,, | Performed by: ORTHOPAEDIC SURGERY

## 2023-12-05 PROCEDURE — 99204 OFFICE O/P NEW MOD 45 MIN: CPT | Mod: S$GLB,,, | Performed by: ORTHOPAEDIC SURGERY

## 2023-12-05 PROCEDURE — 73521 X-RAY EXAM HIPS BI 2 VIEWS: CPT | Mod: 26,,, | Performed by: RADIOLOGY

## 2023-12-05 PROCEDURE — 73562 X-RAY EXAM OF KNEE 3: CPT | Mod: TC,LT

## 2023-12-05 PROCEDURE — 73521 X-RAY EXAM HIPS BI 2 VIEWS: CPT | Mod: TC

## 2023-12-05 PROCEDURE — 73564 XR KNEE ORTHO RIGHT WITH FLEXION: ICD-10-PCS | Mod: 26,RT,, | Performed by: RADIOLOGY

## 2023-12-05 PROCEDURE — 73564 X-RAY EXAM KNEE 4 OR MORE: CPT | Mod: 26,RT,, | Performed by: RADIOLOGY

## 2023-12-05 PROCEDURE — 1125F AMNT PAIN NOTED PAIN PRSNT: CPT | Mod: CPTII,S$GLB,, | Performed by: ORTHOPAEDIC SURGERY

## 2023-12-05 PROCEDURE — 73562 X-RAY EXAM OF KNEE 3: CPT | Mod: 26,59,LT, | Performed by: RADIOLOGY

## 2023-12-05 PROCEDURE — 73521 XR HIPS BILATERAL 2 VIEW INCL AP PELVIS: ICD-10-PCS | Mod: 26,,, | Performed by: RADIOLOGY

## 2023-12-05 NOTE — PROGRESS NOTES
"  Subjective:     HPI:   Lashanda Monaco is a 68 y.o. female who presents for eval R knee    S/p Right ant AURELIANO (2020) with Dr. Owens  The patient reported no issues with his inicision healing and no complications with infection after surgery. The patient spent 3 night(s) in the hospital because of her foot drop.  The patient had 2 weeks of home health physical therapy and 6 weeks of out patient physical therapy. The patient stated that she healed well since surgery: still has some nerve symptoms,   "Everything was numb and I couldn't pick it up= she shows flexing her hip ??femoral nerve   Now resolved and denies weakness but with residual LFCN numbness    Now some pain post lat hip when lying on side  Otherwise ok    L hip fine, no problems    L post AURELIANO (2010) with Dr. Archuleta  The patient reported no issues with his inicision healing and no complications with infection after surgery. The patient spent 2 night(s) in the hospital. The patient had 2 weeks of home health physical therapy and 6 weeks of out patient physical therapy. The patient stated that she healed well since surgery.     R knee years of progressive global predominantly medial knee pain  Mod to severe, activity related and relieved with rest  "Sometimes R leg wont work, brain wont make it go"     S/p R knee ATS 10+ years ago with Dr Archuleta no complications    Of note she says her mom had a foot drop after a total hip replacement in her brother had a foot drop after a total knee replacement       Medications: Meloxicam (15mg, daily), Aleve/Motrin/Tylenol PRN   Was on celebrex but had insurance issues  Notices when she doesn't take nsaids - more pain    Injections: 6/2023 right knee iaCSi with Dr. Ramsay 0% relief. The patient was getting right knee iaCSI every 3-4 months. The patient has had several other iaCSI in the past with diminishing pain relief and how long the relief has lasted. The patient also had right knee iaSynvisc at least 5 " times over the last several years. The patient reported that her visco would last 6-8 months but then the relief results went down.     Physical Therapy: Yes, completed OP-PT in , the patient reported that the OP-PT was helpful while she went but it didn't get rid of all of her pain/discomfort.     Bracing: Yes, HKB, the patient reports that there HKB helps her mentally and helps with stability.     Assistive Devices: None.     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 The patient's  has dementia and she is the primary care taker. The patient stated that their sister  would be able to help take care of them if they were to have surgery.        ROS:  The updated medical history is in the chart and has been reviewed. A review of systems is updated and there is no reported vision changes, ear/nose/mouth/throat complaints,  chest pain, shortness of breath, abdominal pain, urological complaints, fevers or chills, psychiatric complaints. Musculoskeletal and neurologcial symptoms are as documented. All other systems are negative.      Objective:   Exam:  There were no vitals filed for this visit.  Body mass index is 36.54 kg/m².    Physical examination included assessment of the patient's general appearance with particular attention to development, nutrition, body habitus, attention to grooming, and any evidence of distress.  Constitutional: The patient is a well-developed, well-nourished patient in no acute distress.   Cardiovascular: Vascular examination included warmth and capillary refill as well inspection for edema and assessment of pedal pulses. Pulses are palpable and regular.  Musculoskeletal: Gait was assessed as to whether it was steady,  non-antalgic, and/or required the use of an assist device. The patient was also asked to walk independently and get onto the examination table.  Skin: The skin was examined for any obvious rashes or lesions in the extremity.  Neurologic: Sensation is intact to light touch in the extremity. The patient has good coordination without hyperreflexia and is alert and oriented to person, place and time and has normal mood and affect.     All of the above were examined and found to be within normal limits except for the following pertinent clinical findings:    Antalgic gait with a limp she has slight steppage gait consistent with foot drop negative Trendelenburg.  Right hip incisions well-healed left hip incision well healed.  Right hip no groin pain with straight leg raise 0-100 hip flexion 30 abduction 20 adduction 30 external 20 internal rotation with no pain.  No significant limb length discrepancy supine.      Right knee 0-110 degree knee range of motion 2° varus alignment she is tender palpation mediolateral patellofemoral joint lines with moderate effusion knee stable anterior-posterior varus and valgus stresses without flexion contracture or extensor lag.      Distally she has 5- out of 5 tib ant 5/5 gastroc psoas      Imaging:    KNEE R ARTHRITIS    Indication:  Right knee pain  Exam Ordered: Radiographs of the right knee include a standing anteroposterior view, a standing posterioanterior view, a lateral view in full flexion, and a sunrise view  Details of Examination: Exam shows evidence of joint space narrowing, osteophyte formation, and subchondral sclerosis, all consistent with degenerative arthritis of the knee.  No other significant findings are noted.  Impression:  Degenerative Arthritis, Right Knee    Klg4 severe bone on bone varus R knee arthritis    B AURELIANO well fixed, no sig wear      Assessment:       ICD-10-CM ICD-9-CM   1. Right foot drop  M21.371 736.79   2. Primary osteoarthritis of right knee   M17.11 715.16   3. Chronic bilateral low back pain with right-sided sciatica  M54.41 724.2    G89.29 724.3     338.29      All to norco  Dexa t +2.5  cLBP, hx DOMINIC/RFA    R ?femoral nerve palsy after ant AURELIANO Junius 2020  L post AURELIANO AURELIANO 2010 Geremias    S/p R knee ATS 10+ years ago with Dr Archuleta no complications    Of note she says her mom had a foot drop after a total hip replacement in her brother had a foot drop after a total knee replacement      Plan:       The above findings were discussed with patient length. We discussed the risks of conservative versus surgical management knee arthritis. Conservative management consisting of anti-inflammatory medications, glucosamine/chondroitin sulfate, weight loss, physical therapy, activity modification, as well as injections (lubricant versus corticosteroid) was discussed at length. At this point considering the patient's level of activity, pain, and radiographic findings I recommend continued conservative management of the knee arthritis and TKA when medically optimized    RLE radicular pain/weakness (foot drop), hx of foot drop after AURELIANO with family history of foot drop (mom after AURELIANO, brother after TKA), need more info in preparation for potential TKA     Ref back and spine - no one managing back pain/sciatica    -EMG/NCS to eval femoral nerve and foot drop ?spine v AURELIANO v congenital nerve issue    F/u after EMG  Consider robotic TKA, needs baseline CRP/ESR, XR B hips today  1 night - watch nerves post op      Orders Placed This Encounter   Procedures    C-Reactive Protein     Standing Status:   Future     Number of Occurrences:   1     Standing Expiration Date:   2/5/2025    Sedimentation rate     Standing Status:   Future     Number of Occurrences:   1     Standing Expiration Date:   2/5/2025    Ambulatory referral/consult to [facility]     Standing Status:   Future     Number of Occurrences:   1     Standing Expiration Date:   1/5/2025     Referral  Priority:   Routine     Referral Type:   Consultation     Referral Reason:   Specialty Services Required     Number of Visits Requested:   1    EMG W/ ULTRASOUND AND NERVE CONDUCTION TEST 1 Extremity     Standing Status:   Future     Standing Expiration Date:   12/5/2024     Order Specific Question:   Number of Extremities     Answer:   1 Extremity     Order Specific Question:   Reason for Procedure:     Answer:   Other (specify)     Comments:   RLE radicular pain/weakness (foot drop), hx of foot drop after AURELIANO with family history of foot drop (mom after AURELIANO, brother after TKA), need more info in preparation for potential TKA     Order Specific Question:   Other (Specify):     Answer:   RLE radicular pain/weakness (foot drop), hx of foot drop after AURELIANO with family history of foot drop (mom after AURELIANO, brother after TKA), need more info in preparation for potential TKA             Past Medical History:   Diagnosis Date    Arthritis     Hypercholesterolemia     Hypertension        Past Surgical History:   Procedure Laterality Date    BREAST BIOPSY Right     2010 & 2021    CARPAL TUNNEL RELEASE Left     hip repl Right 08/2019    HYSTERECTOMY      PARTIAL HIP ARTHROPLASTY Left     RHINOPLASTY      ROTATOR CUFF REPAIR Right     SHOULDER ARTHROSCOPY Right 05/2022    right shoulder sx with bicep repair    TONSILLECTOMY, ADENOIDECTOMY      TOTAL ABDOMINAL HYSTERECTOMY W/ BILATERAL SALPINGOOPHORECTOMY      TRANSFORAMINAL EPIDURAL INJECTION OF STEROID Right 1/24/2024    Procedure: LUMBAR TRANSFORAMINAL RIGHT L3/4 AND L4/L5 DIRECT REFERRAL;  Surgeon: Oswald Hoskins MD;  Location: [facility];  Service: Pain Management;  Laterality: Right;  [phone]       Family History   Problem Relation Age of Onset    Heart disease Sister     Lymphoma Brother     Heart disease Brother        Social History     Socioeconomic History    Marital status:    Tobacco Use    Smoking status: Never    Smokeless tobacco: Never    Substance and Sexual Activity    Alcohol use: Yes     Comment: Occasionally     Drug use: No    Sexual activity: Yes     Partners: Male

## 2023-12-08 DIAGNOSIS — M51.36 DDD (DEGENERATIVE DISC DISEASE), LUMBAR: Primary | ICD-10-CM

## 2023-12-12 ENCOUNTER — OFFICE VISIT (OUTPATIENT)
Dept: ORTHOPEDICS | Facility: CLINIC | Age: 68
End: 2023-12-12
Payer: MEDICARE

## 2023-12-12 ENCOUNTER — HOSPITAL ENCOUNTER (OUTPATIENT)
Dept: RADIOLOGY | Facility: HOSPITAL | Age: 68
Discharge: HOME | End: 2023-12-12
Attending: PHYSICIAN ASSISTANT
Payer: MEDICARE

## 2023-12-12 VITALS — BODY MASS INDEX: 36.5 KG/M2 | WEIGHT: 213.81 LBS | HEIGHT: 64 IN

## 2023-12-12 DIAGNOSIS — G89.29 CHRONIC BILATERAL LOW BACK PAIN WITH RIGHT-SIDED SCIATICA: ICD-10-CM

## 2023-12-12 DIAGNOSIS — M51.36 DDD (DEGENERATIVE DISC DISEASE), LUMBAR: Primary | ICD-10-CM

## 2023-12-12 DIAGNOSIS — M51.36 DDD (DEGENERATIVE DISC DISEASE), LUMBAR: ICD-10-CM

## 2023-12-12 DIAGNOSIS — M54.16 LUMBAR RADICULOPATHY, CHRONIC: ICD-10-CM

## 2023-12-12 DIAGNOSIS — M54.41 CHRONIC BILATERAL LOW BACK PAIN WITH RIGHT-SIDED SCIATICA: ICD-10-CM

## 2023-12-12 PROCEDURE — 3044F HG A1C LEVEL LT 7.0%: CPT | Mod: CPTII,S$GLB,, | Performed by: PHYSICIAN ASSISTANT

## 2023-12-12 PROCEDURE — 1159F PR MEDICATION LIST DOCUMENTED IN MEDICAL RECORD: ICD-10-PCS | Mod: CPTII,S$GLB,, | Performed by: PHYSICIAN ASSISTANT

## 2023-12-12 PROCEDURE — 99214 PR OFFICE/OUTPT VISIT, EST, LEVL IV, 30-39 MIN: ICD-10-PCS | Mod: S$GLB,,, | Performed by: PHYSICIAN ASSISTANT

## 2023-12-12 PROCEDURE — 3008F BODY MASS INDEX DOCD: CPT | Mod: CPTII,S$GLB,, | Performed by: PHYSICIAN ASSISTANT

## 2023-12-12 PROCEDURE — 1101F PT FALLS ASSESS-DOCD LE1/YR: CPT | Mod: CPTII,S$GLB,, | Performed by: PHYSICIAN ASSISTANT

## 2023-12-12 PROCEDURE — 72110 X-RAY EXAM L-2 SPINE 4/>VWS: CPT | Mod: TC

## 2023-12-12 PROCEDURE — 3288F FALL RISK ASSESSMENT DOCD: CPT | Mod: CPTII,S$GLB,, | Performed by: PHYSICIAN ASSISTANT

## 2023-12-12 PROCEDURE — 72110 XR LUMBAR SPINE AP AND LAT WITH FLEX/EXT: ICD-10-PCS | Mod: 26,,, | Performed by: RADIOLOGY

## 2023-12-12 PROCEDURE — 3008F PR BODY MASS INDEX (BMI) DOCUMENTED: ICD-10-PCS | Mod: CPTII,S$GLB,, | Performed by: PHYSICIAN ASSISTANT

## 2023-12-12 PROCEDURE — 99999 PR PBB SHADOW E&M-EST. PATIENT-LVL III: ICD-10-PCS | Mod: PBBFAC,,, | Performed by: PHYSICIAN ASSISTANT

## 2023-12-12 PROCEDURE — 3044F PR MOST RECENT HEMOGLOBIN A1C LEVEL <7.0%: ICD-10-PCS | Mod: CPTII,S$GLB,, | Performed by: PHYSICIAN ASSISTANT

## 2023-12-12 PROCEDURE — 3288F PR FALLS RISK ASSESSMENT DOCUMENTED: ICD-10-PCS | Mod: CPTII,S$GLB,, | Performed by: PHYSICIAN ASSISTANT

## 2023-12-12 PROCEDURE — 1159F MED LIST DOCD IN RCRD: CPT | Mod: CPTII,S$GLB,, | Performed by: PHYSICIAN ASSISTANT

## 2023-12-12 PROCEDURE — 99214 OFFICE O/P EST MOD 30 MIN: CPT | Mod: S$GLB,,, | Performed by: PHYSICIAN ASSISTANT

## 2023-12-12 PROCEDURE — 99999 PR PBB SHADOW E&M-EST. PATIENT-LVL III: CPT | Mod: PBBFAC,,, | Performed by: PHYSICIAN ASSISTANT

## 2023-12-12 PROCEDURE — 1125F AMNT PAIN NOTED PAIN PRSNT: CPT | Mod: CPTII,S$GLB,, | Performed by: PHYSICIAN ASSISTANT

## 2023-12-12 PROCEDURE — 72110 X-RAY EXAM L-2 SPINE 4/>VWS: CPT | Mod: 26,,, | Performed by: RADIOLOGY

## 2023-12-12 PROCEDURE — 1125F PR PAIN SEVERITY QUANTIFIED, PAIN PRESENT: ICD-10-PCS | Mod: CPTII,S$GLB,, | Performed by: PHYSICIAN ASSISTANT

## 2023-12-12 PROCEDURE — 1101F PR PT FALLS ASSESS DOC 0-1 FALLS W/OUT INJ PAST YR: ICD-10-PCS | Mod: CPTII,S$GLB,, | Performed by: PHYSICIAN ASSISTANT

## 2023-12-12 RX ORDER — PREGABALIN 75 MG/1
75 CAPSULE ORAL 2 TIMES DAILY
Qty: 60 CAPSULE | Refills: 6 | Status: SHIPPED | OUTPATIENT
Start: 2023-12-12 | End: 2024-06-11

## 2023-12-12 NOTE — PROGRESS NOTES
DATE: 12/12/2023  PATIENT: Lashanda Monaco    Supervising Physician: Leonid Paul M.D.    CHIEF COMPLAINT: back pain    HISTORY:  Lashanda Monaco is a 68 y.o. female with PMH of right AURELIANO complicated by right foot drop here for initial evaluation of low back and right leg pain (Back - 7, Leg - 7).  The pain in the back is what bothers her most.  The pain has been present for years but has progressively worsened over the last 6-7 months. The patient describes the pain as shooting.  The pain is worse with standing and walking and improved by sitting or leaning over her walker. There is associated numbness and tingling. There is subjective weakness. Prior treatments have included ibuprofen, mobic, flexeril and nerve ablations many years ago, but no recent injections, PT or surgery.    The patient denies myelopathic symptoms such as handwriting changes or difficulty with buttons/coins/keys. Denies perineal paresthesias, bowel/bladder dysfunction.    PAST MEDICAL/SURGICAL HISTORY:  Past Medical History:   Diagnosis Date    Arthritis     Hypercholesterolemia     Hypertension      Past Surgical History:   Procedure Laterality Date    BREAST BIOPSY Right     2010 & 2021    CARPAL TUNNEL RELEASE Left     hip repl Right 08/2019    HYSTERECTOMY      PARTIAL HIP ARTHROPLASTY Left     RHINOPLASTY      ROTATOR CUFF REPAIR Right     SHOULDER ARTHROSCOPY Right 05/2022    right shoulder sx with bicep repair    TONSILLECTOMY, ADENOIDECTOMY      TOTAL ABDOMINAL HYSTERECTOMY W/ BILATERAL SALPINGOOPHORECTOMY         Medications:   Current Outpatient Medications on File Prior to Visit   Medication Sig Dispense Refill    amLODIPine (NORVASC) 10 MG tablet TAKE 1 TABLET DAILY 90 tablet 3    atorvastatin (LIPITOR) 20 MG tablet TAKE 1 TABLET DAILY 90 tablet 3    butalbital-acetaminophen-caffeine -40 mg (FIORICET, ESGIC) -40 mg per tablet Take 1 tablet by mouth every 4 (four) hours as needed.      cyclobenzaprine  (FLEXERIL) 5 MG tablet Take 1 tablet (5 mg total) by mouth nightly. 30 tablet 1    estradiol (ESTRACE) 2 MG tablet Take 1 tablet (2 mg total) by mouth once daily. 90 tablet 1    fish oil-omega-3 fatty acids 300-1,000 mg capsule Take 1 g by mouth once daily.      hydroCHLOROthiazide (HYDRODIURIL) 25 MG tablet Take 1 tablet (25 mg total) by mouth once daily. 90 tablet 0    melatonin (MELATIN) Take by mouth every evening.      meloxicam (MOBIC) 15 MG tablet Take 1 tablet (15 mg total) by mouth once daily. 90 tablet 1    ofloxacin (FLOXIN) 0.3 % otic solution       omeprazole (PRILOSEC) 40 MG capsule TAKE 1 CAPSULE TWICE A DAY BEFORE MEALS 60 capsule 1    sertraline (ZOLOFT) 50 MG tablet Take 1 tablet by mouth once daily 90 tablet 3    topiramate (TOPAMAX) 25 MG tablet Take 1 tablet (25 mg total) by mouth 2 (two) times daily. 60 tablet 1    traZODone (DESYREL) 50 MG tablet Take 1-2 tablets ( mg total) by mouth nightly as needed for Insomnia. 60 tablet 5    TURMERIC ORAL Take 1 tablet by mouth once daily.       No current facility-administered medications on file prior to visit.       Social History:   Social History     Socioeconomic History    Marital status:    Tobacco Use    Smoking status: Never    Smokeless tobacco: Never   Substance and Sexual Activity    Alcohol use: Yes     Comment: Occasionally     Drug use: No    Sexual activity: Yes     Partners: Male       REVIEW OF SYSTEMS:  Constitution: Negative. Negative for chills, fever and night sweats.   Cardiovascular: Negative for chest pain and syncope.   Respiratory: Negative for cough and shortness of breath.   Gastrointestinal: See HPI. Negative for nausea/vomiting. Negative for abdominal pain.  Genitourinary: See HPI. Negative for discoloration or dysuria.  Skin: Negative for dry skin, itching and rash.   Hematologic/Lymphatic: Negative for bleeding problem. Does not bruise/bleed easily.   Musculoskeletal: Negative for falls and muscle weakness.  "  Neurological: See HPI. No seizures.   Endocrine: Negative for polydipsia, polyphagia and polyuria.   Allergic/Immunologic: Negative for hives and persistent infections.     EXAM:  Ht 5' 4" (1.626 m)   Wt 97 kg (213 lb 12.8 oz)   BMI 36.70 kg/m²     General: The patient is a very pleasant 68 y.o. female in no apparent distress, the patient is oriented to person, place and time.  Psych: Normal mood and affect  HEENT: Vision grossly intact, hearing intact to the spoken word.  Lungs: Respirations unlabored.  Gait: Antalgic station and gait.   Skin: Dorsal lumbar skin negative for rashes, lesions, hairy patches and surgical scars. There is mild lumbar tenderness to palpation.  Range of motion: Lumbar range of motion is acceptable.  Spinal Balance: Global saggital and coronal spinal balance acceptable, not significant for scoliosis and kyphosis.  Musculoskeletal: No pain with the range of motion of the bilateral hips. No trochanteric tenderness to palpation.  Vascular: Bilateral lower extremities warm and well perfused, dorsalis pedis pulses 2+ bilaterally.  Neurological: Normal strength and tone in all major motor groups in the bilateral lower extremities. Normal sensation to light touch in the L2-S1 dermatomes bilaterally.  Deep tendon reflexes symmetric 2+ in the bilateral lower extremities.  Negative Babinski bilaterally. Straight leg raise negative bilaterally.    IMAGING:      Today I personally reviewed AP, Lat and Flex/Ex  upright L-spine films that demonstrate multilevel degenerative changes and grade I anterolisthesis at L5/S1.         Body mass index is 36.7 kg/m².    Hemoglobin A1C   Date Value Ref Range Status   03/01/2023 5.5 4.0 - 5.6 % Final     Comment:     ADA Screening Guidelines:  5.7-6.4%  Consistent with prediabetes  >or=6.5%  Consistent with diabetes    High levels of fetal hemoglobin interfere with the HbA1C  assay. Heterozygous hemoglobin variants (HbS, HgC, etc)do  not significantly interfere " with this assay.   However, presence of multiple variants may affect accuracy.             ASSESSMENT/PLAN:    Lashanda was seen today for low-back pain.    Diagnoses and all orders for this visit:    DDD (degenerative disc disease), lumbar    Chronic bilateral low back pain with right-sided sciatica  -     Ambulatory referral/consult to [facility]    Lumbar radiculopathy, chronic  -     MRI Lumbar Spine Without Contrast; Future    Other orders  -     pregabalin (LYRICA) 75 MG capsule; Take 1 capsule (75 mg total) by mouth 2 (two) times daily.        Today we discussed at length all of the different treatment options including anti-inflammatories, acetaminophen, rest, ice, heat, physical therapy including strengthening and stretching exercises, home exercises, ROM, aerobic conditioning, aqua therapy, other modalities including ultrasound, massage, and dry needling, epidural steroid injections and finally surgical intervention.      I would like to get an MRI.  I will call with results.

## 2024-01-03 ENCOUNTER — HOSPITAL ENCOUNTER (OUTPATIENT)
Dept: RADIOLOGY | Facility: HOSPITAL | Age: 69
Discharge: HOME | End: 2024-01-03
Attending: PHYSICIAN ASSISTANT
Payer: MEDICARE

## 2024-01-03 DIAGNOSIS — M54.16 LUMBAR RADICULOPATHY, CHRONIC: ICD-10-CM

## 2024-01-03 PROCEDURE — 72148 MRI LUMBAR SPINE W/O DYE: CPT | Mod: TC

## 2024-01-03 PROCEDURE — 72148 MRI LUMBAR SPINE W/O DYE: CPT | Mod: 26,,, | Performed by: RADIOLOGY

## 2024-01-08 ENCOUNTER — OFFICE VISIT (OUTPATIENT)
Dept: ORTHOPEDICS | Facility: CLINIC | Age: 69
End: 2024-01-08
Payer: MEDICARE

## 2024-01-08 DIAGNOSIS — M51.36 DDD (DEGENERATIVE DISC DISEASE), LUMBAR: ICD-10-CM

## 2024-01-08 DIAGNOSIS — M48.062 SPINAL STENOSIS OF LUMBAR REGION WITH NEUROGENIC CLAUDICATION: ICD-10-CM

## 2024-01-08 DIAGNOSIS — M54.16 LUMBAR RADICULOPATHY, CHRONIC: Primary | ICD-10-CM

## 2024-01-08 DIAGNOSIS — M54.16 LUMBAR RADICULOPATHY: Primary | ICD-10-CM

## 2024-01-08 PROCEDURE — 99213 OFFICE O/P EST LOW 20 MIN: CPT | Mod: 95,,, | Performed by: PHYSICIAN ASSISTANT

## 2024-01-08 NOTE — H&P (VIEW-ONLY)
Established Patient - Audio Only Telehealth Visit     The patient location is: [place]  The chief complaint leading to consultation is: MRI results  Visit type: Virtual visit with audio only (telephone)  Total time spent with patient: 10 minutes       The reason for the audio only service rather than synchronous audio and video virtual visit was related to technical difficulties or patient preference/necessity.     Each patient to whom I provide medical services by telemedicine is:  (1) informed of the relationship between the physician and patient and the respective role of any other health care provider with respect to management of the patient; and (2) notified that they may decline to receive medical services by telemedicine and may withdraw from such care at any time. Patient verbally consented to receive this service via voice-only telephone call.       HPI: the patient presents for MRI results.    MRI lumbar spine demonstrates multilevel spondylosis with moderate to severe stenosis at L3/4.        Assessment and plan:       Today we discussed at length all of the different treatment options including anti-inflammatories, acetaminophen, rest, ice, heat, physical therapy including strengthening and stretching exercises, home exercises, ROM, aerobic conditioning, aqua therapy, other modalities including ultrasound, massage, and dry needling, epidural steroid injections and finally surgical intervention.      Plan for tfesi. Follow up 2 weeks after injection.          This service was not originating from a related E/M service provided within the previous 7 days nor will  to an E/M service or procedure within the next 24 hours or my soonest available appointment.  Prevailing standard of care was able to be met in this audio-only visit.

## 2024-01-09 ENCOUNTER — PATIENT MESSAGE (OUTPATIENT)
Dept: PAIN MEDICINE | Facility: OTHER | Age: 69
End: 2024-01-09
Payer: MEDICARE

## 2024-01-17 ENCOUNTER — TELEPHONE (OUTPATIENT)
Dept: ORTHOPEDICS | Facility: CLINIC | Age: 69
End: 2024-01-17
Payer: MEDICARE

## 2024-01-17 DIAGNOSIS — M17.11 PRIMARY OSTEOARTHRITIS OF RIGHT KNEE: ICD-10-CM

## 2024-01-17 DIAGNOSIS — M21.371 RIGHT FOOT DROP: Primary | ICD-10-CM

## 2024-01-17 NOTE — TELEPHONE ENCOUNTER
Called pt and rescheduled appt and EMG appt. Put new order in for EMG cause the old one expires before her appt with .

## 2024-01-24 ENCOUNTER — HOSPITAL ENCOUNTER (OUTPATIENT)
Facility: OTHER | Age: 69
Discharge: HOME | End: 2024-01-24
Attending: ANESTHESIOLOGY | Admitting: ANESTHESIOLOGY
Payer: MEDICARE

## 2024-01-24 VITALS
TEMPERATURE: 99 F | DIASTOLIC BLOOD PRESSURE: 82 MMHG | WEIGHT: 210 LBS | RESPIRATION RATE: 16 BRPM | HEIGHT: 64 IN | HEART RATE: 62 BPM | OXYGEN SATURATION: 92 % | SYSTOLIC BLOOD PRESSURE: 130 MMHG | BODY MASS INDEX: 35.85 KG/M2

## 2024-01-24 DIAGNOSIS — M54.16 LUMBAR RADICULOPATHY: ICD-10-CM

## 2024-01-24 DIAGNOSIS — M51.36 DDD (DEGENERATIVE DISC DISEASE), LUMBAR: Primary | ICD-10-CM

## 2024-01-24 DIAGNOSIS — G89.29 CHRONIC PAIN: ICD-10-CM

## 2024-01-24 PROCEDURE — 64483 NJX AA&/STRD TFRM EPI L/S 1: CPT | Mod: RT | Performed by: ANESTHESIOLOGY

## 2024-01-24 PROCEDURE — 25000003 PHARM REV CODE 250: Performed by: ANESTHESIOLOGY

## 2024-01-24 PROCEDURE — 64483 NJX AA&/STRD TFRM EPI L/S 1: CPT | Mod: RT,,, | Performed by: ANESTHESIOLOGY

## 2024-01-24 PROCEDURE — 63600175 PHARM REV CODE 636 W HCPCS: Performed by: ANESTHESIOLOGY

## 2024-01-24 PROCEDURE — 99152 MOD SED SAME PHYS/QHP 5/>YRS: CPT | Mod: ,,, | Performed by: ANESTHESIOLOGY

## 2024-01-24 PROCEDURE — 25500020 PHARM REV CODE 255: Performed by: ANESTHESIOLOGY

## 2024-01-24 PROCEDURE — 64484 NJX AA&/STRD TFRM EPI L/S EA: CPT | Mod: RT,,, | Performed by: ANESTHESIOLOGY

## 2024-01-24 PROCEDURE — 99152 MOD SED SAME PHYS/QHP 5/>YRS: CPT | Performed by: ANESTHESIOLOGY

## 2024-01-24 PROCEDURE — 64484 NJX AA&/STRD TFRM EPI L/S EA: CPT | Mod: RT | Performed by: ANESTHESIOLOGY

## 2024-01-24 RX ORDER — MIDAZOLAM HYDROCHLORIDE 1 MG/ML
INJECTION INTRAMUSCULAR; INTRAVENOUS
Status: DISCONTINUED | OUTPATIENT
Start: 2024-01-24 | End: 2024-01-24 | Stop reason: HOSPADM

## 2024-01-24 RX ORDER — DEXAMETHASONE SODIUM PHOSPHATE 10 MG/ML
INJECTION INTRAMUSCULAR; INTRAVENOUS
Status: DISCONTINUED | OUTPATIENT
Start: 2024-01-24 | End: 2024-01-24 | Stop reason: HOSPADM

## 2024-01-24 RX ORDER — LIDOCAINE HYDROCHLORIDE 10 MG/ML
INJECTION, SOLUTION EPIDURAL; INFILTRATION; INTRACAUDAL; PERINEURAL
Status: DISCONTINUED | OUTPATIENT
Start: 2024-01-24 | End: 2024-01-24 | Stop reason: HOSPADM

## 2024-01-24 RX ORDER — LIDOCAINE HYDROCHLORIDE 20 MG/ML
INJECTION, SOLUTION INFILTRATION; PERINEURAL
Status: DISCONTINUED | OUTPATIENT
Start: 2024-01-24 | End: 2024-01-24 | Stop reason: HOSPADM

## 2024-01-24 RX ORDER — SODIUM CHLORIDE 9 MG/ML
INJECTION, SOLUTION INTRAVENOUS CONTINUOUS
Status: DISCONTINUED | OUTPATIENT
Start: 2024-01-24 | End: 2024-01-24 | Stop reason: HOSPADM

## 2024-01-24 RX ORDER — FENTANYL CITRATE 50 UG/ML
INJECTION, SOLUTION INTRAMUSCULAR; INTRAVENOUS
Status: DISCONTINUED | OUTPATIENT
Start: 2024-01-24 | End: 2024-01-24 | Stop reason: HOSPADM

## 2024-01-24 NOTE — DISCHARGE SUMMARY
Discharge Note  Short Stay      SUMMARY     Admit Date: 1/24/2024    Attending Physician: Isak Lindsay      Discharge Physician: Isak Lindsay      Discharge Date: 1/24/2024 10:54 AM    Procedure(s) (LRB):  LUMBAR TRANSFORAMINAL RIGHT L3/4 AND L4/L5 DIRECT REFERRAL (Right)    Final Diagnosis: Lumbar radiculopathy [M54.16]    Disposition: Home or self care    Patient Instructions:   Current Discharge Medication List        CONTINUE these medications which have NOT CHANGED    Details   amLODIPine (NORVASC) 10 MG tablet TAKE 1 TABLET DAILY  Qty: 90 tablet, Refills: 3    Associated Diagnoses: Essential hypertension, benign      atorvastatin (LIPITOR) 20 MG tablet TAKE 1 TABLET DAILY  Qty: 90 tablet, Refills: 3    Associated Diagnoses: Mixed hyperlipidemia      butalbital-acetaminophen-caffeine -40 mg (FIORICET, ESGIC) -40 mg per tablet Take 1 tablet by mouth every 4 (four) hours as needed.      cyclobenzaprine (FLEXERIL) 5 MG tablet Take 1 tablet (5 mg total) by mouth nightly.  Qty: 30 tablet, Refills: 1    Associated Diagnoses: Myalgia      estradiol (ESTRACE) 2 MG tablet Take 1 tablet (2 mg total) by mouth once daily.  Qty: 90 tablet, Refills: 1      fish oil-omega-3 fatty acids 300-1,000 mg capsule Take 1 g by mouth once daily.      hydroCHLOROthiazide (HYDRODIURIL) 25 MG tablet Take 1 tablet (25 mg total) by mouth once daily.  Qty: 90 tablet, Refills: 0    Comments: .  Associated Diagnoses: Essential hypertension, benign      melatonin (MELATIN) Take by mouth every evening.      meloxicam (MOBIC) 15 MG tablet Take 1 tablet (15 mg total) by mouth once daily.  Qty: 90 tablet, Refills: 1    Associated Diagnoses: DDD (degenerative disc disease), lumbar      ofloxacin (FLOXIN) 0.3 % otic solution       omeprazole (PRILOSEC) 40 MG capsule TAKE 1 CAPSULE TWICE A DAY BEFORE MEALS  Qty: 60 capsule, Refills: 1    Associated Diagnoses: Gastroesophageal reflux disease      pregabalin (LYRICA) 75 MG capsule Take 1  capsule (75 mg total) by mouth 2 (two) times daily.  Qty: 60 capsule, Refills: 6      sertraline (ZOLOFT) 50 MG tablet Take 1 tablet by mouth once daily  Qty: 90 tablet, Refills: 3    Associated Diagnoses: MARIBEL (generalized anxiety disorder); Moderate episode of recurrent major depressive disorder      topiramate (TOPAMAX) 25 MG tablet Take 1 tablet (25 mg total) by mouth 2 (two) times daily.  Qty: 60 tablet, Refills: 1      traZODone (DESYREL) 50 MG tablet Take 1-2 tablets ( mg total) by mouth nightly as needed for Insomnia.  Qty: 60 tablet, Refills: 5    Associated Diagnoses: Psychophysiologic insomnia      TURMERIC ORAL Take 1 tablet by mouth once daily.                 Discharge Diagnosis: Lumbar radiculopathy [M54.16]  Condition on Discharge: Stable with no complications to procedure   Diet on Discharge: Same as before.  Activity: as per instruction sheet.  Discharge to: Home with a responsible adult.  Follow up: 2-4 weeks       Please call my office or pager at [phone] if experienced any weakness or loss of sensation, fever > 101.5, pain uncontrolled with oral medications, persistent nausea/vomiting/or diarrhea, redness or drainage from the incisions, or any other worrisome concerns. If physician on call was not reached or could not communicate with our office for any reason please go to the nearest emergency department

## 2024-01-24 NOTE — OP NOTE
Lumbar Transforaminal Epidural Steroid Injection under Fluoroscopic Guidance    The procedure, risks, benefits, and options were discussed with the patient. There are no contraindications to the procedure. The patent expressed understanding and agreed to the procedure. Informed written consent was obtained prior to the start of the procedure and can be found in the patient's chart.    PATIENT NAME: Lashanda Monaco   MRN: [id number]     DATE OF PROCEDURE: 01/24/2024    PROCEDURE:  Right  L3/4 and L4/5 Lumbar Transforaminal Epidural Steroid Injection under Fluoroscopic Guidance    Patient has 4 lumbar vertebrae. After correlating with the MRI, determined the appropriate levels are L3/4 and L4/5.    PRE-OP DIAGNOSIS: Lumbar radiculopathy [M54.16] Lumbar radiculopathy [M54.16]    POST-OP DIAGNOSIS: Same    PHYSICIAN: Oswald Hoskins MD    ASSISTANTS: Isak Lindsay MD     MEDICATIONS INJECTED: Preservative-free Decadron 10mg with 5cc of Lidocaine 1% MPF     LOCAL ANESTHETIC INJECTED: Xylocaine 2%     SEDATION: Versed 2mg and Fentanyl 50mcg                                                                                                                                                                                     Conscious sedation ordered by M.D. Patient re-evaluation prior to administration of conscious sedation. No changes noted in patient's status from initial evaluation. The patient's vital signs were monitored by RN and patient remained hemodynamically stable throughout the procedure.    Event Time In   Sedation Start 1041   Sedation End 1053       ESTIMATED BLOOD LOSS: None    COMPLICATIONS: None    TECHNIQUE: Time-out was performed to identify the patient and procedure to be performed. With the patient laying in a prone position, the surgical area was prepped and draped in the usual sterile fashion using ChloraPrep and a fenestrated drape.The levels were determined under fluoroscopy guidance. Skin  anesthesia was achieved by injecting Lidocaine 2% over the injection sites. The transforaminal spaces were then approached with a 22 gauge, 5 inch spinal quinke needle that was introduced under fluoroscopic guidance in the AP and Lateral views. Once the needle tip was in the area of the transforaminal space, and there was no blood, CSF or paraesthesias, contrast dye Omnipaque (300mg/mL) was injected to confirm placement and there was no vascular runoff. Fluoroscopic imaging in the AP and lateral views revealed a clear outline of the spinal nerve with proximal spread of agent through the neural foramen into the epidural space. 3 mL of the medication mixture listed above was injected slowly at each site. Displacement of the radio opaque contrast after injection of the medication confirmed that the medication went into the area of the transforaminal spaces. The needles were removed and bleeding was nil. A sterile dressing was applied. No specimens collected. The patient tolerated the procedure well.     The patient was monitored after the procedure in the recovery area. They were given post-procedure and discharge instructions to follow at home. The patient was discharged in a stable condition.    Isak Lindsay MD    I reviewed and edited the fellow's note. I conducted my own interview and physical examination. I agree with the findings. I was present and supervising all critical portions of the procedure.    Oswald Hoskins MD

## 2024-01-24 NOTE — DISCHARGE INSTRUCTIONS

## 2024-01-29 DIAGNOSIS — I10 ESSENTIAL HYPERTENSION, BENIGN: ICD-10-CM

## 2024-01-29 NOTE — TELEPHONE ENCOUNTER
Care Due:                  Date            Visit Type   Department     Provider  --------------------------------------------------------------------------------                                MYCHART                              FOLLOWUP/OF  [facility]  Last Visit: 08-      FICE VISIT   PRIMARY CARE   Wayne Lundberg                               -                              PRIMARY      [facility]  Next Visit: 03-      CARE ([facility])   PRIMARY CARE   Wayne Lundberg                                                            Last  Test          Frequency    Reason                     Performed    Due Date  --------------------------------------------------------------------------------    CMP.........  12 months..  atorvastatin,              03- 02-                             hydroCHLOROthiazide......    Lipid Panel.  12 months..  atorvastatin.............  03- 02-    [facility] Embedded Care Due Messages. Reference number: [id number].   1/29/2024 11:21:43 AM CST

## 2024-01-30 RX ORDER — HYDROCHLOROTHIAZIDE 25 MG/1
25 TABLET ORAL
Qty: 90 TABLET | Refills: 3 | Status: SHIPPED | OUTPATIENT
Start: 2024-01-30 | End: 2024-04-10

## 2024-01-30 NOTE — TELEPHONE ENCOUNTER
Refill Routing Note   Medication(s) are not appropriate for processing by [facility] for the following reason(s):        New or recently adjusted medication    [facility] action(s):  Defer     Requires labs : Yes             Appointments  past 12m or future 3m with PCP    Date Provider   Last Visit   8/29/2023 Wayne Lundberg MD   Next Visit   3/1/2024 Wayne Lundberg MD   ED visits in past 90 days: 0        Note composed:7:59 PM 01/29/2024

## 2024-02-19 ENCOUNTER — PATIENT MESSAGE (OUTPATIENT)
Dept: PRIMARY CARE CLINIC | Facility: CLINIC | Age: 69
End: 2024-02-19
Payer: MEDICARE

## 2024-03-01 ENCOUNTER — OFFICE VISIT (OUTPATIENT)
Dept: PRIMARY CARE CLINIC | Facility: CLINIC | Age: 69
End: 2024-03-01
Payer: MEDICARE

## 2024-03-01 VITALS
RESPIRATION RATE: 18 BRPM | WEIGHT: 219.94 LBS | TEMPERATURE: 98 F | BODY MASS INDEX: 37.55 KG/M2 | HEART RATE: 82 BPM | HEIGHT: 64 IN | SYSTOLIC BLOOD PRESSURE: 136 MMHG | DIASTOLIC BLOOD PRESSURE: 80 MMHG | OXYGEN SATURATION: 97 %

## 2024-03-01 DIAGNOSIS — E78.2 MIXED HYPERLIPIDEMIA: ICD-10-CM

## 2024-03-01 DIAGNOSIS — Z63.6 CAREGIVER STRESS: ICD-10-CM

## 2024-03-01 DIAGNOSIS — Z12.31 ENCOUNTER FOR SCREENING MAMMOGRAM FOR BREAST CANCER: ICD-10-CM

## 2024-03-01 DIAGNOSIS — R06.09 EXERTIONAL DYSPNEA: Primary | ICD-10-CM

## 2024-03-01 DIAGNOSIS — I10 ESSENTIAL HYPERTENSION, BENIGN: ICD-10-CM

## 2024-03-01 DIAGNOSIS — E66.01 CLASS 2 SEVERE OBESITY WITH BODY MASS INDEX (BMI) OF 35 TO 39.9 WITH SERIOUS COMORBIDITY: ICD-10-CM

## 2024-03-01 DIAGNOSIS — F41.1 GAD (GENERALIZED ANXIETY DISORDER): ICD-10-CM

## 2024-03-01 DIAGNOSIS — I20.89 OTHER FORMS OF ANGINA PECTORIS: ICD-10-CM

## 2024-03-01 DIAGNOSIS — F33.1 MODERATE EPISODE OF RECURRENT MAJOR DEPRESSIVE DISORDER: ICD-10-CM

## 2024-03-01 LAB
OHS QRS DURATION: 80 MS
OHS QTC CALCULATION: 466 MS

## 2024-03-01 PROCEDURE — 3079F DIAST BP 80-89 MM HG: CPT | Mod: CPTII,S$GLB,, | Performed by: FAMILY MEDICINE

## 2024-03-01 PROCEDURE — 93010 ELECTROCARDIOGRAM REPORT: CPT | Mod: S$GLB,,, | Performed by: INTERNAL MEDICINE

## 2024-03-01 PROCEDURE — 1160F RVW MEDS BY RX/DR IN RCRD: CPT | Mod: CPTII,S$GLB,, | Performed by: FAMILY MEDICINE

## 2024-03-01 PROCEDURE — 1101F PT FALLS ASSESS-DOCD LE1/YR: CPT | Mod: CPTII,S$GLB,, | Performed by: FAMILY MEDICINE

## 2024-03-01 PROCEDURE — 93005 ELECTROCARDIOGRAM TRACING: CPT | Mod: S$GLB,,, | Performed by: FAMILY MEDICINE

## 2024-03-01 PROCEDURE — 1125F AMNT PAIN NOTED PAIN PRSNT: CPT | Mod: CPTII,S$GLB,, | Performed by: FAMILY MEDICINE

## 2024-03-01 PROCEDURE — 1159F MED LIST DOCD IN RCRD: CPT | Mod: CPTII,S$GLB,, | Performed by: FAMILY MEDICINE

## 2024-03-01 PROCEDURE — 99999 PR PBB SHADOW E&M-EST. PATIENT-LVL V: CPT | Mod: PBBFAC,,, | Performed by: FAMILY MEDICINE

## 2024-03-01 PROCEDURE — 99214 OFFICE O/P EST MOD 30 MIN: CPT | Mod: S$GLB,,, | Performed by: FAMILY MEDICINE

## 2024-03-01 PROCEDURE — 3008F BODY MASS INDEX DOCD: CPT | Mod: CPTII,S$GLB,, | Performed by: FAMILY MEDICINE

## 2024-03-01 PROCEDURE — 3288F FALL RISK ASSESSMENT DOCD: CPT | Mod: CPTII,S$GLB,, | Performed by: FAMILY MEDICINE

## 2024-03-01 PROCEDURE — 3075F SYST BP GE 130 - 139MM HG: CPT | Mod: CPTII,S$GLB,, | Performed by: FAMILY MEDICINE

## 2024-03-01 RX ORDER — SERTRALINE HYDROCHLORIDE 100 MG/1
100 TABLET, FILM COATED ORAL DAILY
Qty: 90 TABLET | Refills: 3 | Status: SHIPPED | OUTPATIENT
Start: 2024-03-01

## 2024-03-01 RX ORDER — ALPRAZOLAM 0.5 MG/1
0.5 TABLET ORAL DAILY PRN
Qty: 20 TABLET | Refills: 0 | Status: SHIPPED | OUTPATIENT
Start: 2024-03-01 | End: 2024-04-30 | Stop reason: SDUPTHER

## 2024-03-01 SDOH — SOCIAL DETERMINANTS OF HEALTH (SDOH): DEPENDENT RELATIVE NEEDING CARE AT HOME: Z63.6

## 2024-03-01 NOTE — PROGRESS NOTES
"Subjective:       Patient ID: Lashanda Monaco is a 68 y.o. female.    Chief Complaint: Follow-up (6 month follow up/reg check up)    Ongoing issues with her back and knees, but for the past month or 2 she has been having increasing exertional dyspnea, has to stop and catch her breath after walking relatively short distances up to quarter of a mi.  Denies associated chest pain or palpitations.  Worried because she has a strong family history of heart disease.  Also has been having worsening anxiety and stress, largely centered around caring for her  who is ill with worsening dementia.  Has tried doubling up on Zoloft few times, and is requesting a prescription for Xanax to use as needed    Follow-up  Associated symptoms include arthralgias. Pertinent negatives include no chest pain or coughing.     Review of Systems   Respiratory:  Positive for shortness of breath. Negative for cough and wheezing.    Cardiovascular:  Negative for chest pain and palpitations.   Musculoskeletal:  Positive for arthralgias and back pain.   Psychiatric/Behavioral:  Positive for dysphoric mood. The patient is nervous/anxious.        Objective:      Vitals:    03/01/24 1015   BP: 136/80   BP Location: Right arm   Patient Position: Sitting   BP Method: Medium (Manual)   Pulse: 82   Resp: 18   Temp: 97.5 °F (36.4 °C)   TempSrc: Temporal   SpO2: 97%   Weight: 99.7 kg (219 lb 14.5 oz)   Height: 5' 4" (1.626 m)     BP Readings from Last 5 Encounters:   03/01/24 136/80   01/24/24 130/82   11/03/23 (!) 154/92   09/27/23 (!) 148/79   08/29/23 138/82     Wt Readings from Last 5 Encounters:   03/01/24 99.7 kg (219 lb 14.5 oz)   01/24/24 95.3 kg (210 lb)   12/12/23 97 kg (213 lb 12.8 oz)   12/05/23 96.6 kg (212 lb 13.7 oz)   11/03/23 97.5 kg (215 lb)     Physical Exam  Vitals and nursing note reviewed.   Constitutional:       General: She is not in acute distress.     Appearance: Normal appearance. She is well-developed.   HENT:      Head: " Normocephalic and atraumatic.   Cardiovascular:      Rate and Rhythm: Normal rate and regular rhythm.      Heart sounds: Normal heart sounds.   Pulmonary:      Effort: Pulmonary effort is normal.      Breath sounds: Normal breath sounds.   Musculoskeletal:      Right lower leg: No edema.      Left lower leg: No edema.   Skin:     General: Skin is warm and dry.   Neurological:      Mental Status: She is alert and oriented to person, place, and time.   Psychiatric:         Mood and Affect: Mood normal.         Behavior: Behavior normal.         Lab Results   Component Value Date    WBC 7.28 03/01/2023    HGB 13.7 03/01/2023    HCT 42.9 03/01/2023     03/01/2023    CHOL 199 03/01/2023    TRIG 130 03/01/2023    HDL 76 (H) 03/01/2023    ALT 14 03/01/2023    AST 15 03/01/2023     03/01/2023    K 4.1 03/01/2023     03/01/2023    CREATININE 0.8 03/01/2023    BUN 22 03/01/2023    CO2 24 03/01/2023    TSH 1.293 03/01/2023    HGBA1C 5.5 03/01/2023      Assessment:       1. Exertional dyspnea    2. Class 2 severe obesity with body mass index (BMI) of 35 to 39.9 with serious comorbidity    3. Moderate episode of recurrent major depressive disorder    4. Encounter for screening mammogram for breast cancer    5. Caregiver stress    6. Essential hypertension, benign    7. Mixed hyperlipidemia    8. MARIBEL (generalized anxiety disorder)    9. Other forms of angina pectoris        Plan:       Exertional dyspnea  -     EKG 12-lead  -     CBC Auto Differential; Future; Expected date: 03/01/2024  -     X-Ray Chest PA And Lateral; Future; Expected date: 03/01/2024  -     NM Myocardial Perfusion Spect Multi Pharmacologic; Future; Expected date: 03/08/2024  -     Stress test; Future; Expected date: 03/08/2024  EKG normal.  Needs stress test for further risk stratification.  Not a treadmill candidate due to significant back and knee problems  Class 2 severe obesity with body mass index (BMI) of 35 to 39.9 with serious  comorbidity  Lifestyle modification  Moderate episode of recurrent major depressive disorder  -     sertraline (ZOLOFT) 100 MG tablet; Take 1 tablet (100 mg total) by mouth once daily.  Dispense: 90 tablet; Refill: 3  Increase Zoloft  Encounter for screening mammogram for breast cancer  -     Mammo Digital Screening Bilat w/ Jhonatan; Future; Expected date: 04/01/2024    Caregiver stress  -     ALPRAZolam (XANAX) 0.5 MG tablet; Take 1 tablet (0.5 mg total) by mouth daily as needed for Anxiety.  Dispense: 20 tablet; Refill: 0  Advised to use Xanax as sparingly as possible  Essential hypertension, benign  -     CBC Auto Differential; Future; Expected date: 03/01/2024  -     Comprehensive Metabolic Panel; Future; Expected date: 03/01/2024    Mixed hyperlipidemia  -     Comprehensive Metabolic Panel; Future; Expected date: 03/01/2024  -     Lipid Panel; Future; Expected date: 03/01/2024    MARIBEL (generalized anxiety disorder)  -     sertraline (ZOLOFT) 100 MG tablet; Take 1 tablet (100 mg total) by mouth once daily.  Dispense: 90 tablet; Refill: 3  Increase Zoloft, reassess in 6 weeks  Other forms of angina pectoris  -     NM Myocardial Perfusion Spect Multi Pharmacologic; Future; Expected date: 03/08/2024  -     Stress test; Future; Expected date: 03/08/2024      Medication List with Changes/Refills   New Medications    ALPRAZOLAM (XANAX) 0.5 MG TABLET    Take 1 tablet (0.5 mg total) by mouth daily as needed for Anxiety.   Current Medications    AMLODIPINE (NORVASC) 10 MG TABLET    TAKE 1 TABLET DAILY    ATORVASTATIN (LIPITOR) 20 MG TABLET    TAKE 1 TABLET DAILY    BUTALBITAL-ACETAMINOPHEN-CAFFEINE -40 MG (FIORICET, ESGIC) -40 MG PER TABLET    Take 1 tablet by mouth every 4 (four) hours as needed.    CYCLOBENZAPRINE (FLEXERIL) 5 MG TABLET    Take 1 tablet (5 mg total) by mouth nightly.    ESTRADIOL (ESTRACE) 2 MG TABLET    Take 1 tablet (2 mg total) by mouth once daily.    FISH OIL-OMEGA-3 FATTY ACIDS 300-1,000  MG CAPSULE    Take 1 g by mouth once daily.    HYDROCHLOROTHIAZIDE (HYDRODIURIL) 25 MG TABLET    Take 1 tablet by mouth once daily    MELOXICAM (MOBIC) 15 MG TABLET    Take 1 tablet (15 mg total) by mouth once daily.    OMEPRAZOLE (PRILOSEC) 40 MG CAPSULE    TAKE 1 CAPSULE TWICE A DAY BEFORE MEALS    PREGABALIN (LYRICA) 75 MG CAPSULE    Take 1 capsule (75 mg total) by mouth 2 (two) times daily.    TOPIRAMATE (TOPAMAX) 25 MG TABLET    Take 1 tablet (25 mg total) by mouth 2 (two) times daily.    TRAZODONE (DESYREL) 50 MG TABLET    Take 1-2 tablets ( mg total) by mouth nightly as needed for Insomnia.    TURMERIC ORAL    Take 1 tablet by mouth once daily.   Changed and/or Refilled Medications    Modified Medication Previous Medication    SERTRALINE (ZOLOFT) 100 MG TABLET sertraline (ZOLOFT) 50 MG tablet       Take 1 tablet (100 mg total) by mouth once daily.    Take 1 tablet by mouth once daily   Discontinued Medications    MELATONIN (MELATIN)    Take by mouth every evening.    OFLOXACIN (FLOXIN) 0.3 % OTIC SOLUTION

## 2024-03-05 DIAGNOSIS — R94.4 DECREASED GFR: Primary | ICD-10-CM

## 2024-03-06 ENCOUNTER — TELEPHONE (OUTPATIENT)
Dept: PRIMARY CARE CLINIC | Facility: CLINIC | Age: 69
End: 2024-03-06
Payer: MEDICARE

## 2024-03-06 DIAGNOSIS — R93.89 ABNORMAL CXR: ICD-10-CM

## 2024-03-06 DIAGNOSIS — R91.1 SOLITARY PULMONARY NODULE: Primary | ICD-10-CM

## 2024-03-06 DIAGNOSIS — R06.09 EXERTIONAL DYSPNEA: ICD-10-CM

## 2024-03-06 NOTE — TELEPHONE ENCOUNTER
----- Message from Deanne Lynn sent at 3/6/2024 12:39 PM CST -----  Contact: Mobile  [phone]  Patient said that she had two abnormal test and she would like to speak with you about this please.

## 2024-03-06 NOTE — TELEPHONE ENCOUNTER
No acutely worrisome findings, but has questionable right paratracheal opacity likely due to body positioning, but recommend chest CT for definitive evaluation to rule out any type of lymph node enlargement.  Please schedule.

## 2024-03-12 ENCOUNTER — OFFICE VISIT (OUTPATIENT)
Dept: ORTHOPEDICS | Facility: CLINIC | Age: 69
End: 2024-03-12
Payer: MEDICARE

## 2024-03-12 VITALS — WEIGHT: 198 LBS | BODY MASS INDEX: 33.8 KG/M2 | HEIGHT: 64 IN

## 2024-03-12 DIAGNOSIS — M17.11 PRIMARY OSTEOARTHRITIS OF RIGHT KNEE: Primary | ICD-10-CM

## 2024-03-12 PROCEDURE — 1159F MED LIST DOCD IN RCRD: CPT | Mod: CPTII,S$GLB,, | Performed by: ORTHOPAEDIC SURGERY

## 2024-03-12 PROCEDURE — 1101F PT FALLS ASSESS-DOCD LE1/YR: CPT | Mod: CPTII,S$GLB,, | Performed by: ORTHOPAEDIC SURGERY

## 2024-03-12 PROCEDURE — 3288F FALL RISK ASSESSMENT DOCD: CPT | Mod: CPTII,S$GLB,, | Performed by: ORTHOPAEDIC SURGERY

## 2024-03-12 PROCEDURE — 20610 DRAIN/INJ JOINT/BURSA W/O US: CPT | Mod: RT,S$GLB,, | Performed by: ORTHOPAEDIC SURGERY

## 2024-03-12 PROCEDURE — 3008F BODY MASS INDEX DOCD: CPT | Mod: CPTII,S$GLB,, | Performed by: ORTHOPAEDIC SURGERY

## 2024-03-12 PROCEDURE — 99213 OFFICE O/P EST LOW 20 MIN: CPT | Mod: 25,S$GLB,, | Performed by: ORTHOPAEDIC SURGERY

## 2024-03-12 PROCEDURE — 1125F AMNT PAIN NOTED PAIN PRSNT: CPT | Mod: CPTII,S$GLB,, | Performed by: ORTHOPAEDIC SURGERY

## 2024-03-12 PROCEDURE — 99999 PR PBB SHADOW E&M-EST. PATIENT-LVL III: CPT | Mod: PBBFAC,,, | Performed by: ORTHOPAEDIC SURGERY

## 2024-03-12 RX ORDER — TRIAMCINOLONE ACETONIDE 40 MG/ML
40 INJECTION, SUSPENSION INTRA-ARTICULAR; INTRAMUSCULAR
Status: DISCONTINUED | OUTPATIENT
Start: 2024-03-12 | End: 2024-03-12 | Stop reason: HOSPADM

## 2024-03-12 RX ADMIN — TRIAMCINOLONE ACETONIDE 40 MG: 40 INJECTION, SUSPENSION INTRA-ARTICULAR; INTRAMUSCULAR at 10:03

## 2024-03-12 NOTE — PROGRESS NOTES
"Subjective:     HPI:   Lashanda Monaco is a 68 y.o. female who presents for repeat eval R knee    Foot drop  Fam hx foot drop    R ?femoral nerve palsy after ant AURELIANO Junius 2020  L post AURELIANO AURELIANO 2010 Geremias  Baseline CRP/ESR normal    Has seen back/spine and pain clinic - lyrica started, MRI L spine  MRI severe stenosis  S/p DOMINIC pain clinic - no change    History of Present Illness         Objective:   Body mass index is 33.99 kg/m².  Exam:    Physical Exam      unchanged    Imaging:    Results      None today    MRI L spine 1/3/24  Impression:     Scoliosis, spondylosis and facet arthritis throughout the lumbar spine contributing to multilevel central canal and foraminal stenosis most severe centrally at L3-4. and involving the right neural foramen at L5-S1.        Electronically signed by: Oscar Mathis Jr  Date:                                            01/03/2024  Time:                                           13:15      Assessment/Plan:     No diagnosis found.     All to norco  Dexa t +2.5  cLBP, hx DOMINIC/RFA     R ?femoral nerve palsy after ant AURELIANO Junius 2020  L post AURELIANO AURELIANO 2010 [place]     S/p R knee ATS 10+ years ago with Dr Archuleta no complications     Of note she says her mom had a foot drop after a total hip replacement in her brother had a foot drop after a total knee replacement         Recent PCP:   Worsening PARDO, CXR abnormal, CT chest and nuc stress pending  Renal disease - repeat labs pending      Assessment/Plan:     Assessment & Plan      PCP 3/1/24: PARDO eval: "for the past month or 2 she has been having increasing exertional dyspnea, has to stop and catch her breath after walking relatively short distances up to quarter of a mi. Denies associated chest pain or palpitations. "  nuc stress test ordered    Rpt labs: CKD on latest labs, repeat pending    EMG/NCS rescheduled to tomorrow    Ref to rheum for polyarthralgia eval, ?CMT    Ref to genetics for eval for possible CMT    R knee " CSI today  6 week f/u   -review EMG, rheum, genetics, PCP/PARDO/renal workup      No orders of the defined types were placed in this encounter.      This note was generated with the assistance of ambient listening technology. Verbal consent was obtained by the patient and accompanying visitor(s) for the recording of patient appointment to facilitate this note. I attest to having reviewed and edited the generated note for accuracy, though some syntax or spelling errors may persist. Please contact the author of this note for any clarification.            Past Medical History:   Diagnosis Date    Arthritis     Hypercholesterolemia     Hypertension        Past Surgical History:   Procedure Laterality Date    BREAST BIOPSY Right     2010 & 2021    CARPAL TUNNEL RELEASE Left     hip repl Right 08/2019    HYSTERECTOMY      PARTIAL HIP ARTHROPLASTY Left     RHINOPLASTY      ROTATOR CUFF REPAIR Right     SHOULDER ARTHROSCOPY Right 05/2022    right shoulder sx with bicep repair    TONSILLECTOMY, ADENOIDECTOMY      TOTAL ABDOMINAL HYSTERECTOMY W/ BILATERAL SALPINGOOPHORECTOMY      TRANSFORAMINAL EPIDURAL INJECTION OF STEROID Right 1/24/2024    Procedure: LUMBAR TRANSFORAMINAL RIGHT L3/4 AND L4/L5 DIRECT REFERRAL;  Surgeon: Oswald Hoskins MD;  Location: [facility];  Service: Pain Management;  Laterality: Right;  [phone]       Family History   Problem Relation Age of Onset    Heart disease Sister     Lymphoma Brother     Heart disease Brother        Social History     Socioeconomic History    Marital status:    Tobacco Use    Smoking status: Never    Smokeless tobacco: Never   Substance and Sexual Activity    Alcohol use: Yes     Comment: Occasionally     Drug use: No    Sexual activity: Yes     Partners: Male

## 2024-03-12 NOTE — PROCEDURES
Large Joint Aspiration/Injection: R knee    Date/Time: 3/12/2024 10:20 AM    Performed by: Jose David Chowdhury III, MD  Authorized by: Jose David Chowdhury III, MD    Consent Done?:  Yes (Verbal)  Indications:  Pain  Timeout: prior to procedure the correct patient, procedure, and site was verified    Prep: patient was prepped and draped in usual sterile fashion      Local anesthesia used?: Yes    Local anesthetic:  Lidocaine 1% without epinephrine  Anesthetic total (ml):  5      Details:  Needle Size:  21 G  Location:  Knee  Site:  R knee  Medications:  40 mg triamcinolone acetonide 40 mg/mL  Patient tolerance:  Patient tolerated the procedure well with no immediate complications

## 2024-03-13 ENCOUNTER — PROCEDURE VISIT (OUTPATIENT)
Dept: NEUROLOGY | Facility: CLINIC | Age: 69
End: 2024-03-13
Payer: MEDICARE

## 2024-03-13 ENCOUNTER — PATIENT MESSAGE (OUTPATIENT)
Dept: ORTHOPEDICS | Facility: CLINIC | Age: 69
End: 2024-03-13
Payer: MEDICARE

## 2024-03-13 DIAGNOSIS — G57.11 MERALGIA PARESTHETICA OF RIGHT SIDE: ICD-10-CM

## 2024-03-13 DIAGNOSIS — M17.11 PRIMARY OSTEOARTHRITIS OF RIGHT KNEE: ICD-10-CM

## 2024-03-13 PROCEDURE — 95909 NRV CNDJ TST 5-6 STUDIES: CPT | Mod: S$GLB,,, | Performed by: PSYCHIATRY & NEUROLOGY

## 2024-03-13 PROCEDURE — 99203 OFFICE O/P NEW LOW 30 MIN: CPT | Mod: 25,S$GLB,, | Performed by: PSYCHIATRY & NEUROLOGY

## 2024-03-13 PROCEDURE — 95886 MUSC TEST DONE W/N TEST COMP: CPT | Mod: S$GLB,,, | Performed by: PSYCHIATRY & NEUROLOGY

## 2024-03-13 NOTE — PROCEDURES
EMG W/ ULTRASOUND AND NERVE CONDUCTION TEST 1 Extremity    Date/Time: 3/13/2024 3:00 PM    Performed by: Go Wick MD  Authorized by: Jose David Chowdhury III, MD                                                                 [facility] [address]     Subjective:       Patient ID: Lashanda Monaco is a 68 y.o. female here for a EMG focused evaluation for right leg numbness. Previous visits and diagnostic evaluation has been reviewed.  Patient states she had a right hip surgery in 2020 and noticed a right footdrop which improved within a few weeks.  She also began noticing right lateral thigh numbness which has persisted since that time.  She does describe some numbness in her toes more so on the right.  She also describes low back pain which sometimes radiates down bilateral legs.  HPI  Review of patient's allergies indicates:   Allergen Reactions    Kiwi (actinidia chinensis)     Hydrocodone bitartrate Nausea Only      There were no vitals filed for this visit.   Chief Complaint: No chief complaint on file.    Past Medical History:   Diagnosis Date    Arthritis     Hypercholesterolemia     Hypertension       Social History     Socioeconomic History    Marital status:    Tobacco Use    Smoking status: Never    Smokeless tobacco: Never   Substance and Sexual Activity    Alcohol use: Yes     Comment: Occasionally     Drug use: No    Sexual activity: Yes     Partners: Male      Review of Systems:   No Fever  No SOB  No vomiting  No visual disturbance      Objective:      Physical Exam    Constitutional: Patient appears well-nourished.   Head: Normocephalic and atraumatic.   Mouth/Throat: Oropharynx is clear and moist.   Pulmonary/Chest: Effort normal.   Abdominal: Soft.   Skin: Skin is warm and dry.      General:  Patient is alert and cooperative.  Affect:  Patient is appropriate to surroundings and environment.  Language:  Speech is fluent.  HEENT:  There are no outward signs of trauma to head or  face.  Cranial Nerves:  Pupils are equal round and reactive to light. Extra-ocular movements are intact. Face, tongue, and palate are  symmetrical.  Motor:  Patient exhibits normal strength testing in bilateral proximal and distal lower extremities.  Reflexes:  Symmetrical in bilateral lower extremities.  Gait:  Ambulation is independent without use of cane or walker without signs of ataxia or circumduction.  Cerebellar:  Normal finger to nose testing without dysmetria.  Sensory:  Decreased sensation of the right lateral thigh roughly corresponding to the right lateral femoral cutaneous nerve distribution  Musculoskeletal:  There is no severe tenderness to palpation and manipulation of lumbar spine region.   Assessment:       We reviewed and discussed at length results of EMG of the right lower extremity performed today without significant evidence of major nerve injury or lumbar radiculopathy.  There is evidence suggesting an early polyneuropathy of unclear etiology.  Patient does have a family history of diabetes and was counseled to follow-up with her primary care.  Clinically the patient does exhibit signs and symptoms consistent with a right meralgia paresthetica (right lateral femoral cutaneous mononeuropathy). These findings are available via media section of chart review.   1. Primary osteoarthritis of right knee    2. Meralgia paresthetica of right side              Plan:       We discussed treatment options at length. Recommend patient keep appointment with referring provider.         I spent a total of 35 minutes on the day of the visit. This includes face to face time and non-face to face time preparing to see the patient (eg, review of tests), obtaining and/or reviewing separately obtained history, documenting clinical information in the electronic or other health record, independently interpreting results and communicating results to the patient/family/caregiver, or care coordinator.    Go Wick  MD, TIMON   03/13/2024   3:43 PM       A dictation device was used to produce this document. Use of such devices sometimes results in grammatical errors or replacement of words that sound similarly.

## 2024-03-14 ENCOUNTER — OFFICE VISIT (OUTPATIENT)
Dept: RHEUMATOLOGY | Facility: CLINIC | Age: 69
End: 2024-03-14
Payer: MEDICARE

## 2024-03-14 ENCOUNTER — HOSPITAL ENCOUNTER (OUTPATIENT)
Dept: RADIOLOGY | Facility: HOSPITAL | Age: 69
Discharge: HOME | End: 2024-03-14
Attending: INTERNAL MEDICINE
Payer: MEDICARE

## 2024-03-14 VITALS
HEART RATE: 97 BPM | BODY MASS INDEX: 33.8 KG/M2 | DIASTOLIC BLOOD PRESSURE: 98 MMHG | WEIGHT: 198 LBS | SYSTOLIC BLOOD PRESSURE: 157 MMHG | HEIGHT: 64 IN

## 2024-03-14 DIAGNOSIS — M17.11 PRIMARY OSTEOARTHRITIS OF RIGHT KNEE: Primary | ICD-10-CM

## 2024-03-14 DIAGNOSIS — M17.11 PRIMARY OSTEOARTHRITIS OF RIGHT KNEE: ICD-10-CM

## 2024-03-14 DIAGNOSIS — M06.9 RHEUMATOID ARTHRITIS FLARE: ICD-10-CM

## 2024-03-14 PROCEDURE — 96372 THER/PROPH/DIAG INJ SC/IM: CPT | Mod: S$GLB,,, | Performed by: INTERNAL MEDICINE

## 2024-03-14 PROCEDURE — 99204 OFFICE O/P NEW MOD 45 MIN: CPT | Mod: 25,S$GLB,, | Performed by: INTERNAL MEDICINE

## 2024-03-14 PROCEDURE — 77077 JOINT SURVEY SINGLE VIEW: CPT | Mod: TC

## 2024-03-14 PROCEDURE — 1160F RVW MEDS BY RX/DR IN RCRD: CPT | Mod: CPTII,S$GLB,, | Performed by: INTERNAL MEDICINE

## 2024-03-14 PROCEDURE — 1159F MED LIST DOCD IN RCRD: CPT | Mod: CPTII,S$GLB,, | Performed by: INTERNAL MEDICINE

## 2024-03-14 PROCEDURE — 1125F AMNT PAIN NOTED PAIN PRSNT: CPT | Mod: CPTII,S$GLB,, | Performed by: INTERNAL MEDICINE

## 2024-03-14 PROCEDURE — 3080F DIAST BP >= 90 MM HG: CPT | Mod: CPTII,S$GLB,, | Performed by: INTERNAL MEDICINE

## 2024-03-14 PROCEDURE — 77077 JOINT SURVEY SINGLE VIEW: CPT | Mod: 26,,, | Performed by: RADIOLOGY

## 2024-03-14 PROCEDURE — 99999 PR PBB SHADOW E&M-EST. PATIENT-LVL IV: CPT | Mod: PBBFAC,,, | Performed by: INTERNAL MEDICINE

## 2024-03-14 PROCEDURE — 3077F SYST BP >= 140 MM HG: CPT | Mod: CPTII,S$GLB,, | Performed by: INTERNAL MEDICINE

## 2024-03-14 PROCEDURE — 3008F BODY MASS INDEX DOCD: CPT | Mod: CPTII,S$GLB,, | Performed by: INTERNAL MEDICINE

## 2024-03-14 RX ORDER — PREDNISONE 10 MG/1
TABLET ORAL
Qty: 120 TABLET | Refills: 0 | Status: SHIPPED | OUTPATIENT
Start: 2024-03-14

## 2024-03-14 RX ORDER — TRIAMCINOLONE ACETONIDE 40 MG/ML
80 INJECTION, SUSPENSION INTRA-ARTICULAR; INTRAMUSCULAR ONCE
Qty: 2 ML | Refills: 0 | Status: SHIPPED | OUTPATIENT
Start: 2024-03-14 | End: 2024-03-14

## 2024-03-14 RX ORDER — TRIAMCINOLONE ACETONIDE 40 MG/ML
80 INJECTION, SUSPENSION INTRA-ARTICULAR; INTRAMUSCULAR
Status: COMPLETED | OUTPATIENT
Start: 2024-03-14 | End: 2024-03-14

## 2024-03-14 RX ADMIN — TRIAMCINOLONE ACETONIDE 80 MG: 40 INJECTION, SUSPENSION INTRA-ARTICULAR; INTRAMUSCULAR at 03:03

## 2024-03-14 NOTE — PROGRESS NOTES
"Subjective:      Patient ID: Lashanda Monaco is a 68 y.o. female.    Chief Complaint: Disease Management    HPI  68 year old F  with PMH of HL, HTN, basal cell cancer,  bilateral hip replacement, lumbar arthritis,  right rotator cuff tear and bicep tear, left CT release,  here for evaluation. She reports she needs right knee replacement.   She gets pain in neck, shoulders, hands, wrists, right knee, both ankles, lower back, and right foot.  She has been having since she was 64. Pain level  can be as high 7/10.   Reports swelling in ankles and feet for years. Denies any rashes, oral ulcers, fevers, raynauds, or photosensitivity. Reports some hair thinning.   She takes meloxicam 15 mg a day for about  5 years. Denies smoking.     Family hx: sister: RA        Past Medical History:   Diagnosis Date    Arthritis     Hypercholesterolemia     Hypertension        Review of Systems see HPI     Objective:   BP (!) 157/98   Pulse 97   Ht 5' 4" (1.626 m)   Wt 89.8 kg (198 lb)   BMI 33.99 kg/m²   Physical Exam   Constitutional: She is oriented to person, place, and time.   HENT:   Head: Normocephalic and atraumatic.   Right Ear: External ear normal.   Left Ear: External ear normal.   Nose: Nose normal.   Mouth/Throat: Oropharynx is clear and moist. No oropharyngeal exudate.   Eyes: Pupils are equal, round, and reactive to light. Conjunctivae are normal. Right eye exhibits no discharge. Left eye exhibits no discharge. No scleral icterus.   Neck: No JVD present. No thyromegaly present.   Cardiovascular: Normal rate, regular rhythm and normal heart sounds. Exam reveals no gallop and no friction rub.   No murmur heard.  Pulmonary/Chest: Effort normal and breath sounds normal. No respiratory distress. She has no wheezes. She has no rales. She exhibits no tenderness.   Abdominal: Soft. Bowel sounds are normal. She exhibits no distension and no mass. There is no abdominal tenderness. There is no rebound and no guarding. "   Musculoskeletal:         General: Swelling and tenderness present.      Cervical back: Neck supple.   Lymphadenopathy:     She has no cervical adenopathy.   Neurological: She is alert and oriented to person, place, and time. No cranial nerve deficit. Gait normal. Coordination normal.   Skin: Skin is dry. No rash noted. No erythema. No pallor.   Psychiatric: Affect and judgment normal.   TENDERNESS IN MCPS, PIPS, WRISTS, ANKLES , MTPS WITH SWELLING    No data to display     Assessment:   68 year old F  with PMH of HL, HTN, basal cell cancer,  bilateral hip replacement, lumbar arthritis,  right rotator cuff tear and bicep tear, left CT release,  here for evaluation of joint pain.  She is coming in with bilateral symmetrical arthritis consistent  RHEUMATOID ARTHRITIS. PATIENT HAS EROSIVE CHANGES IN WRISTS CONSISTENT WITH SERONEGATIVE RA.  GIVEN ABNORMAL CT CHEST, WILL AVOID MTX AS THIS CAN MAKE NODULES WORSE AND CAN AFFECT THE LUNG.  She has upcoming  CT chest to evaluate abnormal chest xray.  Will need to follow it up to determine if MTX would be options for her.          1. Primary osteoarthritis of right knee          Plan:     Problem List Items Addressed This Visit    None  Visit Diagnoses       Primary osteoarthritis of right knee        Relevant Orders    Rheumatoid Factor    Cyclic Citrullinated Peptide Antibody, IgG    Sedimentation rate    C-Reactive Protein    Hepatitis B Core Antibody, Total    Hepatitis B Surface Antigen    Hepatitis C Antibody    XR Arthritis Survey         #Joint pain: suspect she has RA.  Labs   xrays  Stop meloxicam  Start predniSONE (DELTASONE) 10 MG [id number]/14/2024--NoSig: Take 4 pills once a day for 10 days and then 10mg dailySent to pharmacy as: predniSONE (DELTASONE) 10 MG tabletClass:   Labs  Xrays    #obesity: encourage weight loss  #abnormal chest xray: follow up CT chest      60 * minutes of total time spent on the encounter, which includes face to face time and  non-face to face time preparing to see the patient (eg, review of tests), Obtaining and/or reviewing separately obtained history, Documenting clinical information in the electronic or other health record, Independently interpreting results (not separately reported) and communicating results to the patient/family/caregiver, or Care coordination (not separately reported).     ADDENDUM; XRAYS CONSISTENT WITH EROSIVE CHANGES SEEN IN RA.  DIAGNOSIS IS SERONEGATIVE RA GIVEN bilateral symmetrical arthritis.  NO MTX given abnormal chest xray  GIVEN HISTORY OF BASAL CELL SKIN CANCER, WILL AVOID ANTI-TNF.

## 2024-03-15 DIAGNOSIS — I71.21 ANEURYSM OF ASCENDING AORTA WITHOUT RUPTURE: Primary | ICD-10-CM

## 2024-03-15 DIAGNOSIS — I71.23 ANEURYSM OF DESCENDING THORACIC AORTA WITHOUT RUPTURE: ICD-10-CM

## 2024-03-16 ENCOUNTER — PATIENT MESSAGE (OUTPATIENT)
Dept: RHEUMATOLOGY | Facility: CLINIC | Age: 69
End: 2024-03-16
Payer: MEDICARE

## 2024-03-18 ENCOUNTER — PATIENT MESSAGE (OUTPATIENT)
Dept: RHEUMATOLOGY | Facility: CLINIC | Age: 69
End: 2024-03-18
Payer: MEDICARE

## 2024-03-18 ENCOUNTER — TELEPHONE (OUTPATIENT)
Dept: CARDIOTHORACIC SURGERY | Facility: CLINIC | Age: 69
End: 2024-03-18
Payer: MEDICARE

## 2024-03-18 DIAGNOSIS — R91.8 PULMONARY NODULES: Primary | ICD-10-CM

## 2024-03-18 DIAGNOSIS — I71.21 ASCENDING AORTIC ANEURYSM, UNSPECIFIED WHETHER RUPTURED: ICD-10-CM

## 2024-03-18 NOTE — TELEPHONE ENCOUNTER
Spoke with pt regarding apt with CT dept refer by Dr. Nilton Joyner .  Pt scheduled with Dr. Munoz to evaluate Ascending Aorta .   Notified pt that,  if pt need any additional testing besides CT , Dr. Munoz nurse will give her call back to do additional testing.  Dept direct contact no provided to pt to call back if have any question or concern.   Pt confirmed apt  and verbalized understand.

## 2024-03-22 ENCOUNTER — TELEPHONE (OUTPATIENT)
Dept: ORTHOPEDICS | Facility: CLINIC | Age: 69
End: 2024-03-22
Payer: MEDICARE

## 2024-03-22 DIAGNOSIS — M17.11 PRIMARY OSTEOARTHRITIS OF RIGHT KNEE: Primary | ICD-10-CM

## 2024-03-22 NOTE — TELEPHONE ENCOUNTER
No genetics appointment has been made so have placed e-consult for genetics:    NCV/EMG: no sig major nerve/lumbar radic, +early PN of unclear etiology  EMG read as normal  but ankle to pop fossa CV is 38m/s which is upper limit for CMT1    Rheum: labs all negative, Dx OA, started prednisone taper    PARDO w/u - CXR prompted CT chest to rule out suprahilar adenopathy: no masses but + ascending aorta aneurysm -> vasc eval    Renal eval pending, new KD on labs    spine  MRI L spine: Scoliosis, spondylosis and facet arthritis throughout the lumbar spine contributing to multilevel central canal and foraminal stenosis most severe centrally at L3-4. and involving the right neural foramen at L5-S1.     DATE OF PROCEDURE: 01/24/2024  PROCEDURE:  Right  L3/4 and L4/5 Lumbar Transforaminal Epidural Steroid Injection under Fluoroscopic Guidance

## 2024-03-25 DIAGNOSIS — I71.20 THORACIC AORTIC ANEURYSM WITHOUT RUPTURE, UNSPECIFIED PART: Primary | ICD-10-CM

## 2024-03-26 ENCOUNTER — OFFICE VISIT (OUTPATIENT)
Dept: PULMONOLOGY | Facility: CLINIC | Age: 69
End: 2024-03-26
Payer: MEDICARE

## 2024-03-26 ENCOUNTER — PATIENT MESSAGE (OUTPATIENT)
Dept: RHEUMATOLOGY | Facility: CLINIC | Age: 69
End: 2024-03-26
Payer: MEDICARE

## 2024-03-26 VITALS
HEIGHT: 64 IN | WEIGHT: 216.69 LBS | DIASTOLIC BLOOD PRESSURE: 86 MMHG | OXYGEN SATURATION: 97 % | BODY MASS INDEX: 36.99 KG/M2 | HEART RATE: 75 BPM | SYSTOLIC BLOOD PRESSURE: 134 MMHG

## 2024-03-26 DIAGNOSIS — R91.8 PULMONARY NODULES: Primary | ICD-10-CM

## 2024-03-26 PROCEDURE — 3288F FALL RISK ASSESSMENT DOCD: CPT | Mod: CPTII,S$GLB,, | Performed by: STUDENT IN AN ORGANIZED HEALTH CARE EDUCATION/TRAINING PROGRAM

## 2024-03-26 PROCEDURE — 3075F SYST BP GE 130 - 139MM HG: CPT | Mod: CPTII,S$GLB,, | Performed by: STUDENT IN AN ORGANIZED HEALTH CARE EDUCATION/TRAINING PROGRAM

## 2024-03-26 PROCEDURE — 99999 PR PBB SHADOW E&M-EST. PATIENT-LVL III: CPT | Mod: PBBFAC,,, | Performed by: STUDENT IN AN ORGANIZED HEALTH CARE EDUCATION/TRAINING PROGRAM

## 2024-03-26 PROCEDURE — 99203 OFFICE O/P NEW LOW 30 MIN: CPT | Mod: S$GLB,,, | Performed by: STUDENT IN AN ORGANIZED HEALTH CARE EDUCATION/TRAINING PROGRAM

## 2024-03-26 PROCEDURE — 3008F BODY MASS INDEX DOCD: CPT | Mod: CPTII,S$GLB,, | Performed by: STUDENT IN AN ORGANIZED HEALTH CARE EDUCATION/TRAINING PROGRAM

## 2024-03-26 PROCEDURE — 1101F PT FALLS ASSESS-DOCD LE1/YR: CPT | Mod: CPTII,S$GLB,, | Performed by: STUDENT IN AN ORGANIZED HEALTH CARE EDUCATION/TRAINING PROGRAM

## 2024-03-26 PROCEDURE — 3079F DIAST BP 80-89 MM HG: CPT | Mod: CPTII,S$GLB,, | Performed by: STUDENT IN AN ORGANIZED HEALTH CARE EDUCATION/TRAINING PROGRAM

## 2024-03-26 RX ORDER — FLUTICASONE PROPIONATE AND SALMETEROL 250; 50 UG/1; UG/1
1 POWDER RESPIRATORY (INHALATION) 2 TIMES DAILY
Qty: 60 EACH | Refills: 11 | Status: SHIPPED | OUTPATIENT
Start: 2024-03-26 | End: 2025-03-26

## 2024-03-26 RX ORDER — MONTELUKAST SODIUM 5 MG/1
5 TABLET, CHEWABLE ORAL NIGHTLY
Qty: 30 TABLET | Refills: 0 | Status: SHIPPED | OUTPATIENT
Start: 2024-03-26 | End: 2024-04-25

## 2024-03-26 NOTE — PATIENT INSTRUCTIONS
Please get a neti pot and use it every day in the morning. Followed by daily flonase. You can use a daily antihistamine of your choice. Please use clean water when using the neti pot.

## 2024-03-26 NOTE — PROGRESS NOTES
"Subjective:      Patient ID: Lashanda Monaco is a 68 y.o. female.    Chief Complaint: No chief complaint on file.      HPI:  Lashanda Monaco is a 68 y.o. female who presents for surgical evaluation of TAA. Medical conditions include osteoarthritis, bilateral hip replacements and chornic lumbar arthritis, and RA with a recent flare, s/p esophageal dilations. Had an abnormal CXR so rhuematologist Dr Callaway ordered a follow up CT scan which revealed the aneurysm. Genetic evaluation for CMT pending.     Saw pulmonology for her CT scan results of "small bilateral sub centimeter nodules - solid appearing and appears to be in centrilobular and perilymphatic distribution. This could be related to RA, but I think unlikely to be causing her respiratory issues. I dont see any evidence of fibrosis or airway disease.  Plan to treat this and then investigate further if they do not resolve."     Patient reports having an extensive family history of heart disease. She is one of ten and most of her siblings have had some sort of open heart operation. Her mother had an aortic aneurysm. No prior strokes, seizures, blood clots, stents, or sternotomies. No smoking or drinking history. Denies any chest pain. Reports that the past couple of months her breathing has been labored. She has shortness of breath when walking from clinic check in to exam room. Does not use an assistive walking device but has to hold on to the buggy when at the store.     Family and social history reviewed    Current Outpatient Medications   Medication Instructions    ALPRAZolam (XANAX) 0.5 mg, Oral, Daily PRN    amLODIPine (NORVASC) 10 MG tablet TAKE 1 TABLET DAILY    atorvastatin (LIPITOR) 20 MG tablet TAKE 1 TABLET DAILY    butalbital-acetaminophen-caffeine -40 mg (FIORICET, ESGIC) -40 mg per tablet 1 tablet, Oral, Every 4 hours PRN    cyclobenzaprine (FLEXERIL) 5 mg, Oral, Nightly    estradioL (ESTRACE) 2 mg, Oral, Daily    fish oil-omega-3 " fatty acids 300-1,000 mg capsule 1 g, Oral, Daily    fluticasone-salmeterol diskus inhaler 250-50 mcg 1 puff, Inhalation, 2 times daily, Controller    hydroCHLOROthiazide (HYDRODIURIL) 25 mg, Oral    meloxicam (MOBIC) 15 mg, Oral, Daily    montelukast (SINGULAIR) 5 mg, Oral, Nightly    omeprazole (PRILOSEC) 40 MG capsule TAKE 1 CAPSULE TWICE A DAY BEFORE MEALS    predniSONE (DELTASONE) 10 MG tablet Take 4 pills once a day for 10 days and then 10mg daily    pregabalin (LYRICA) 75 mg, Oral, 2 times daily    sertraline (ZOLOFT) 100 mg, Oral, Daily    topiramate (TOPAMAX) 25 mg, Oral, 2 times daily    traZODone (DESYREL)  mg, Oral, Nightly PRN    TURMERIC ORAL 1 tablet, Oral, Daily       Review of patient's allergies indicates:   Allergen Reactions    Kiwi (actinidia chinensis)     Hydrocodone bitartrate Nausea Only     Past Medical History:   Diagnosis Date    Arthritis     Hypercholesterolemia     Hypertension      Past Surgical History:   Procedure Laterality Date    BREAST BIOPSY Right     2010 & 2021    CARPAL TUNNEL RELEASE Left     hip repl Right 08/2019    HYSTERECTOMY      PARTIAL HIP ARTHROPLASTY Left     RHINOPLASTY      ROTATOR CUFF REPAIR Right     SHOULDER ARTHROSCOPY Right 05/2022    right shoulder sx with bicep repair    TONSILLECTOMY, ADENOIDECTOMY      TOTAL ABDOMINAL HYSTERECTOMY W/ BILATERAL SALPINGOOPHORECTOMY      TRANSFORAMINAL EPIDURAL INJECTION OF STEROID Right 1/24/2024    Procedure: LUMBAR TRANSFORAMINAL RIGHT L3/4 AND L4/L5 DIRECT REFERRAL;  Surgeon: Oswald Hoskins MD;  Location: [facility];  Service: Pain Management;  Laterality: Right;  [phone]     Family History       Problem Relation (Age of Onset)    Heart disease Sister, Brother    Lymphoma Brother          Social History     Socioeconomic History    Marital status:    Tobacco Use    Smoking status: Never    Smokeless tobacco: Never   Substance and Sexual Activity    Alcohol use: Yes     Comment: Occasionally      Drug use: No    Sexual activity: Yes     Partners: Male       Current medications Reviewed    Review of Systems   Constitutional:  Negative for activity change.   HENT:  Negative for nosebleeds.    Eyes:  Negative for visual disturbance.   Respiratory:  Positive for cough and shortness of breath.    Cardiovascular:  Negative for chest pain.   Gastrointestinal:  Negative for nausea.   Musculoskeletal:  Negative for gait problem.   Skin:  Negative for color change.   Neurological:  Negative for dizziness and numbness.   Hematological:  Does not bruise/bleed easily.   Psychiatric/Behavioral:  Negative for sleep disturbance.      Objective:   Physical Exam  Constitutional:       General: She is not in acute distress.  HENT:      Head: Normocephalic and atraumatic.   Eyes:      Pupils: Pupils are equal, round, and reactive to light.   Cardiovascular:      Rate and Rhythm: Normal rate.   Pulmonary:      Effort: Pulmonary effort is normal. No respiratory distress.   Musculoskeletal:         General: Normal range of motion.      Cervical back: Normal range of motion.   Skin:     Coloration: Skin is not pale.   Neurological:      General: No focal deficit present.      Mental Status: She is alert.   Psychiatric:         Mood and Affect: Mood normal.         Behavior: Behavior normal.     Diagnostic Results:   CT 3/15/24  1. No mediastinal or pulmonary masses.  2. Fusiform dilatation of the ascending (4.3 cm) and proximal descending (3.8 cm) thoracic aorta.  No aneurysmal dilatation.  3. Mild aortic and coronary artery atherosclerosis.  4. Bilateral solid pulmonary nodules measuring up to 0.5 cm.  In this low risk patient, Fleischner Society guidelines recommend no further follow-up.  5. Additional findings as above      Assessment:   TAA ~4.3cm  Plan:       I have seen the patient and reviewed the physician assistant's note above. I have personally interviewed and examined the patient at bedside and agree with the  findings.     Ms. Monaco is a pleasant 68 y.o. female with asymptomatic ascending aortic aneurysm.  She has no chest pain.    CT chest noncontrast shows 4.4cm ascending aorta.    We had a lengthy discussion with her regarding her aortic aneurysm and the guidelines and we agreed to continue surveillance.  We will obtain a CT chest noncontrast in one year with repeat clinic visit.      Louis Munoz MD  Cardiothoracic Surgery  [facility]

## 2024-03-26 NOTE — PROGRESS NOTES
3/26/2024    Lashanda Monaco  New Patient Consult    Chief Complaint   Patient presents with    Pulmonary Nodules     New Patient       HPI:Ms Monaco is a 68 year old woman who presents for eavluation of pulmonary nodules.     She has a history of osteoarthritis, bilateral hip replacements and chornic lumbar arthritis, and RA with a recent flare. Sent by her Rhuematologist Dr Callaway for evaluation of abnormal CT.     She is on prednisone currently, but waiting for plans to start immunothrapy depedning on what we think about the CT findings.     She reports she has a cold now- but typically at baseline she is unable to walk 100 yards without stopping. She is wheezing- she can hear it. She also has tinnitus. She wheezes mostly at night time and when she is exerting herself.     SHe is coughing stuff up- but she thinks this is a cold.     No weight loss. No hemoptyusis.     She is a never smoker.     She never had asthma as a child.     No seasonal allergies.     She does get swelling in her feet- since starting prednisone this has improved.     Her weight has been stable.     She has had esophageal dilations- she repotrs no issues withs wallowing right now.     She is retired- she worked at [employer]     She reports frequent upper airway cough syndrome symptoms.       The chief complaint problem is New to me    PFSH:  Past Medical History:   Diagnosis Date    Arthritis     Hypercholesterolemia     Hypertension          Past Surgical History:   Procedure Laterality Date    BREAST BIOPSY Right     2010 & 2021    CARPAL TUNNEL RELEASE Left     hip repl Right 08/2019    HYSTERECTOMY      PARTIAL HIP ARTHROPLASTY Left     RHINOPLASTY      ROTATOR CUFF REPAIR Right     SHOULDER ARTHROSCOPY Right 05/2022    right shoulder sx with bicep repair    TONSILLECTOMY, ADENOIDECTOMY      TOTAL ABDOMINAL HYSTERECTOMY W/ BILATERAL SALPINGOOPHORECTOMY      TRANSFORAMINAL EPIDURAL INJECTION OF STEROID Right 1/24/2024     Procedure: LUMBAR TRANSFORAMINAL RIGHT L3/4 AND L4/L5 DIRECT REFERRAL;  Surgeon: Oswald Hoskins MD;  [facility];  Service: Pain Management;  Laterality: Right;  [phone]     Social History     Tobacco Use    Smoking status: Never    Smokeless tobacco: Never   Substance Use Topics    Alcohol use: Yes     Comment: Occasionally     Drug use: No     Family History   Problem Relation Age of Onset    Heart disease Sister     Lymphoma Brother     Heart disease Brother      Review of patient's allergies indicates:   Allergen Reactions    Kiwi (actinidia chinensis)     Hydrocodone bitartrate Nausea Only       Performance Status:The patient's activity level is functions out of house.      Review of Systems:   Review of Systems   Constitutional:  Negative for chills, fever, malaise/fatigue and weight loss.   HENT:  Negative for congestion, sinus pain and sore throat.    Eyes:  Negative for blurred vision and pain.   Respiratory:  Positive for cough, shortness of breath and wheezing. Negative for sputum production.    Cardiovascular:  Negative for chest pain, palpitations, orthopnea, claudication and leg swelling.   Gastrointestinal:  Negative for abdominal pain, constipation, diarrhea, heartburn, melena, nausea and vomiting.   Genitourinary:  Negative for dysuria, frequency, hematuria and urgency.   Skin:  Negative for itching and rash.   Neurological:  Negative for dizziness, seizures, loss of consciousness and headaches.   Endo/Heme/Allergies:  Negative for environmental allergies and polydipsia. Does not bruise/bleed easily.   Psychiatric/Behavioral:  Negative for depression. The patient is not nervous/anxious.         Exam:  Physical Exam  Vitals reviewed.   Constitutional:       General: She is not in acute distress.     Appearance: She is well-developed. She is not diaphoretic.   HENT:      Head: Normocephalic and atraumatic.      Mouth/Throat:      Pharynx: No oropharyngeal exudate or posterior  oropharyngeal erythema.   Eyes:      General: No scleral icterus.     Pupils: Pupils are equal, round, and reactive to light.   Neck:      Vascular: No JVD.   Cardiovascular:      Rate and Rhythm: Normal rate and regular rhythm.      Heart sounds: Normal heart sounds. No murmur heard.  Pulmonary:      Effort: Pulmonary effort is normal. No respiratory distress.      Breath sounds: Normal breath sounds. No wheezing.   Abdominal:      General: Bowel sounds are normal. There is no distension.      Palpations: Abdomen is soft.      Tenderness: There is no abdominal tenderness.   Musculoskeletal:         General: No swelling.      Cervical back: Normal range of motion and neck supple. No rigidity.   Skin:     General: Skin is warm and dry.      Capillary Refill: Capillary refill takes less than 2 seconds.      Coloration: Skin is not pale.      Findings: No rash.   Neurological:      General: No focal deficit present.      Mental Status: She is alert and oriented to person, place, and time.      Cranial Nerves: No cranial nerve deficit.      Motor: No weakness or abnormal muscle tone.          Radiographs (ct chest and cxr) reviewed: results reviewed     Labs none available   Lab Results   Component Value Date    WBC 6.41 03/01/2024    HGB 12.9 03/01/2024    HCT 40.1 03/01/2024     (H) 03/01/2024     03/01/2024       CMP  Sodium   Date Value Ref Range Status   03/01/2024 141 136 - 145 mmol/L Final     Potassium   Date Value Ref Range Status   03/01/2024 3.9 3.5 - 5.1 mmol/L Final     Chloride   Date Value Ref Range Status   03/01/2024 105 95 - 110 mmol/L Final     CO2   Date Value Ref Range Status   03/01/2024 27 23 - 29 mmol/L Final     Glucose   Date Value Ref Range Status   03/01/2024 91 70 - 110 mg/dL Final     BUN   Date Value Ref Range Status   03/01/2024 33 (H) 8 - 23 mg/dL Final     Creatinine   Date Value Ref Range Status   03/01/2024 1.2 0.5 - 1.4 mg/dL Final     Calcium   Date Value Ref Range  Status   03/01/2024 9.9 8.7 - 10.5 mg/dL Final     Total Protein   Date Value Ref Range Status   03/01/2024 7.6 6.0 - 8.4 g/dL Final     Albumin   Date Value Ref Range Status   03/01/2024 3.7 3.5 - 5.2 g/dL Final     Total Bilirubin   Date Value Ref Range Status   03/01/2024 0.5 0.1 - 1.0 mg/dL Final     Comment:     For infants and newborns, interpretation of results should be based  on gestational age, weight and in agreement with clinical  observations.    Premature Infant recommended reference ranges:  Up to 24 hours.............<8.0 mg/dL  Up to 48 hours............<12.0 mg/dL  3-5 days..................<15.0 mg/dL  6-29 days.................<15.0 mg/dL       Alkaline Phosphatase   Date Value Ref Range Status   03/01/2024 123 55 - 135 U/L Final     AST   Date Value Ref Range Status   03/01/2024 20 10 - 40 U/L Final     ALT   Date Value Ref Range Status   03/01/2024 23 10 - 44 U/L Final     Anion Gap   Date Value Ref Range Status   03/01/2024 9 8 - 16 mmol/L Final     eGFR   Date Value Ref Range Status   03/01/2024 49.0 (A) >60 mL/min/1.73 m^2 Final         PFT will be done and results to be reviewed      Plan:  Clinical impression is apparently straight forward and impression with management as below.    Ms Monaco is a 68 year old woman with RA and osteoarthritis- on prendiosne with possible plans to start additional therapy with sterod sparing agents- had CT imaging which shows small bilateral sub centimeter nodules - solid appearing and appears to be in centrilobular and perilymphatic distribution. This could be related to RA, but I think unlikely to be causing her respiratory issues. I dont see any evidence of fibrosis or airway disease.     I don't see any clear evidence of abnormal lung findings that would be causing her respiratory issues- but she is clearly wheezing on exam- almost sounds like broncholitiis to me due to the high pitch squeak.     SHe is reporting frequent symptoms of upper airway  cough syndrome. This could produce this type of picture- frequent cough and wheezing.     Will plan on treating her upper airway cough syndrome- if no improvement after 2-3 weeks- then will consider proceeding with airway inspection to ensure she does not have any evidence of airway disease related to RA causing her cough and or bronchiolitis.     - Start on daily flonase following neti pot, then daily antihistmaine  - Follow up with me in 1 month after daily treatment with above        Problem List Items Addressed This Visit    None  Visit Diagnoses       Pulmonary nodules                No follow-ups on file.    Discussed with patient above for education the following:      There are no Patient Instructions on file for this visit.

## 2024-03-27 ENCOUNTER — PATIENT MESSAGE (OUTPATIENT)
Dept: RHEUMATOLOGY | Facility: CLINIC | Age: 69
End: 2024-03-27
Payer: MEDICARE

## 2024-03-27 ENCOUNTER — HOSPITAL ENCOUNTER (OUTPATIENT)
Dept: CARDIOLOGY | Facility: HOSPITAL | Age: 69
Discharge: HOME | End: 2024-03-27
Attending: THORACIC SURGERY (CARDIOTHORACIC VASCULAR SURGERY)
Payer: MEDICARE

## 2024-03-27 ENCOUNTER — OFFICE VISIT (OUTPATIENT)
Dept: CARDIOTHORACIC SURGERY | Facility: CLINIC | Age: 69
End: 2024-03-27
Payer: MEDICARE

## 2024-03-27 ENCOUNTER — E-CONSULT (OUTPATIENT)
Dept: GENETICS | Facility: CLINIC | Age: 69
End: 2024-03-27
Payer: MEDICARE

## 2024-03-27 ENCOUNTER — TELEPHONE (OUTPATIENT)
Dept: GENETICS | Facility: CLINIC | Age: 69
End: 2024-03-27

## 2024-03-27 VITALS
HEART RATE: 75 BPM | HEIGHT: 64 IN | SYSTOLIC BLOOD PRESSURE: 134 MMHG | DIASTOLIC BLOOD PRESSURE: 86 MMHG | HEIGHT: 64 IN | WEIGHT: 216.06 LBS | BODY MASS INDEX: 36.89 KG/M2 | SYSTOLIC BLOOD PRESSURE: 155 MMHG | DIASTOLIC BLOOD PRESSURE: 90 MMHG | OXYGEN SATURATION: 95 % | WEIGHT: 218.13 LBS | BODY MASS INDEX: 37.24 KG/M2 | HEART RATE: 86 BPM

## 2024-03-27 DIAGNOSIS — I71.21 ASCENDING AORTIC ANEURYSM, UNSPECIFIED WHETHER RUPTURED: ICD-10-CM

## 2024-03-27 DIAGNOSIS — M21.379 FOOT-DROP, UNSPECIFIED LATERALITY: Primary | ICD-10-CM

## 2024-03-27 DIAGNOSIS — I71.20 THORACIC AORTIC ANEURYSM WITHOUT RUPTURE, UNSPECIFIED PART: ICD-10-CM

## 2024-03-27 DIAGNOSIS — M17.11 PRIMARY OSTEOARTHRITIS OF RIGHT KNEE: Primary | ICD-10-CM

## 2024-03-27 LAB
ASCENDING AORTA: 3.82 CM
AV INDEX (PROSTH): 0.76
AV MEAN GRADIENT: 4 MMHG
AV PEAK GRADIENT: 6 MMHG
AV VALVE AREA BY VELOCITY RATIO: 7.64 CM²
AV VALVE AREA: 6.8 CM²
AV VELOCITY RATIO: 0.85
BSA FOR ECHO PROCEDURE: 2.1 M2
CV ECHO LV RWT: 0.54 CM
DOP CALC AO PEAK VEL: 1.22 M/S
DOP CALC AO VTI: 23.4 CM
DOP CALC LVOT AREA: 9 CM2
DOP CALC LVOT DIAMETER: 3.38 CM
DOP CALC LVOT PEAK VEL: 1.04 M/S
DOP CALC LVOT STROKE VOLUME: 159.18 CM3
DOP CALCLVOT PEAK VEL VTI: 17.75 CM
E WAVE DECELERATION TIME: 318.35 MSEC
E/A RATIO: 0.65
E/E' RATIO: 14.22 M/S
ECHO LV POSTERIOR WALL: 0.94 CM (ref 0.6–1.1)
EJECTION FRACTION: 65 %
FRACTIONAL SHORTENING: 25 % (ref 28–44)
INTERVENTRICULAR SEPTUM: 0.81 CM (ref 0.6–1.1)
IVRT: 97.05 MSEC
LA MAJOR: 5.28 CM
LA MINOR: 5.28 CM
LA WIDTH: 3.12 CM
LEFT ATRIUM SIZE: 3.34 CM
LEFT ATRIUM VOLUME INDEX MOD: 17 ML/M2
LEFT ATRIUM VOLUME INDEX: 23.2 ML/M2
LEFT ATRIUM VOLUME MOD: 34.37 CM3
LEFT ATRIUM VOLUME: 46.77 CM3
LEFT INTERNAL DIMENSION IN SYSTOLE: 2.62 CM (ref 2.1–4)
LEFT VENTRICLE DIASTOLIC VOLUME INDEX: 24.95 ML/M2
LEFT VENTRICLE DIASTOLIC VOLUME: 50.4 ML
LEFT VENTRICLE MASS INDEX: 42 G/M2
LEFT VENTRICLE SYSTOLIC VOLUME INDEX: 12.5 ML/M2
LEFT VENTRICLE SYSTOLIC VOLUME: 25.18 ML
LEFT VENTRICULAR INTERNAL DIMENSION IN DIASTOLE: 3.49 CM (ref 3.5–6)
LEFT VENTRICULAR MASS: 84.94 G
LV LATERAL E/E' RATIO: 16 M/S
LV SEPTAL E/E' RATIO: 12.8 M/S
MV A" WAVE DURATION": 15.13 MSEC
MV PEAK A VEL: 0.99 M/S
MV PEAK E VEL: 0.64 M/S
PISA TR MAX VEL: 2.29 M/S
PULM VEIN S/D RATIO: 1.54
PV PEAK D VEL: 0.28 M/S
PV PEAK S VEL: 0.43 M/S
RA MAJOR: 4.92 CM
RA PRESSURE ESTIMATED: 3 MMHG
RA WIDTH: 2.83 CM
RIGHT VENTRICULAR END-DIASTOLIC DIMENSION: 3.45 CM
RV TB RVSP: 5 MMHG
SINUS: 4.06 CM
STJ: 3.42 CM
TDI LATERAL: 0.04 M/S
TDI SEPTAL: 0.05 M/S
TDI: 0.05 M/S
TR MAX PG: 21 MMHG
TRICUSPID ANNULAR PLANE SYSTOLIC EXCURSION: 1.65 CM
TV REST PULMONARY ARTERY PRESSURE: 24 MMHG
Z-SCORE OF LEFT VENTRICULAR DIMENSION IN END DIASTOLE: -5.34
Z-SCORE OF LEFT VENTRICULAR DIMENSION IN END SYSTOLE: -2.62

## 2024-03-27 PROCEDURE — 93306 TTE W/DOPPLER COMPLETE: CPT | Mod: 26,,, | Performed by: INTERNAL MEDICINE

## 2024-03-27 PROCEDURE — 1126F AMNT PAIN NOTED NONE PRSNT: CPT | Mod: CPTII,S$GLB,, | Performed by: THORACIC SURGERY (CARDIOTHORACIC VASCULAR SURGERY)

## 2024-03-27 PROCEDURE — 3080F DIAST BP >= 90 MM HG: CPT | Mod: CPTII,S$GLB,, | Performed by: THORACIC SURGERY (CARDIOTHORACIC VASCULAR SURGERY)

## 2024-03-27 PROCEDURE — 99451 NTRPROF PH1/NTRNET/EHR 5/>: CPT | Mod: S$GLB,,, | Performed by: MEDICAL GENETICS

## 2024-03-27 PROCEDURE — 1159F MED LIST DOCD IN RCRD: CPT | Mod: CPTII,S$GLB,, | Performed by: THORACIC SURGERY (CARDIOTHORACIC VASCULAR SURGERY)

## 2024-03-27 PROCEDURE — 3077F SYST BP >= 140 MM HG: CPT | Mod: CPTII,S$GLB,, | Performed by: THORACIC SURGERY (CARDIOTHORACIC VASCULAR SURGERY)

## 2024-03-27 PROCEDURE — 3008F BODY MASS INDEX DOCD: CPT | Mod: CPTII,S$GLB,, | Performed by: THORACIC SURGERY (CARDIOTHORACIC VASCULAR SURGERY)

## 2024-03-27 PROCEDURE — 99999 PR PBB SHADOW E&M-EST. PATIENT-LVL IV: CPT | Mod: PBBFAC,,, | Performed by: THORACIC SURGERY (CARDIOTHORACIC VASCULAR SURGERY)

## 2024-03-27 PROCEDURE — 99205 OFFICE O/P NEW HI 60 MIN: CPT | Mod: S$GLB,,, | Performed by: THORACIC SURGERY (CARDIOTHORACIC VASCULAR SURGERY)

## 2024-03-27 PROCEDURE — 93306 TTE W/DOPPLER COMPLETE: CPT

## 2024-03-27 NOTE — CONSULTS
[facility]  Response for E-Consult     Patient Name: Lashanda Monaco  MRN: [id number]  Primary Care Provider: Wayne Lundberg MD   Requesting Provider: Jose David Chowdhury III, *  E-Consult to Genetics  Consult performed by: Elsi Frias MD  Consult ordered by: Jose David Chowdhury III, MD  Reason for consult: possible CMT  Assessment/Recommendations: Recommend patient be referred for genetic testing and counseling           Recommendation: Genetic testing for CMT. Recommend referral for genetic testing and counseling    Contingency: If family history of a different neurological disorder, will consider testing for that condition as appropriate    Total time of Consultation: 5 minute    I did not speak to the requesting provider verbally about this.     *This eConsult is based on the clinical data available to me and is furnished without benefit of a physical examination. The eConsult will need to be interpreted in light of any clinical issues or changes in patient status not available to me at the time of filing this eConsults. Significant changes in patient condition or level of acuity should result in immediate formal consultation and reevaluation. Please alert me if you have further questions.    Thank you for this eConsult referral.     MD [facility]

## 2024-03-27 NOTE — TELEPHONE ENCOUNTER
Spoke with pt to schedule appt with GC. Pt scheduled virtual appt for 4/11 at 1pm. Pt understood and confirmed appt.

## 2024-03-28 RX ORDER — UPADACITINIB 15 MG/1
15 TABLET, EXTENDED RELEASE ORAL DAILY
Qty: 30 TABLET | Refills: 0 | Status: ACTIVE | OUTPATIENT
Start: 2024-03-28 | End: 2024-04-27

## 2024-04-04 PROBLEM — J40 BRONCHITIS: Status: ACTIVE | Noted: 2024-04-04

## 2024-04-10 ENCOUNTER — E-VISIT (OUTPATIENT)
Dept: PRIMARY CARE CLINIC | Facility: CLINIC | Age: 69
End: 2024-04-10
Payer: MEDICARE

## 2024-04-10 ENCOUNTER — TELEPHONE (OUTPATIENT)
Dept: RHEUMATOLOGY | Facility: CLINIC | Age: 69
End: 2024-04-10
Payer: MEDICARE

## 2024-04-10 ENCOUNTER — PATIENT MESSAGE (OUTPATIENT)
Dept: PRIMARY CARE CLINIC | Facility: CLINIC | Age: 69
End: 2024-04-10

## 2024-04-10 ENCOUNTER — TELEPHONE (OUTPATIENT)
Dept: GENETICS | Facility: CLINIC | Age: 69
End: 2024-04-10
Payer: MEDICARE

## 2024-04-10 ENCOUNTER — TELEPHONE (OUTPATIENT)
Dept: PRIMARY CARE CLINIC | Facility: CLINIC | Age: 69
End: 2024-04-10
Payer: MEDICARE

## 2024-04-10 DIAGNOSIS — J98.8 RESPIRATORY TRACT INFECTION: Primary | ICD-10-CM

## 2024-04-10 PROCEDURE — 99421 OL DIG E/M SVC 5-10 MIN: CPT | Mod: ,,, | Performed by: FAMILY MEDICINE

## 2024-04-10 RX ORDER — AMOXICILLIN AND CLAVULANATE POTASSIUM 875; 125 MG/1; MG/1
1 TABLET, FILM COATED ORAL 2 TIMES DAILY
Qty: 14 TABLET | Refills: 0 | Status: SHIPPED | OUTPATIENT
Start: 2024-04-10 | End: 2024-04-17

## 2024-04-10 RX ORDER — CODEINE PHOSPHATE AND GUAIFENESIN 10; 100 MG/5ML; MG/5ML
5 SOLUTION ORAL EVERY 6 HOURS PRN
Qty: 120 ML | Refills: 0 | Status: SHIPPED | OUTPATIENT
Start: 2024-04-10

## 2024-04-10 NOTE — TELEPHONE ENCOUNTER
----- Message from Kirstie Yu sent at 4/10/2024 11:05 AM CDT -----  Contact: [phone] Patient  Patient would like to get medical advice.  Symptoms (please be specific):   Pt states she was seen in the ER on 04/05/24 for bronchitis and prescribed inhalers & cough medication but no antibiotic. Pt states she is still having the cough. Pt states she is on a steroid for the rheumatoid arthritis. Pt is requesting Dr Lundberg call in some type of antibiotic.   How long have you had these symptoms: about 3 weeks   Would you like a call back, or a response through your [facility] portal?:   call back  Pharmacy name and phone # (copy from chart):   [place] [place] [place] [facility] [address] [facility]  [address] [facility] [address]  [place] [address]  Phone: [phone] Fax: [phone]         Comments:

## 2024-04-10 NOTE — TELEPHONE ENCOUNTER
----- Message from Abdullahi Maddox MD sent at 3/26/2024 11:30 AM CDT -----  Hey- just reaching out as this patient is with me in clinic.     I dont think her CT findings explain her wheezing- due to absence of smoking history I'm not sure if it is as simple as something as upper airway cough syndrome- she has a lot of post nasal drip- or if it is bronchiolitis potentially related to her RA.     I dont see anything on her CT that would cause these symptoms- I do see a lot of those sub centimeter pulmonary nodules- I doubt those exaplain her symptoms, however, the distribution would be very consistent with RA.     If planning on starting on other therapy- from my persepective it hink that's ok- I dont think its  malignancy or infection.     I am planning on treating her upper airway cough syndrome first- then follow up 1 month- if her symptoms persist then I will likely take her back for bronch and airway insepction etc.     Hope this is helpful - let me know what you think     Abdullahi

## 2024-04-10 NOTE — PROGRESS NOTES
Patient ID: Lashanda Monaco is a 68 y.o. female.    Chief Complaint: URI (Entered automatically based on patient selection in Patient Portal.)    The patient initiated a request through [facility] on 4/10/2024 for evaluation and management with a chief complaint of URI (Entered automatically based on patient selection in Patient Portal.)     I evaluated the questionnaire submission on 4/10/24.    [facility] Peq Evisit Upper Respitatory/Cough Questionnaire    4/10/2024  2:35 PM CDT - Filed by Patient   Do you agree to participate in an E-Visit? Yes   If you have any of the following symptoms, please present to your local ER or call 911:  I acknowledge   What is the main issue you would like addressed today? Continued cough   Are you able to take your vital signs? No   What symptoms do you currently have?  Cough;  Nasal Congestion   Describe your cough: Productive (containing mucus)   Describe the mucus: Green;  Yellow;  Thick   Have you ever smoked? I have never smoked   Have you had a fever? No   When did your symptoms first appear? 3/23/2024   In the last two weeks, have you been in close contact with someone who has COVID-19 or the Flu? No   In the last two weeks, have you worked or volunteered in a healthcare facility or as a ? Healthcare facilities include a hospital, medical or dental clinic, long-term care facility, or nursing home No   Do you live in a long-term care facility, nursing home, group home, or homeless shelter? No   List what you have done or taken to help your symptoms. Cough meds, inhaler, taking a steriod for rheumatoid. Had a breathing treatment at the hospital. Continue to have abutoral treatments every 6 hours alternate rescue inhaler and treatment.   How severe are your symptoms? Moderate   Have your symptoms improved since they first appeared? Better   Have you taken an at home Covid test? Yes   What were the results? Negative   Have you taken a Flu test? No   Have you been fully  vaccinated for COVID? (2 Pfizer, 2 Moderna or 1 Myles & Myles vaccine injections) Yes   Have you received a booster? No   Have you recieved a Flu shot? No   Do you have transportation to get tested for COVID if it is indicated and ordered for you at an [facility] location? Yes   Provide any additional information you feel is important. The cough is more persistent st night. It is not as deep of a cough but still strangling until mucus comes out.   Please attach any relevant images or files          Encounter Diagnosis   Name Primary?    Respiratory tract infection Yes        No orders of the defined types were placed in this encounter.     Medications Ordered This Encounter   Medications    amoxicillin-clavulanate 875-125mg (AUGMENTIN) 875-125 mg per tablet     Sig: Take 1 tablet by mouth 2 (two) times daily. for 7 days     Dispense:  14 tablet     Refill:  0    guaiFENesin-codeine 100-10 mg/5 ml (TUSSI-ORGANIDIN NR)  mg/5 mL syrup     Sig: Take 5 mLs by mouth every 6 (six) hours as needed for Cough or Congestion.     Dispense:  120 mL     Refill:  0     Order Specific Question:   I have reviewed the Prescription Drug Monitoring Program (PDMP) database for this patient prior to prescribing the above opioid medication     Answer:   Yes        No follow-ups on file.      E-Visit Time Tracking:    Day 1 Time (in minutes): 7    Total Time (in minutes): 7

## 2024-04-11 ENCOUNTER — TELEPHONE (OUTPATIENT)
Dept: GENETICS | Facility: CLINIC | Age: 69
End: 2024-04-11
Payer: MEDICARE

## 2024-04-16 ENCOUNTER — HOSPITAL ENCOUNTER (OUTPATIENT)
Dept: RADIOLOGY | Facility: HOSPITAL | Age: 69
Discharge: HOME | End: 2024-04-16
Attending: FAMILY MEDICINE
Payer: MEDICARE

## 2024-04-16 DIAGNOSIS — Z12.31 ENCOUNTER FOR SCREENING MAMMOGRAM FOR BREAST CANCER: ICD-10-CM

## 2024-04-16 DIAGNOSIS — M79.10 MYALGIA: ICD-10-CM

## 2024-04-16 PROCEDURE — 77063 BREAST TOMOSYNTHESIS BI: CPT | Mod: 26,,, | Performed by: RADIOLOGY

## 2024-04-16 PROCEDURE — 77067 SCR MAMMO BI INCL CAD: CPT | Mod: TC

## 2024-04-16 PROCEDURE — 77067 SCR MAMMO BI INCL CAD: CPT | Mod: 26,,, | Performed by: RADIOLOGY

## 2024-04-17 RX ORDER — CYCLOBENZAPRINE HCL 5 MG
5 TABLET ORAL NIGHTLY
Qty: 30 TABLET | Refills: 1 | Status: SHIPPED | OUTPATIENT
Start: 2024-04-17

## 2024-04-19 ENCOUNTER — OFFICE VISIT (OUTPATIENT)
Dept: GENETICS | Facility: CLINIC | Age: 69
End: 2024-04-19
Payer: MEDICARE

## 2024-04-19 DIAGNOSIS — M17.11 PRIMARY OSTEOARTHRITIS OF RIGHT KNEE: ICD-10-CM

## 2024-04-19 DIAGNOSIS — Z13.79 GENETIC TESTING: Primary | ICD-10-CM

## 2024-04-19 PROCEDURE — 99999 PR PBB SHADOW E&M-EST. PATIENT-LVL III: CPT | Mod: PBBFAC,,,

## 2024-04-19 PROCEDURE — 96040 PR GENETIC COUNSELING, EACH 30 MIN: CPT | Mod: S$GLB,,,

## 2024-04-19 NOTE — PROGRESS NOTES
Lashanda Monaco   DOS: 2024   : 1955   MRN: [id number]   REFERRING MD: Dr. Jose David Chowdhury III   Genetic Counseling Consult    REASON FOR CONSULT: [facility] was asked to evaluate this 68 y.o.  female  regarding polyneuropathy seen on EMG. She is unaccompanied for today's genetics evaluation.     HISTORY OF PRESENT ILLNESS: Lashanda Monaco  is a 68 y.o.  female  referred to [facility] regarding polyneuropathy seen on EMG and foot drop.    Lashanda has been experiencing hip/knee/leg/back pain for the past 5 years. She had hip surgery 5 years ago and developed foot drop. She reports not being able to stand for long periods of time. She walks unassisted but very slowly. She has not had any falls but does feel off balance at times. She reports pain in feet and recurrent haley horse. She also experiences intermittent hand numbness mostly in the right hand. She was recently evaluated for consideration of a knee replacement dur to arthritis, but EMG showed polyneuropathy. The orthopedic surgeon Dr. 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     MEDICAL HISTORY:   Active Ambulatory Problems     Diagnosis Date Noted    Mixed hyperlipidemia 2017    Essential hypertension, benign 2017    GERD (gastroesophageal reflux disease) 2017    DDD (degenerative disc disease), lumbar 2017    Primary osteoarthritis of both knees 2017    Primary osteoarthritis of both wrists 2017    Moderate episode of recurrent major depressive disorder 2019    Arthritis of right hip 2019    Severe obesity (BMI 35.0-39.9) with comorbidity     Myalgia of pelvic floor 12/15/2021    Lack of coordination 12/15/2021    Muscle weakness 2021    MARIBEL (generalized anxiety disorder) 2022    Chronic right-sided low back pain without sciatica 2023    Weakness of both hips 2023    Impaired  functional mobility and activity tolerance 2023    Caregiver stress 2023    Psychophysiologic insomnia 2023    Bronchitis 2024     Resolved Ambulatory Problems     Diagnosis Date Noted    No Resolved Ambulatory Problems     Past Medical History:   Diagnosis Date    Arthritis     Hypercholesterolemia     Hypertension         SURGICAL HISTORY:   Past Surgical History:   Procedure Laterality Date    BREAST BIOPSY Right      &     BREAST CYST ASPIRATION      CARPAL TUNNEL RELEASE Left     hip repl Right 2019    HYSTERECTOMY      PARTIAL HIP ARTHROPLASTY Left     RHINOPLASTY      ROTATOR CUFF REPAIR Right     SHOULDER ARTHROSCOPY Right 2022    right shoulder sx with bicep repair    TONSILLECTOMY, ADENOIDECTOMY      TOTAL ABDOMINAL HYSTERECTOMY W/ BILATERAL SALPINGOOPHORECTOMY      TRANSFORAMINAL EPIDURAL INJECTION OF STEROID Right 2024    Procedure: LUMBAR TRANSFORAMINAL RIGHT L3/4 AND L4/L5 DIRECT REFERRAL;  Surgeon: Oswald Hoskins MD;  Location: [facility];  Service: Pain Management;  Laterality: Right;  [phone]       IMAGING:  3/13/24 EMG  EMG of the right lower extremity performed today without significant evidence of major nerve injury or lumbar radiculopathy.  There is evidence suggesting an early polyneuropathy of unclear etiology.     FAMILY HISTORY:          Lashanda has one son who is currently undergoing treatment for colon cancer. She has several siblings with heart disease, father  of heart attack at 56. Also has several siblings with rheumatoid arthritis. One brother with history of lymphoma. Two nephews and great nephew/niece with Tourette syndrome. Mother  of aortic aneurism at 80. History of foot drop in one brother and her mother. Intellectual disability, developmental delays, learning disabilities, autism spectrum disorder, birth defects, recurrent miscarriage, stillbirth, and infant/childhood death were denied. Consanguinity was  denied.    IMPRESSION: Lashanda Monaco  is a 68 y.o.  female  with foot drop, polyneuropathy.    We reviewed Lashanda's medical and family history. We discussed that given the clinical presentation, a genetic etiology is possible. Education and counseling were provided to the family about DNA, chromosomes, genes, and possible types of genetic testing that may be recommended. Given Lashanda's symptoms, we discussed genetic testing for Charcot Katarina Tooth.     Charcot Katarina Tooth Hereditary Neuropathy (CMT) is a group of disorders characterized by chronic motor and sensory polyneuropathy, also known as hereditary motor and sensory neuropathy. Individuals with CMT have symmetric, slowly progressive distal motor neuropathy of the arms and legs usually beginning in the first to third decade, resulting in weakness and atrophy of the muscles in the feet and/or hands. The affected individual typically has distal muscle weakness and atrophy, weak ankle dorsiflexion, depressed tendon reflexes, and pes cavus foot deformity (i.e., high-arched feet). Sensorineural hearing loss, optic atrophy, retinitis pigmentosa, vocal chord paresis, dementia, and cognitive decline may also be associated with CMT. The most common genetic cause of CMT is due to deletions/duplications in the PMP22 gene, although pathogenic variants (mutations) in 74 genes are known to cause CMT, which may be inherited in an autosomal dominant, autosomal recessive, or x linked pattern.     Lashanda decided to pursue genetic testing with gene panel today. Possible results of genetic testing include positive, negative, and/or variant of unknown significance (VUS). A positive result could find an answer for Lashanda's phenotype, inform recurrence risk and possibly form a targeted management plan. A negative genetic test does not rule out the possibility of a genetic cause only that one was not able to be identified. A VUS is result where it is uncertain if that finding  is contributing to the phenotype. We spent time discussing that even if testing is negative, that does not take away the finding of polyneuropathy. Lashanda consented to genetic testing today.     We also briefly discussed the family history of cancer (in her son) and heart disease. Discussed that son could consider genetic testing/counseling due to his diagnosis under the age of 50. Family members with heart disease may also consider genetic testing for cardiac disease genes. Lashanda has seen a cardiologist before but it has been a while. We discussed reestablishing care with cardiology given the family history of heart disease and sudden cardiac arrest/death in two family members.     Results of her genetic testing will be available 3-4 weeks from the time the lab receives her specimen. She will have her blood drawn in a few weeks when she comes back for rheumatology blood work.     RECOMMENDATIONS/PLAN:  Orders placed for hereditary neuropathy panel at [facility]     TIME SPENT: 40 minutes with over 50% spent counseling    Radha Saravia MS, [facility] [place] [facility] [place]  Licensed, Certified Genetic Counselor   [facility]

## 2024-04-23 ENCOUNTER — TELEPHONE (OUTPATIENT)
Dept: ORTHOPEDICS | Facility: CLINIC | Age: 69
End: 2024-04-23
Payer: MEDICARE

## 2024-04-23 NOTE — TELEPHONE ENCOUNTER
I called the patient today regarding her appointment. The patient said that her son has cancer and lives in [place] and will find out what his cancer treatment will be at some point over the next several weeks. Her appointment is rescheduled for in 5 weeks . The patient verbalized understanding and has no further questions.         ----- Message from Jose David Chowdhury III, MD sent at 4/23/2024  7:28 AM CDT -----  Regarding: genetics  She finally saw genetics, they are going to test for CMT but its send out labs and no results yet  I had hoped that would all be back by this visit  Does she want to wait until that testing is done?   She just had CSI in march so unless she wants to schedule surgery theres not much to do

## 2024-04-30 ENCOUNTER — PATIENT MESSAGE (OUTPATIENT)
Dept: PAIN MEDICINE | Facility: OTHER | Age: 69
End: 2024-04-30
Payer: MEDICARE

## 2024-04-30 ENCOUNTER — LAB VISIT (OUTPATIENT)
Dept: LAB | Facility: HOSPITAL | Age: 69
End: 2024-04-30
Payer: MEDICARE

## 2024-04-30 ENCOUNTER — OFFICE VISIT (OUTPATIENT)
Dept: ORTHOPEDICS | Facility: CLINIC | Age: 69
End: 2024-04-30
Payer: MEDICARE

## 2024-04-30 VITALS — BODY MASS INDEX: 37.8 KG/M2 | WEIGHT: 221.44 LBS | HEIGHT: 64 IN

## 2024-04-30 DIAGNOSIS — M48.062 SPINAL STENOSIS OF LUMBAR REGION WITH NEUROGENIC CLAUDICATION: Primary | ICD-10-CM

## 2024-04-30 DIAGNOSIS — Z13.79 GENETIC TESTING: ICD-10-CM

## 2024-04-30 DIAGNOSIS — Z63.6 CAREGIVER STRESS: ICD-10-CM

## 2024-04-30 PROCEDURE — 99213 OFFICE O/P EST LOW 20 MIN: CPT | Mod: S$GLB,,, | Performed by: PHYSICIAN ASSISTANT

## 2024-04-30 PROCEDURE — 1159F MED LIST DOCD IN RCRD: CPT | Mod: CPTII,S$GLB,, | Performed by: PHYSICIAN ASSISTANT

## 2024-04-30 PROCEDURE — 3008F BODY MASS INDEX DOCD: CPT | Mod: CPTII,S$GLB,, | Performed by: PHYSICIAN ASSISTANT

## 2024-04-30 PROCEDURE — 99999 PR PBB SHADOW E&M-EST. PATIENT-LVL III: CPT | Mod: PBBFAC,,, | Performed by: PHYSICIAN ASSISTANT

## 2024-04-30 PROCEDURE — 36415 COLL VENOUS BLD VENIPUNCTURE: CPT

## 2024-04-30 PROCEDURE — 1125F AMNT PAIN NOTED PAIN PRSNT: CPT | Mod: CPTII,S$GLB,, | Performed by: PHYSICIAN ASSISTANT

## 2024-04-30 RX ORDER — PREGABALIN 150 MG/1
150 CAPSULE ORAL 2 TIMES DAILY
Qty: 60 CAPSULE | Refills: 6 | Status: SHIPPED | OUTPATIENT
Start: 2024-04-30 | End: 2024-10-29

## 2024-04-30 SDOH — SOCIAL DETERMINANTS OF HEALTH (SDOH): DEPENDENT RELATIVE NEEDING CARE AT HOME: Z63.6

## 2024-04-30 NOTE — PROGRESS NOTES
"DATE: 4/30/2024  PATIENT: Lashanda Monaco    Attending Physician: Leonid Paul M.D.    HISTORY:  Lashanda Monaco is a 68 y.o. female who returns to me today for follow up.  She was last seen by me 1/8/2024.  Today she is doing well but notes she had an DOMINIC 1/24/24.  She says the injection gave her great relief for 2 weeks and then wore off.  She has bilateral leg pain that is improved with sitting.      EXAM:  Ht 5' 4" (1.626 m)   Wt 100.4 kg (221 lb 7.2 oz)   BMI 38.01 kg/m²     Physical exam stable. Neuro exam stable.       IMAGING:    Today I personally re- reviewed AP, Lat and Flex/Ex  upright L-spine that demonstrate grade I anterolisthesis at L5/S1 and multilevel degenerative changes.      MRI lumbar spine demonstrates multilevel spondylosis with moderate to severe stenosis at L3/4.     Body mass index is 38.01 kg/m².    Hemoglobin A1C   Date Value Ref Range Status   03/01/2023 5.5 4.0 - 5.6 % Final     Comment:     ADA Screening Guidelines:  5.7-6.4%  Consistent with prediabetes  >or=6.5%  Consistent with diabetes    High levels of fetal hemoglobin interfere with the HbA1C  assay. Heterozygous hemoglobin variants (HbS, HgC, etc)do  not significantly interfere with this assay.   However, presence of multiple variants may affect accuracy.           ASSESSMENT/PLAN:    Diagnoses and all orders for this visit:    Spinal stenosis of lumbar region with neurogenic claudication  -     Procedure Order to Pain Management; Future    Other orders  -     pregabalin (LYRICA) 150 MG capsule; Take 1 capsule (150 mg total) by mouth 2 (two) times daily.        Today we discussed at length all of the different treatment options including anti-inflammatories, acetaminophen, rest, ice, heat, physical therapy including strengthening and stretching exercises, home exercises, ROM, aerobic conditioning, aqua therapy, other modalities including ultrasound, massage, and dry needling, epidural steroid injections and finally " surgical intervention.      Plan for caudal DOMINIC.  The patient would require a large adult deformity surgery.  We will try to exhaust all conservative treatment before considering surgery.

## 2024-04-30 NOTE — TELEPHONE ENCOUNTER
No care due was identified.  [facility] Embedded Care Due Messages. Reference number: [id number].   4/30/2024 5:24:27 PM CDT

## 2024-05-01 RX ORDER — ALPRAZOLAM 0.5 MG/1
0.5 TABLET ORAL DAILY PRN
Qty: 20 TABLET | Refills: 1 | Status: SHIPPED | OUTPATIENT
Start: 2024-05-01

## 2024-05-02 ENCOUNTER — PATIENT MESSAGE (OUTPATIENT)
Dept: RHEUMATOLOGY | Facility: CLINIC | Age: 69
End: 2024-05-02
Payer: MEDICARE

## 2024-05-04 ENCOUNTER — PATIENT MESSAGE (OUTPATIENT)
Dept: GENETICS | Facility: CLINIC | Age: 69
End: 2024-05-04
Payer: MEDICARE

## 2024-05-09 ENCOUNTER — PATIENT MESSAGE (OUTPATIENT)
Dept: ORTHOPEDICS | Facility: CLINIC | Age: 69
End: 2024-05-09
Payer: MEDICARE

## 2024-05-22 ENCOUNTER — HOSPITAL ENCOUNTER (OUTPATIENT)
Facility: OTHER | Age: 69
Discharge: HOME | End: 2024-05-22
Attending: ANESTHESIOLOGY | Admitting: ANESTHESIOLOGY
Payer: MEDICARE

## 2024-05-22 VITALS
OXYGEN SATURATION: 92 % | DIASTOLIC BLOOD PRESSURE: 91 MMHG | HEIGHT: 64 IN | HEART RATE: 83 BPM | BODY MASS INDEX: 36.7 KG/M2 | SYSTOLIC BLOOD PRESSURE: 136 MMHG | TEMPERATURE: 99 F | RESPIRATION RATE: 15 BRPM | WEIGHT: 215 LBS

## 2024-05-22 DIAGNOSIS — M54.16 LUMBAR RADICULOPATHY: ICD-10-CM

## 2024-05-22 DIAGNOSIS — G89.29 CHRONIC PAIN: ICD-10-CM

## 2024-05-22 DIAGNOSIS — M51.36 DDD (DEGENERATIVE DISC DISEASE), LUMBAR: Primary | ICD-10-CM

## 2024-05-22 PROCEDURE — 63600175 PHARM REV CODE 636 W HCPCS: Performed by: ANESTHESIOLOGY

## 2024-05-22 PROCEDURE — 25000003 PHARM REV CODE 250: Performed by: ANESTHESIOLOGY

## 2024-05-22 PROCEDURE — A4216 STERILE WATER/SALINE, 10 ML: HCPCS | Performed by: ANESTHESIOLOGY

## 2024-05-22 PROCEDURE — 62323 NJX INTERLAMINAR LMBR/SAC: CPT | Mod: ,,, | Performed by: ANESTHESIOLOGY

## 2024-05-22 PROCEDURE — 25500020 PHARM REV CODE 255: Performed by: ANESTHESIOLOGY

## 2024-05-22 PROCEDURE — 62323 NJX INTERLAMINAR LMBR/SAC: CPT | Performed by: ANESTHESIOLOGY

## 2024-05-22 RX ORDER — LIDOCAINE HYDROCHLORIDE 20 MG/ML
INJECTION, SOLUTION INFILTRATION; PERINEURAL
Status: DISCONTINUED | OUTPATIENT
Start: 2024-05-22 | End: 2024-05-22 | Stop reason: HOSPADM

## 2024-05-22 RX ORDER — SODIUM CHLORIDE 9 MG/ML
INJECTION, SOLUTION INTRAMUSCULAR; INTRAVENOUS; SUBCUTANEOUS
Status: DISCONTINUED | OUTPATIENT
Start: 2024-05-22 | End: 2024-05-22 | Stop reason: HOSPADM

## 2024-05-22 RX ORDER — FENTANYL CITRATE 50 UG/ML
INJECTION, SOLUTION INTRAMUSCULAR; INTRAVENOUS
Status: DISCONTINUED | OUTPATIENT
Start: 2024-05-22 | End: 2024-05-22 | Stop reason: HOSPADM

## 2024-05-22 RX ORDER — DEXAMETHASONE SODIUM PHOSPHATE 10 MG/ML
INJECTION INTRAMUSCULAR; INTRAVENOUS
Status: DISCONTINUED | OUTPATIENT
Start: 2024-05-22 | End: 2024-05-22 | Stop reason: HOSPADM

## 2024-05-22 RX ORDER — MIDAZOLAM HYDROCHLORIDE 1 MG/ML
INJECTION INTRAMUSCULAR; INTRAVENOUS
Status: DISCONTINUED | OUTPATIENT
Start: 2024-05-22 | End: 2024-05-22 | Stop reason: HOSPADM

## 2024-05-22 RX ORDER — SODIUM CHLORIDE 9 MG/ML
INJECTION, SOLUTION INTRAVENOUS CONTINUOUS
Status: DISCONTINUED | OUTPATIENT
Start: 2024-05-22 | End: 2024-05-22 | Stop reason: HOSPADM

## 2024-05-22 RX ORDER — LIDOCAINE HYDROCHLORIDE 10 MG/ML
INJECTION, SOLUTION EPIDURAL; INFILTRATION; INTRACAUDAL; PERINEURAL
Status: DISCONTINUED | OUTPATIENT
Start: 2024-05-22 | End: 2024-05-22 | Stop reason: HOSPADM

## 2024-05-22 NOTE — DISCHARGE SUMMARY
Discharge Note  Short Stay      SUMMARY     Admit Date: 5/22/2024    Attending Physician: Oswald Hoskins MD      Discharge Physician: Juanito Bacon MD      Discharge Date: 5/22/2024 9:52 AM    Procedure(s) (LRB):  CAUDAL DOMINIC DIRECT REFERRAL (N/A)    Final Diagnosis: Spinal stenosis of lumbar region with neurogenic claudication [M48.062]    Disposition: Home or self care    Patient Instructions:   Current Discharge Medication List        CONTINUE these medications which have NOT CHANGED    Details   albuterol (PROVENTIL/VENTOLIN HFA) 90 mcg/actuation inhaler Inhale 1-2 puffs into the lungs every 6 (six) hours as needed for Wheezing or Shortness of Breath. Rescue  Qty: 18 g, Refills: 0      ALPRAZolam (XANAX) 0.5 MG tablet Take 1 tablet (0.5 mg total) by mouth daily as needed for Anxiety.  Qty: 20 tablet, Refills: 1    Associated Diagnoses: Caregiver stress      amLODIPine (NORVASC) 10 MG tablet TAKE 1 TABLET DAILY  Qty: 90 tablet, Refills: 3    Associated Diagnoses: Essential hypertension, benign      atorvastatin (LIPITOR) 20 MG tablet TAKE 1 TABLET DAILY  Qty: 90 tablet, Refills: 3    Associated Diagnoses: Mixed hyperlipidemia      butalbital-acetaminophen-caffeine -40 mg (FIORICET, ESGIC) -40 mg per tablet Take 1 tablet by mouth every 4 (four) hours as needed.      cyclobenzaprine (FLEXERIL) 5 MG tablet Take 1 tablet (5 mg total) by mouth nightly.  Qty: 30 tablet, Refills: 1    Associated Diagnoses: Myalgia      fish oil-omega-3 fatty acids 300-1,000 mg capsule Take 1 g by mouth once daily.      fluticasone-salmeterol diskus inhaler 250-50 mcg Inhale 1 puff into the lungs 2 (two) times daily. Controller  Qty: 60 each, Refills: 11      guaiFENesin-codeine 100-10 mg/5 ml (TUSSI-ORGANIDIN NR)  mg/5 mL syrup Take 5 mLs by mouth every 6 (six) hours as needed for Cough or Congestion.  Qty: 120 mL, Refills: 0    Associated Diagnoses: Respiratory tract infection      meloxicam (MOBIC) 15 MG tablet  Take 1 tablet (15 mg total) by mouth once daily.  Qty: 90 tablet, Refills: 1    Associated Diagnoses: DDD (degenerative disc disease), lumbar      omeprazole (PRILOSEC) 40 MG capsule TAKE 1 CAPSULE TWICE A DAY BEFORE MEALS  Qty: 60 capsule, Refills: 1    Associated Diagnoses: Gastroesophageal reflux disease      predniSONE (DELTASONE) 10 MG tablet Take 4 pills once a day for 10 days and then 10mg daily  Qty: 120 tablet, Refills: 0      pregabalin (LYRICA) 150 MG capsule Take 1 capsule (150 mg total) by mouth 2 (two) times daily.  Qty: 60 capsule, Refills: 6      sertraline (ZOLOFT) 100 MG tablet Take 1 tablet (100 mg total) by mouth once daily.  Qty: 90 tablet, Refills: 3    Associated Diagnoses: Moderate episode of recurrent major depressive disorder; MARIBEL (generalized anxiety disorder)      topiramate (TOPAMAX) 25 MG tablet Take 1 tablet (25 mg total) by mouth 2 (two) times daily.  Qty: 60 tablet, Refills: 1      traZODone (DESYREL) 50 MG tablet Take 1-2 tablets ( mg total) by mouth nightly as needed for Insomnia.  Qty: 60 tablet, Refills: 5    Associated Diagnoses: Psychophysiologic insomnia      TURMERIC ORAL Take 1 tablet by mouth once daily.                 Discharge Diagnosis: Spinal stenosis of lumbar region with neurogenic claudication [M48.062]  Condition on Discharge: Stable with no complications to procedure   Diet on Discharge: Same as before.  Activity: as per instruction sheet.  Discharge to: Home with a responsible adult.  Follow up: 2-4 weeks       Please call my office, on call number [phone] or pager at [phone] if experienced any weakness or loss of sensation, fever > 101.5, pain uncontrolled with oral medications, persistent nausea/vomiting/or diarrhea, redness or drainage from the incisions, or any other worrisome concerns. If physician on call was not reached or could not communicate with our office for any reason please go to the nearest emergency department

## 2024-05-22 NOTE — OP NOTE
Caudal Epidural Steroid Injection with Catheter under Fluoroscopic Guidance    The procedure, risks, benefits, and options were discussed with the patient. There are no contraindications to the procedure. The patent expressed understanding and agreed to the procedure. Informed written consent was obtained prior to the start of the procedure and can be found in the patient's chart.    PATIENT NAME: Lashanda Monaco   MRN: [id number]     DATE OF PROCEDURE: 05/22/2024    PROCEDURE: Caudal Epidural Steroid Injection under Fluoroscopic Guidance    PRE-OP DIAGNOSIS: Spinal stenosis of lumbar region with neurogenic claudication [M48.062] Lumbar radiculopathy [M54.16]    POST-OP DIAGNOSIS: Same    PHYSICIAN: Oswald Hoskins MD    ASSISTANTS: None     MEDICATIONS INJECTED: Preservative-free Decadron 10mg with 4cc of Lidocaine 1% MPF     LOCAL ANESTHETIC INJECTED: Xylocaine 2%     SEDATION: Versed 2mg and Fentanyl 50mcg                                                                                                                                                                                     Conscious sedation ordered by M.D. Patient re-evaluation prior to administration of conscious sedation. No changes noted in patient's status from initial evaluation. The patient's vital signs were monitored by RN and patient remained hemodynamically stable throughout the procedure.    Event Time In   Sedation Start 0946   Sedation End 0951       ESTIMATED BLOOD LOSS: None    COMPLICATIONS: None    TECHNIQUE: Time-out was performed to identify the patient and procedure to be performed. With the patient laying in a prone position, the surgical area was prepped and draped in the usual sterile fashion using ChloraPrep and a fenestrated drape. The injection site was determined under fluoroscopy guidance. Skin anesthesia was achieved by injecting Lidocaine 2% over the injection site. The sacrum and sacral cornua were then approached with  a 16 gauge, 3.5 inch epidural needle that was introduced and advanced into the sacral hiatus under fluoroscopic guidance with AP, lateral and/or contralateral oblique imaging. After the needle passed through the sacrococcygeal ligament, the needle angle was lowered and the needle was advanced 1 cm. After negative aspiration of blood or CSF, and no evidence of paraesthesias, the catheter was threaded through the needle into the epidural space using live fluoroscopy, contrast dye Omnipaque (300mg/mL) was injected to confirm placement and there was no vascular runoff. Fluoroscopic imaging in the AP and lateral views revealed a clear outline of the spinal nerve with proximal spread of agent through the caudal epidural space. Then 10 mL of the medication mixture listed above was injected slowly. Displacement of the radio opaque contrast after injection of the medication confirmed that the medication went into the area of the transforaminal spaces. The needles were removed and bleeding was nil. A sterile dressing was applied. No specimens collected. The patient tolerated the procedure well.     The patient was monitored after the procedure in the recovery area. They were given post-procedure and discharge instructions to follow at home. The patient was discharged in a stable condition.    Juanito Bacon MD    I reviewed and edited the fellow's note. I conducted my own interview and physical examination. I agree with the findings. I was present and supervising all critical portions of the procedure.       Oswald Hoskins MD

## 2024-05-22 NOTE — DISCHARGE INSTRUCTIONS

## 2024-05-23 DIAGNOSIS — I10 ESSENTIAL HYPERTENSION, BENIGN: ICD-10-CM

## 2024-05-23 DIAGNOSIS — E78.2 MIXED HYPERLIPIDEMIA: ICD-10-CM

## 2024-05-23 LAB
GENETIC COUNSELING?: YES
GENSO SPECIMEN TYPE: NORMAL
MISCELLANEOUS GENETIC TEST NAME: NORMAL
PARTENTAL OR SIBLING TESTING?: NO
REFERENCE LAB: NORMAL
TEST RESULT: NORMAL

## 2024-05-24 NOTE — TELEPHONE ENCOUNTER
No care due was identified.  [facility] Embedded Care Due Messages. Reference number: [id number].   5/23/2024 11:13:40 PM CDT

## 2024-05-24 NOTE — TELEPHONE ENCOUNTER
Refill Routing Note   Medication(s) are not appropriate for processing by [facility] for the following reason(s):        Required vitals abnormal  ED/Hospital Visit since last OV with provider    [facility] action(s):  Defer               Appointments  past 12m or future 3m with PCP    Date Provider   Last Visit   4/10/2024 Wayne Lundberg MD   Next Visit   Visit date not found Wayne Lundberg MD   ED visits in past 90 days: 1        Note composed:7:38 AM 05/24/2024

## 2024-05-27 ENCOUNTER — PATIENT MESSAGE (OUTPATIENT)
Dept: ORTHOPEDICS | Facility: CLINIC | Age: 69
End: 2024-05-27
Payer: MEDICARE

## 2024-05-27 RX ORDER — AMLODIPINE BESYLATE 10 MG/1
TABLET ORAL
Qty: 90 TABLET | Refills: 3 | Status: SHIPPED | OUTPATIENT
Start: 2024-05-27

## 2024-05-27 RX ORDER — ATORVASTATIN CALCIUM 20 MG/1
TABLET, FILM COATED ORAL
Qty: 90 TABLET | Refills: 3 | Status: SHIPPED | OUTPATIENT
Start: 2024-05-27

## 2024-06-03 ENCOUNTER — TELEPHONE (OUTPATIENT)
Dept: GENETICS | Facility: CLINIC | Age: 69
End: 2024-06-03
Payer: MEDICARE

## 2024-06-03 ENCOUNTER — PATIENT MESSAGE (OUTPATIENT)
Dept: RHEUMATOLOGY | Facility: CLINIC | Age: 69
End: 2024-06-03
Payer: MEDICARE

## 2024-06-03 ENCOUNTER — PATIENT MESSAGE (OUTPATIENT)
Dept: GENETICS | Facility: CLINIC | Age: 69
End: 2024-06-03
Payer: MEDICARE

## 2024-06-03 NOTE — TELEPHONE ENCOUNTER
NAME: Lashanda Monaco   DOS: 2024   : 1955   MRN: [id number]     RE: Phone call to patient regarding genetic test results    Called and spoke with patient to disclose the results from her recent genetic testing. Lashanda recently had the Hereditary Neuropathy Panel at [facility] for recent finding of polyneuropathy on EMG with foot drop. 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 This genetic test did not identify any changes in any of the CMT genes. 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     Pathogenic variants in the REEP1 gene have been associated with a range of clinical phenotypes including autosomal dominant distal hereditary motor  neuropathy type V. DHMN-V features include weakness of muscles in hands and feet. The clinical significance of the variant identified in Lashanda is uncertain at this time, and these results do not confer a molecular diagnosis. Given the symptoms seen in Lashanda and potential overlap with this condition, follow up was recommended with medical geneticist for clinical correlation.     Our team will contact her to schedule the follow up with Dr. Frias. Results will be shared with patient via portal.    TIME SPENT: 5 minutes     Radha Saravia, MS, [facility]  Licensed Certified Genetic Counselor  [facility]

## 2024-06-04 ENCOUNTER — OFFICE VISIT (OUTPATIENT)
Dept: PULMONOLOGY | Facility: CLINIC | Age: 69
End: 2024-06-04
Payer: MEDICARE

## 2024-06-04 VITALS
DIASTOLIC BLOOD PRESSURE: 89 MMHG | BODY MASS INDEX: 36.92 KG/M2 | SYSTOLIC BLOOD PRESSURE: 120 MMHG | HEIGHT: 64 IN | OXYGEN SATURATION: 95 % | HEART RATE: 112 BPM | WEIGHT: 216.25 LBS

## 2024-06-04 DIAGNOSIS — E66.01 SEVERE OBESITY (BMI 35.0-39.9) WITH COMORBIDITY: Primary | ICD-10-CM

## 2024-06-04 DIAGNOSIS — R53.81 PHYSICAL DECONDITIONING: ICD-10-CM

## 2024-06-04 DIAGNOSIS — M54.16 LUMBAR RADICULOPATHY: ICD-10-CM

## 2024-06-04 PROCEDURE — 3288F FALL RISK ASSESSMENT DOCD: CPT | Mod: CPTII,S$GLB,, | Performed by: STUDENT IN AN ORGANIZED HEALTH CARE EDUCATION/TRAINING PROGRAM

## 2024-06-04 PROCEDURE — 1125F AMNT PAIN NOTED PAIN PRSNT: CPT | Mod: CPTII,S$GLB,, | Performed by: STUDENT IN AN ORGANIZED HEALTH CARE EDUCATION/TRAINING PROGRAM

## 2024-06-04 PROCEDURE — 3074F SYST BP LT 130 MM HG: CPT | Mod: CPTII,S$GLB,, | Performed by: STUDENT IN AN ORGANIZED HEALTH CARE EDUCATION/TRAINING PROGRAM

## 2024-06-04 PROCEDURE — 99999 PR PBB SHADOW E&M-EST. PATIENT-LVL IV: CPT | Mod: PBBFAC,,, | Performed by: STUDENT IN AN ORGANIZED HEALTH CARE EDUCATION/TRAINING PROGRAM

## 2024-06-04 PROCEDURE — 3008F BODY MASS INDEX DOCD: CPT | Mod: CPTII,S$GLB,, | Performed by: STUDENT IN AN ORGANIZED HEALTH CARE EDUCATION/TRAINING PROGRAM

## 2024-06-04 PROCEDURE — 99213 OFFICE O/P EST LOW 20 MIN: CPT | Mod: S$GLB,,, | Performed by: STUDENT IN AN ORGANIZED HEALTH CARE EDUCATION/TRAINING PROGRAM

## 2024-06-04 PROCEDURE — 3079F DIAST BP 80-89 MM HG: CPT | Mod: CPTII,S$GLB,, | Performed by: STUDENT IN AN ORGANIZED HEALTH CARE EDUCATION/TRAINING PROGRAM

## 2024-06-04 PROCEDURE — 1159F MED LIST DOCD IN RCRD: CPT | Mod: CPTII,S$GLB,, | Performed by: STUDENT IN AN ORGANIZED HEALTH CARE EDUCATION/TRAINING PROGRAM

## 2024-06-04 PROCEDURE — 1101F PT FALLS ASSESS-DOCD LE1/YR: CPT | Mod: CPTII,S$GLB,, | Performed by: STUDENT IN AN ORGANIZED HEALTH CARE EDUCATION/TRAINING PROGRAM

## 2024-06-04 NOTE — PROGRESS NOTES
6/4/2024    Lashanda Monaco  New Patient Consult    Chief Complaint   Patient presents with    1m f/u    Shortness of Breath       HPI:Ms Monaco is a 68 year old woman who presents for eavluation of pulmonary nodules.     She has a history of osteoarthritis, bilateral hip replacements and chornic lumbar arthritis, and RA with a recent flare. Sent by her Rhuematologist Dr Callaway for evaluation of abnormal CT.     She is on prednisone currently, but waiting for plans to start immunothrapy depedning on what we think about the CT findings.     She reports she has a cold now- but typically at baseline she is unable to walk 100 yards without stopping. She is wheezing- she can hear it. She also has tinnitus. She wheezes mostly at night time and when she is exerting herself.     SHe is coughing stuff up- but she thinks this is a cold.     No weight loss. No hemoptyusis.     She is a never smoker.     She never had asthma as a child.     No seasonal allergies.     She does get swelling in her feet- since starting prednisone this has improved.     Her weight has been stable.     She has had esophageal dilations- she repotrs no issues withs wallowing right now.     She is retired- she worked at [employer]     She reports frequent upper airway cough syndrome symptoms.       Today:     She reports improvement in coughing symptoms since we last spoke- she was on prednisone- but sounds like she stopped it by herself.   No new rashes. RA symtpoms are stable. Lots of issues with knee pain and walking around  She reposrt she still getes quit SOB_ just walking from her car to the clinic she gets SOB   She had nebulizer therapy while she got bronchitis which she thinks helped         PFSH:  Past Medical History:   Diagnosis Date    Arthritis     Hypercholesterolemia     Hypertension          Past Surgical History:   Procedure Laterality Date    BREAST BIOPSY Right     2010 & 2021    BREAST CYST ASPIRATION      CARPAL TUNNEL  RELEASE Left     EPIDURAL STEROID INJECTION N/A 5/22/2024    Procedure: CAUDAL DOMINIC DIRECT REFERRAL;  Surgeon: Oswald Hoskins MD;  Location: [facility];  Service: Pain Management;  Laterality: N/A;  [phone]    hip repl Right 08/2019    HYSTERECTOMY      PARTIAL HIP ARTHROPLASTY Left     RHINOPLASTY      ROTATOR CUFF REPAIR Right     SHOULDER ARTHROSCOPY Right 05/2022    right shoulder sx with bicep repair    TONSILLECTOMY, ADENOIDECTOMY      TOTAL ABDOMINAL HYSTERECTOMY W/ BILATERAL SALPINGOOPHORECTOMY      TRANSFORAMINAL EPIDURAL INJECTION OF STEROID Right 01/24/2024    Procedure: LUMBAR TRANSFORAMINAL RIGHT L3/4 AND L4/L5 DIRECT REFERRAL;  Surgeon: Oswald Hoskins MD;  Location: [facility];  Service: Pain Management;  Laterality: Right;  [phone]     Social History     Tobacco Use    Smoking status: Never    Smokeless tobacco: Never   Substance Use Topics    Alcohol use: Yes     Comment: Occasionally     Drug use: No     Family History   Problem Relation Name Age of Onset    Aortic aneurysm Mother  80    Heart attack Father      Colon cancer Son          no genetic testing    Arthritis Sister      Arthritis Sister      Diabetes Sister      Osteoarthritis Sister      Heart disease Sister      Diabetes Brother      Arthritis Brother      Heart disease Brother      Lymphoma Brother      Diabetes Brother      Arthritis Brother      Heart disease Brother      Heart disease Brother      Heart attack Other      Tourette syndrome Other      Tourette syndrome Other      Tourette syndrome Other      Tourette syndrome Other       Review of patient's allergies indicates:   Allergen Reactions    Kiwi (actinidia chinensis)     Hydrocodone bitartrate Nausea Only       Performance Status:The patient's activity level is functions out of house.      Review of Systems:   Review of Systems   Constitutional:  Negative for chills, fever, malaise/fatigue and weight loss.   HENT:  Negative for congestion, sinus  pain and sore throat.    Eyes:  Negative for blurred vision and pain.   Respiratory:  Positive for cough, shortness of breath and wheezing. Negative for sputum production.    Cardiovascular:  Negative for chest pain, palpitations, orthopnea, claudication and leg swelling.   Gastrointestinal:  Negative for abdominal pain, constipation, diarrhea, heartburn, melena, nausea and vomiting.   Genitourinary:  Negative for dysuria, frequency, hematuria and urgency.   Skin:  Negative for itching and rash.   Neurological:  Negative for dizziness, seizures, loss of consciousness and headaches.   Endo/Heme/Allergies:  Negative for environmental allergies and polydipsia. Does not bruise/bleed easily.   Psychiatric/Behavioral:  Negative for depression. The patient is not nervous/anxious.         Exam:  Physical Exam  Vitals reviewed.   Constitutional:       General: She is not in acute distress.     Appearance: She is well-developed. She is not diaphoretic.   HENT:      Head: Normocephalic and atraumatic.      Mouth/Throat:      Pharynx: No oropharyngeal exudate or posterior oropharyngeal erythema.   Eyes:      General: No scleral icterus.     Pupils: Pupils are equal, round, and reactive to light.   Neck:      Vascular: No JVD.   Cardiovascular:      Rate and Rhythm: Normal rate and regular rhythm.      Heart sounds: Normal heart sounds. No murmur heard.  Pulmonary:      Effort: Pulmonary effort is normal. No respiratory distress.      Breath sounds: Normal breath sounds. No wheezing.   Abdominal:      General: Bowel sounds are normal. There is no distension.      Palpations: Abdomen is soft.      Tenderness: There is no abdominal tenderness.   Musculoskeletal:         General: No swelling.      Cervical back: Normal range of motion and neck supple. No rigidity.   Skin:     General: Skin is warm and dry.      Capillary Refill: Capillary refill takes less than 2 seconds.      Coloration: Skin is not pale.      Findings: No rash.    Neurological:      General: No focal deficit present.      Mental Status: She is alert and oriented to person, place, and time.      Cranial Nerves: No cranial nerve deficit.      Motor: No weakness or abnormal muscle tone.          Radiographs (ct chest and cxr) reviewed: results reviewed     Labs none available   Lab Results   Component Value Date    WBC 6.41 03/01/2024    HGB 12.9 03/01/2024    HCT 40.1 03/01/2024     (H) 03/01/2024     03/01/2024       CMP  Sodium   Date Value Ref Range Status   03/01/2024 141 136 - 145 mmol/L Final     Potassium   Date Value Ref Range Status   03/01/2024 3.9 3.5 - 5.1 mmol/L Final     Chloride   Date Value Ref Range Status   03/01/2024 105 95 - 110 mmol/L Final     CO2   Date Value Ref Range Status   03/01/2024 27 23 - 29 mmol/L Final     Glucose   Date Value Ref Range Status   03/01/2024 91 70 - 110 mg/dL Final     BUN   Date Value Ref Range Status   03/01/2024 33 (H) 8 - 23 mg/dL Final     Creatinine   Date Value Ref Range Status   03/01/2024 1.2 0.5 - 1.4 mg/dL Final     Calcium   Date Value Ref Range Status   03/01/2024 9.9 8.7 - 10.5 mg/dL Final     Total Protein   Date Value Ref Range Status   03/01/2024 7.6 6.0 - 8.4 g/dL Final     Albumin   Date Value Ref Range Status   03/01/2024 3.7 3.5 - 5.2 g/dL Final     Total Bilirubin   Date Value Ref Range Status   03/01/2024 0.5 0.1 - 1.0 mg/dL Final     Comment:     For infants and newborns, interpretation of results should be based  on gestational age, weight and in agreement with clinical  observations.    Premature Infant recommended reference ranges:  Up to 24 hours.............<8.0 mg/dL  Up to 48 hours............<12.0 mg/dL  3-5 days..................<15.0 mg/dL  6-29 days.................<15.0 mg/dL       Alkaline Phosphatase   Date Value Ref Range Status   03/01/2024 123 55 - 135 U/L Final     AST   Date Value Ref Range Status   03/01/2024 20 10 - 40 U/L Final     ALT   Date Value Ref Range Status    03/01/2024 23 10 - 44 U/L Final     Anion Gap   Date Value Ref Range Status   03/01/2024 9 8 - 16 mmol/L Final     eGFR   Date Value Ref Range Status   03/01/2024 49.0 (A) >60 mL/min/1.73 m^2 Final         PFT will be done and results to be reviewed      Plan:  Clinical impression is apparently straight forward and impression with management as below.    Ms Monaco is a 68 year old woman with RA and osteoarthritis- on prendiosne with possible plans to start additional therapy with sterod sparing agents- had CT imaging which shows small bilateral sub centimeter nodules - solid appearing and appears to be in centrilobular and perilymphatic distribution. This could be related to RA, but I think unlikely to be causing her respiratory issues. I dont see any evidence of fibrosis or airway disease.     She had bronchitis which seems resolved now.     I am suspecting the predominance of her issues is mostly musculoskeletal. I think she is very deconditioned due to neuropathy, and knee and back pain. She reports progressive immobility due to pain. She plans for evaluation of knee on the 11th. She is planning on trying to avoid surgery for her back. She is currently gettign evaluated for neuropathy by genetics.     I do not think she needs repeat CT chest to monitor nodules- unless she develops new respiratory symptoms.     From a pulmonary perspective I do believe she can tolerate anesthesia if required for any future surgeries. She has not been tested for sleep apnea which I would recommend- she wants to wait right now on testing for sleep apnea.     We discussed at length- since I see no current pulmonary issues- will have her follow up with me as needed- she can call office to make appointment at any time.    Problem List Items Addressed This Visit    None        No follow-ups on file.    Discussed with patient above for education the following:      There are no Patient Instructions on file for this visit.

## 2024-06-05 ENCOUNTER — TELEPHONE (OUTPATIENT)
Dept: GENETICS | Facility: CLINIC | Age: 69
End: 2024-06-05
Payer: MEDICARE

## 2024-06-05 NOTE — TELEPHONE ENCOUNTER
----- Message from Elsi rFias MD sent at 6/3/2024  6:37 PM CDT -----  Regarding: RE:  Next available Wednesday urgent slot should be fine.    MA  ----- Message -----  From: Radha Saravia CGC  Sent: 6/3/2024  12:34 PM EDT  To: Yanira Paulino; MD Yanira Natarajan-will you get her in with MA? Needs a physical exam to correlate findings with her VUS in REEP1 (she does have hand numbness).     Elsi-how soon do you want to see her?    Radha

## 2024-06-10 ENCOUNTER — OFFICE VISIT (OUTPATIENT)
Dept: RHEUMATOLOGY | Facility: CLINIC | Age: 69
End: 2024-06-10
Payer: MEDICARE

## 2024-06-10 ENCOUNTER — LAB VISIT (OUTPATIENT)
Dept: LAB | Facility: HOSPITAL | Age: 69
End: 2024-06-10
Attending: INTERNAL MEDICINE
Payer: MEDICARE

## 2024-06-10 ENCOUNTER — TELEPHONE (OUTPATIENT)
Dept: PRIMARY CARE CLINIC | Facility: CLINIC | Age: 69
End: 2024-06-10
Payer: MEDICARE

## 2024-06-10 VITALS
SYSTOLIC BLOOD PRESSURE: 147 MMHG | HEART RATE: 94 BPM | DIASTOLIC BLOOD PRESSURE: 92 MMHG | BODY MASS INDEX: 37.69 KG/M2 | WEIGHT: 219.56 LBS

## 2024-06-10 DIAGNOSIS — M06.9 RHEUMATOID ARTHRITIS FLARE: ICD-10-CM

## 2024-06-10 DIAGNOSIS — M06.9 RHEUMATOID ARTHRITIS FLARE: Primary | ICD-10-CM

## 2024-06-10 DIAGNOSIS — R30.0 DYSURIA: Primary | ICD-10-CM

## 2024-06-10 LAB
ALBUMIN SERPL BCP-MCNC: 3.3 G/DL (ref 3.5–5.2)
ALBUMIN SERPL BCP-MCNC: 3.3 G/DL (ref 3.5–5.2)
ALP SERPL-CCNC: 129 U/L (ref 55–135)
ALP SERPL-CCNC: 129 U/L (ref 55–135)
ALT SERPL W/O P-5'-P-CCNC: 18 U/L (ref 10–44)
ALT SERPL W/O P-5'-P-CCNC: 18 U/L (ref 10–44)
ANION GAP SERPL CALC-SCNC: 9 MMOL/L (ref 8–16)
ANION GAP SERPL CALC-SCNC: 9 MMOL/L (ref 8–16)
AST SERPL-CCNC: 20 U/L (ref 10–40)
AST SERPL-CCNC: 20 U/L (ref 10–40)
BASOPHILS # BLD AUTO: 0.03 K/UL (ref 0–0.2)
BASOPHILS NFR BLD: 0.4 % (ref 0–1.9)
BILIRUB SERPL-MCNC: 0.4 MG/DL (ref 0.1–1)
BILIRUB SERPL-MCNC: 0.4 MG/DL (ref 0.1–1)
BUN SERPL-MCNC: 25 MG/DL (ref 8–23)
BUN SERPL-MCNC: 25 MG/DL (ref 8–23)
CALCIUM SERPL-MCNC: 10.2 MG/DL (ref 8.7–10.5)
CALCIUM SERPL-MCNC: 10.2 MG/DL (ref 8.7–10.5)
CHLORIDE SERPL-SCNC: 105 MMOL/L (ref 95–110)
CHLORIDE SERPL-SCNC: 105 MMOL/L (ref 95–110)
CO2 SERPL-SCNC: 27 MMOL/L (ref 23–29)
CO2 SERPL-SCNC: 27 MMOL/L (ref 23–29)
CREAT SERPL-MCNC: 1.1 MG/DL (ref 0.5–1.4)
CREAT SERPL-MCNC: 1.1 MG/DL (ref 0.5–1.4)
CRP SERPL-MCNC: 9.3 MG/L (ref 0–8.2)
DIFFERENTIAL METHOD BLD: ABNORMAL
EOSINOPHIL # BLD AUTO: 0.2 K/UL (ref 0–0.5)
EOSINOPHIL NFR BLD: 2.6 % (ref 0–8)
ERYTHROCYTE [DISTWIDTH] IN BLOOD BY AUTOMATED COUNT: 14.2 % (ref 11.5–14.5)
ERYTHROCYTE [SEDIMENTATION RATE] IN BLOOD BY PHOTOMETRIC METHOD: 55 MM/HR (ref 0–36)
EST. GFR  (NO RACE VARIABLE): 54.4 ML/MIN/1.73 M^2
EST. GFR  (NO RACE VARIABLE): 54.4 ML/MIN/1.73 M^2
GLUCOSE SERPL-MCNC: 96 MG/DL (ref 70–110)
GLUCOSE SERPL-MCNC: 96 MG/DL (ref 70–110)
HCT VFR BLD AUTO: 43.4 % (ref 37–48.5)
HGB BLD-MCNC: 13.7 G/DL (ref 12–16)
IMM GRANULOCYTES # BLD AUTO: 0.03 K/UL (ref 0–0.04)
IMM GRANULOCYTES NFR BLD AUTO: 0.4 % (ref 0–0.5)
LYMPHOCYTES # BLD AUTO: 1.6 K/UL (ref 1–4.8)
LYMPHOCYTES NFR BLD: 21.4 % (ref 18–48)
MCH RBC QN AUTO: 32.2 PG (ref 27–31)
MCHC RBC AUTO-ENTMCNC: 31.6 G/DL (ref 32–36)
MCV RBC AUTO: 102 FL (ref 82–98)
MONOCYTES # BLD AUTO: 1 K/UL (ref 0.3–1)
MONOCYTES NFR BLD: 13 % (ref 4–15)
NEUTROPHILS # BLD AUTO: 4.7 K/UL (ref 1.8–7.7)
NEUTROPHILS NFR BLD: 62.2 % (ref 38–73)
NRBC BLD-RTO: 0 /100 WBC
PLATELET # BLD AUTO: 228 K/UL (ref 150–450)
PMV BLD AUTO: 10.2 FL (ref 9.2–12.9)
POTASSIUM SERPL-SCNC: 4.2 MMOL/L (ref 3.5–5.1)
POTASSIUM SERPL-SCNC: 4.2 MMOL/L (ref 3.5–5.1)
PROT SERPL-MCNC: 7.3 G/DL (ref 6–8.4)
PROT SERPL-MCNC: 7.3 G/DL (ref 6–8.4)
RBC # BLD AUTO: 4.26 M/UL (ref 4–5.4)
SODIUM SERPL-SCNC: 141 MMOL/L (ref 136–145)
SODIUM SERPL-SCNC: 141 MMOL/L (ref 136–145)
WBC # BLD AUTO: 7.61 K/UL (ref 3.9–12.7)

## 2024-06-10 PROCEDURE — 99999 PR PBB SHADOW E&M-EST. PATIENT-LVL II: CPT | Mod: PBBFAC,,, | Performed by: INTERNAL MEDICINE

## 2024-06-10 PROCEDURE — 3008F BODY MASS INDEX DOCD: CPT | Mod: CPTII,S$GLB,, | Performed by: INTERNAL MEDICINE

## 2024-06-10 PROCEDURE — 36415 COLL VENOUS BLD VENIPUNCTURE: CPT | Performed by: INTERNAL MEDICINE

## 2024-06-10 PROCEDURE — 80053 COMPREHEN METABOLIC PANEL: CPT | Performed by: INTERNAL MEDICINE

## 2024-06-10 PROCEDURE — 85652 RBC SED RATE AUTOMATED: CPT | Performed by: INTERNAL MEDICINE

## 2024-06-10 PROCEDURE — 3077F SYST BP >= 140 MM HG: CPT | Mod: CPTII,S$GLB,, | Performed by: INTERNAL MEDICINE

## 2024-06-10 PROCEDURE — 1125F AMNT PAIN NOTED PAIN PRSNT: CPT | Mod: CPTII,S$GLB,, | Performed by: INTERNAL MEDICINE

## 2024-06-10 PROCEDURE — 85025 COMPLETE CBC W/AUTO DIFF WBC: CPT | Performed by: INTERNAL MEDICINE

## 2024-06-10 PROCEDURE — 3080F DIAST BP >= 90 MM HG: CPT | Mod: CPTII,S$GLB,, | Performed by: INTERNAL MEDICINE

## 2024-06-10 PROCEDURE — 99215 OFFICE O/P EST HI 40 MIN: CPT | Mod: S$GLB,,, | Performed by: INTERNAL MEDICINE

## 2024-06-10 PROCEDURE — 86140 C-REACTIVE PROTEIN: CPT | Performed by: INTERNAL MEDICINE

## 2024-06-10 RX ORDER — UPADACITINIB 15 MG/1
15 TABLET, EXTENDED RELEASE ORAL DAILY
Qty: 30 TABLET | Refills: 0 | Status: ACTIVE | OUTPATIENT
Start: 2024-06-10 | End: 2024-06-12 | Stop reason: SDUPTHER

## 2024-06-10 RX ORDER — NITROFURANTOIN 25; 75 MG/1; MG/1
100 CAPSULE ORAL 2 TIMES DAILY
Qty: 10 CAPSULE | Refills: 0 | Status: SHIPPED | OUTPATIENT
Start: 2024-06-10 | End: 2024-06-15

## 2024-06-10 NOTE — TELEPHONE ENCOUNTER
Called pt regarding message. Pt stated she didn't call the office today and is unaware of the message. Verbalized understanding.

## 2024-06-10 NOTE — PROGRESS NOTES
"Subjective:      Patient ID: Lashanda Monaco is a 68 y.o. female.    Chief Complaint: Disease Management    HPI  68 year old F  with PMH of HL, HTN, basal cell cancer,  bilateral hip replacement, lumbar arthritis,  right rotator cuff tear and bicep tear, left CT release,  here for evaluation. She reports she needs right knee replacement.   She gets pain in neck, shoulders, hands, wrists, right knee, both ankles, lower back, and right foot.  She has been having since she was 64. Pain level  can be as high 7/10.   Reports swelling in ankles and feet for years. Denies any rashes, oral ulcers, fevers, raynauds, or photosensitivity. Reports some hair thinning.   She takes meloxicam 15 mg a day for about  5 years. Denies smoking.     Family hx: sister: RA       Interval history: She weaned herself off the steroids and now can't walk.  She is having pain in wrists, hands, knees,and right foot.  She also has pain in lower back.  She has  swelling in wrists and right foot. She is planning on getting right knee replacement.   Past Medical History:   Diagnosis Date    Arthritis     Hypercholesterolemia     Hypertension        Review of Systems see HPI     Objective:   BP (!) 157/98   Pulse 97   Ht 5' 4" (1.626 m)   Wt 89.8 kg (198 lb)   BMI 33.99 kg/m²   Physical Exam   Constitutional: She is oriented to person, place, and time.   HENT:   Head: Normocephalic and atraumatic.   Right Ear: External ear normal.   Left Ear: External ear normal.   Nose: Nose normal.   Mouth/Throat: Oropharynx is clear and moist. No oropharyngeal exudate.   Eyes: Pupils are equal, round, and reactive to light. Conjunctivae are normal. Right eye exhibits no discharge. Left eye exhibits no discharge. No scleral icterus.   Neck: No JVD present. No thyromegaly present.   Cardiovascular: Normal rate, regular rhythm and normal heart sounds. Exam reveals no gallop and no friction rub.   No murmur heard.  Pulmonary/Chest: Effort normal and breath " sounds normal. No respiratory distress. She has no wheezes. She has no rales. She exhibits no tenderness.   Abdominal: Soft. Bowel sounds are normal. She exhibits no distension and no mass. There is no abdominal tenderness. There is no rebound and no guarding.   Musculoskeletal:         General: Swelling and tenderness present.      Cervical back: Neck supple.   Lymphadenopathy:     She has no cervical adenopathy.   Neurological: She is alert and oriented to person, place, and time. No cranial nerve deficit. Gait normal. Coordination normal.   Skin: Skin is dry. No rash noted. No erythema. No pallor.   Psychiatric: Affect and judgment normal.   TENDERNESS IN MCPS, PIPS, WRISTS, ANKLES , MTPS WITH SWELLING    No data to display     Assessment:   68 year old F  with PMH of HL, HTN, basal cell cancer,  bilateral hip replacement, lumbar arthritis,  right rotator cuff tear and bicep tear, left CT release,  here for evaluation of joint pain.  She is coming in with bilateral symmetrical arthritis consistent  RHEUMATOID ARTHRITIS. PATIENT HAS EROSIVE CHANGES IN WRISTS CONSISTENT WITH SERONEGATIVE RA.  GIVEN ABNORMAL CT CHEST, WILL AVOID MTX AS THIS CAN MAKE NODULES WORSE AND CAN AFFECT THE LUNG.  She has upcoming  CT chest to evaluate abnormal chest xray.  Will need to follow it up to determine if MTX would be options for her.          1. Primary osteoarthritis of right knee          Plan:     Problem List Items Addressed This Visit    None  Visit Diagnoses       Primary osteoarthritis of right knee        Relevant Orders    Rheumatoid Factor    Cyclic Citrullinated Peptide Antibody, IgG    Sedimentation rate    C-Reactive Protein    Hepatitis B Core Antibody, Total    Hepatitis B Surface Antigen    Hepatitis C Antibody    XR Arthritis Survey         #Joint pain: suspect she has RA.  Labs   xrays  Stop meloxicam  Start predniSONE (DELTASONE) 10 MG [id number]/14/2024--NoSig: Take 4 pills once a day for 10 days and  then 10mg dailySent to pharmacy as: predniSONE (DELTASONE) 10 MG tabletClass:   Labs  Xrays    #obesity: encourage weight loss  #abnormal chest xray: follow up CT chest      60 * minutes of total time spent on the encounter, which includes face to face time and non-face to face time preparing to see the patient (eg, review of tests), Obtaining and/or reviewing separately obtained history, Documenting clinical information in the electronic or other health record, Independently interpreting results (not separately reported) and communicating results to the patient/family/caregiver, or Care coordination (not separately reported).     ADDENDUM; XRAYS CONSISTENT WITH EROSIVE CHANGES SEEN IN RA.  DIAGNOSIS IS SERONEGATIVE RA GIVEN bilateral symmetrical arthritis.  NO MTX given abnormal chest xray  GIVEN HISTORY OF BASAL CELL SKIN CANCER, WILL AVOID ANTI-TNF.

## 2024-06-10 NOTE — PROGRESS NOTES
"Subjective:      Patient ID: Lashanda Monaco is a 68 y.o. female.    Chief Complaint: Disease Management    HPI  68 year old F  with PMH of HL, HTN, basal cell cancer,  bilateral hip replacement, lumbar arthritis,  right rotator cuff tear and bicep tear, left CT release,  here for evaluation. She reports she needs right knee replacement.   She gets pain in neck, shoulders, hands, wrists, right knee, both ankles, lower back, and right foot.  She has been having since she was 64. Pain level  can be as high 7/10.   Reports swelling in ankles and feet for years. Denies any rashes, oral ulcers, fevers, raynauds, or photosensitivity. Reports some hair thinning.   She takes meloxicam 15 mg a day for about  5 years. Denies smoking.     Family hx: sister: RA    Interval history: She weaned herself off the steroids and now can't walk. She is having pain in wrists, hands, knees,and right foot.  She also has pain in lower back. She has swelling in wrists and right foot. She is planning on getting right knee replacement.     Past Medical History:   Diagnosis Date    Arthritis     Hypercholesterolemia     Hypertension        Review of Systems see HPI     Objective:   BP (!) 157/98   Pulse 97   Ht 5' 4" (1.626 m)   Wt 89.8 kg (198 lb)   BMI 33.99 kg/m²   Physical Exam   Constitutional: She is oriented to person, place, and time.   HENT:   Head: Normocephalic and atraumatic.   Right Ear: External ear normal.   Left Ear: External ear normal.   Nose: Nose normal.   Mouth/Throat: Oropharynx is clear and moist. No oropharyngeal exudate.   Eyes: Pupils are equal, round, and reactive to light. Conjunctivae are normal. Right eye exhibits no discharge. Left eye exhibits no discharge. No scleral icterus.   Neck: No JVD present. No thyromegaly present.   Cardiovascular: Normal rate, regular rhythm and normal heart sounds. Exam reveals no gallop and no friction rub.   No murmur heard.  Pulmonary/Chest: Effort normal and breath sounds " normal. No respiratory distress. She has no wheezes. She has no rales. She exhibits no tenderness.   Abdominal: Soft. Bowel sounds are normal. She exhibits no distension and no mass. There is no abdominal tenderness. There is no rebound and no guarding.   Musculoskeletal:         General: Swelling and tenderness present.      Cervical back: Neck supple.   Lymphadenopathy:     She has no cervical adenopathy.   Neurological: She is alert and oriented to person, place, and time. No cranial nerve deficit. Gait normal. Coordination normal.   Skin: Skin is dry. No rash noted. No erythema. No pallor.   Psychiatric: Affect and judgment normal.   TENDERNESS IN MCPS, PIPS, WRISTS, ANKLES , MTPS WITH SWELLING    No data to display     Assessment:   68 year old F  with PMH of HL, HTN, basal cell cancer,  bilateral hip replacement, lumbar arthritis,  right rotator cuff tear and bicep tear, left CT release,  here for follow up of seronegative RA.  She is coming in with bilateral symmetrical arthritis consistent  RHEUMATOID ARTHRITIS. PATIENT HAS EROSIVE CHANGES IN WRISTS CONSISTENT WITH SERONEGATIVE RA.  GIVEN ABNORMAL CT CHEST, WILL AVOID MTX AS THIS CAN MAKE NODULES WORSE AND CAN AFFECT THE LUNG.  I discussed with her and it would be optimal to get her RA under control before knee replacement.      - no anti-TNF given basal cell cancer  -no MTX given nodules  -awaiting PA for Rinvoq  -patient self stopped steroids and would like to get back on mobic. (Need to check gfr)  -plan to get labs 4 weeks after she starts rinvoq.    #obesity: encourage weight loss        40 * minutes of total time spent on the encounter, which includes face to face time and non-face to face time preparing to see the patient (eg, review of tests), Obtaining and/or reviewing separately obtained history, Documenting clinical information in the electronic or other health record, Independently interpreting results (not separately reported) and communicating  results to the patient/family/caregiver, or Care coordination (not separately reported).

## 2024-06-10 NOTE — TELEPHONE ENCOUNTER
----- Message from Celine Herrera sent at 6/10/2024  2:22 PM CDT -----  Contact: [phone]  Pt is requesting this through the portal please advise    Preferred Date Range: 6/20/2024 - 6/30/2024    Preferred Times: Any Time    Reason for visit: Coming off steroids    Comments:  Side effects from coming off steroids

## 2024-06-11 ENCOUNTER — HOSPITAL ENCOUNTER (OUTPATIENT)
Dept: RADIOLOGY | Facility: HOSPITAL | Age: 69
Discharge: HOME | End: 2024-06-11
Attending: ORTHOPAEDIC SURGERY
Payer: MEDICARE

## 2024-06-11 ENCOUNTER — TELEPHONE (OUTPATIENT)
Dept: PRIMARY CARE CLINIC | Facility: CLINIC | Age: 69
End: 2024-06-11
Payer: MEDICARE

## 2024-06-11 ENCOUNTER — OFFICE VISIT (OUTPATIENT)
Dept: ORTHOPEDICS | Facility: CLINIC | Age: 69
End: 2024-06-11
Payer: MEDICARE

## 2024-06-11 ENCOUNTER — PATIENT MESSAGE (OUTPATIENT)
Dept: RHEUMATOLOGY | Facility: CLINIC | Age: 69
End: 2024-06-11
Payer: MEDICARE

## 2024-06-11 VITALS — BODY MASS INDEX: 41.45 KG/M2 | WEIGHT: 219.56 LBS | HEIGHT: 61 IN

## 2024-06-11 DIAGNOSIS — R11.0 NAUSEA: Primary | ICD-10-CM

## 2024-06-11 DIAGNOSIS — M17.11 PRIMARY OSTEOARTHRITIS OF RIGHT KNEE: Primary | ICD-10-CM

## 2024-06-11 DIAGNOSIS — M17.11 PRIMARY OSTEOARTHRITIS OF RIGHT KNEE: ICD-10-CM

## 2024-06-11 PROCEDURE — 73562 X-RAY EXAM OF KNEE 3: CPT | Mod: 26,59,LT, | Performed by: RADIOLOGY

## 2024-06-11 PROCEDURE — 73564 X-RAY EXAM KNEE 4 OR MORE: CPT | Mod: 26,RT,, | Performed by: RADIOLOGY

## 2024-06-11 PROCEDURE — 3288F FALL RISK ASSESSMENT DOCD: CPT | Mod: CPTII,S$GLB,, | Performed by: ORTHOPAEDIC SURGERY

## 2024-06-11 PROCEDURE — 3008F BODY MASS INDEX DOCD: CPT | Mod: CPTII,S$GLB,, | Performed by: ORTHOPAEDIC SURGERY

## 2024-06-11 PROCEDURE — 1159F MED LIST DOCD IN RCRD: CPT | Mod: CPTII,S$GLB,, | Performed by: ORTHOPAEDIC SURGERY

## 2024-06-11 PROCEDURE — 1125F AMNT PAIN NOTED PAIN PRSNT: CPT | Mod: CPTII,S$GLB,, | Performed by: ORTHOPAEDIC SURGERY

## 2024-06-11 PROCEDURE — 73562 X-RAY EXAM OF KNEE 3: CPT | Mod: 59,TC,LT

## 2024-06-11 PROCEDURE — 1101F PT FALLS ASSESS-DOCD LE1/YR: CPT | Mod: CPTII,S$GLB,, | Performed by: ORTHOPAEDIC SURGERY

## 2024-06-11 PROCEDURE — 99999 PR PBB SHADOW E&M-EST. PATIENT-LVL III: CPT | Mod: PBBFAC,,, | Performed by: ORTHOPAEDIC SURGERY

## 2024-06-11 PROCEDURE — 99214 OFFICE O/P EST MOD 30 MIN: CPT | Mod: S$GLB,,, | Performed by: ORTHOPAEDIC SURGERY

## 2024-06-11 RX ORDER — ONDANSETRON 4 MG/1
4 TABLET, ORALLY DISINTEGRATING ORAL EVERY 6 HOURS PRN
Qty: 20 TABLET | Refills: 1 | Status: SHIPPED | OUTPATIENT
Start: 2024-06-11

## 2024-06-11 NOTE — LETTER
I certify that (Name) Lashanda Monaco meets the requirements as outlined in #  (shown on reverse side) and qualifies for a mobility impaired license plate/hang-tag. I further understand that willful and false certification shall subject me to fines/imprisonment as outlined in R.S. 47:463.4 (G) (4). The applicant's information is as follows:    YOB: 1955            Race:White        Gender:Female    Address:  [address] [place] [address]    City:[place]                               State:[place]     Zip Code:[address]     []Permanently Impaired - Applicant has a total or lifelong condition of mobility impairment from which little or no improvement or recovery can reasonably be expected. A medical examiners certification is required on initial application only.      [] Temporarily Impaired - Applicant has a temporary condition of mobility impairment of which improvement or recovery can reasonably be expected. Applicant is entitled to a hangtag, which will be valid for one (1) year. A medical examiners certification is required for the renewal of the hangtag      [] Unable to appear in person at the Office of Motor Vehicles - Applicant must bring facial photo        Medical Examiner's Signature________________________________________ Date:6/11/24_________________________    Printed Name:___________________________________________________    State License #_____________________________    Address: [address]                                     Phone Number: [phone]                                                                                                                                                                                                                  City: [place]__________________________________ State: [place] __Zip Code: [address] _______________    TO BE COMPLETED BY MOTOR VEHICLE ANALYST ONLY    JONATHAN   Lic. Plate #      Hangtag Control #   Hangtag ID #       Date Issued:    #:   Office #:

## 2024-06-11 NOTE — TELEPHONE ENCOUNTER
----- Message from Celine Herrera sent at 6/11/2024 10:43 AM CDT -----  Contact: [phone]  [email or web address]     Patient is calling for Medical Advice regarding:handi cap sticker     How long has patient had these symptoms:    Pharmacy name and phone#:    Would like response via JJ PHARMAhart:  no     Comments:  Pt needs referral for to calm the shakes and nausea from getting off the steroids and she is also needing a handi cap parking sticker as well please give return call     Also she states the dr she seen yesterday has given her antibiotics for A UTI she is just letting the dr know

## 2024-06-11 NOTE — TELEPHONE ENCOUNTER
Called pt regarding message. Pt stated rheumatologist prescribed prednisone 10 mg for pt. Pt stated she stopped taking the medication 2 weeks ago. Pt stated she started having nausea and tremors. Pt is asking PCP to prescribe mediation to help with the side effects.  Pt also requested DMV form. Form has been placed on PCP's desk.

## 2024-06-12 ENCOUNTER — TELEPHONE (OUTPATIENT)
Dept: ORTHOPEDICS | Facility: CLINIC | Age: 69
End: 2024-06-12
Payer: MEDICARE

## 2024-06-12 DIAGNOSIS — M06.9 RHEUMATOID ARTHRITIS FLARE: ICD-10-CM

## 2024-06-12 RX ORDER — UPADACITINIB 15 MG/1
15 TABLET, EXTENDED RELEASE ORAL DAILY
Qty: 30 TABLET | Refills: 11 | Status: ACTIVE | OUTPATIENT
Start: 2024-06-12

## 2024-06-12 NOTE — TELEPHONE ENCOUNTER
----- Message from Anya Callaway MD sent at 6/11/2024  9:35 AM CDT -----  Regarding: RE: ?timing for TKA  Hello  Ideally we would want her to be controlled from her RA prior to surgery.  I am waiting on specialty pharmacy to give me status of Rinvoq and other treatment options.  ----- Message -----  From: Jose David Chowdhury III, MD  Sent: 6/11/2024   9:00 AM CDT  To: Anya Callaway MD; Nawaf Mack; #  Subject: ?timing for TKA                                  Thank you for working with ms kelley    Looks like CRP and ESR are up off steroids and rinvoq pending insurance auth and other meds contraindicated?     What are your thoughts re: timing/optimization for TKA?   She would like to proceed with surgery    Thank you  will

## 2024-07-04 ENCOUNTER — PATIENT MESSAGE (OUTPATIENT)
Dept: RHEUMATOLOGY | Facility: CLINIC | Age: 69
End: 2024-07-04
Payer: MEDICARE

## 2024-07-04 DIAGNOSIS — Z63.6 CAREGIVER STRESS: ICD-10-CM

## 2024-07-04 SDOH — SOCIAL DETERMINANTS OF HEALTH (SDOH): DEPENDENT RELATIVE NEEDING CARE AT HOME: Z63.6

## 2024-07-04 NOTE — TELEPHONE ENCOUNTER
No care due was identified.  [facility] Embedded Care Due Messages. Reference number: [id number].   7/04/2024 2:19:24 PM CDT

## 2024-07-05 RX ORDER — ALPRAZOLAM 0.5 MG/1
0.5 TABLET ORAL DAILY PRN
Qty: 20 TABLET | Refills: 1 | Status: SHIPPED | OUTPATIENT
Start: 2024-07-05

## 2024-07-09 ENCOUNTER — PATIENT MESSAGE (OUTPATIENT)
Dept: OTOLARYNGOLOGY | Facility: CLINIC | Age: 69
End: 2024-07-09
Payer: MEDICARE

## 2024-07-15 ENCOUNTER — PATIENT MESSAGE (OUTPATIENT)
Dept: RHEUMATOLOGY | Facility: CLINIC | Age: 69
End: 2024-07-15
Payer: MEDICARE

## 2024-07-15 ENCOUNTER — PATIENT MESSAGE (OUTPATIENT)
Dept: PRIMARY CARE CLINIC | Facility: CLINIC | Age: 69
End: 2024-07-15
Payer: MEDICARE

## 2024-07-16 ENCOUNTER — TELEPHONE (OUTPATIENT)
Dept: ORTHOPEDICS | Facility: CLINIC | Age: 69
End: 2024-07-16
Payer: MEDICARE

## 2024-07-16 NOTE — TELEPHONE ENCOUNTER
Attempted to contact pt to schedule appointment, no answer. LVM with reason for call and call back number

## 2024-07-17 ENCOUNTER — TELEPHONE (OUTPATIENT)
Dept: ORTHOPEDICS | Facility: CLINIC | Age: 69
End: 2024-07-17
Payer: MEDICARE

## 2024-07-17 NOTE — TELEPHONE ENCOUNTER
Scheduled appointment, pt added to wait list.   ----- Message from Raya Cast sent at 7/17/2024  8:03 AM CDT -----      Nature of Call: missed call     Best Call Back Number:  [phone]    Additional Information:  Lashanda Monaco calling regarding Patient Advice for a missed call from Rosie about scheduling, call back assist

## 2024-07-18 ENCOUNTER — TELEPHONE (OUTPATIENT)
Dept: ORTHOPEDICS | Facility: CLINIC | Age: 69
End: 2024-07-18
Payer: MEDICARE

## 2024-07-18 NOTE — TELEPHONE ENCOUNTER
Spoke to pt in regards to message below, pt at first stated that no one helped her, but after looking in her chart to further assist her I informed her that it looks like some one scheduled her for September 17th to see . Pt stated that it correct she just forgot that Rosie stated she was going to just put her done for that date. Pt stated she wanted to know how Dr. Chowdhury surgery schedule is looking because she would like surgery after August 12, 2024 or the beginning of September. I informed pt that I would have to discuss that with Sukhdeep because I do not have Dr. Chowdhury surgery schedule, but I do know that his surgeries are full up until September already I think, but I can confirm that with Sukhdeep.     Sincerely,  Dilcia Ozuna CMA   Certified Clinical Medical Assistant to Dr. Jose David Chowdhury [facility]  Phone: [phone]  Fax: [phone]     ----- Message from Raya Cast sent at 7/17/2024  8:03 AM CDT -----      Nature of Call: missed call     Best Call Back Number:  [phone]    Additional Information:  Lashanda Monaco calling regarding Patient Advice for a missed call from Rosie about scheduling, call back assist

## 2024-07-24 ENCOUNTER — PATIENT MESSAGE (OUTPATIENT)
Dept: PRIMARY CARE CLINIC | Facility: CLINIC | Age: 69
End: 2024-07-24
Payer: MEDICARE

## 2024-07-24 RX ORDER — ESTRADIOL 2 MG/1
2 TABLET ORAL DAILY
COMMUNITY
End: 2024-07-24 | Stop reason: CLARIF

## 2024-07-25 ENCOUNTER — PATIENT MESSAGE (OUTPATIENT)
Dept: SPINE | Facility: CLINIC | Age: 69
End: 2024-07-25
Payer: MEDICARE

## 2024-08-15 ENCOUNTER — TELEPHONE (OUTPATIENT)
Dept: SPINE | Facility: CLINIC | Age: 69
End: 2024-08-15
Payer: MEDICARE

## 2024-08-28 DIAGNOSIS — M06.9 RHEUMATOID ARTHRITIS FLARE: Primary | ICD-10-CM

## 2024-09-17 ENCOUNTER — OFFICE VISIT (OUTPATIENT)
Dept: ORTHOPEDICS | Facility: CLINIC | Age: 69
End: 2024-09-17
Payer: MEDICARE

## 2024-09-17 VITALS — HEIGHT: 61 IN | WEIGHT: 228.81 LBS | BODY MASS INDEX: 43.2 KG/M2

## 2024-09-17 DIAGNOSIS — M17.11 PRIMARY OSTEOARTHRITIS OF RIGHT KNEE: Primary | ICD-10-CM

## 2024-09-17 PROCEDURE — 1125F AMNT PAIN NOTED PAIN PRSNT: CPT | Mod: CPTII,S$GLB,, | Performed by: ORTHOPAEDIC SURGERY

## 2024-09-17 PROCEDURE — 3288F FALL RISK ASSESSMENT DOCD: CPT | Mod: CPTII,S$GLB,, | Performed by: ORTHOPAEDIC SURGERY

## 2024-09-17 PROCEDURE — 99214 OFFICE O/P EST MOD 30 MIN: CPT | Mod: S$GLB,,, | Performed by: ORTHOPAEDIC SURGERY

## 2024-09-17 PROCEDURE — 1159F MED LIST DOCD IN RCRD: CPT | Mod: CPTII,S$GLB,, | Performed by: ORTHOPAEDIC SURGERY

## 2024-09-17 PROCEDURE — 3008F BODY MASS INDEX DOCD: CPT | Mod: CPTII,S$GLB,, | Performed by: ORTHOPAEDIC SURGERY

## 2024-09-17 PROCEDURE — 99999 PR PBB SHADOW E&M-EST. PATIENT-LVL III: CPT | Mod: PBBFAC,,, | Performed by: ORTHOPAEDIC SURGERY

## 2024-09-17 PROCEDURE — 1101F PT FALLS ASSESS-DOCD LE1/YR: CPT | Mod: CPTII,S$GLB,, | Performed by: ORTHOPAEDIC SURGERY

## 2024-09-17 NOTE — PROGRESS NOTES
Subjective:     HPI:   Lashanda Monaco is a 69 y.o. female who presents for repeat eval R knee    Genetics pending no CMT  Needs rheum optimization Dr VEGA, Dx RA  R ant AURELIANO 2020 Junius - ?fem nerve v foot drop post op  L post AURELIANO 2010 Geremias  EMG: no sig major nerve/lumbar radic, +early PN of unclear etiology  CT chest: ascending aorta aneurysm - vasc eval observe    ---- Message from Anya Callaway MD sent at 6/11/2024  9:35 AM CDT -----  Regarding: RE: ?timing for TKA  Hello  Ideally we would want her to be controlled from her RA prior to surgery.  I am waiting on specialty pharmacy to give me status of Rinvoq and other treatment options.    Now on rinvoq daily     Labs 8/23/24:   CRP 1.7  ESR 19    Today:   Progressive R knee pain   Now using a walker  Ready for TKA   Gaining weight    Sos support:   : dementia, she is sole caretaker  She has several people lined up to help post op    Needs 1 night in hospital and rides to OP PT    History of Present Illness  The patient presents for evaluation of knee replacement.    She was last seen in June 2024, at which time the possibility of a knee replacement was discussed. She has been under the care of Dr. Munguia, who recommended managing her rheumatoid arthritis prior to surgery. After a month on medication, she underwent blood work in August 2024, which showed improvement. However, she has not received any further communication from Dr. Munguia.    She has been taking Rinvoq daily for the past two months and one week and is not on any other medications for rheumatoid arthritis. She reports that her knee condition is deteriorating, and her gait has been affected due to back issues, necessitating the use of a walker. She has also gained weight.    Despite these challenges, she continues to perform leg exercises to strengthen her calves. She has a support system in place for post-surgery care, including her brother and several others. She prefers to undergo  therapy after the surgery.       Objective:   Body mass index is 43.24 kg/m².  Exam:    Physical Exam  Patient stands independently with a significant limp and antalgic gait, favoring the right knee. No pain is observed with internal or external range of motion of the hip joint. Knee range of motion is 5 to 110 degrees. Alignment is 0 degrees, correcting into valgus. Knee is stable to anterior, posterior, varus and valgus stresses with flexion contracture, but no extension lag. Tenderness is noted upon palpation of medial and lateral patellofemoral joint lines with moderate effusion distally. Neurovascular integrity is intact with good strength, sensation and pulses.    5-110  5/5 quad and distal strength    Imaging:    Results  Laboratory Studies  Inflammatory markers were better in August.    KNEE R ARTHRITIS    Indication:  Right knee pain  Exam Ordered: Radiographs of the right knee include a standing anteroposterior view, a standing posterioanterior view, a lateral view in full flexion, and a sunrise view  Details of Examination: Exam shows evidence of joint space narrowing, osteophyte formation, and subchondral sclerosis, all consistent with degenerative arthritis of the knee.  No other significant findings are noted.  Impression:  Degenerative Arthritis, Right Knee    Klg4 sever bone on bone varus knee arthritis      Assessment/Plan:       ICD-10-CM ICD-9-CM   1. Primary osteoarthritis of right knee  M17.11 715.16        RA  Neuropathy  Pulm issues  Radicular spine  BMI now up to 43    R ant AURELIANO 2020 Junius - ?fem nerve v foot drop post op  L post AURELIANO 2010 Geremias    Assessment/Plan:     Assessment & Plan  1. Rheumatoid Arthritis.  Her inflammatory markers have improved as of August 2024. She has been on Rinvoq for 2 months and 1 week, taking it once daily. No other rheumatoid arthritis medications are currently being used. A message has been sent to Dr. Munguia to confirm if she is ready for knee  replacement surgery.    2. Right Knee Osteoarthritis.  She has significant limp and antalgic gait, favoring the right knee. Physical examination reveals 5 to 110 degrees knee range of motion, 0 degrees of alignment correcting into valgus, and tenderness to palpation medial and lateral patellofemoral joint lines with moderate effusion distally. The knee is stable to anterior, posterior, varus, and valgus stresses with flexion contracture but no extension lag. She is neurovascularly intact with good strength, sensation, and pulses. The surgical procedure, its associated risks, and the recovery process were thoroughly explained. Post-surgery, she will be prescribed baby aspirin 81 mg twice daily for 30 days. Outpatient therapy will be scheduled two or three times a week for the first 6 weeks, and she will perform exercises on her own in between sessions.      The above findings were discussed with patient length. We discussed the risks of conservative versus surgical management knee arthritis. Conservative management consisting of anti-inflammatory medications, glucosamine/chondroitin sulfate, weight loss, physical therapy, activity modification, as well as injections (lubricant versus corticosteroid) was discussed at length. At this point considering the patient's level of activity, pain, and radiographic findings I recommend TKA    This patient has significant symptoms in their knee that are affecting their quality of life and daily activities.  They have tried non-operative treatment including analgesics, an exercise program, and activity modification, but the symptoms have persisted. I believe they make a good candidate for knee arthroplasty.     The risks and benefits of knee arthroplasty have been discussed with the patient which include, but are not limited to infections (including severe sequelae), potential component failure, fracture, DVT, pulmonary embolus, nerve palsy, poor range of motion, the possibility  of bone grafting, persistent pain, wound healing complications, transfusions, medical complications and death.     We discussed surgical options including implant type and why I believe the implant selected is a good match for the patient's needs. The patient expressed understanding and agrees to proceed with the planned surgery.     Pre-operative planning will include the following:  A pre-surgical evaluation by will be arranged.  Pre-op orders will be placed.  We will make arrangements with the operating room for proper time and staffing.  We will make Social Service arrangements for the patient.    Implants:   Company: [employer]  System: Attune    Velys (not available at [facility] [place])  FB revision tray + 30 stem  Simplex P + tobra       DVT prophylaxis: ASA 81mg BID x1 month    Dispo: outpatient PT    Admission status: Outpatient/23hr OBS    Location: [facility]      Seen 6/11/24 and discussed R TKA     Pul issues explored  Genetics - does not have CMT, virtual visit pending  Spine - better after caudal block - exam more consistant with OA and not radicular pain   Message back from Rheum 6/12 was needed time for optimization on rinvoq  CRP/ESR now normal  Last seen by rheum 6/10 no further notes       R VTKA   R+B today  TKA + velys info  Timing pending rheum clearance  Needs lat c-spine flex/ext XR     -messaged Dr Callaway: ?clear for surgery, timing for rinvoq?        No orders of the defined types were placed in this encounter.      This note was generated with the assistance of ambient listening technology. Verbal consent was obtained by the patient and accompanying visitor(s) for the recording of patient appointment to facilitate this note. I attest to having reviewed and edited the generated note for accuracy, though some syntax or spelling errors may persist. Please contact the author of this note for any clarification.            Past Medical History:   Diagnosis Date    Arthritis     Hypercholesterolemia      Hypertension        Past Surgical History:   Procedure Laterality Date    BREAST BIOPSY Right     2010 & 2021    BREAST CYST ASPIRATION      CARPAL TUNNEL RELEASE Left     EPIDURAL STEROID INJECTION N/A 5/22/2024    Procedure: CAUDAL DOMINIC DIRECT REFERRAL;  Surgeon: Oswald Hoskins MD;  Location: [facility];  Service: Pain Management;  Laterality: N/A;  [phone]    hip repl Right 08/2019    HYSTERECTOMY      PARTIAL HIP ARTHROPLASTY Left     RHINOPLASTY      ROTATOR CUFF REPAIR Right     SHOULDER ARTHROSCOPY Right 05/2022    right shoulder sx with bicep repair    TONSILLECTOMY, ADENOIDECTOMY      TOTAL ABDOMINAL HYSTERECTOMY W/ BILATERAL SALPINGOOPHORECTOMY      TRANSFORAMINAL EPIDURAL INJECTION OF STEROID Right 01/24/2024    Procedure: LUMBAR TRANSFORAMINAL RIGHT L3/4 AND L4/L5 DIRECT REFERRAL;  Surgeon: Oswald Hoskins MD;  Location: [facility];  Service: Pain Management;  Laterality: Right;  [phone]       Family History   Problem Relation Name Age of Onset    Aortic aneurysm Mother  80    Heart attack Father      Colon cancer Son          no genetic testing    Arthritis Sister      Arthritis Sister      Diabetes Sister      Osteoarthritis Sister      Heart disease Sister      Diabetes Brother      Arthritis Brother      Heart disease Brother      Lymphoma Brother      Diabetes Brother      Arthritis Brother      Heart disease Brother      Heart disease Brother      Heart attack Other      Tourette syndrome Other      Tourette syndrome Other      Tourette syndrome Other      Tourette syndrome Other         Social History     Socioeconomic History    Marital status:    Tobacco Use    Smoking status: Never    Smokeless tobacco: Never   Substance and Sexual Activity    Alcohol use: Yes     Comment: Occasionally     Drug use: No    Sexual activity: Yes     Partners: Male     Social Determinants of Health     Financial Resource Strain: Patient Declined (4/11/2024)    Overall Financial  Resource Strain (CARDIA)     Difficulty of Paying Living Expenses: Patient declined   Food Insecurity: Patient Declined (4/11/2024)    Hunger Vital Sign     Worried About Running Out of Food in the Last Year: Patient declined     Ran Out of Food in the Last Year: Patient declined   Transportation Needs: No Transportation Needs (4/11/2024)    PRAPARE - Transportation     Lack of Transportation (Medical): No     Lack of Transportation (Non-Medical): No   Physical Activity: Unknown (4/11/2024)    Exercise Vital Sign     Days of Exercise per Week: 1 day   Stress: Stress Concern Present (4/11/2024)    [employer] - Occupational Stress Questionnaire     Feeling of Stress : To some extent   Housing Stability: Patient Declined (4/11/2024)    Housing Stability Vital Sign     Unable to Pay for Housing in the Last Year: Patient declined     Unstable Housing in the Last Year: Patient declined

## 2024-09-18 ENCOUNTER — TELEPHONE (OUTPATIENT)
Dept: ORTHOPEDICS | Facility: HOSPITAL | Age: 69
End: 2024-09-18
Payer: MEDICARE

## 2024-09-18 NOTE — TELEPHONE ENCOUNTER
----- Message from Anya Callaway MD sent at 9/18/2024 12:13 PM CDT -----  Regarding: RE: total knee replacement?  Hello  She can hold Rinvoq 3 days prior to surgery.  Ok from my end to proceed with surgery.  Thanks!  Anya  ----- Message -----  From: Jose David Chowdhury III, MD  Sent: 9/17/2024   4:11 PM CDT  To: Anya Callaway MD; Nawaf Mack; #  Subject: total knee replacement?                          Following up re: ms kelley  Last communication from 6/12/24: Ideally we would want her to be controlled from her RA prior to surgery. I am waiting on specialty pharmacy to give me status of Rinvoq and other treatment options.    She has been on rinvoq for 2 months   Repeat labs 8/23/24 normalized:   CRP 1.7  ESR 19    Is she now optimized for total knee replacement?   Any guidance on timing for rinvoq perioperatively?     Thank you,   Christiano Chowdhury

## 2024-09-19 ENCOUNTER — PATIENT MESSAGE (OUTPATIENT)
Dept: ORTHOPEDICS | Facility: CLINIC | Age: 69
End: 2024-09-19
Payer: MEDICARE

## 2024-09-19 ENCOUNTER — TELEPHONE (OUTPATIENT)
Dept: ORTHOPEDICS | Facility: CLINIC | Age: 69
End: 2024-09-19
Payer: MEDICARE

## 2024-09-19 ENCOUNTER — PATIENT MESSAGE (OUTPATIENT)
Dept: PRIMARY CARE CLINIC | Facility: CLINIC | Age: 69
End: 2024-09-19
Payer: MEDICARE

## 2024-09-19 DIAGNOSIS — M17.11 PRIMARY OSTEOARTHRITIS OF RIGHT KNEE: Primary | ICD-10-CM

## 2024-09-19 NOTE — TELEPHONE ENCOUNTER
I called the patient today regarding her surgery. Patient is scheduled for surgery on 10/21 with Dr. Chowdhury . The patient verbalized understanding and has no further questions.           ----- Message from Jose David Chowdhury III, MD sent at 9/18/2024  2:54 PM CDT -----  Regarding: RE: total knee replacement?  Thank you!    Sukhdeep, please proceed with scheduling  ----- Message -----  From: Anya Callaway MD  Sent: 9/18/2024  12:13 PM CDT  To: Jose David Chowdhury III, MD; Nawaf Mack  Subject: RE: total knee replacement?                      Hello  She can hold Rinvoq 3 days prior to surgery.  Ok from my end to proceed with surgery.  Thanks!  Anya  ----- Message -----  From: Jose David Chowdhury III, MD  Sent: 9/17/2024   4:11 PM CDT  To: Anya Callaway MD; Nawaf Mack; #  Subject: total knee replacement?                          Following up re: ms kelley  Last communication from 6/12/24: Ideally we would want her to be controlled from her RA prior to surgery. I am waiting on specialty pharmacy to give me status of Rinvoq and other treatment options.    She has been on rinvoq for 2 months   Repeat labs 8/23/24 normalized:   CRP 1.7  ESR 19    Is she now optimized for total knee replacement?   Any guidance on timing for rinvoq perioperatively?     Thank you,   Christiano Chowdhury

## 2024-09-23 DIAGNOSIS — M17.11 PRIMARY OSTEOARTHRITIS OF RIGHT KNEE: Primary | ICD-10-CM

## 2024-09-23 PROBLEM — M06.9 RHEUMATOID ARTHRITIS: Status: ACTIVE | Noted: 2024-09-23

## 2024-09-23 PROBLEM — F32.A DEPRESSIVE DISORDER: Status: ACTIVE | Noted: 2019-05-06

## 2024-09-23 PROBLEM — F41.9 ANXIETY: Status: ACTIVE | Noted: 2019-05-06

## 2024-09-23 RX ORDER — IBUPROFEN 200 MG
400 TABLET ORAL
Status: ON HOLD | COMMUNITY
End: 2024-10-17 | Stop reason: HOSPADM

## 2024-09-23 NOTE — ANESTHESIA PAT ROS NOTE
09/23/2024  Lashanda Monaco is a 69 y.o., female.      Pre-op Assessment          Review of Systems           Anesthesia Assessment: Preoperative EQUATION    Planned Procedure: Procedure(s) (LRB):  ARTHROPLASTY, KNEE, TOTAL, USING [employer] COMPUTER-ASSISTED NAVIGATION: RIGHT: DEPUY - ATTUNE (Right)  Requested Anesthesia Type:Regional  Surgeon: Jose David Chowdhury III, MD  Service: Orthopedics  Known or anticipated Date of Surgery:10/21/2024    Surgeon notes: reviewed    Electronic QUestionnaire Assessment completed via nurse interview with patient.        Triage considerations:     The patient has no apparent active cardiac condition (No unstable coronary Syndrome such as severe unstable angina or recent [<1 month] myocardial infarction, decompensated CHF, severe valvular   disease or significant arrhythmia)    Previous anesthesia records:MAC   Patient reports slow to awaken and PONV and reports no  family history of anesthesia complications.   10/23/15     outside [facility]  neurectomy forearm AIN and PIN (Left: Wrist)   Airway    Mallampati Score: II.     Last PCP note: 6-12 months ago , within [facility] Dr. [place]  Subspecialty notes: Ortho  Rheumatology      Dr. Callaway  Pulmonology        Dr. Galeano  Ortho Surgery      IGLESIA DE  Genetics               [place] [facility] [place]ioth[place] Surgery     Dr. Munoz  Bariatrics              Dr. Kelley  Pain Management              Dr. Hoskins    Other important co-morbidities: GERD, HLD, HTN, and Morbid Obesity, TAA, MDD/MARIBEL, OA, RA, cLBP w/ Rt sided sciatica, Lum DDD w/ multilevel stenosis, hx Migraines, hx BCC, foot drop post op RTHA, neuropathy (R foot), +COVID 7/15/24, marijuana edibles (one brownie nightly)      Tests already available:  Available tests,  within [facility] .  8/23/24      Sed Rate   C-Reactive protein   CMP   CBC  4/4/24        CXR  3/27/24       TTE  3/14/24      XR Arthritis Survey  3/15/24      CT Chest  3/13/24      EMG  3/1/24        EKG  1/3/24        MRI Lumbar spine  12/12/23    Xray Lumbar spine               Instructions given. (See in Nurse's note)    Optimization:  Anesthesia Preop Clinic Assessment  Indicated  Will schedule POC.    Medical Opinion Indicated       Sub-specialist consult indicated:   [facility] NP.       Plan:    Testing:  EKG and PT/INR   Pre-anesthesia  visit       Visit focus: possible regional anesthesia and/or nerve block      Consultation: . PCC NP for medical and anesthesia optimization.     Patient  has previously scheduled Medical Appointment:   9/24   10/9    Navigation: Tests Scheduled.              Consults scheduled.             Results will be tracked by Preop Clinic.  3/27/24  Ms. Monaco is a pleasant 68 y.o. female with asymptomatic ascending aortic aneurysm.  She has no chest pain.     CT chest noncontrast shows 4.4cm ascending aorta.     We had a lengthy discussion with her regarding her aortic aneurysm and the guidelines and we agreed to continue surveillance.  We will obtain a CT chest noncontrast in one year with repeat clinic visit.       Louis Munoz MD  Cardiothoracic Surgery  [facility]     6/4/24  Dr. Maddox   Pulmonology  From a pulmonary perspective I do believe she can tolerate anesthesia if required for any future surgeries.      9/18/24   Message from Anya Callaway MD sent at 9/18/2024 12:13 PM CDT -----  Regarding: RE: total knee replacement?  Hello  She can hold Rinvoq 3 days prior to surgery.  Ok from my end to proceed with surgery.  Thanks!  Anya              9/25/24  Patient is optimized     I spent a total of 55 minutes on the day of the visit.This includes face to face time and non-face to face time preparing to see the patient (eg, review of tests), obtaining and/or reviewing separately obtained history, documenting clinical information in the electronic  or other health record, independently interpreting results and communicating results to the patient/family/caregiver, or care coordinator.        Heath Monaco NP  Perioperative Medicine  [facility]

## 2024-09-23 NOTE — PRE-PROCEDURE INSTRUCTIONS
Chart review; triage plan initiated.    Spoke to patient regarding upcoming scheduled surgery.  Name, , and allergies verified.  Medical, surgical, and anesthesia history reviewed.  Instructed to hold vitamins, supplements and NSAIDs for one week prior to surgery (last day can take is Oct 13). Informed that the remaining medication instructions will be provided at the POC visit. Pt verbalized understanding.    Patient instructed to hold Rinvoq 3 days prior to surgery, last day can take is Oct 17.  Patient verbalized understanding and agreed.    Patient reported a chipped tooth with a lost filling. Patient said will call dentist today and get a cleaning and tooth checked.  Instructed patient to obtain a letter from dentist that she is ok for knee replacement surgery.  Patient verbalized understanding and agreed.

## 2024-09-23 NOTE — ASSESSMENT & PLAN NOTE
Current /93 today.  Repeat BP after 30 [id number]/82  Taking: Norvasc  Digital HTN patient- they manager her BP    9/18/24 140/84  9/7/24 150/92             159/99  9/2/24 135/88  9/24/24 154/91      Lifestyle changes to reduce systolic BP:   exercise 30 minutes per day,  5 days per week or 150 minutes weekly; sodium reduction and avoidance of high salt foods such as processed meats, frozen meals and  fast foods.   Keeping a healthy weight/BMI can help with better BP control    BP acceptable for surgery. I recommend monitoring BP during perioperative period as uncontrolled pain can elevate blood pressure.

## 2024-09-24 ENCOUNTER — PATIENT MESSAGE (OUTPATIENT)
Dept: FAMILY MEDICINE | Facility: CLINIC | Age: 69
End: 2024-09-24
Payer: MEDICARE

## 2024-09-24 ENCOUNTER — OFFICE VISIT (OUTPATIENT)
Dept: ORTHOPEDICS | Facility: CLINIC | Age: 69
End: 2024-09-24
Payer: MEDICARE

## 2024-09-24 ENCOUNTER — HOSPITAL ENCOUNTER (OUTPATIENT)
Dept: RADIOLOGY | Facility: HOSPITAL | Age: 69
Discharge: HOME | End: 2024-09-24
Payer: MEDICARE

## 2024-09-24 ENCOUNTER — HOSPITAL ENCOUNTER (OUTPATIENT)
Dept: CARDIOLOGY | Facility: CLINIC | Age: 69
Discharge: HOME | End: 2024-09-24
Payer: MEDICARE

## 2024-09-24 ENCOUNTER — HOSPITAL ENCOUNTER (OUTPATIENT)
Dept: RADIOLOGY | Facility: HOSPITAL | Age: 69
Discharge: HOME | End: 2024-09-24
Attending: NURSE PRACTITIONER
Payer: MEDICARE

## 2024-09-24 ENCOUNTER — OFFICE VISIT (OUTPATIENT)
Dept: INTERNAL MEDICINE | Facility: CLINIC | Age: 69
End: 2024-09-24
Payer: MEDICARE

## 2024-09-24 VITALS
WEIGHT: 229.81 LBS | HEIGHT: 64 IN | SYSTOLIC BLOOD PRESSURE: 142 MMHG | TEMPERATURE: 98 F | HEART RATE: 87 BPM | OXYGEN SATURATION: 96 % | BODY MASS INDEX: 39.23 KG/M2 | DIASTOLIC BLOOD PRESSURE: 82 MMHG

## 2024-09-24 DIAGNOSIS — M17.11 PRIMARY OSTEOARTHRITIS OF RIGHT KNEE: ICD-10-CM

## 2024-09-24 DIAGNOSIS — I10 ESSENTIAL HYPERTENSION, BENIGN: ICD-10-CM

## 2024-09-24 DIAGNOSIS — M06.9 RHEUMATOID ARTHRITIS, INVOLVING UNSPECIFIED SITE, UNSPECIFIED WHETHER RHEUMATOID FACTOR PRESENT: ICD-10-CM

## 2024-09-24 DIAGNOSIS — F41.9 ANXIETY: ICD-10-CM

## 2024-09-24 DIAGNOSIS — F41.1 GAD (GENERALIZED ANXIETY DISORDER): Primary | ICD-10-CM

## 2024-09-24 DIAGNOSIS — F51.04 PSYCHOPHYSIOLOGIC INSOMNIA: ICD-10-CM

## 2024-09-24 DIAGNOSIS — M17.11 PRIMARY OSTEOARTHRITIS OF RIGHT KNEE: Primary | ICD-10-CM

## 2024-09-24 DIAGNOSIS — F12.90 MARIJUANA USE: ICD-10-CM

## 2024-09-24 DIAGNOSIS — Z63.6 CAREGIVER STRESS: ICD-10-CM

## 2024-09-24 DIAGNOSIS — K21.9 GASTROESOPHAGEAL REFLUX DISEASE, UNSPECIFIED WHETHER ESOPHAGITIS PRESENT: ICD-10-CM

## 2024-09-24 DIAGNOSIS — M17.11 ARTHRITIS OF RIGHT KNEE: ICD-10-CM

## 2024-09-24 DIAGNOSIS — E78.2 MIXED HYPERLIPIDEMIA: ICD-10-CM

## 2024-09-24 PROBLEM — Z96.649 HISTORY OF HIP REPLACEMENT: Status: ACTIVE | Noted: 2019-08-13

## 2024-09-24 PROBLEM — R32 INCONTINENCE: Status: ACTIVE | Noted: 2024-09-24

## 2024-09-24 PROBLEM — J40 BRONCHITIS: Status: RESOLVED | Noted: 2024-04-04 | Resolved: 2024-09-24

## 2024-09-24 LAB
OHS QRS DURATION: 82 MS
OHS QTC CALCULATION: 455 MS

## 2024-09-24 PROCEDURE — 72052 X-RAY EXAM NECK SPINE 6/>VWS: CPT | Mod: 26,,, | Performed by: RADIOLOGY

## 2024-09-24 PROCEDURE — 1101F PT FALLS ASSESS-DOCD LE1/YR: CPT | Mod: CPTII,S$GLB,,

## 2024-09-24 PROCEDURE — 3079F DIAST BP 80-89 MM HG: CPT | Mod: CPTII,S$GLB,, | Performed by: NURSE PRACTITIONER

## 2024-09-24 PROCEDURE — 99214 OFFICE O/P EST MOD 30 MIN: CPT | Mod: S$GLB,,,

## 2024-09-24 PROCEDURE — 1159F MED LIST DOCD IN RCRD: CPT | Mod: CPTII,S$GLB,,

## 2024-09-24 PROCEDURE — 3077F SYST BP >= 140 MM HG: CPT | Mod: CPTII,S$GLB,, | Performed by: NURSE PRACTITIONER

## 2024-09-24 PROCEDURE — 1125F AMNT PAIN NOTED PAIN PRSNT: CPT | Mod: CPTII,S$GLB,,

## 2024-09-24 PROCEDURE — 73560 X-RAY EXAM OF KNEE 1 OR 2: CPT | Mod: 26,RT,, | Performed by: STUDENT IN AN ORGANIZED HEALTH CARE EDUCATION/TRAINING PROGRAM

## 2024-09-24 PROCEDURE — 3008F BODY MASS INDEX DOCD: CPT | Mod: CPTII,S$GLB,, | Performed by: NURSE PRACTITIONER

## 2024-09-24 PROCEDURE — 93010 ELECTROCARDIOGRAM REPORT: CPT | Mod: S$GLB,,, | Performed by: INTERNAL MEDICINE

## 2024-09-24 PROCEDURE — 73560 X-RAY EXAM OF KNEE 1 OR 2: CPT | Mod: TC,RT

## 2024-09-24 PROCEDURE — 1160F RVW MEDS BY RX/DR IN RCRD: CPT | Mod: CPTII,S$GLB,,

## 2024-09-24 PROCEDURE — 3288F FALL RISK ASSESSMENT DOCD: CPT | Mod: CPTII,S$GLB,,

## 2024-09-24 PROCEDURE — 99215 OFFICE O/P EST HI 40 MIN: CPT | Mod: S$GLB,,, | Performed by: NURSE PRACTITIONER

## 2024-09-24 PROCEDURE — 72052 X-RAY EXAM NECK SPINE 6/>VWS: CPT | Mod: TC

## 2024-09-24 PROCEDURE — 99999 PR PBB SHADOW E&M-EST. PATIENT-LVL IV: CPT | Mod: PBBFAC,,, | Performed by: NURSE PRACTITIONER

## 2024-09-24 PROCEDURE — 99999 PR PBB SHADOW E&M-EST. PATIENT-LVL III: CPT | Mod: PBBFAC,,,

## 2024-09-24 RX ORDER — PROCHLORPERAZINE EDISYLATE 5 MG/ML
5 INJECTION INTRAMUSCULAR; INTRAVENOUS EVERY 6 HOURS PRN
OUTPATIENT
Start: 2024-09-24

## 2024-09-24 RX ORDER — MORPHINE SULFATE 2 MG/ML
2 INJECTION, SOLUTION INTRAMUSCULAR; INTRAVENOUS
OUTPATIENT
Start: 2024-09-24

## 2024-09-24 RX ORDER — SODIUM CHLORIDE 0.9 % (FLUSH) 0.9 %
10 SYRINGE (ML) INJECTION
OUTPATIENT
Start: 2024-09-24

## 2024-09-24 RX ORDER — SODIUM CHLORIDE 9 MG/ML
INJECTION, SOLUTION INTRAVENOUS CONTINUOUS
OUTPATIENT
Start: 2024-09-24 | End: 2024-09-25

## 2024-09-24 RX ORDER — PREGABALIN 75 MG/1
75 CAPSULE ORAL
OUTPATIENT
Start: 2024-09-24

## 2024-09-24 RX ORDER — CEFAZOLIN SODIUM 2 G/50ML
2 SOLUTION INTRAVENOUS
OUTPATIENT
Start: 2024-09-24

## 2024-09-24 RX ORDER — FENTANYL CITRATE 50 UG/ML
25 INJECTION, SOLUTION INTRAMUSCULAR; INTRAVENOUS EVERY 5 MIN PRN
OUTPATIENT
Start: 2024-09-24

## 2024-09-24 RX ORDER — SODIUM CHLORIDE 9 MG/ML
INJECTION, SOLUTION INTRAVENOUS
OUTPATIENT
Start: 2024-09-24

## 2024-09-24 RX ORDER — MIDAZOLAM HYDROCHLORIDE 1 MG/ML
4 INJECTION, SOLUTION INTRAMUSCULAR; INTRAVENOUS
OUTPATIENT
Start: 2024-09-24 | End: 2024-09-25

## 2024-09-24 RX ORDER — MUPIROCIN 20 MG/G
1 OINTMENT TOPICAL
OUTPATIENT
Start: 2024-09-24

## 2024-09-24 RX ORDER — LIDOCAINE HYDROCHLORIDE 10 MG/ML
1 INJECTION, SOLUTION EPIDURAL; INFILTRATION; INTRACAUDAL; PERINEURAL
OUTPATIENT
Start: 2024-09-24

## 2024-09-24 RX ORDER — METHOCARBAMOL 750 MG/1
750 TABLET, FILM COATED ORAL 3 TIMES DAILY
OUTPATIENT
Start: 2024-09-24

## 2024-09-24 RX ORDER — CELECOXIB 200 MG/1
200 CAPSULE ORAL DAILY
OUTPATIENT
Start: 2024-09-25

## 2024-09-24 RX ORDER — TALC
6 POWDER (GRAM) TOPICAL NIGHTLY PRN
OUTPATIENT
Start: 2024-09-24

## 2024-09-24 RX ORDER — AMOXICILLIN 250 MG
1 CAPSULE ORAL 2 TIMES DAILY
OUTPATIENT
Start: 2024-09-24

## 2024-09-24 RX ORDER — OXYCODONE HYDROCHLORIDE 5 MG/1
10 TABLET ORAL
OUTPATIENT
Start: 2024-09-24

## 2024-09-24 RX ORDER — OXYCODONE HYDROCHLORIDE 5 MG/1
5 TABLET ORAL
OUTPATIENT
Start: 2024-09-24

## 2024-09-24 RX ORDER — POLYETHYLENE GLYCOL 3350 17 G/17G
17 POWDER, FOR SOLUTION ORAL DAILY
OUTPATIENT
Start: 2024-09-25

## 2024-09-24 RX ORDER — BISACODYL 10 MG/1
10 SUPPOSITORY RECTAL EVERY 12 HOURS PRN
OUTPATIENT
Start: 2024-09-24

## 2024-09-24 RX ORDER — ACETAMINOPHEN 500 MG
1000 TABLET ORAL
OUTPATIENT
Start: 2024-09-24

## 2024-09-24 RX ORDER — FAMOTIDINE 20 MG/1
20 TABLET, FILM COATED ORAL 2 TIMES DAILY
OUTPATIENT
Start: 2024-09-24

## 2024-09-24 RX ORDER — MUPIROCIN 20 MG/G
1 OINTMENT TOPICAL 2 TIMES DAILY
OUTPATIENT
Start: 2024-09-24 | End: 2024-09-29

## 2024-09-24 RX ORDER — CELECOXIB 200 MG/1
400 CAPSULE ORAL ONCE
OUTPATIENT
Start: 2024-09-24 | End: 2024-09-24

## 2024-09-24 RX ORDER — PREGABALIN 75 MG/1
75 CAPSULE ORAL NIGHTLY
OUTPATIENT
Start: 2024-09-24

## 2024-09-24 RX ORDER — ACETAMINOPHEN 500 MG
1000 TABLET ORAL EVERY 6 HOURS
OUTPATIENT
Start: 2024-09-24

## 2024-09-24 RX ORDER — ONDANSETRON HYDROCHLORIDE 2 MG/ML
4 INJECTION, SOLUTION INTRAVENOUS EVERY 8 HOURS PRN
OUTPATIENT
Start: 2024-09-24

## 2024-09-24 RX ORDER — ASPIRIN 81 MG/1
81 TABLET ORAL 2 TIMES DAILY
OUTPATIENT
Start: 2024-09-24

## 2024-09-24 RX ORDER — FENTANYL CITRATE 50 UG/ML
100 INJECTION, SOLUTION INTRAMUSCULAR; INTRAVENOUS
OUTPATIENT
Start: 2024-09-24 | End: 2024-09-25

## 2024-09-24 RX ORDER — NALOXONE HCL 0.4 MG/ML
0.02 VIAL (ML) INJECTION
OUTPATIENT
Start: 2024-09-24

## 2024-09-24 RX ORDER — CEFAZOLIN SODIUM 2 G/50ML
2 SOLUTION INTRAVENOUS
OUTPATIENT
Start: 2024-09-24 | End: 2024-09-25

## 2024-09-24 SDOH — SOCIAL DETERMINANTS OF HEALTH (SDOH): DEPENDENT RELATIVE NEEDING CARE AT HOME: Z63.6

## 2024-09-24 NOTE — HPI
This is a 69 y.o. female  who presents today for a preoperative evaluation in preparation for right knee replacement   Surgery is indicated for ostoarthiritis    .   Patient is new to me.    The history has been obtained by speaking with the patient and reviewing the electronic medical record and/or outside health information. Significant health conditions for the perioperative period are discussed below in assessment and plan.     Patient reports current health status to be Good.  Denies any new symptoms before surgery.

## 2024-09-24 NOTE — ASSESSMENT & PLAN NOTE
Currently being treated with Omeprazole  Encouraged to elevate HOB and avoid laying down for 2-3 hours after meals.   Weight loss encouraged as well as dietary changes such as reduction or avoidance of fatty foods, caffeine, spicy foods, and chocolate.    Smoking cessation was recommended as well as reduction of alcohol consumption.

## 2024-09-24 NOTE — OUTPATIENT SUBJECTIVE & OBJECTIVE
Outpatient Subjective & Objective      Chief Complaint: Preoperative evaulation, perioperative medical management, and complication reduction plan.     Functional Capacity:  Able to climb a flight of stairs without CP SOB or Syncope.  Able to meet 4 METs      Anesthesia issues: None    Difficulty mouth opening:    Steroid use in the last 12 months:      Dental Issues:    Family anesthesia difficulty: None     Family Hx of Thrombosis     Past Medical History:   Diagnosis Date    Arthritis     Hypercholesterolemia     Hypertension        Past Surgical History:   Procedure Laterality Date    BREAST BIOPSY Right     2010 & 2021    BREAST CYST ASPIRATION      CARPAL TUNNEL RELEASE Left     EPIDURAL STEROID INJECTION N/A 5/22/2024    Procedure: CAUDAL DOMINIC DIRECT REFERRAL;  Surgeon: Oswald Hoskins MD;  Location: [facility] PAIN [facility]GT;  Service: Pain Management;  Laterality: N/A;  [phone]    hip repl Right 08/2019    HYSTERECTOMY      PARTIAL HIP ARTHROPLASTY Left     RHINOPLASTY      ROTATOR CUFF REPAIR Right     SHOULDER ARTHROSCOPY Right 05/2022    right shoulder sx with bicep repair    TONSILLECTOMY, ADENOIDECTOMY      TOTAL ABDOMINAL HYSTERECTOMY W/ BILATERAL SALPINGOOPHORECTOMY      TRANSFORAMINAL EPIDURAL INJECTION OF STEROID Right 01/24/2024    Procedure: LUMBAR TRANSFORAMINAL RIGHT L3/4 AND L4/L5 DIRECT REFERRAL;  Surgeon: Oswadl Hoskins MD;  Location: [facility] PAIN [facility]GT;  Service: Pain Management;  Laterality: Right;  [phone]       Review of Systems     VITALS  There were no vitals taken for this visit.       Physical Exam     Significant Labs:  Lab Results   Component Value Date    WBC 8.61 08/23/2024    HGB 13.4 08/23/2024    HCT 41.0 08/23/2024     08/23/2024    CHOL 186 03/01/2024    TRIG 119 03/01/2024    HDL 68 03/01/2024    ALT 17 08/23/2024    AST 23 08/23/2024     08/23/2024     08/23/2024    K 4.0 08/23/2024    K 4.0 08/23/2024     08/23/2024     08/23/2024     "CREATININE 1.0 08/23/2024    CREATININE 1.0 08/23/2024    BUN 19 08/23/2024    BUN 19 08/23/2024    CO2 22 (L) 08/23/2024    CO2 22 (L) 08/23/2024    TSH 1.293 03/01/2023    HGBA1C 5.5 03/01/2023       Diagnostic Studies: No relevant studies.    EKG:   Results for orders placed or performed in visit on 03/01/24   EKG 12-lead    Collection Time: 03/01/24 12:18 PM   Result Value Ref Range    QRS Duration 80 ms    OHS QTC Calculation 466 ms    Narrative    Test Reason : R06.09,    Vent. Rate : 064 BPM     Atrial Rate : 064 BPM     P-R Int : 174 ms          QRS Dur : 080 ms      QT Int : 452 ms       P-R-T Axes : 061 074 060 degrees     QTc Int : 466 ms    Normal sinus rhythm  Normal ECG  When compared with ECG of 29-JUN-2020 15:28,  No significant change was found  Confirmed by Jennifer HUNTLEY, Clemente LA ([id number]) on 3/1/2024 3:37:48 PM    Referred By:             Confirmed By:Clemente Rodriguez MD       2D ECHO:  TTE:  Results for orders placed or performed during the hospital encounter of 03/27/24   Echo   Result Value Ref Range    RA Width 2.83 cm    LA Vol (MOD) 34.37 cm3    Left Atrium Major Axis 5.28 cm    Left Atrium Minor Axis 5.28 cm    RA Major Axis 4.92 cm    LV Diastolic Volume 50.40 mL    LV Systolic Volume 25.18 mL    PV Peak D Xavier 0.28 m/s    PV Peak S Xavier 0.43 m/s    MV Peak A Xavier 0.99 m/s    TR Max Xavier 2.29 m/s    MV Peak E Xavier 0.64 m/s    Ao VTI 23.40 cm    Ao peak xavier 1.22 m/s    LVOT peak VTI 17.75 cm    LVOT peak xavier 1.04 m/s    LVOT diameter 3.38 cm    MV "A" wave duration 15.13 msec    IVRT 97.05 msec    E wave deceleration time 318.35 msec    AV mean gradient 4 mmHg    TAPSE 1.65 cm    RVDD 3.45 cm    LA size 3.34 cm    Ascending aorta 3.82 cm    STJ 3.42 cm    Sinus 4.06 cm    LVIDs 2.62 2.1 - 4.0 cm    Posterior Wall 0.94 0.6 - 1.1 cm    IVS 0.81 0.6 - 1.1 cm    LVIDd 3.49 (A) 3.5 - 6.0 cm    TDI LATERAL 0.04 m/s    LA WIDTH 3.12 cm    TDI SEPTAL 0.05 m/s    LV LATERAL E/E' RATIO 16.00 m/s    LV SEPTAL E/E' " RATIO 12.80 m/s    FS 25 (A) 28 - 44 %    LA volume 46.77 cm3    LV mass 84.94 g    Left Ventricle Relative Wall Thickness 0.54 cm    AV valve area 6.80 cm²    AV Velocity Ratio 0.85     AV index (prosthetic) 0.76     E/A ratio 0.65     Mean e' 0.05 m/s    Pulm vein S/D ratio 1.54     LVOT area 9.0 cm2    LVOT stroke volume 159.18 cm3    AV peak gradient 6 mmHg    E/E' ratio 14.22 m/s    Triscuspid Valve Regurgitation Peak Gradient 21 mmHg    MUNA by Velocity Ratio 7.64 cm²    BSA 2.1 m2    LV Systolic Volume Index 12.5 mL/m2    LV Diastolic Volume Index 24.95 mL/m2    LV Mass Index 42 g/m2    LA Volume Index 23.2 mL/m2    LA Volume Index (Mod) 17.0 mL/m2    ZLVIDS -2.62     ZLVIDD -5.34     EF 65 %    TV resting pulmonary artery pressure 24 mmHg    RV TB RVSP 5 mmHg    Est. RA pres 3 mmHg    Narrative      Left Ventricle: The left ventricle is normal in size. Ventricular mass   is normal. Normal wall thickness. There is concentric remodeling. Normal   wall motion. There is normal systolic function with a visually estimated   ejection fraction of 60 - 65%. Ejection fraction by visual approximation   is 65%. There is indeterminate diastolic function.    Right Ventricle: Normal right ventricular cavity size. Wall thickness   is normal. Right ventricle wall motion  is normal. Systolic function is   normal.    Right Atrium: Right atrium is mildly dilated.    Aorta: Aortic root is mildly to moderately dilated measuring 4.06 cm.   Ascending aorta is mildly dilated measuring 3.82 cm.    Pulmonary Artery: The estimated pulmonary artery systolic pressure is   24 mmHg.    IVC/SVC: Normal venous pressure at 3 mmHg.    Pericardium: There is a trivial posterior effusion.         RADHA:  No results found for this or any previous visit.     Imaging     Active Cardiac Conditions: {POC active cardiac conditions:52061}      Revised Cardiac Risk Index   High -Risk Surgery  Intraperitoneal; Intrathoracic; suprainguinal vascular Yes- + 1  No- 0   History of Ischemic Heart Disease   (Hx of MI/positive exercise test/current chest pain due to ischemia/use of nitrate therapy/EKG with pathological Q waves) Yes- + 1 No- 0   History of CHF  (Pulmonary edema/bilateral rales or S3 gallop/PND/CXR showing pulmonary vascular redistribution) Yes- + 1 No- 0   History of CVA   (Prior stroke or TIA) Yes- + 1 No- 0   Pre-operative treatment with insulin Yes- + 1 No- 0   Pre-operative creatinine > 2mg/dl Yes- + 1 No- 0   Total:      Risk Status:  Estimated risk of cardiac complications after non-cardiac surgery using the Revised Cardiac Risk Index for Preoperative risk is {pocrevisedcardiacindex:41490}      ARISCAT (Canet) risk index: {poccanetchoice:38561}    American Society of Anesthesiologists Physical Status classification (ASA): {POC ASA class:87459}           No further cardiac workup needed prior to surgery.    Outpatient Subjective & Objective

## 2024-09-24 NOTE — PROGRESS NOTES
Lashanda Monaco is a 69 y.o. year old here today for pre surgery optimization visit  in preparation for a Right total knee arthroplasty to be performed by Dr. Chowdhury  on 10/21/24.  she was last seen and treated in the clinic on 9/17/2024. she will be medically optimized by the pre op center. There has been no significant change in medical status since last visit. No fever, chills, malaise, or unexplained weight change.      Allergies, Medications, past medical and surgical history reviewed.    Focused examination performed.    Patient did not request to see surgeon in clinic today. All questions answered. Patient encouraged to call with questions. Contact information given.     Pre, philip, and post operative procedures and expectations discussed. Goals of successful surgery reviewed and include manageable pain levels, surgical site free of infection, medication management, and ambulation with PT/OT assistance. Healthy weight management discussed with patient and caregiver who were receptive to eduction of healthy diet and activity. No other necessary lifestyle changes identified. Educated patient about signs and symptoms of infection, medication management, anticoagulation therapy, risk of tobacco and alcohol use, and self-care to promote healing. Surgical guide given for future reference. Hibiclens given to patient with instructions. All questions were answered.     Lashanda Monaco verbalized an understanding to the education and goals. Patient has displayed readiness to engage in care and is ready to proceed with surgery.  Patient reports brother and sister are able and ready to provide assistance at home after discharge.    Surgical and blood consents signed.    DME will be arranged. The mobility limitation cannot be sufficiently resolved by the use of a cane. Patient's functional mobility deficit can be sufficiently resolved with the use of a (Rolling Walker or Walker). Patient's mobility limitation  "significantly impairs their ability to participate in one of more activities of daily living. The use of a (Rolling Walker or Walker) will significantly improve the patient's ability to participate in MRADLS and the patient will use it on regular basis in the home."     Lashanda Monaco will contact us if there are any questions, concerns, or changes in medical status prior to surgery.       Joint class:  10/14/2024    Patient has discussed discharge planning with surgeon. Patient will be discharged to home following surgery.   patient will be scheduled with non-[facility] PT. PT will be done at the [facility] location.    30 minutes of time was spent on patient education, review of records, templating, H&P, , appointment scheduling and optimizing patient for surgery.    "

## 2024-09-24 NOTE — OUTPATIENT SUBJECTIVE & OBJECTIVE
Outpatient Subjective & Objective      Chief Complaint: Preoperative evaulation, perioperative medical management, and complication reduction plan.     Functional Capacity:  Able to climb a flight of stairs without CP SOB or Syncope.  Able to meet 4 METs      Anesthesia issues: difficulty emerging after colonoscopy nausea    Difficulty mouth opening: none    Steroid use in the last 12 months:  yes for RA    Dental Issues: has tooth fell out    Family anesthesia difficulty: None     Family Hx of Thrombosis none    Past Medical History:   Diagnosis Date    Anxiety     Arthritis     Bronchitis 04/04/2024    Cancer     Depression     GERD (gastroesophageal reflux disease)     Hypercholesterolemia     Hypertension      Past Surgical History:   Procedure Laterality Date    BREAST BIOPSY Right     2010 & 2021    BREAST CYST ASPIRATION      CARPAL TUNNEL RELEASE Left     EPIDURAL STEROID INJECTION N/A 5/22/2024    Procedure: CAUDAL DOMINIC DIRECT REFERRAL;  Surgeon: Oswald Hoskins MD;  Location: [facility];  Service: Pain Management;  Laterality: N/A;  [phone]    hip repl Right 08/2019    HYSTERECTOMY      PARTIAL HIP ARTHROPLASTY Left     RHINOPLASTY      ROTATOR CUFF REPAIR Right     SHOULDER ARTHROSCOPY Right 05/2022    right shoulder sx with bicep repair    TONSILLECTOMY, ADENOIDECTOMY      TOTAL ABDOMINAL HYSTERECTOMY W/ BILATERAL SALPINGOOPHORECTOMY      TRANSFORAMINAL EPIDURAL INJECTION OF STEROID Right 01/24/2024    Procedure: LUMBAR TRANSFORAMINAL RIGHT L3/4 AND L4/L5 DIRECT REFERRAL;  Surgeon: Oswald Hoskins MD;  Location: [facility] PAIN T;  Service: Pain Management;  Laterality: Right;  [phone]     Review of Systems   Constitutional:  Positive for fatigue. Negative for chills and fever.   HENT:  Negative for trouble swallowing and voice change.    Eyes:  Negative for photophobia and visual disturbance.        No acute visual changes   Respiratory:  Negative for apnea, cough, chest tightness,  "shortness of breath and wheezing.         STOP bang  Score 4  High risk FARIDA   Cardiovascular:  Negative for chest pain, palpitations and leg swelling.   Gastrointestinal:  Positive for constipation. Negative for abdominal distention, abdominal pain, blood in stool, diarrhea, nausea and vomiting.        NO FLD, hepatitis, cirrhosis  No BRB or black tarry stool     Genitourinary:  Negative for difficulty urinating, dysuria, flank pain, frequency, hematuria and urgency.        Severe incontinence   Musculoskeletal:  Positive for arthralgias, back pain, gait problem and joint swelling. Negative for myalgias, neck pain and neck stiffness.   Neurological:  Negative for dizziness, tremors, seizures, syncope, weakness, light-headedness, numbness and headaches.   Psychiatric/Behavioral:  Negative for suicidal ideas.         VITALS  Visit Vitals  BP (!) 142/82   Pulse 87   Temp 97.7 °F (36.5 °C) (Oral)   Ht 5' 4" (1.626 m)   Wt 104.2 kg (229 lb 13.3 oz)   SpO2 96%   BMI 39.45 kg/m²      Physical Exam  Constitutional:       General: She is not in acute distress.     Appearance: Normal appearance. She is well-developed. She is not diaphoretic.   HENT:      Head: Normocephalic.      Right Ear: Hearing normal.      Left Ear: Hearing normal.      Nose: Nose normal.      Mouth/Throat:      Lips: Pink.      Mouth: Mucous membranes are moist.      Pharynx: No oropharyngeal exudate.   Eyes:      General: Lids are normal.         Right eye: No discharge.         Left eye: No discharge.      Conjunctiva/sclera: Conjunctivae normal.      Pupils: Pupils are equal, round, and reactive to light.   Neck:      Thyroid: No thyromegaly.      Vascular: No carotid bruit or JVD.      Trachea: Trachea and phonation normal. No tracheal deviation.   Cardiovascular:      Rate and Rhythm: Normal rate and regular rhythm.      Pulses: Normal pulses.           Carotid pulses are 2+ on the right side and 2+ on the left side.       Radial pulses are 2+ on " the right side and 2+ on the left side.        Posterior tibial pulses are 2+ on the right side and 2+ on the left side.      Heart sounds: Normal heart sounds. No murmur heard.     No friction rub. No gallop.   Pulmonary:      Effort: Pulmonary effort is normal. No respiratory distress.      Breath sounds: Normal breath sounds. No stridor. No wheezing or rales.      Comments: Clear Anterior and Posterior BBS  Abdominal:      General: Abdomen is protuberant. Bowel sounds are normal. There is no distension.      Palpations: Abdomen is soft.      Tenderness: There is no abdominal tenderness. There is no guarding.   Musculoskeletal:         General: No tenderness or deformity. Normal range of motion.      Cervical back: Normal range of motion and neck supple. No rigidity.      Right lower leg: No edema.      Left lower leg: No edema.   Lymphadenopathy:      Head:      Right side of head: No submental, submandibular, tonsillar, preauricular, posterior auricular or occipital adenopathy.      Left side of head: No submental, submandibular, tonsillar, preauricular, posterior auricular or occipital adenopathy.      Cervical: No cervical adenopathy.      Right cervical: No superficial cervical adenopathy.     Left cervical: No superficial cervical adenopathy.   Skin:     General: Skin is warm and dry.      Capillary Refill: Capillary refill takes 2 to 3 seconds.      Coloration: Skin is not pale.      Findings: No erythema or rash.   Neurological:      Mental Status: She is alert and oriented to person, place, and time. Mental status is at baseline.      GCS: GCS eye subscore is 4. GCS verbal subscore is 5. GCS motor subscore is 6.      Motor: No abnormal muscle tone.      Coordination: Coordination normal.   Psychiatric:         Attention and Perception: Attention and perception normal.         Mood and Affect: Mood and affect normal.         Speech: Speech normal.         Behavior: Behavior normal. Behavior is cooperative.          Thought Content: Thought content normal.         Cognition and Memory: Cognition and memory normal.         Judgment: Judgment normal.          Significant Labs:  Lab Results   Component Value Date    WBC 8.61 08/23/2024    HGB 13.4 08/23/2024    HCT 41.0 08/23/2024     08/23/2024    CHOL 186 03/01/2024    TRIG 119 03/01/2024    HDL 68 03/01/2024    ALT 17 08/23/2024    AST 23 08/23/2024     08/23/2024     08/23/2024    K 4.0 08/23/2024    K 4.0 08/23/2024     08/23/2024     08/23/2024    CREATININE 1.0 08/23/2024    CREATININE 1.0 08/23/2024    BUN 19 08/23/2024    BUN 19 08/23/2024    CO2 22 (L) 08/23/2024    CO2 22 (L) 08/23/2024    TSH 1.293 03/01/2023    INR 0.9 09/24/2024    HGBA1C 5.5 03/01/2023       Diagnostic Studies: No relevant studies.    EKG:   Results for orders placed or performed in visit on 03/01/24   EKG 12-lead    Collection Time: 03/01/24 12:18 PM   Result Value Ref Range    QRS Duration 80 ms    OHS QTC Calculation 466 ms    Narrative    Test Reason : R06.09,    Vent. Rate : 064 BPM     Atrial Rate : 064 BPM     P-R Int : 174 ms          QRS Dur : 080 ms      QT Int : 452 ms       P-R-T Axes : 061 074 060 degrees     QTc Int : 466 ms    Normal sinus rhythm  Normal ECG  When compared with ECG of 29-JUN-2020 15:28,  No significant change was found  Confirmed by Jennifer HUNTLEY, Clemente LA ([id number]) on 3/1/2024 3:37:48 PM    Referred By:             Confirmed By:Clemente Rodriguez MD       2D ECHO:  TTE:  Results for orders placed or performed during the hospital encounter of 03/27/24   Echo   Result Value Ref Range    RA Width 2.83 cm    LA Vol (MOD) 34.37 cm3    Left Atrium Major Axis 5.28 cm    Left Atrium Minor Axis 5.28 cm    RA Major Axis 4.92 cm    LV Diastolic Volume 50.40 mL    LV Systolic Volume 25.18 mL    PV Peak D Xavier 0.28 m/s    PV Peak S Xavier 0.43 m/s    MV Peak A Xavier 0.99 m/s    TR Max Xavier 2.29 m/s    MV Peak E Xavier 0.64 m/s    Ao VTI 23.40 cm    Ao peak xavier  "1.22 m/s    LVOT peak VTI 17.75 cm    LVOT peak agnes 1.04 m/s    LVOT diameter 3.38 cm    MV "A" wave duration 15.13 msec    IVRT 97.05 msec    E wave deceleration time 318.35 msec    AV mean gradient 4 mmHg    TAPSE 1.65 cm    RVDD 3.45 cm    LA size 3.34 cm    Ascending aorta 3.82 cm    STJ 3.42 cm    Sinus 4.06 cm    LVIDs 2.62 2.1 - 4.0 cm    Posterior Wall 0.94 0.6 - 1.1 cm    IVS 0.81 0.6 - 1.1 cm    LVIDd 3.49 (A) 3.5 - 6.0 cm    TDI LATERAL 0.04 m/s    LA WIDTH 3.12 cm    TDI SEPTAL 0.05 m/s    LV LATERAL E/E' RATIO 16.00 m/s    LV SEPTAL E/E' RATIO 12.80 m/s    FS 25 (A) 28 - 44 %    LA volume 46.77 cm3    LV mass 84.94 g    Left Ventricle Relative Wall Thickness 0.54 cm    AV valve area 6.80 cm²    AV Velocity Ratio 0.85     AV index (prosthetic) 0.76     E/A ratio 0.65     Mean e' 0.05 m/s    Pulm vein S/D ratio 1.54     LVOT area 9.0 cm2    LVOT stroke volume 159.18 cm3    AV peak gradient 6 mmHg    E/E' ratio 14.22 m/s    Triscuspid Valve Regurgitation Peak Gradient 21 mmHg    MUNA by Velocity Ratio 7.64 cm²    BSA 2.1 m2    LV Systolic Volume Index 12.5 mL/m2    LV Diastolic Volume Index 24.95 mL/m2    LV Mass Index 42 g/m2    LA Volume Index 23.2 mL/m2    LA Volume Index (Mod) 17.0 mL/m2    ZLVIDS -2.62     ZLVIDD -5.34     EF 65 %    TV resting pulmonary artery pressure 24 mmHg    RV TB RVSP 5 mmHg    Est. RA pres 3 mmHg    Narrative      Left Ventricle: The left ventricle is normal in size. Ventricular mass   is normal. Normal wall thickness. There is concentric remodeling. Normal   wall motion. There is normal systolic function with a visually estimated   ejection fraction of 60 - 65%. Ejection fraction by visual approximation   is 65%. There is indeterminate diastolic function.    Right Ventricle: Normal right ventricular cavity size. Wall thickness   is normal. Right ventricle wall motion  is normal. Systolic function is   normal.    Right Atrium: Right atrium is mildly dilated.    Aorta: Aortic " root is mildly to moderately dilated measuring 4.06 cm.   Ascending aorta is mildly dilated measuring 3.82 cm.    Pulmonary Artery: The estimated pulmonary artery systolic pressure is   24 mmHg.    IVC/SVC: Normal venous pressure at 3 mmHg.    Pericardium: There is a trivial posterior effusion.         RADHA:  No results found for this or any previous visit.     Imaging     Active Cardiac Conditions: None      Revised Cardiac Risk Index   High -Risk Surgery  Intraperitoneal; Intrathoracic; suprainguinal vascular Yes- + 1 No- 0   History of Ischemic Heart Disease   (Hx of MI/positive exercise test/current chest pain due to ischemia/use of nitrate therapy/EKG with pathological Q waves) Yes- + 1 No- 0   History of CHF  (Pulmonary edema/bilateral rales or S3 gallop/PND/CXR showing pulmonary vascular redistribution) Yes- + 1 No- 0   History of CVA   (Prior stroke or TIA) Yes- + 1 No- 0   Pre-operative treatment with insulin Yes- + 1 No- 0   Pre-operative creatinine > 2mg/dl Yes- + 1 No- 0   Total:      Risk Status:  Estimated risk of cardiac complications after non-cardiac surgery using the Revised Cardiac Risk Index for Preoperative risk is 3.9 %      ARISCAT (Canet) risk index: Low: 1.6% risk of post-op pulmonary complications.    American Society of Anesthesiologists Physical Status classification (ASA): 3         No further cardiac workup needed prior to surgery.    Outpatient Subjective & Objective

## 2024-09-24 NOTE — DISCHARGE INSTRUCTIONS
Your surgery has been scheduled for:______10/21/24____________________________________    You should report to:  ____[facility], located on the [place] side of the first floor of the           [facility] ([phone])  ____[facility], located on the [facility] side of the            Second floor of the [facility] ([phone])  ____3rd Floor [facility] located on the [facility] side of the [facility] [phone]  __X__[facility]: located at [address] [place] [address]. [address]     Please Note   Tell your doctor if you take Aspirin, products containing Aspirin, herbal medications  or blood thinners, such as Coumadin, Ticlid, or Plavix.  (Consult your provider regarding holding or stopping before surgery).  Arrange for someone to drive you home following surgery.  You will not be allowed to leave the surgical facility alone or drive yourself home following sedation and anesthesia.    Before Surgery  Stop taking all herbal medications, vitamins, and supplements 7 days prior to surgery  No Motrin/Advil (Ibuprofen) 7 days before surgery  No Aleve (Naproxen) 7 days before surgery   No Goody's/BC Powder 7 days before surgery  Refrain from drinking alcoholic beverages for 24 hours before and after surgery  Stop or limit smoking at least 24 hours prior to surgery  You may take Tylenol for pain    Night before Surgery  Do not eat or drink after midnight  Take a shower or bath (shower is recommended).  Bathe with Hibiclens soap or an antibacterial soap from the neck down.  If not supplied by your surgeon, hibiclens soap will need to be purchased over the counter in pharmacy.  Rinse soap off thoroughly.  Shampoo your hair with your regular shampoo    The Day of Surgery  Take another bath or shower with hibiclens or any antibacterial soap, to reduce the chance of infection.  Take heart  and blood pressure medications with a small sip of water, as advised by the perioperative team.  Do not take fluid pills  You may brush your teeth and rinse your mouth, but do not swallow any additional water.   Do not apply perfumes, powder, body lotions or deodorant on the day of surgery.  Nail polish should be removed.  Do not wear makeup or moisturizer  Wear comfortable clothes, such as a button front shirt and loose fitting pants.  Leave all jewelry, including body piercings, and valuables at home.    Bring any devices you will need after surgery such as crutches or canes.  If you have sleep apnea, please bring your CPAP machine  In the event that your physical condition changes including the onset of a cold or respiratory illness, or if you have to delay or cancel your surgery, please notify your surgeon.

## 2024-09-24 NOTE — H&P (VIEW-ONLY)
CC:  Right knee pain    Lashanda Monaco is a 69 y.o. female with history of Right knee pain. Pain is worse with activity and weight bearing.  Patient has experienced interference of activities of daily living due to decreased range of motion and an increase in joint pain and swelling.  Patient has failed non-operative treatment including NSAIDs, corticosteroid injections, viscosupplement injections, and activity modification.  Lashanda Monaco currently ambulates using assistive device .     Relevant medical conditions of significance in perioperative period:  HTN- on medication managed by PCP  HLD- on medication managed by PCP       Given documented medical comorbidities including those listed above and based off of CMS criteria patient would meet inpatient admission status guidelines. This case has been approved as inpatient.     Past Medical History:   Diagnosis Date    Anxiety     Arthritis     Bronchitis 04/04/2024    Cancer     Depression     GERD (gastroesophageal reflux disease)     Hypercholesterolemia     Hypertension        Past Surgical History:   Procedure Laterality Date    BREAST BIOPSY Right     2010 & 2021    BREAST CYST ASPIRATION      CARPAL TUNNEL RELEASE Left     EPIDURAL STEROID INJECTION N/A 5/22/2024    Procedure: CAUDAL DOMINIC DIRECT REFERRAL;  Surgeon: Oswald Hoskins MD;  Location: [facility];  Service: Pain Management;  Laterality: N/A;  [phone]    hip repl Right 08/2019    HYSTERECTOMY      PARTIAL HIP ARTHROPLASTY Left     RHINOPLASTY      ROTATOR CUFF REPAIR Right     SHOULDER ARTHROSCOPY Right 05/2022    right shoulder sx with bicep repair    TONSILLECTOMY, ADENOIDECTOMY      TOTAL ABDOMINAL HYSTERECTOMY W/ BILATERAL SALPINGOOPHORECTOMY      TRANSFORAMINAL EPIDURAL INJECTION OF STEROID Right 01/24/2024    Procedure: LUMBAR TRANSFORAMINAL RIGHT L3/4 AND L4/L5 DIRECT REFERRAL;  Surgeon: Oswald Hoskins MD;  Location: [facility];  Service: Pain Management;  Laterality:  Right;  [phone]       Family History   Problem Relation Name Age of Onset    Hyperlipidemia Mother      Hypertension Mother      Aortic aneurysm Mother  80    Hyperlipidemia Father      Hypertension Father      Heart attack Father      Arthritis Sister      Arthritis Sister      Diabetes Sister      Osteoarthritis Sister      Heart disease Sister      Diabetes Brother      Arthritis Brother      Heart disease Brother      Lymphoma Brother      Diabetes Brother      Arthritis Brother      Heart disease Brother      Heart disease Brother      Colon cancer Son          no genetic testing    Heart attack Other      Tourette syndrome Other      Tourette syndrome Other      Tourette syndrome Other      Tourette syndrome Other         Review of patient's allergies indicates:   Allergen Reactions    Kiwi (actinidia chinensis)     Hydrocodone bitartrate Nausea Only         Current Outpatient Medications:     albuterol (PROVENTIL/VENTOLIN HFA) 90 mcg/actuation inhaler, Inhale 1-2 puffs into the lungs every 6 (six) hours as needed for Wheezing or Shortness of Breath. Rescue, Disp: 18 g, Rfl: 0    ALPRAZolam (XANAX) 0.5 MG tablet, Take 1 tablet (0.5 mg total) by mouth daily as needed for Anxiety., Disp: 20 tablet, Rfl: 1    amLODIPine (NORVASC) 10 MG tablet, TAKE 1 TABLET DAILY, Disp: 90 tablet, Rfl: 3    atorvastatin (LIPITOR) 20 MG tablet, TAKE 1 TABLET DAILY, Disp: 90 tablet, Rfl: 3    butalbital-acetaminophen-caffeine -40 mg (FIORICET, ESGIC) -40 mg per tablet, Take 1 tablet by mouth every 4 (four) hours as needed., Disp: , Rfl:     cyclobenzaprine (FLEXERIL) 5 MG tablet, Take 1 tablet (5 mg total) by mouth nightly., Disp: 30 tablet, Rfl: 1    estradioL (ESTRACE) 2 MG tablet, Take 1 tablet (2 mg total) by mouth once daily., Disp: 90 tablet, Rfl: 0    fluticasone-salmeterol diskus inhaler 250-50 mcg, Inhale 1 puff into the lungs 2 (two) times daily. Controller (Patient not taking: Reported on 9/24/2024),  Disp: 60 each, Rfl: 11    ibuprofen (ADVIL) 200 MG tablet, Take 400 mg by mouth as needed for Pain., Disp: , Rfl:     omeprazole (PRILOSEC) 40 MG capsule, TAKE 1 CAPSULE TWICE A DAY BEFORE MEALS, Disp: 60 capsule, Rfl: 1    ondansetron (ZOFRAN-ODT) 4 MG TbDL, Take 1 tablet (4 mg total) by mouth every 6 (six) hours as needed (nausea)., Disp: 20 tablet, Rfl: 1    pregabalin (LYRICA) 150 MG capsule, Take 1 capsule (150 mg total) by mouth 2 (two) times daily., Disp: 60 capsule, Rfl: 6    sertraline (ZOLOFT) 100 MG tablet, Take 1 tablet (100 mg total) by mouth once daily., Disp: 90 tablet, Rfl: 3    traZODone (DESYREL) 50 MG tablet, Take 1-2 tablets ( mg total) by mouth nightly as needed for Insomnia., Disp: 60 tablet, Rfl: 5    TURMERIC ORAL, Take 1 tablet by mouth once daily., Disp: , Rfl:     upadacitinib (RINVOQ) 15 mg 24 hr tablet, Take 1 tablet (15 mg total) by mouth once daily., Disp: 30 tablet, Rfl: 11    Review of Systems:  Constitutional: no fever or chills  Eyes: no visual changes  ENT: no nasal congestion or sore throat  Respiratory: no cough or shortness of breath  Cardiovascular: no chest pain or palpitations  Gastrointestinal: no nausea or vomiting, tolerating diet  Genitourinary: no hematuria or dysuria  Integument/Breast: no rash or pruritis  Hematologic/Lymphatic: no easy bruising or lymphadenopathy  Musculoskeletal: positive for knee pain  Neurological: no seizures or tremors  Behavioral/Psych: no auditory or visual hallucinations  Endocrine: no heat or cold intolerance    PE:  There were no vitals taken for this visit.  General: Pleasant, cooperative, NAD   Gait: antalgic  HEENT: NCAT, sclera nonicteric   Lungs: Respirations clear bilaterally; equal and unlabored.   CV: S1S2; 2+ bilateral upper and lower extremity pulses.   Skin: Intact throughout with no rashes, erythema, or lesions  Extremities: No LE edema,  no erythema or warmth of the skin in either lower extremity.    Right knee  exam:  Knee Range of Motion:  5-110, pain with passive range of motion  Effusion:  Mild  Condition of skin:intact  Location of tenderness:Medial joint line   Strength:normal  Stability: stable to testing    Hip Examination: painless PROM of hip     Radiographs: Radiographs reveal advanced degenerative changes including subchondral cyst formation, subchondral sclerosis, osteophyte formation, joint space narrowing.     Knee Alignment: normal    Diagnosis: Primary osteoarthritis Right knee    Plan: Right total knee arthroplasty    Due to the serious nature of total joint infection and the high prevalence of community acquired MRSA, vancomycin will be used perioperatively.

## 2024-09-24 NOTE — PROGRESS NOTES
[facility]  Progress Note    Patient Name: Lashanda Monaco  MRN: [id number]  Date of Evaluation- 09/25/2024  PCP- Wayne Lundberg MD    Future cases for Lashanda Monaco [[id number]]       Case ID Status Date Time Chilo Procedure Provider Location    [id number] [facility] 10/21/2024 10:59  ARTHROPLASTY, KNEE, TOTAL, USING [employer] COMPUTER-ASSISTED NAVIGATION: RIGHT[id number]            HPI:  This is a 69 y.o. female  who presents today for a preoperative evaluation in preparation for right knee replacement   Surgery is indicated for ostoarthiritis    .   Patient is new to me.    The history has been obtained by speaking with the patient and reviewing the electronic medical record and/or outside health information. Significant health conditions for the perioperative period are discussed below in assessment and plan.     Patient reports current health status to be Good.  Denies any new symptoms before surgery.       Subjective/ Objective:     Chief Complaint: Preoperative evaulation, perioperative medical management, and complication reduction plan.     Functional Capacity:  Able to climb a flight of stairs without CP SOB or Syncope.  Able to meet 4 METs      Anesthesia issues: difficulty emerging after colonoscopy nausea    Difficulty mouth opening: none    Steroid use in the last 12 months:  yes for RA    Dental Issues: has tooth fell out    Family anesthesia difficulty: None     Family Hx of Thrombosis none    Past Medical History:   Diagnosis Date    Anxiety     Arthritis     Bronchitis 04/04/2024    Cancer     Depression     GERD (gastroesophageal reflux disease)     Hypercholesterolemia     Hypertension      Past Surgical History:   Procedure Laterality Date    BREAST BIOPSY Right     2010 & 2021    BREAST CYST ASPIRATION      CARPAL TUNNEL RELEASE Left     EPIDURAL STEROID INJECTION N/A 5/22/2024    Procedure: CAUDAL DOMINIC DIRECT REFERRAL;  Surgeon:  "Oswald Hoskins MD;  Location: [facility];  Service: Pain Management;  Laterality: N/A;  [phone]    hip repl Right 08/2019    HYSTERECTOMY      PARTIAL HIP ARTHROPLASTY Left     RHINOPLASTY      ROTATOR CUFF REPAIR Right     SHOULDER ARTHROSCOPY Right 05/2022    right shoulder sx with bicep repair    TONSILLECTOMY, ADENOIDECTOMY      TOTAL ABDOMINAL HYSTERECTOMY W/ BILATERAL SALPINGOOPHORECTOMY      TRANSFORAMINAL EPIDURAL INJECTION OF STEROID Right 01/24/2024    Procedure: LUMBAR TRANSFORAMINAL RIGHT L3/4 AND L4/L5 DIRECT REFERRAL;  Surgeon: Oswald Hoskins MD;  Location: [facility];  Service: Pain Management;  Laterality: Right;  [phone]     Review of Systems   Constitutional:  Positive for fatigue. Negative for chills and fever.   HENT:  Negative for trouble swallowing and voice change.    Eyes:  Negative for photophobia and visual disturbance.        No acute visual changes   Respiratory:  Negative for apnea, cough, chest tightness, shortness of breath and wheezing.         STOP bang  Score 4  High risk FARIDA   Cardiovascular:  Negative for chest pain, palpitations and leg swelling.   Gastrointestinal:  Positive for constipation. Negative for abdominal distention, abdominal pain, blood in stool, diarrhea, nausea and vomiting.        NO FLD, hepatitis, cirrhosis  No BRB or black tarry stool     Genitourinary:  Negative for difficulty urinating, dysuria, flank pain, frequency, hematuria and urgency.        Severe incontinence   Musculoskeletal:  Positive for arthralgias, back pain, gait problem and joint swelling. Negative for myalgias, neck pain and neck stiffness.   Neurological:  Negative for dizziness, tremors, seizures, syncope, weakness, light-headedness, numbness and headaches.   Psychiatric/Behavioral:  Negative for suicidal ideas.         VITALS  Visit Vitals  BP (!) 142/82   Pulse 87   Temp 97.7 °F (36.5 °C) (Oral)   Ht 5' 4" (1.626 m)   Wt 104.2 kg (229 lb 13.3 oz)   SpO2 96%   BMI " 39.45 kg/m²      Physical Exam  Constitutional:       General: She is not in acute distress.     Appearance: Normal appearance. She is well-developed. She is not diaphoretic.   HENT:      Head: Normocephalic.      Right Ear: Hearing normal.      Left Ear: Hearing normal.      Nose: Nose normal.      Mouth/Throat:      Lips: Pink.      Mouth: Mucous membranes are moist.      Pharynx: No oropharyngeal exudate.   Eyes:      General: Lids are normal.         Right eye: No discharge.         Left eye: No discharge.      Conjunctiva/sclera: Conjunctivae normal.      Pupils: Pupils are equal, round, and reactive to light.   Neck:      Thyroid: No thyromegaly.      Vascular: No carotid bruit or JVD.      Trachea: Trachea and phonation normal. No tracheal deviation.   Cardiovascular:      Rate and Rhythm: Normal rate and regular rhythm.      Pulses: Normal pulses.           Carotid pulses are 2+ on the right side and 2+ on the left side.       Radial pulses are 2+ on the right side and 2+ on the left side.        Posterior tibial pulses are 2+ on the right side and 2+ on the left side.      Heart sounds: Normal heart sounds. No murmur heard.     No friction rub. No gallop.   Pulmonary:      Effort: Pulmonary effort is normal. No respiratory distress.      Breath sounds: Normal breath sounds. No stridor. No wheezing or rales.      Comments: Clear Anterior and Posterior BBS  Abdominal:      General: Abdomen is protuberant. Bowel sounds are normal. There is no distension.      Palpations: Abdomen is soft.      Tenderness: There is no abdominal tenderness. There is no guarding.   Musculoskeletal:         General: No tenderness or deformity. Normal range of motion.      Cervical back: Normal range of motion and neck supple. No rigidity.      Right lower leg: No edema.      Left lower leg: No edema.   Lymphadenopathy:      Head:      Right side of head: No submental, submandibular, tonsillar, preauricular, posterior auricular or  occipital adenopathy.      Left side of head: No submental, submandibular, tonsillar, preauricular, posterior auricular or occipital adenopathy.      Cervical: No cervical adenopathy.      Right cervical: No superficial cervical adenopathy.     Left cervical: No superficial cervical adenopathy.   Skin:     General: Skin is warm and dry.      Capillary Refill: Capillary refill takes 2 to 3 seconds.      Coloration: Skin is not pale.      Findings: No erythema or rash.   Neurological:      Mental Status: She is alert and oriented to person, place, and time. Mental status is at baseline.      GCS: GCS eye subscore is 4. GCS verbal subscore is 5. GCS motor subscore is 6.      Motor: No abnormal muscle tone.      Coordination: Coordination normal.   Psychiatric:         Attention and Perception: Attention and perception normal.         Mood and Affect: Mood and affect normal.         Speech: Speech normal.         Behavior: Behavior normal. Behavior is cooperative.         Thought Content: Thought content normal.         Cognition and Memory: Cognition and memory normal.         Judgment: Judgment normal.          Significant Labs:  Lab Results   Component Value Date    WBC 8.61 08/23/2024    HGB 13.4 08/23/2024    HCT 41.0 08/23/2024     08/23/2024    CHOL 186 03/01/2024    TRIG 119 03/01/2024    HDL 68 03/01/2024    ALT 17 08/23/2024    AST 23 08/23/2024     08/23/2024     08/23/2024    K 4.0 08/23/2024    K 4.0 08/23/2024     08/23/2024     08/23/2024    CREATININE 1.0 08/23/2024    CREATININE 1.0 08/23/2024    BUN 19 08/23/2024    BUN 19 08/23/2024    CO2 22 (L) 08/23/2024    CO2 22 (L) 08/23/2024    TSH 1.293 03/01/2023    INR 0.9 09/24/2024    HGBA1C 5.5 03/01/2023       Diagnostic Studies: No relevant studies.    EKG:   Results for orders placed or performed in visit on 03/01/24   EKG 12-lead    Collection Time: 03/01/24 12:18 PM   Result Value Ref Range    QRS Duration 80 ms    OHS  "QTC Calculation 466 ms    Narrative    Test Reason : R06.09,    Vent. Rate : 064 BPM     Atrial Rate : 064 BPM     P-R Int : 174 ms          QRS Dur : 080 ms      QT Int : 452 ms       P-R-T Axes : 061 074 060 degrees     QTc Int : 466 ms    Normal sinus rhythm  Normal ECG  When compared with ECG of 29-JUN-2020 15:28,  No significant change was found  Confirmed by Jennifer HUNTLEY, Clemente LA ([id number]) on 3/1/2024 3:37:48 PM    Referred By:             Confirmed By:Clemente Rodriguez MD       2D ECHO:  TTE:  Results for orders placed or performed during the hospital encounter of 03/27/24   Echo   Result Value Ref Range    RA Width 2.83 cm    LA Vol (MOD) 34.37 cm3    Left Atrium Major Axis 5.28 cm    Left Atrium Minor Axis 5.28 cm    RA Major Axis 4.92 cm    LV Diastolic Volume 50.40 mL    LV Systolic Volume 25.18 mL    PV Peak D Xavier 0.28 m/s    PV Peak S Xavier 0.43 m/s    MV Peak A Xavier 0.99 m/s    TR Max Xavier 2.29 m/s    MV Peak E Xavier 0.64 m/s    Ao VTI 23.40 cm    Ao peak xavier 1.22 m/s    LVOT peak VTI 17.75 cm    LVOT peak xavier 1.04 m/s    LVOT diameter 3.38 cm    MV "A" wave duration 15.13 msec    IVRT 97.05 msec    E wave deceleration time 318.35 msec    AV mean gradient 4 mmHg    TAPSE 1.65 cm    RVDD 3.45 cm    LA size 3.34 cm    Ascending aorta 3.82 cm    STJ 3.42 cm    Sinus 4.06 cm    LVIDs 2.62 2.1 - 4.0 cm    Posterior Wall 0.94 0.6 - 1.1 cm    IVS 0.81 0.6 - 1.1 cm    LVIDd 3.49 (A) 3.5 - 6.0 cm    TDI LATERAL 0.04 m/s    LA WIDTH 3.12 cm    TDI SEPTAL 0.05 m/s    LV LATERAL E/E' RATIO 16.00 m/s    LV SEPTAL E/E' RATIO 12.80 m/s    FS 25 (A) 28 - 44 %    LA volume 46.77 cm3    LV mass 84.94 g    Left Ventricle Relative Wall Thickness 0.54 cm    AV valve area 6.80 cm²    AV Velocity Ratio 0.85     AV index (prosthetic) 0.76     E/A ratio 0.65     Mean e' 0.05 m/s    Pulm vein S/D ratio 1.54     LVOT area 9.0 cm2    LVOT stroke volume 159.18 cm3    AV peak gradient 6 mmHg    E/E' ratio 14.22 m/s    Triscuspid Valve " Regurgitation Peak Gradient 21 mmHg    MUNA by Velocity Ratio 7.64 cm²    BSA 2.1 m2    LV Systolic Volume Index 12.5 mL/m2    LV Diastolic Volume Index 24.95 mL/m2    LV Mass Index 42 g/m2    LA Volume Index 23.2 mL/m2    LA Volume Index (Mod) 17.0 mL/m2    ZLVIDS -2.62     ZLVIDD -5.34     EF 65 %    TV resting pulmonary artery pressure 24 mmHg    RV TB RVSP 5 mmHg    Est. RA pres 3 mmHg    Narrative      Left Ventricle: The left ventricle is normal in size. Ventricular mass   is normal. Normal wall thickness. There is concentric remodeling. Normal   wall motion. There is normal systolic function with a visually estimated   ejection fraction of 60 - 65%. Ejection fraction by visual approximation   is 65%. There is indeterminate diastolic function.    Right Ventricle: Normal right ventricular cavity size. Wall thickness   is normal. Right ventricle wall motion  is normal. Systolic function is   normal.    Right Atrium: Right atrium is mildly dilated.    Aorta: Aortic root is mildly to moderately dilated measuring 4.06 cm.   Ascending aorta is mildly dilated measuring 3.82 cm.    Pulmonary Artery: The estimated pulmonary artery systolic pressure is   24 mmHg.    IVC/SVC: Normal venous pressure at 3 mmHg.    Pericardium: There is a trivial posterior effusion.         RADHA:  No results found for this or any previous visit.     Imaging     Active Cardiac Conditions: None      Revised Cardiac Risk Index   High -Risk Surgery  Intraperitoneal; Intrathoracic; suprainguinal vascular Yes- + 1 No- 0   History of Ischemic Heart Disease   (Hx of MI/positive exercise test/current chest pain due to ischemia/use of nitrate therapy/EKG with pathological Q waves) Yes- + 1 No- 0   History of CHF  (Pulmonary edema/bilateral rales or S3 gallop/PND/CXR showing pulmonary vascular redistribution) Yes- + 1 No- 0   History of CVA   (Prior stroke or TIA) Yes- + 1 No- 0   Pre-operative treatment with insulin Yes- + 1 No- 0   Pre-operative  creatinine > 2mg/dl Yes- + 1 No- 0   Total:      Risk Status:  Estimated risk of cardiac complications after non-cardiac surgery using the Revised Cardiac Risk Index for Preoperative risk is 3.9 %      ARISCAT (Canet) risk index: Low: 1.6% risk of post-op pulmonary complications.    American Society of Anesthesiologists Physical Status classification (ASA): 3         No further cardiac workup needed prior to surgery.        Preoperative cardiac risk assessment-  The patient does not have any active cardiac conditions . Revised cardiac risk index predictors- ---.Functional capacity is more than 4 Mets. She will be undergoing a Orthopedic procedure that carries a Moderate Risk risk     The estimated risk of the rate of adverse cardiac outcomes  3.9    No further cardiac work up is indicated prior to proceeding with the surgery     Orders Placed This Encounter    X-Ray Cervical Spine 5 View With Flex And Ext    EKG 12-lead       American Society of Anesthesiologists Physical status classification ( ASA ) class: 3     Postoperative pulmonary complication risk assessment: 1.6     ARISCAT ( Canet) risk index- risk class -  Low, if duration of surgery is under 3 hours, intermediate, if duration of surgery is over 3 hours        Assessment/Plan:     MARIBEL (generalized anxiety disorder)  Xanax prn, Setraline    Mixed hyperlipidemia  Atorvastatin      Essential hypertension, benign  Current /93 today.  Repeat BP after 30 [id number]/82  Taking: Norvasc  Digital HTN patient- they manager her BP    9/18/24 140/84  9/7/24 150/92             159/99  9/2/24 135/88  9/24/24 154/91      Lifestyle changes to reduce systolic BP:   exercise 30 minutes per day,  5 days per week or 150 minutes weekly; sodium reduction and avoidance of high salt foods such as processed meats, frozen meals and  fast foods.   Keeping a healthy weight/BMI can help with better BP control    BP acceptable for surgery. I recommend monitoring BP during  perioperative period as uncontrolled pain can elevate blood pressure.         Rheumatoid arthritis  Diagnosed by Troy Delarosa    Anxiety  Xanax prn    Caregiver stress  She is currently taking care of her  with dementia    Incontinence  Has urge incontinence.  She expresses concerns about waking after surgery d/t incontinence      GERD (gastroesophageal reflux disease)  Currently being treated with Omeprazole  Encouraged to elevate HOB and avoid laying down for 2-3 hours after meals.   Weight loss encouraged as well as dietary changes such as reduction or avoidance of fatty foods, caffeine, spicy foods, and chocolate.    Smoking cessation was recommended as well as reduction of alcohol consumption.    Arthritis of right knee  Sx scheduled 10/21/24    Psychophysiologic insomnia  Currently using THC brownie to help with sleep    Marijuana use  Reports nightly THC use   Instructed to not use THC 3 days before surgery      Preventive perioperative care    Thromboembolic prophylaxis:  Her risk factors for thrombosis include morbid obesity, surgical procedure, age, and reduced mobility.I suggest  thromboembolic prophylaxis ( mechanical/pharmacological, weighing the risk benefits of pharmacological agent use considering philip procedural bleeding )  during the perioperative period.I suggested being active in the post operative period.      Postoperative pulmonary complication prophylaxis-Risk factors for post operative pulmonary complications include age over 65 years and ASA class >2- I suggest incentive spirometry use, early ambulation, and pain control so as to avoid diaphragmatic splinting  Brush teeth twice per day, oral rinses, sleep with the head of the bed up 30 degrees     Renal complication prophylaxis-Risk factors for renal complications include hypertension . I suggest keeping her well hydrated and avoidance/ minimizing the use of  NSAID's,CHRISTOPHER 2 Inhibitors ,IV contrast if possible in the  perioperative period.I suggested drinking 2 litre's of water a day      Surgical site Infection Prophylaxis-I  suggest appropriate antibiotic for Prophylaxis against Surgical site infections Shower with Hibiclens in the night before surgery and the morning of surgery    In view of Spine procedure the patient  is at risk of postoperative urinary retention.  I suggest avoidance / minimizing the of  Benzodiazepines,Anticholinergic medication,antihistamines ( Benadryl) , if possible in the perioperative period. I suggest using the minimum possible use of opioids for the minimum period of time in the perioperative period. Benadryl avoidance suggested      This visit was focused on Preoperative evaluation, Perioperative Medical management, complication reduction plans. I suggest that the patient follows up with primary care or relevant sub specialists for ongoing health care.    I appreciate the opportunity to be involved in this patients care. Please feel free to contact me if there were any questions about this consultation.    Patient is optimized    I spent a total of 55 minutes on the day of the visit.This includes face to face time and non-face to face time preparing to see the patient (eg, review of tests), obtaining and/or reviewing separately obtained history, documenting clinical information in the electronic or other health record, independently interpreting results and communicating results to the patient/family/caregiver, or care coordinator.      Heath Monaco NP  Perioperative Medicine  [facility]   Pager [phone]

## 2024-09-24 NOTE — H&P
CC:  Right knee pain    Lashanda Monaco is a 69 y.o. female with history of Right knee pain. Pain is worse with activity and weight bearing.  Patient has experienced interference of activities of daily living due to decreased range of motion and an increase in joint pain and swelling.  Patient has failed non-operative treatment including NSAIDs, corticosteroid injections, viscosupplement injections, and activity modification.  Lashanda Monaco currently ambulates using assistive device .     Relevant medical conditions of significance in perioperative period:  HTN- on medication managed by PCP  HLD- on medication managed by PCP       Given documented medical comorbidities including those listed above and based off of CMS criteria patient would meet inpatient admission status guidelines. This case has been approved as inpatient.     Past Medical History:   Diagnosis Date    Anxiety     Arthritis     Bronchitis 04/04/2024    Cancer     Depression     GERD (gastroesophageal reflux disease)     Hypercholesterolemia     Hypertension        Past Surgical History:   Procedure Laterality Date    BREAST BIOPSY Right     2010 & 2021    BREAST CYST ASPIRATION      CARPAL TUNNEL RELEASE Left     EPIDURAL STEROID INJECTION N/A 5/22/2024    Procedure: CAUDAL DOMINIC DIRECT REFERRAL;  Surgeon: Oswald Hoskins MD;  Location: [facility], [place] [facility];  Service: Pain Management;  Laterality: N/A;  [phone]    hip repl Right 08/2019    HYSTERECTOMY      PARTIAL HIP ARTHROPLASTY Left     RHINOPLASTY      ROTATOR CUFF REPAIR Right     SHOULDER ARTHROSCOPY Right 05/2022    right shoulder sx with bicep repair    TONSILLECTOMY, ADENOIDECTOMY      TOTAL ABDOMINAL HYSTERECTOMY W/ BILATERAL SALPINGOOPHORECTOMY      TRANSFORAMINAL EPIDURAL INJECTION OF STEROID Right 01/24/2024    Procedure: LUMBAR TRANSFORAMINAL RIGHT L3/4 AND L4/L5 DIRECT REFERRAL;  Surgeon: Oswald Hoskins MD;  Location: [facility], [place] [facility];  Service: Pain Management;  Laterality:  Right;  [phone]       Family History   Problem Relation Name Age of Onset    Hyperlipidemia Mother      Hypertension Mother      Aortic aneurysm Mother  80    Hyperlipidemia Father      Hypertension Father      Heart attack Father      Arthritis Sister      Arthritis Sister      Diabetes Sister      Osteoarthritis Sister      Heart disease Sister      Diabetes Brother      Arthritis Brother      Heart disease Brother      Lymphoma Brother      Diabetes Brother      Arthritis Brother      Heart disease Brother      Heart disease Brother      Colon cancer Son          no genetic testing    Heart attack Other      Tourette syndrome Other      Tourette syndrome Other      Tourette syndrome Other      Tourette syndrome Other         Review of patient's allergies indicates:   Allergen Reactions    Kiwi (actinidia chinensis)     Hydrocodone bitartrate Nausea Only         Current Outpatient Medications:     albuterol (PROVENTIL/VENTOLIN HFA) 90 mcg/actuation inhaler, Inhale 1-2 puffs into the lungs every 6 (six) hours as needed for Wheezing or Shortness of Breath. Rescue, Disp: 18 g, Rfl: 0    ALPRAZolam (XANAX) 0.5 MG tablet, Take 1 tablet (0.5 mg total) by mouth daily as needed for Anxiety., Disp: 20 tablet, Rfl: 1    amLODIPine (NORVASC) 10 MG tablet, TAKE 1 TABLET DAILY, Disp: 90 tablet, Rfl: 3    atorvastatin (LIPITOR) 20 MG tablet, TAKE 1 TABLET DAILY, Disp: 90 tablet, Rfl: 3    butalbital-acetaminophen-caffeine -40 mg (FIORICET, ESGIC) -40 mg per tablet, Take 1 tablet by mouth every 4 (four) hours as needed., Disp: , Rfl:     cyclobenzaprine (FLEXERIL) 5 MG tablet, Take 1 tablet (5 mg total) by mouth nightly., Disp: 30 tablet, Rfl: 1    estradioL (ESTRACE) 2 MG tablet, Take 1 tablet (2 mg total) by mouth once daily., Disp: 90 tablet, Rfl: 0    fluticasone-salmeterol diskus inhaler 250-50 mcg, Inhale 1 puff into the lungs 2 (two) times daily. Controller (Patient not taking: Reported on 9/24/2024),  Disp: 60 each, Rfl: 11    ibuprofen (ADVIL) 200 MG tablet, Take 400 mg by mouth as needed for Pain., Disp: , Rfl:     omeprazole (PRILOSEC) 40 MG capsule, TAKE 1 CAPSULE TWICE A DAY BEFORE MEALS, Disp: 60 capsule, Rfl: 1    ondansetron (ZOFRAN-ODT) 4 MG TbDL, Take 1 tablet (4 mg total) by mouth every 6 (six) hours as needed (nausea)., Disp: 20 tablet, Rfl: 1    pregabalin (LYRICA) 150 MG capsule, Take 1 capsule (150 mg total) by mouth 2 (two) times daily., Disp: 60 capsule, Rfl: 6    sertraline (ZOLOFT) 100 MG tablet, Take 1 tablet (100 mg total) by mouth once daily., Disp: 90 tablet, Rfl: 3    traZODone (DESYREL) 50 MG tablet, Take 1-2 tablets ( mg total) by mouth nightly as needed for Insomnia., Disp: 60 tablet, Rfl: 5    TURMERIC ORAL, Take 1 tablet by mouth once daily., Disp: , Rfl:     upadacitinib (RINVOQ) 15 mg 24 hr tablet, Take 1 tablet (15 mg total) by mouth once daily., Disp: 30 tablet, Rfl: 11    Review of Systems:  Constitutional: no fever or chills  Eyes: no visual changes  ENT: no nasal congestion or sore throat  Respiratory: no cough or shortness of breath  Cardiovascular: no chest pain or palpitations  Gastrointestinal: no nausea or vomiting, tolerating diet  Genitourinary: no hematuria or dysuria  Integument/Breast: no rash or pruritis  Hematologic/Lymphatic: no easy bruising or lymphadenopathy  Musculoskeletal: positive for knee pain  Neurological: no seizures or tremors  Behavioral/Psych: no auditory or visual hallucinations  Endocrine: no heat or cold intolerance    PE:  There were no vitals taken for this visit.  General: Pleasant, cooperative, NAD   Gait: antalgic  HEENT: NCAT, sclera nonicteric   Lungs: Respirations clear bilaterally; equal and unlabored.   CV: S1S2; 2+ bilateral upper and lower extremity pulses.   Skin: Intact throughout with no rashes, erythema, or lesions  Extremities: No LE edema,  no erythema or warmth of the skin in either lower extremity.    Right knee  exam:  Knee Range of Motion:  5-110, pain with passive range of motion  Effusion:  Mild  Condition of skin:intact  Location of tenderness:Medial joint line   Strength:normal  Stability: stable to testing    Hip Examination: painless PROM of hip     Radiographs: Radiographs reveal advanced degenerative changes including subchondral cyst formation, subchondral sclerosis, osteophyte formation, joint space narrowing.     Knee Alignment: normal    Diagnosis: Primary osteoarthritis Right knee    Plan: Right total knee arthroplasty    Due to the serious nature of total joint infection and the high prevalence of community acquired MRSA, vancomycin will be used perioperatively.

## 2024-09-25 PROBLEM — F12.90 MARIJUANA USE: Status: ACTIVE | Noted: 2024-09-25

## 2024-10-08 ENCOUNTER — TELEPHONE (OUTPATIENT)
Dept: GENETICS | Facility: CLINIC | Age: 69
End: 2024-10-08
Payer: MEDICARE

## 2024-10-09 ENCOUNTER — OFFICE VISIT (OUTPATIENT)
Dept: GENETICS | Facility: CLINIC | Age: 69
End: 2024-10-09
Payer: MEDICARE

## 2024-10-09 DIAGNOSIS — I71.20 THORACIC AORTIC ANEURYSM WITHOUT RUPTURE, UNSPECIFIED PART: ICD-10-CM

## 2024-10-09 DIAGNOSIS — M21.379 FOOT-DROP, UNSPECIFIED LATERALITY: Primary | ICD-10-CM

## 2024-10-09 PROCEDURE — 99215 OFFICE O/P EST HI 40 MIN: CPT | Mod: 95,,, | Performed by: MEDICAL GENETICS

## 2024-10-09 PROCEDURE — 99417 PROLNG OP E/M EACH 15 MIN: CPT | Mod: 95,,, | Performed by: MEDICAL GENETICS

## 2024-10-09 NOTE — PROGRESS NOTES
The patient location is: at home in [place]  The chief complaint leading to consultation is: polyneuropathy on EMG, foot drop, REEP1 VUS, and a personal and family history of aortic aneurysms    Visit type: audiovisual    Face to Face time with patient: 40 minutes  70 minutes of total time spent on the encounter, which includes face to face time and non-face to face time preparing to see the patient (eg, review of tests), Obtaining and/or reviewing separately obtained history, Documenting clinical information in the electronic or other health record, Independently interpreting results (not separately reported) and communicating results to the patient/family/caregiver, or Care coordination (not separately reported).       Each patient to whom he or she provides medical services by telemedicine is:  (1) informed of the relationship between the physician and patient and the respective role of any other health care provider with respect to management of the patient; and (2) notified that he or she may decline to receive medical services by telemedicine and may withdraw from such care at any time.    Notes:   [facility]  [address] [place] [address]    [facility] CONSULT[facility] NOTE- TELEMEDICINE     Lashanda Monaco    DOS: 10/11/2024  : 1955  MRN: [id number]     REFERRING MD: No ref. provider found     REASON FOR CONSULT: Our Genetic Counseling Service was asked to consult this 69 y.o.  female regarding polyneuropathy on EMG, foot drop, and REEP1 VUS. She is unaccompanied for today's genetic counseling appointment.     HISTORY OF PRESENT ILLNESS: Lashanda Monaco  is a 69 y.o.  female  referred to [facility] regarding polyneuropathy seen on EMG, foot drop, and REEP1 VUS. Lashanda was previously seen by genetic counseling (Genetic Counselor Radha Saravia, MS, [facility]) on 2024. HPI below:     Lashanda has been experiencing hip/knee/leg/back pain  for the past 5 years. She had hip surgery 5 years ago and developed foot drop. She reports not being able to stand for long periods of time. She walks unassisted but very slowly. She has not had any falls but does feel off balance at times. She reports pain in feet and recurrent haley horse. She also experiences intermittent hand numbness mostly in the right hand. She was recently evaluated for consideration of a knee replacement dur to arthritis, but EMG showed polyneuropathy. The orthopedic surgeon Dr. 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     Since she was last seen, Lashanda reports that she has been using a walker over the past six weeks due to continued gait issues. She is able to walk short distances (e.g., up and down a driveway) before needing to sit down, can do longer distances with the assistance of a walker. She endorses continuous pain in her toes and the top of her right foot, uses a medicated patch to help alleviate pain. She continues to be followed by rheumatology and orthopedics. She is scheduled to have right total knee arthroplasty on 10/21/2024.     Lashanda's medical history is also notable for an ascending aortic aneurysm incidentally found on chest CT (4.4cm). She is followed by cardiothoracic surgery for surveillance. She has been seen by pulmonology for pulmonary nodules but reports no longer having breathing issues since using her walker and has an inhaler when needed. Additional medical concerns reported during today's visit include weak bladder (wears pull-ups at nighttime) and thin skin with easy bruising.     Mrs. Monaco has had regular mammograms (annually- very recent) and colonoscopies (last one was 3 years ago).    The patient's sister's was recently found to have an  aortic aneurysm and poor renal function.      MEDICAL HISTORY:        Active Ambulatory Problems     Diagnosis Date Noted    Mixed hyperlipidemia 08/04/2017    Essential hypertension, benign 08/04/2017    GERD (gastroesophageal reflux disease) 08/16/2017    DDD (degenerative disc disease), lumbar 08/16/2017    Primary osteoarthritis of both knees 08/16/2017    Primary osteoarthritis of both wrists 08/16/2017    Moderate episode of recurrent major depressive disorder 05/01/2019    History of bilateral hip replacement 08/13/2019    Severe obesity (BMI 35.0-39.9) with comorbidity      Myalgia of pelvic floor 12/15/2021    Lack of coordination 12/15/2021    Muscle weakness 12/21/2021    MARIBEL (generalized anxiety disorder) 05/20/2022    Chronic right-sided low back pain without sciatica 07/27/2023    Weakness of both hips 07/27/2023    Impaired functional mobility and activity tolerance 07/27/2023    Caregiver stress 08/29/2023    Psychophysiologic insomnia 08/29/2023    Lumbar radiculopathy 05/22/2024    Anxiety 05/06/2019    Depressive disorder 05/06/2019    Rheumatoid arthritis 09/23/2024    Incontinence 09/24/2024    Arthritis of right knee 09/24/2024    Marijuana use 09/25/2024           Resolved Ambulatory Problems     Diagnosis Date Noted    Bronchitis 04/04/2024           Past Medical History:   Diagnosis Date    Arthritis      Cancer      Depression      Hypercholesterolemia      Hypertension             Past Surgical History:   Procedure Laterality Date    BREAST BIOPSY Right     2010 & 2021    BREAST CYST ASPIRATION      CARPAL TUNNEL RELEASE Left     EPIDURAL STEROID INJECTION N/A 5/22/2024    Procedure: CAUDAL DOMINIC DIRECT REFERRAL;  Surgeon: Oswald Hoskins MD;  Location: [facility];  Service: Pain Management;  Laterality: N/A;  [phone]    hip repl Right 08/2019    HYSTERECTOMY      PARTIAL HIP ARTHROPLASTY Left     RHINOPLASTY      ROTATOR CUFF REPAIR Right     SHOULDER ARTHROSCOPY Right 05/2022     right shoulder sx with bicep repair    TONSILLECTOMY, ADENOIDECTOMY      TOTAL ABDOMINAL HYSTERECTOMY W/ BILATERAL SALPINGOOPHORECTOMY      TRANSFORAMINAL EPIDURAL INJECTION OF STEROID Right 2024    Procedure: LUMBAR TRANSFORAMINAL RIGHT L3/4 AND L4/L5 DIRECT REFERRAL;  Surgeon: Oswald Hoskins MD;  [facility];  Service: Pain Management;  Laterality: Right;  [phone]       Review of patient's allergies indicates:   Allergen Reactions    Kiwi (actinidia chinensis)     Hydrocodone bitartrate Nausea Only       Immunization History   Administered Date(s) Administered    COVID-19, MRNA, LN-S, PF (MODERNA FULL 0.5 ML DOSE) 2021, 2021, 2021, 2021    Pneumococcal Conjugate - 20 Valent 2019, 2023    Td (ADULT) 2019    Td - PF (ADULT) 2019    Zoster 2014       REVIEW OF SYSTEMS: A complete review of systems is normal other than as specified above.        FAMILY HISTORY:           Lashanda has one son (46yo) who is currently undergoing treatment for colon cancer. She has several siblings with heart disease. Her father  of a heart attack at 56. Lashanda also has several siblings with rheumatoid arthritis. One brother has a history of non-Hodgkin's lymphoma and melanoma. One sister with a history of kidney issues was also recently found to have an aortic aneurysm. Two nephews and great nephew/niece have Tourette syndrome. One nephew had colon cancer diagnosed in his late 40s/early 50s and reportedly had negative genetic testing. Lashanda's mother  of aortic aneurism at 80. There is a history of foot drop in one brother after he had knee surgery and Lashanda's mother after she had hip surgery.      Intellectual disability, developmental delays, learning disabilities, autism spectrum disorder, birth defects, recurrent miscarriage, stillbirth, and infant/childhood death were denied. Consanguinity was denied.    PERTINENT LABS:    I have reviewed  "the patient's labs.    PERTINENT IMAGING STUDIES:  CT Chest Without Contrast (03/15/2024):  Impression:  1. No mediastinal or pulmonary masses.  2. Fusiform dilatation of the ascending (4.3 cm) and proximal descending (3.8 cm) thoracic aorta.  No aneurysmal dilatation.  3. Mild aortic and coronary artery atherosclerosis.  4. Bilateral solid pulmonary nodules measuring up to 0.5 cm.  In this low risk patient, Fleischner Society guidelines recommend no further follow-up.  5. Additional findings as detailed in the body of the report.     EMG W/ ULTRASOUND AND NERVE CONDUCTION TEST 1 Extremity (03/13/2024):      MEASUREMENTS: (most recent available at the time of consultation)    Wt Readings from Last 3 Encounters:   09/24/24 104.2 kg (229 lb 13.3 oz)   09/17/24 103.8 kg (228 lb 13.4 oz)   06/11/24 99.6 kg (219 lb 9.3 oz)     Ht Readings from Last 3 Encounters:   09/24/24 5' 4" (1.626 m)   09/17/24 5' 1" (1.549 m)   06/11/24 5' 1" (1.549 m)     HC Readings from Last 3 Encounters:   No data found for HC       EXAM:  (limited by virtual nature of visit, facilitated by patient)  General: Awake, alert, in no acute distress  Head: proportionate  Face: Symmetric  Eyes: epicanthal folds not appreciated  Ears: normal  Nose: narrow nasal tip  Mouth/Jaw: prominent chin, thin upper lip  Neck: Configuration: Normal  Arms/Hands: Subjectively arachnodactyly not appreciated  Legs/Feet: hallux valgus bilaterally. Hammertoes on 2nd - 4th toes. Higher arch on the left than the right. No pes cavus.   Back: Spine straight, intact  Neurologic: Patient answers questions appropriately. Muscle bulk appears normal.  Gait: Foot drop appreciated    IMPRESSION/DISCUSSION: Lashanda is a 69 y.o. female with polyneuropathy on EMG, foot drop, REEP1 VUS, and a personal and family history of aortic aneurysms.    Pathogenic/likely pathogenic variants in the REEP1 gene are known to be associated with autosomal dominant hereditary distal motor neuronopathy " 12, autosomal dominant spastic paraplegia 31, and autosomal recessive (AR) hereditary distal motor neuropathy 6. With regard to the AR disorder, reclassification of the variant as well as a second disease-causing variant in the REEP1 gene would be needed to establish a molecular diagnosis of REEP1-related AR disease. AD distal motor neuropathy typically starts in the first or second decade of life with distal weakness and muscle atrophy. This seems to affect the hands primarily first and then the lower legs resulting in abnormal gait and pes cavus. The timing of Ms. Monaco's symptoms is not consistent with this diagnosis. She does not seem to have spasticity making spastic paraplegia less likely.    Further workup for her neurological symptoms is indicated. Highest yield would be whole exome sequencing. This would also allow for a genetic etiology of her thoracic aortic aneurysm. Her family history of an aortic aneurysm in herself and her sister is concerning for the possibility of an underlying genetic etiology. I am unable to perform a connective tissue disorder exam today given the virtual modality of the visit. Based on what I am able to see today, I am less concerned for Marfan syndrome specifically but certainly cannot rule out another connective tissue disorder. Her history though supports the need for genetic testing. A physical exam in-person with one of my new colleagues can be completed upon its resulting.    Will look into BI for Marfan/TAAD panel vs Whole exome sequencing. The results of either testing could lead to change in her medical management.    Risks and benefits of genetic testing reviewed. Family expresses understanding and their questions have been answered to their satisfaction.    Without a specific diagnosis, I am unable to provide recurrence risk information to the family at this time. Should the etiology of Lashanda's features be genetic, the risk for recurrence in a future pregnancy  could be significant.    She has questions about the side effects of her Rinvoq medication and does not feel like it has been as helpful. Defer to her prescribing provider to answer these questions.     It was a pleasure to see Lashanda today.  We would like to see Lashanda back in Genetics clinic when results of testing above return for in-person evaluation. Should any questions or concerns arise following today's visit, we encourage the family to contact the Genetics Office.    RECOMMENDATIONS/PLAN:  Marfan/TAAD gene panel vs MILVIA- our office to run benefits investigations for both for patient  Defer to prescribing provider re:Rinvoq questions  Return to clinic when results of testing above return for in-person evaluation        Ernesto Norman, Genetic Counselor   [facility]    Elsi Frias MD  Medical Genetics  [facility]      EXTERNAL CC:    Wayne Lundberg MD  No ref. provider found

## 2024-10-09 NOTE — PROGRESS NOTES
TELEMEDICINE VIDEO VISIT     The patient location is: [facility]  The chief complaint leading to consultation is: polyneuropathy seen on EMG, foot drop, REEP1 VUS  Total time spent with patient: 40 minutes  Visit type: Virtual visit with synchronous audio and video      Each patient to whom he or she provides medical services by telemedicine is: (1) informed of the relationship between the physician and patient and the respective role of any other health care provider with respect to management of the patient; and (2) notified that he or she may decline to receive medical services by telemedicine and may withdraw from such care at any time.    ESTABLISHED PATIENT GENETIC COUNSELING CONSULTATION     Lashanda Monaco    DOS: 10/11/2024  : 1955  MRN: [id number]     REFERRING MD: No ref. provider found     REASON FOR CONSULT: Our Genetic Counseling Service was asked to consult this 69 y.o.  female regarding polyneuropathy seen on EMG, foot drop, and REEP1 VUS. She is unaccompanied for today's genetic counseling appointment.     HISTORY OF PRESENT ILLNESS: Lashanda Monaco  is a 69 y.o.  female  referred to [facility] regarding polyneuropathy seen on EMG, foot drop, and REEP1 VUS.    Lashanda was previously seen by genetic counseling (Genetic Counselor Radha Saravia, MS, [facility], [facility]) on 2024. HPI below:    Lashanda has been experiencing hip/knee/leg/back pain for the past 5 years. She had hip surgery 5 years ago and developed foot drop. She reports not being able to stand for long periods of time. She walks unassisted but very slowly. She has not had any falls but does feel off balance at times. She reports pain in feet and recurrent haley horse. She also experiences intermittent hand numbness mostly in the right hand. She was recently evaluated for consideration of a knee replacement dur to arthritis, but EMG showed polyneuropathy. The orthopedic surgeon Dr. 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    Hereditary Neuropathy Panel completed through [facility] [employer] identified one variant of uncertain significance in REEP1 [REEP1: c.547G>A, (p.G183S)].     Since she was last seen, Lashanda reports that she has been using a walker over the past six weeks due to continued gait issues. She is able to walk short distances (e.g., up and down a driveway) before needing to sit down, can do longer distances with the assistance of a walker. She endorses continuous pain in her toes and the top of her right foot, uses a medicated patch to help alleviate pain. She continues to be followed by rheumatology and orthopedics. She is scheduled to have right total knee arthroplasty on 10/21/2024.    Lashanda's medical history is also notable for an ascending aortic aneurysm incidentally found on chest CT (4.4cm). She is followed by cardiothoracic surgery for surveillance. She has been seen by pulmonology for pulmonary nodules but reports no longer having breathing issues since using her walker and has an inhaler when needed. Additional medical concerns reported during today's visit include weak bladder (wears pull-ups at nighttime) and thin skin with easy bruising.      MEDICAL HISTORY:   Active Ambulatory Problems     Diagnosis Date Noted    Mixed hyperlipidemia 08/04/2017    Essential hypertension, benign 08/04/2017    GERD (gastroesophageal reflux disease) 08/16/2017    DDD (degenerative disc disease), lumbar 08/16/2017    Primary osteoarthritis of both knees 08/16/2017    Primary osteoarthritis of both wrists 08/16/2017    Moderate episode of recurrent major depressive disorder 05/01/2019    History of bilateral hip replacement 08/13/2019    Severe obesity (BMI 35.0-39.9) with comorbidity     Myalgia of pelvic floor 12/15/2021    Lack of coordination 12/15/2021    Muscle weakness 12/21/2021    MARIBEL (generalized anxiety disorder) 05/20/2022    Chronic right-sided low back  pain without sciatica 2023    Weakness of both hips 2023    Impaired functional mobility and activity tolerance 2023    Caregiver stress 2023    Psychophysiologic insomnia 2023    Lumbar radiculopathy 2024    Anxiety 2019    Depressive disorder 2019    Rheumatoid arthritis 2024    Incontinence 2024    Arthritis of right knee 2024    Marijuana use 2024     Resolved Ambulatory Problems     Diagnosis Date Noted    Bronchitis 2024     Past Medical History:   Diagnosis Date    Arthritis     Cancer     Depression     Hypercholesterolemia     Hypertension        RELEVANT LABS/IMAGING:    Hereditary Neuropathy Panel/Sequencing and Deletion Duplication Analysis ([facility], 2024):      CT Chest Without Contrast (03/15/2024):  Impression:  1. No mediastinal or pulmonary masses.  2. Fusiform dilatation of the ascending (4.3 cm) and proximal descending (3.8 cm) thoracic aorta.  No aneurysmal dilatation.  3. Mild aortic and coronary artery atherosclerosis.  4. Bilateral solid pulmonary nodules measuring up to 0.5 cm.  In this low risk patient, Fleischner Society guidelines recommend no further follow-up.  5. Additional findings as detailed in the body of the report.    EMG W/ ULTRASOUND AND NERVE CONDUCTION TEST 1 Extremity (2024):         FAMILY HISTORY:         Lashanda has one son (48yo) who is currently undergoing treatment for colon cancer. She has several siblings with heart disease. Her father  of a heart attack at 56. Lashanda also has several siblings with rheumatoid arthritis. One brother has a history of non-Hodgkin's lymphoma and melanoma. One sister with a history of kidney issues was also recently found to have an aortic aneurysm. Two nephews and great nephew/niece have Tourette syndrome. One nephew had colon cancer diagnosed in his late 40s/early 50s and reportedly had negative genetic testing. Lashanda's mother  of aortic  aneurism at 80. There is a history of foot drop in one brother after he had knee surgery and Lashanda's mother after she had hip surgery.     Intellectual disability, developmental delays, learning disabilities, autism spectrum disorder, birth defects, recurrent miscarriage, stillbirth, and infant/childhood death were denied. Consanguinity was denied.     IMPRESSION: Lashanda Monaco  is a 69 y.o.  female  with polyneuropathy seen on EMG, foot drop, REEP1 VUS.     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 DHMN-V features include weakness of muscles in the hands and feet. Review of the REEP1 variant identified indicates it has been reported in general population databases at a frequency of 0.008% (gnomAD, 126/8757084 allele count, 0 homozygotes). This variant has been reported in the literature in one patient with distal hereditary motor neuropathy, deep tendon reflexes, foot deformities, muscular weakness and wasting of distal muscles, and dysphagia (PMID: 79076370). In silico analysis indicates that this missense variant does not alter protein structure/function. ClinVar has conflicting classifications on the pathogenicity of this variant; it is classified as a variant of uncertain significance by one laboratory and likely benign by another laboratory (ClinVar Variant ID #[id number]).     We discussed that a VUS is a change that we do not currently know the impact of. There are changes in genes that we know can be disease-causing and changes that are part of normal human variation; a VUS is a change that we cannot classify into one of those categories at this time. A VUS is not a diagnosis. Variants are evaluated to determine its classification using evidence from population and gene/disease-specific databases, in silico prediction tools, laboratory variant databases, and the relevant  scientific literature. Over time, additional information will be available to clarify the significance of this variant. Contact with [facility] following the visit confirmed that this variant would qualify for their VUS Resolution Program and testing is available for free for up to two family members; in light of weak criteria for this variant, identifying this variant in an unaffected relative could aid in downgrading this variant to likely benign.     Based on Lashanda's medical and family history and nondiagnostic results thus far, additional testing could be considered. Education and counseling were provided about genetics and possible types of genetic testing that may be recommended for Lashanda, including panel testing based on her personal/family history of aneurysms versus whole exome sequencing ([facility]). A positive genetic test result may explain Lashanda's symptoms and can be informative for the family. A negative genetic test result, while nondiagnostic, does not rule out that the underlying condition is heritable in nature and reanalysis or additional genetic testing may be possible in the future. Variants of uncertain significance (VUS), or changes in a gene with unknown clinical significance are also possible.      RECOMMENDATIONS/PLAN:  See Dr. Frias's note for recommendations.  Follow-up with Genetics as indicated     TIME SPENT: 40 minutes with over 50% spent counseling    Ernesto [place] MS, [facility]  Certified Genetic Counselor   [facility]

## 2024-10-11 ENCOUNTER — PATIENT MESSAGE (OUTPATIENT)
Dept: ORTHOPEDICS | Facility: CLINIC | Age: 69
End: 2024-10-11
Payer: MEDICARE

## 2024-10-11 DIAGNOSIS — M17.11 PRIMARY OSTEOARTHRITIS OF RIGHT KNEE: Primary | ICD-10-CM

## 2024-10-15 ENCOUNTER — TELEPHONE (OUTPATIENT)
Dept: ORTHOPEDICS | Facility: CLINIC | Age: 69
End: 2024-10-15
Payer: MEDICARE

## 2024-10-15 DIAGNOSIS — Z96.651 S/P TOTAL KNEE REPLACEMENT, RIGHT: Primary | ICD-10-CM

## 2024-10-15 RX ORDER — AMOXICILLIN 250 MG
1 CAPSULE ORAL DAILY
Qty: 30 TABLET | Refills: 0 | Status: SHIPPED | OUTPATIENT
Start: 2024-10-15

## 2024-10-15 RX ORDER — ARGIN/GLUT/CAHMB/COLLAG/MV-MIN 7G-7G-1.5G
1 POWDER IN PACKET (EA) ORAL DAILY
Qty: 14 PACKET | Refills: 0 | Status: SHIPPED | OUTPATIENT
Start: 2024-10-15

## 2024-10-15 RX ORDER — ASPIRIN 81 MG/1
81 TABLET ORAL 2 TIMES DAILY
Qty: 60 TABLET | Refills: 0 | Status: SHIPPED | OUTPATIENT
Start: 2024-10-15 | End: 2024-11-17

## 2024-10-15 RX ORDER — LACTOSE-REDUCED FOOD
LIQUID (ML) ORAL
Qty: 3318 ML | Refills: 0 | Status: SHIPPED | OUTPATIENT
Start: 2024-10-15

## 2024-10-15 RX ORDER — PANTOPRAZOLE SODIUM 40 MG/1
40 TABLET, DELAYED RELEASE ORAL DAILY
Qty: 30 TABLET | Refills: 0 | Status: SHIPPED | OUTPATIENT
Start: 2024-10-15

## 2024-10-15 RX ORDER — CELECOXIB 200 MG/1
200 CAPSULE ORAL DAILY
Qty: 30 CAPSULE | Refills: 0 | Status: SHIPPED | OUTPATIENT
Start: 2024-10-15

## 2024-10-15 RX ORDER — CEFADROXIL 500 MG/1
500 CAPSULE ORAL EVERY 12 HOURS
Qty: 14 CAPSULE | Refills: 0 | Status: SHIPPED | OUTPATIENT
Start: 2024-10-15 | End: 2024-10-25

## 2024-10-15 RX ORDER — METHOCARBAMOL 750 MG/1
750 TABLET, FILM COATED ORAL 4 TIMES DAILY PRN
Qty: 40 TABLET | Refills: 0 | Status: SHIPPED | OUTPATIENT
Start: 2024-10-15

## 2024-10-15 RX ORDER — MULTIVITAMIN
1 TABLET ORAL DAILY
Qty: 14 TABLET | Refills: 0 | Status: SHIPPED | OUTPATIENT
Start: 2024-10-15

## 2024-10-15 RX ORDER — DEXTROMETHORPHAN HYDROBROMIDE, GUAIFENESIN 5; 100 MG/5ML; MG/5ML
650 LIQUID ORAL EVERY 8 HOURS
Qty: 120 TABLET | Refills: 0 | Status: SHIPPED | OUTPATIENT
Start: 2024-10-15

## 2024-10-15 RX ORDER — OXYCODONE HYDROCHLORIDE 5 MG/1
TABLET ORAL
Qty: 50 TABLET | Refills: 0 | Status: SHIPPED | OUTPATIENT
Start: 2024-10-15

## 2024-10-15 RX ORDER — ASCORBIC ACID 500 MG
1000 TABLET ORAL 2 TIMES DAILY
Qty: 56 TABLET | Refills: 0 | Status: SHIPPED | OUTPATIENT
Start: 2024-10-15 | End: 2024-11-01

## 2024-10-15 NOTE — TELEPHONE ENCOUNTER
I called the patient today regarding surgery on 10/17/2024 with Dr. Jose David Chowdhury. I informed the patient that her surgery will be at  [facility] ([facility] [place] [address]). I informed the patient they must arrive at 7:30am and their surgery will start at 9:30am.     Per the [facility] COVID-19 Pre-Procedural Testing Policy (administered 10/26/2022), patients do not need tested for COVID-19 regardless of vaccination status.    I reminded the patient that they cannot eat or drink after midnight, the night before surgery.      I reminded the patient to be careful of their skin over the next few days to make sure they do not get any cuts, scratches or scrapes.    The patient verbalized that they have received their walker, bedside commode and shower chair from [facility].    The patient verbalized understanding and has no further questions.

## 2024-10-16 ENCOUNTER — ANESTHESIA EVENT (OUTPATIENT)
Dept: SURGERY | Facility: HOSPITAL | Age: 69
End: 2024-10-16
Payer: MEDICARE

## 2024-10-16 ENCOUNTER — PATIENT MESSAGE (OUTPATIENT)
Dept: ADMINISTRATIVE | Facility: HOSPITAL | Age: 69
End: 2024-10-16
Payer: MEDICARE

## 2024-10-17 ENCOUNTER — ANESTHESIA (OUTPATIENT)
Dept: SURGERY | Facility: HOSPITAL | Age: 69
End: 2024-10-17
Payer: MEDICARE

## 2024-10-17 ENCOUNTER — HOSPITAL ENCOUNTER (OUTPATIENT)
Facility: HOSPITAL | Age: 69
Discharge: HOME | End: 2024-10-18
Attending: ORTHOPAEDIC SURGERY | Admitting: ORTHOPAEDIC SURGERY
Payer: MEDICARE

## 2024-10-17 DIAGNOSIS — Z01.818 PRE-OP TESTING: Primary | ICD-10-CM

## 2024-10-17 DIAGNOSIS — M79.609 PAIN IN EXTREMITY, UNSPECIFIED EXTREMITY: ICD-10-CM

## 2024-10-17 DIAGNOSIS — M06.9 RHEUMATOID ARTHRITIS FLARE: ICD-10-CM

## 2024-10-17 DIAGNOSIS — M17.11 PRIMARY OSTEOARTHRITIS OF RIGHT KNEE: ICD-10-CM

## 2024-10-17 PROCEDURE — 99900035 HC TECH TIME PER 15 MIN (STAT)

## 2024-10-17 PROCEDURE — 94799 UNLISTED PULMONARY SVC/PX: CPT

## 2024-10-17 PROCEDURE — 27447 TOTAL KNEE ARTHROPLASTY: CPT | Mod: 22,RT,, | Performed by: ORTHOPAEDIC SURGERY

## 2024-10-17 PROCEDURE — C1776 JOINT DEVICE (IMPLANTABLE): HCPCS | Performed by: ORTHOPAEDIC SURGERY

## 2024-10-17 PROCEDURE — 63600175 PHARM REV CODE 636 W HCPCS

## 2024-10-17 PROCEDURE — 20985 CPTR-ASST DIR MS PX: CPT | Mod: ,,, | Performed by: ORTHOPAEDIC SURGERY

## 2024-10-17 PROCEDURE — 25000003 PHARM REV CODE 250

## 2024-10-17 PROCEDURE — 63600175 PHARM REV CODE 636 W HCPCS: Performed by: NURSE ANESTHETIST, CERTIFIED REGISTERED

## 2024-10-17 PROCEDURE — 97162 PT EVAL MOD COMPLEX 30 MIN: CPT

## 2024-10-17 PROCEDURE — 37000009 HC ANESTHESIA EA ADD 15 MINS: Performed by: ORTHOPAEDIC SURGERY

## 2024-10-17 PROCEDURE — 63600175 PHARM REV CODE 636 W HCPCS: Performed by: ANESTHESIOLOGY

## 2024-10-17 PROCEDURE — 36000713 HC OR TIME LEV V EA ADD 15 MIN: Performed by: ORTHOPAEDIC SURGERY

## 2024-10-17 PROCEDURE — 97530 THERAPEUTIC ACTIVITIES: CPT

## 2024-10-17 PROCEDURE — C1713 ANCHOR/SCREW BN/BN,TIS/BN: HCPCS | Performed by: ORTHOPAEDIC SURGERY

## 2024-10-17 PROCEDURE — 25000003 PHARM REV CODE 250: Performed by: NURSE ANESTHETIST, CERTIFIED REGISTERED

## 2024-10-17 PROCEDURE — 27201423 OPTIME MED/SURG SUP & DEVICES STERILE SUPPLY: Performed by: ORTHOPAEDIC SURGERY

## 2024-10-17 PROCEDURE — 37000008 HC ANESTHESIA 1ST 15 MINUTES: Performed by: ORTHOPAEDIC SURGERY

## 2024-10-17 PROCEDURE — 25000003 PHARM REV CODE 250: Performed by: STUDENT IN AN ORGANIZED HEALTH CARE EDUCATION/TRAINING PROGRAM

## 2024-10-17 PROCEDURE — 97116 GAIT TRAINING THERAPY: CPT

## 2024-10-17 PROCEDURE — 25000003 PHARM REV CODE 250: Performed by: ANESTHESIOLOGY

## 2024-10-17 PROCEDURE — 25000003 PHARM REV CODE 250: Performed by: ORTHOPAEDIC SURGERY

## 2024-10-17 PROCEDURE — 97165 OT EVAL LOW COMPLEX 30 MIN: CPT

## 2024-10-17 PROCEDURE — 94761 N-INVAS EAR/PLS OXIMETRY MLT: CPT

## 2024-10-17 PROCEDURE — 63600175 PHARM REV CODE 636 W HCPCS: Performed by: ORTHOPAEDIC SURGERY

## 2024-10-17 PROCEDURE — 36000712 HC OR TIME LEV V 1ST 15 MIN: Performed by: ORTHOPAEDIC SURGERY

## 2024-10-17 PROCEDURE — 71000033 HC RECOVERY, INTIAL HOUR: Performed by: ORTHOPAEDIC SURGERY

## 2024-10-17 PROCEDURE — 71000039 HC RECOVERY, EACH ADD'L HOUR: Performed by: ORTHOPAEDIC SURGERY

## 2024-10-17 PROCEDURE — 27200688 HC TRAY, SPINAL-HYPER/ ISOBARIC: Performed by: ANESTHESIOLOGY

## 2024-10-17 DEVICE — ATTUNE PATELLA MEDIALIZED DOME 38MM CEMENTED AOX
Type: IMPLANTABLE DEVICE | Site: KNEE | Status: FUNCTIONAL
Brand: ATTUNE

## 2024-10-17 DEVICE — ATTUNE PINNING SYSTEM
Type: IMPLANTABLE DEVICE | Site: KNEE | Status: FUNCTIONAL
Brand: ATTUNE

## 2024-10-17 DEVICE — DEPUY CMW 2 FAST SET BONE CEMENT 20G: Type: IMPLANTABLE DEVICE | Site: KNEE | Status: FUNCTIONAL

## 2024-10-17 DEVICE — ATTUNE FEMORAL POROCOAT POSTERIOR STABILIZED SIZE 6 RIGHT CEMENTLESS
Type: IMPLANTABLE DEVICE | Site: KNEE | Status: FUNCTIONAL
Brand: ATTUNE

## 2024-10-17 RX ORDER — FENTANYL CITRATE 50 UG/ML
INJECTION, SOLUTION INTRAMUSCULAR; INTRAVENOUS
Status: DISCONTINUED | OUTPATIENT
Start: 2024-10-17 | End: 2024-10-17

## 2024-10-17 RX ORDER — ACETAMINOPHEN 500 MG
1000 TABLET ORAL EVERY 6 HOURS
Status: DISCONTINUED | OUTPATIENT
Start: 2024-10-17 | End: 2024-10-18 | Stop reason: HOSPADM

## 2024-10-17 RX ORDER — DEXAMETHASONE SODIUM PHOSPHATE 4 MG/ML
INJECTION, SOLUTION INTRA-ARTICULAR; INTRALESIONAL; INTRAMUSCULAR; INTRAVENOUS; SOFT TISSUE
Status: DISCONTINUED | OUTPATIENT
Start: 2024-10-17 | End: 2024-10-17

## 2024-10-17 RX ORDER — PREGABALIN 75 MG/1
75 CAPSULE ORAL NIGHTLY
Status: DISCONTINUED | OUTPATIENT
Start: 2024-10-17 | End: 2024-10-18 | Stop reason: HOSPADM

## 2024-10-17 RX ORDER — AMLODIPINE BESYLATE 10 MG/1
10 TABLET ORAL DAILY
Status: DISCONTINUED | OUTPATIENT
Start: 2024-10-18 | End: 2024-10-18 | Stop reason: HOSPADM

## 2024-10-17 RX ORDER — ONDANSETRON HYDROCHLORIDE 2 MG/ML
INJECTION, SOLUTION INTRAVENOUS
Status: DISCONTINUED | OUTPATIENT
Start: 2024-10-17 | End: 2024-10-17

## 2024-10-17 RX ORDER — OXYCODONE HYDROCHLORIDE 5 MG/1
5 TABLET ORAL
Status: DISCONTINUED | OUTPATIENT
Start: 2024-10-17 | End: 2024-10-18 | Stop reason: HOSPADM

## 2024-10-17 RX ORDER — MUPIROCIN 20 MG/G
1 OINTMENT TOPICAL
Status: COMPLETED | OUTPATIENT
Start: 2024-10-17 | End: 2024-10-17

## 2024-10-17 RX ORDER — ALPRAZOLAM 0.5 MG/1
0.5 TABLET ORAL DAILY PRN
Status: DISCONTINUED | OUTPATIENT
Start: 2024-10-17 | End: 2024-10-18 | Stop reason: HOSPADM

## 2024-10-17 RX ORDER — CEFAZOLIN 2 G/1
2 INJECTION, POWDER, FOR SOLUTION INTRAMUSCULAR; INTRAVENOUS
Status: COMPLETED | OUTPATIENT
Start: 2024-10-17 | End: 2024-10-17

## 2024-10-17 RX ORDER — SODIUM CHLORIDE 9 MG/ML
INJECTION, SOLUTION INTRAVENOUS
Status: COMPLETED | OUTPATIENT
Start: 2024-10-17 | End: 2024-10-17

## 2024-10-17 RX ORDER — UPADACITINIB 15 MG/1
15 TABLET, EXTENDED RELEASE ORAL DAILY
Qty: 30 TABLET | Refills: 11 | Status: ON HOLD | OUTPATIENT
Start: 2024-10-17

## 2024-10-17 RX ORDER — CARBOXYMETHYLCELLULOSE SODIUM 10 MG/ML
GEL OPHTHALMIC
Status: DISCONTINUED | OUTPATIENT
Start: 2024-10-17 | End: 2024-10-17

## 2024-10-17 RX ORDER — ACETAMINOPHEN 500 MG
1000 TABLET ORAL
Status: COMPLETED | OUTPATIENT
Start: 2024-10-17 | End: 2024-10-17

## 2024-10-17 RX ORDER — LIDOCAINE HYDROCHLORIDE 10 MG/ML
1 INJECTION, SOLUTION EPIDURAL; INFILTRATION; INTRACAUDAL; PERINEURAL
Status: DISCONTINUED | OUTPATIENT
Start: 2024-10-17 | End: 2024-10-17 | Stop reason: HOSPADM

## 2024-10-17 RX ORDER — METHOCARBAMOL 750 MG/1
750 TABLET, FILM COATED ORAL 3 TIMES DAILY
Status: DISCONTINUED | OUTPATIENT
Start: 2024-10-17 | End: 2024-10-17

## 2024-10-17 RX ORDER — MUPIROCIN 20 MG/G
1 OINTMENT TOPICAL 2 TIMES DAILY
Status: DISCONTINUED | OUTPATIENT
Start: 2024-10-17 | End: 2024-10-18 | Stop reason: HOSPADM

## 2024-10-17 RX ORDER — KETAMINE HCL IN 0.9 % NACL 50 MG/5 ML
SYRINGE (ML) INTRAVENOUS
Status: DISCONTINUED | OUTPATIENT
Start: 2024-10-17 | End: 2024-10-17

## 2024-10-17 RX ORDER — LIDOCAINE HYDROCHLORIDE 20 MG/ML
INJECTION INTRAVENOUS
Status: DISCONTINUED | OUTPATIENT
Start: 2024-10-17 | End: 2024-10-17

## 2024-10-17 RX ORDER — SODIUM CHLORIDE 0.9 % (FLUSH) 0.9 %
10 SYRINGE (ML) INJECTION
Status: DISCONTINUED | OUTPATIENT
Start: 2024-10-17 | End: 2024-10-17 | Stop reason: HOSPADM

## 2024-10-17 RX ORDER — NICARDIPINE HYDROCHLORIDE 2.5 MG/ML
INJECTION INTRAVENOUS
Status: DISCONTINUED | OUTPATIENT
Start: 2024-10-17 | End: 2024-10-17

## 2024-10-17 RX ORDER — ATORVASTATIN CALCIUM 20 MG/1
20 TABLET, FILM COATED ORAL DAILY
Status: DISCONTINUED | OUTPATIENT
Start: 2024-10-18 | End: 2024-10-18 | Stop reason: HOSPADM

## 2024-10-17 RX ORDER — METHOCARBAMOL 750 MG/1
750 TABLET, FILM COATED ORAL 4 TIMES DAILY
Status: DISCONTINUED | OUTPATIENT
Start: 2024-10-17 | End: 2024-10-18 | Stop reason: HOSPADM

## 2024-10-17 RX ORDER — PREGABALIN 75 MG/1
75 CAPSULE ORAL
Status: COMPLETED | OUTPATIENT
Start: 2024-10-17 | End: 2024-10-17

## 2024-10-17 RX ORDER — MIDAZOLAM HYDROCHLORIDE 1 MG/ML
INJECTION INTRAMUSCULAR; INTRAVENOUS
Status: DISCONTINUED | OUTPATIENT
Start: 2024-10-17 | End: 2024-10-17

## 2024-10-17 RX ORDER — FENTANYL CITRATE 50 UG/ML
25 INJECTION, SOLUTION INTRAMUSCULAR; INTRAVENOUS EVERY 5 MIN PRN
Status: DISCONTINUED | OUTPATIENT
Start: 2024-10-17 | End: 2024-10-17 | Stop reason: HOSPADM

## 2024-10-17 RX ORDER — SODIUM CHLORIDE 9 MG/ML
INJECTION, SOLUTION INTRAVENOUS CONTINUOUS
Status: DISCONTINUED | OUTPATIENT
Start: 2024-10-17 | End: 2024-10-17

## 2024-10-17 RX ORDER — NALOXONE HCL 0.4 MG/ML
0.02 VIAL (ML) INJECTION
Status: DISCONTINUED | OUTPATIENT
Start: 2024-10-17 | End: 2024-10-18 | Stop reason: HOSPADM

## 2024-10-17 RX ORDER — MORPHINE SULFATE 2 MG/ML
2 INJECTION, SOLUTION INTRAMUSCULAR; INTRAVENOUS
Status: DISCONTINUED | OUTPATIENT
Start: 2024-10-17 | End: 2024-10-18 | Stop reason: HOSPADM

## 2024-10-17 RX ORDER — VANCOMYCIN HYDROCHLORIDE 1 G/20ML
INJECTION, POWDER, LYOPHILIZED, FOR SOLUTION INTRAVENOUS
Status: DISCONTINUED | OUTPATIENT
Start: 2024-10-17 | End: 2024-10-17 | Stop reason: HOSPADM

## 2024-10-17 RX ORDER — POLYETHYLENE GLYCOL 3350 17 G/17G
17 POWDER, FOR SOLUTION ORAL DAILY
Status: DISCONTINUED | OUTPATIENT
Start: 2024-10-17 | End: 2024-10-18 | Stop reason: HOSPADM

## 2024-10-17 RX ORDER — ASPIRIN 81 MG/1
81 TABLET ORAL 2 TIMES DAILY
Status: DISCONTINUED | OUTPATIENT
Start: 2024-10-17 | End: 2024-10-18 | Stop reason: HOSPADM

## 2024-10-17 RX ORDER — DOCUSATE SODIUM 100 MG
400 CAPSULE ORAL
Status: DISCONTINUED | OUTPATIENT
Start: 2024-10-17 | End: 2024-10-18 | Stop reason: HOSPADM

## 2024-10-17 RX ORDER — PROCHLORPERAZINE EDISYLATE 5 MG/ML
5 INJECTION INTRAMUSCULAR; INTRAVENOUS EVERY 6 HOURS PRN
Status: DISCONTINUED | OUTPATIENT
Start: 2024-10-17 | End: 2024-10-18 | Stop reason: HOSPADM

## 2024-10-17 RX ORDER — FENTANYL CITRATE 50 UG/ML
100 INJECTION, SOLUTION INTRAMUSCULAR; INTRAVENOUS
Status: DISCONTINUED | OUTPATIENT
Start: 2024-10-17 | End: 2024-10-17 | Stop reason: HOSPADM

## 2024-10-17 RX ORDER — OXYCODONE HYDROCHLORIDE 10 MG/1
10 TABLET ORAL
Status: DISCONTINUED | OUTPATIENT
Start: 2024-10-17 | End: 2024-10-18 | Stop reason: HOSPADM

## 2024-10-17 RX ORDER — TALC
6 POWDER (GRAM) TOPICAL NIGHTLY PRN
Status: DISCONTINUED | OUTPATIENT
Start: 2024-10-17 | End: 2024-10-18 | Stop reason: HOSPADM

## 2024-10-17 RX ORDER — ROPIVACAINE/EPI/CLONIDINE/KET 2.46-0.005
SYRINGE (ML) INJECTION
Status: DISCONTINUED | OUTPATIENT
Start: 2024-10-17 | End: 2024-10-17 | Stop reason: HOSPADM

## 2024-10-17 RX ORDER — TRAZODONE HYDROCHLORIDE 50 MG/1
50 TABLET ORAL NIGHTLY PRN
Status: DISCONTINUED | OUTPATIENT
Start: 2024-10-17 | End: 2024-10-18 | Stop reason: HOSPADM

## 2024-10-17 RX ORDER — METHOCARBAMOL 750 MG/1
750 TABLET, FILM COATED ORAL 3 TIMES DAILY
Status: DISCONTINUED | OUTPATIENT
Start: 2024-10-17 | End: 2024-10-18 | Stop reason: HOSPADM

## 2024-10-17 RX ORDER — FAMOTIDINE 20 MG/1
20 TABLET, FILM COATED ORAL 2 TIMES DAILY
Status: DISCONTINUED | OUTPATIENT
Start: 2024-10-17 | End: 2024-10-18 | Stop reason: HOSPADM

## 2024-10-17 RX ORDER — CEFAZOLIN 2 G/1
2 INJECTION, POWDER, FOR SOLUTION INTRAMUSCULAR; INTRAVENOUS
Status: COMPLETED | OUTPATIENT
Start: 2024-10-17 | End: 2024-10-18

## 2024-10-17 RX ORDER — BISACODYL 10 MG/1
10 SUPPOSITORY RECTAL EVERY 12 HOURS PRN
Status: DISCONTINUED | OUTPATIENT
Start: 2024-10-17 | End: 2024-10-18 | Stop reason: HOSPADM

## 2024-10-17 RX ORDER — ALBUTEROL SULFATE 90 UG/1
2 INHALANT RESPIRATORY (INHALATION) EVERY 6 HOURS PRN
Status: DISCONTINUED | OUTPATIENT
Start: 2024-10-17 | End: 2024-10-18 | Stop reason: HOSPADM

## 2024-10-17 RX ORDER — MIDAZOLAM HYDROCHLORIDE 1 MG/ML
4 INJECTION, SOLUTION INTRAMUSCULAR; INTRAVENOUS
Status: DISCONTINUED | OUTPATIENT
Start: 2024-10-17 | End: 2024-10-17 | Stop reason: HOSPADM

## 2024-10-17 RX ORDER — FAMOTIDINE 10 MG/ML
INJECTION INTRAVENOUS
Status: DISCONTINUED | OUTPATIENT
Start: 2024-10-17 | End: 2024-10-17

## 2024-10-17 RX ORDER — TALC
6 POWDER (GRAM) TOPICAL NIGHTLY PRN
Status: DISCONTINUED | OUTPATIENT
Start: 2024-10-17 | End: 2024-10-17 | Stop reason: HOSPADM

## 2024-10-17 RX ORDER — ONDANSETRON HYDROCHLORIDE 2 MG/ML
4 INJECTION, SOLUTION INTRAVENOUS EVERY 8 HOURS PRN
Status: DISCONTINUED | OUTPATIENT
Start: 2024-10-17 | End: 2024-10-18 | Stop reason: HOSPADM

## 2024-10-17 RX ORDER — FLUTICASONE FUROATE AND VILANTEROL 100; 25 UG/1; UG/1
1 POWDER RESPIRATORY (INHALATION) DAILY
Status: DISCONTINUED | OUTPATIENT
Start: 2024-10-17 | End: 2024-10-18 | Stop reason: HOSPADM

## 2024-10-17 RX ORDER — SERTRALINE HYDROCHLORIDE 100 MG/1
100 TABLET, FILM COATED ORAL DAILY
Status: DISCONTINUED | OUTPATIENT
Start: 2024-10-17 | End: 2024-10-18 | Stop reason: HOSPADM

## 2024-10-17 RX ORDER — ONDANSETRON HYDROCHLORIDE 2 MG/ML
4 INJECTION, SOLUTION INTRAVENOUS DAILY PRN
Status: DISCONTINUED | OUTPATIENT
Start: 2024-10-17 | End: 2024-10-17 | Stop reason: HOSPADM

## 2024-10-17 RX ORDER — PROPOFOL 10 MG/ML
VIAL (ML) INTRAVENOUS CONTINUOUS PRN
Status: DISCONTINUED | OUTPATIENT
Start: 2024-10-17 | End: 2024-10-17

## 2024-10-17 RX ORDER — AMOXICILLIN 250 MG
1 CAPSULE ORAL 2 TIMES DAILY
Status: DISCONTINUED | OUTPATIENT
Start: 2024-10-17 | End: 2024-10-18 | Stop reason: HOSPADM

## 2024-10-17 RX ORDER — CELECOXIB 200 MG/1
400 CAPSULE ORAL ONCE
Status: COMPLETED | OUTPATIENT
Start: 2024-10-17 | End: 2024-10-17

## 2024-10-17 RX ADMIN — CARBOXYMETHYLCELLULOSE SODIUM 4 DROP: 10 GEL OPHTHALMIC at 10:10

## 2024-10-17 RX ADMIN — PREGABALIN 75 MG: 75 CAPSULE ORAL at 08:10

## 2024-10-17 RX ADMIN — Medication 400 ML: at 05:10

## 2024-10-17 RX ADMIN — FENTANYL CITRATE 50 MCG: 50 INJECTION, SOLUTION INTRAMUSCULAR; INTRAVENOUS at 10:10

## 2024-10-17 RX ADMIN — ACETAMINOPHEN 1000 MG: 500 TABLET ORAL at 06:10

## 2024-10-17 RX ADMIN — ACETAMINOPHEN 1000 MG: 500 TABLET ORAL at 11:10

## 2024-10-17 RX ADMIN — TRANEXAMIC ACID 1000 MG: 100 INJECTION INTRAVENOUS at 11:10

## 2024-10-17 RX ADMIN — SODIUM CHLORIDE: 9 INJECTION, SOLUTION INTRAVENOUS at 08:10

## 2024-10-17 RX ADMIN — Medication 6 MG: at 08:10

## 2024-10-17 RX ADMIN — Medication 400 ML: at 09:10

## 2024-10-17 RX ADMIN — FENTANYL CITRATE 25 MCG: 50 INJECTION, SOLUTION INTRAMUSCULAR; INTRAVENOUS at 01:10

## 2024-10-17 RX ADMIN — METHOCARBAMOL 750 MG: 750 TABLET ORAL at 05:10

## 2024-10-17 RX ADMIN — MEPIVACAINE HYDROCHLORIDE 3 ML: 15 INJECTION, SOLUTION EPIDURAL; INFILTRATION at 10:10

## 2024-10-17 RX ADMIN — SENNOSIDES AND DOCUSATE SODIUM 1 TABLET: 50; 8.6 TABLET ORAL at 08:10

## 2024-10-17 RX ADMIN — MUPIROCIN 1 G: 20 OINTMENT TOPICAL at 08:10

## 2024-10-17 RX ADMIN — ASPIRIN 81 MG: 81 TABLET, COATED ORAL at 08:10

## 2024-10-17 RX ADMIN — CEFAZOLIN 3 G: 2 INJECTION, POWDER, FOR SOLUTION INTRAMUSCULAR; INTRAVENOUS at 10:10

## 2024-10-17 RX ADMIN — FAMOTIDINE 20 MG: 10 INJECTION, SOLUTION INTRAVENOUS at 10:10

## 2024-10-17 RX ADMIN — CEFAZOLIN 2 G: 2 INJECTION, POWDER, FOR SOLUTION INTRAMUSCULAR; INTRAVENOUS at 06:10

## 2024-10-17 RX ADMIN — PROPOFOL 50 MCG/KG/MIN: 10 INJECTION, EMULSION INTRAVENOUS at 10:10

## 2024-10-17 RX ADMIN — OXYCODONE 5 MG: 5 TABLET ORAL at 11:10

## 2024-10-17 RX ADMIN — NICARDIPINE HYDROCHLORIDE 0.6 MG: 25 INJECTION INTRAVENOUS at 10:10

## 2024-10-17 RX ADMIN — SODIUM CHLORIDE, SODIUM GLUCONATE, SODIUM ACETATE, POTASSIUM CHLORIDE, MAGNESIUM CHLORIDE, SODIUM PHOSPHATE, DIBASIC, AND POTASSIUM PHOSPHATE: .53; .5; .37; .037; .03; .012; .00082 INJECTION, SOLUTION INTRAVENOUS at 10:10

## 2024-10-17 RX ADMIN — CELECOXIB 400 MG: 200 CAPSULE ORAL at 08:10

## 2024-10-17 RX ADMIN — MIDAZOLAM HYDROCHLORIDE 1 MG: 2 INJECTION, SOLUTION INTRAMUSCULAR; INTRAVENOUS at 10:10

## 2024-10-17 RX ADMIN — METHOCARBAMOL 750 MG: 750 TABLET ORAL at 01:10

## 2024-10-17 RX ADMIN — ACETAMINOPHEN 1000 MG: 500 TABLET ORAL at 08:10

## 2024-10-17 RX ADMIN — LIDOCAINE HYDROCHLORIDE 75 MG: 20 INJECTION INTRAVENOUS at 10:10

## 2024-10-17 RX ADMIN — Medication 10 MG: at 10:10

## 2024-10-17 RX ADMIN — VANCOMYCIN HYDROCHLORIDE 1500 MG: 1.5 INJECTION, POWDER, LYOPHILIZED, FOR SOLUTION INTRAVENOUS at 08:10

## 2024-10-17 RX ADMIN — METHOCARBAMOL 750 MG: 750 TABLET ORAL at 08:10

## 2024-10-17 RX ADMIN — OXYCODONE 5 MG: 5 TABLET ORAL at 01:10

## 2024-10-17 RX ADMIN — DEXAMETHASONE SODIUM PHOSPHATE 8 MG: 4 INJECTION, SOLUTION INTRAMUSCULAR; INTRAVENOUS at 10:10

## 2024-10-17 RX ADMIN — SODIUM CHLORIDE: 0.9 INJECTION, SOLUTION INTRAVENOUS at 08:10

## 2024-10-17 RX ADMIN — FAMOTIDINE 20 MG: 20 TABLET, FILM COATED ORAL at 08:10

## 2024-10-17 RX ADMIN — POLYETHYLENE GLYCOL 3350 17 G: 17 POWDER, FOR SOLUTION ORAL at 05:10

## 2024-10-17 RX ADMIN — TRANEXAMIC ACID 2000 MG: 100 INJECTION INTRAVENOUS at 10:10

## 2024-10-17 RX ADMIN — Medication 10 MG: at 11:10

## 2024-10-17 RX ADMIN — ONDANSETRON 4 MG: 2 INJECTION INTRAMUSCULAR; INTRAVENOUS at 11:10

## 2024-10-17 NOTE — PLAN OF CARE
Pt sleepy but responds to verbal stimuli. Sister at bedside. +void. Seen by Dr. Chowdhury. Questions answered. Pt reporting sensation  to bilaterally toes with moderate plantar/dorsi flexion noted. Tolerating PO intake with no c/o N&V at this time. Pt to be transferred to assigned room #304 via hospital bed with all belongings

## 2024-10-17 NOTE — PT/OT/SLP EVAL
Physical Therapy Evaluation and Treatment    Patient Name: Lashanda Monaco   MRN: [id number]  Recent Surgery: Procedure(s) (LRB):  ARTHROPLASTY, KNEE, TOTAL, USING [email or web address] COMPUTER-ASSISTED NAVIGATION: RIGHT: DEPUY - ATTGLEN (Right) Day of Surgery    Recommendations:     Discharge Recommendations: Low Intensity Therapy   Discharge Equipment Recommendations: bedside commode, shower chair, walker, rolling   Barriers to discharge: None    Assessment:     Lashanda Monaco is a 69 y.o. female admitted with a medical diagnosis of Primary osteoarthritis of right knee. She presents with the following impairments/functional limitations: weakness, impaired endurance, impaired self care skills, impaired functional mobility, gait instability, impaired balance, decreased lower extremity function, pain, decreased ROM, edema. Pt presents s/p R TKA with expected post-op deficits.  Pt did well with PT today and was able to amb 25'  with RW and CGA/min assist .  She had occasional unsteadiness during gait due to antalgic gait pattern R LE.    She was limited due to fatigu/ drowsiness and increase L knee pain during L stance phase of gait. Pt falling asleep during eval when seated in chair but easily roused and responsive. . Pt will benefit from cont PT to maximize  functional recovery, strength and ROM.      Rehab Prognosis: Good; patient would benefit from acute PT services to address these deficits and reach maximum level of function.    Plan:     During this hospitalization, patient to be seen daily to address the above listed problems via gait training, therapeutic activities, therapeutic exercises    Plan of Care Expires: 11/16/24    Subjective     Chief Complaint: pt reports feeling very sleepy.  RN reports pt would like to put a brief on during PT session.  Patient Comments/Goals: to go home  Pain/Comfort:  Pain Rating 1: 8/10 (standing and wt shifting)  Location - Side 1: Right  Location 1: knee  Pain Addressed 1: Pre-medicate  for activity, Reposition, Distraction, Cessation of Activity  Pain Rating Post-Intervention 1: 3/10 (at rest)    Social History:  Living Environment: Patient lives with their  of whom she is the caregiver due to his dementia  in a single story home with number of outside stair(s): entry only and walk-in shower  Pt's brother will be staying with pt and her  to provide care for pt and her .   Prior Level of Function: Prior to admission, patient was independent  Equipment Used at Home: none  DME owned (not currently used): rolling walker, rollator, bedside commode, and shower chair  Assistance Upon Discharge:  her brother    Objective:     Communicated with RN and OT prior to session. Patient found up in chair with cryotherapy, peripheral IV, FCD, PureWick upon PT entry to room.   Pt's sister present in room and for PT session.     General Precautions: Standard, fall   Orthopedic Precautions: RLE weight bearing as tolerated   Braces: N/A    Respiratory Status: Room air    Exams:  RLE ROM: WFL except decrease knee flex/ext due to pain  RLE Strength:  at least 3/5  LLE ROM: WNL  LLE Strength: WNL  Cognitive: Patient is oriented to Person, Place, Time, Situation  Gross Motor Coordination: WFL  Postural Exam: Patient presented with the following abnormalities:    -       Rounded shoulders  -       Forward head  Sensation:    -       Intact    Functional Mobility:  Gait belt applied - Yes  Bed Mobility  Seated in chair at start of session and returned to chair  Transfers  Sit to Stand: contact guard assistance with rolling walker and with cues for hand placement, foot placement, and weight bearing precautions  Gait  Patient ambulated 25'  with rolling walker and contact guard assistance and minimum assistance due to occasionally required min assist for balance due to unsteadiness with antalgic gait pattern R LE. Patient required cues for heel strike, position in walker, sequencing, upright posture, and  weight bearing status to increase independence and safety. Patient required cues ~ 25% of the time.  Pt amb with step-to gait pattern with decrease esme, step length and marked antalgic gait pattern R LE during stance phase of gait  Balance  Sitting: GOOD: Maintains balance through MODERATE excursions of active trunk movement  Standing: FAIR: Needs CONTACT GUARD during gait    Therapeutic Activities and Exercises:  Patient educated on role of acute care PT and PT POC, safety while in hospital including calling nurse for mobility, and call light usage.  Educated about weightbearing  as taty R LE and provided cuing for adherence as appropriate during session.  Educated about importance of OOB mobility and remaining up in chair most of the day.  Pt ed on importance of elevating  LE and proper placement of pillows to keep knee extended and not flexed.  Pt and her caregiver verbalized good understanding back to PT.   Pt required mod assist for donning brief in seated position. Upon standing, pt able to manage pulling up brief from upper thighs to reposition correctly with CGA and pt using single UE support on RW alternating sides.      AM-PAC 6 CLICK MOBILITY  Total Score:18    Patient left up in chair with all lines intact, call button in reach, RN notified, and her sister present.    GOALS:   Multidisciplinary Problems       Physical Therapy Goals          Problem: Physical Therapy    Goal Priority Disciplines Outcome Interventions   Physical Therapy Goal     PT, PT/OT Progressing    Description: Goals to be met by: 10/18/24     Patient will increase functional independence with mobility by performin. Supine to sit with Supervision  2. Sit to stand transfer with Supervision  3. Gait  x 150 feet with SBA using Rolling Walker.   4. Ascend/Descend 6 inch curb step with Contact Guard Assistance using Rolling Walker.  5. Lower extremity TKA home exercise program x 10 reps per handout, with supervision    Patient  demonstrates a mobility limitation that significantly impairs their ability to participate in one or more mobility related activities of daily living. Patient's mobility limitation cannot be sufficiently resolved with the use of a cane, but can be sufficiently resolved with the use of a rolling walker.The use of a rolling walker will considerably improve their ability to participate in MRADLs. Patient will use the walker on a regular basis at home.                            History:     Past Medical History:   Diagnosis Date    Anxiety     Arthritis     Bronchitis 04/04/2024    Cancer     Depression     GERD (gastroesophageal reflux disease)     Hypercholesterolemia     Hypertension        Past Surgical History:   Procedure Laterality Date    BREAST BIOPSY Right     2010 & 2021    BREAST CYST ASPIRATION      CARPAL TUNNEL RELEASE Left     EPIDURAL STEROID INJECTION N/A 05/22/2024    Procedure: CAUDAL DOMINIC DIRECT REFERRAL;  Surgeon: Oswald Hoskins MD;  Location: [facility];  Service: Pain Management;  Laterality: N/A;  [phone]    hip repl Right 08/2019    HYSTERECTOMY      JOINT REPLACEMENT Bilateral     hip    RHINOPLASTY      ROTATOR CUFF REPAIR Right     and bicep    ROTATOR CUFF REPAIR Left     SHOULDER ARTHROSCOPY Right 05/2022    right shoulder sx with bicep repair    TONSILLECTOMY, ADENOIDECTOMY      TOTAL ABDOMINAL HYSTERECTOMY W/ BILATERAL SALPINGOOPHORECTOMY      TRANSFORAMINAL EPIDURAL INJECTION OF STEROID Right 01/24/2024    Procedure: LUMBAR TRANSFORAMINAL RIGHT L3/4 AND L4/L5 DIRECT REFERRAL;  Surgeon: Oswald Hoskins MD;  Location: [facility];  Service: Pain Management;  Laterality: Right;  [phone]       Time Tracking:     PT Received On: 10/17/24  PT Start Time: 1634  PT Stop Time: 1700  PT Total Time (min): 26 min     Billable Minutes: Evaluation 12 and Gait Training 14    10/17/2024

## 2024-10-17 NOTE — PLAN OF CARE
Problem: Physical Therapy  Goal: Physical Therapy Goal  Description: Goals to be met by: 10/18/24     Patient will increase functional independence with mobility by performin. Supine to sit with Supervision  2. Sit to stand transfer with Supervision  3. Gait  x 150 feet with SBA using Rolling Walker.   4. Ascend/Descend 6 inch curb step with Contact Guard Assistance using Rolling Walker.  5. Lower extremity TKA home exercise program x 10 reps per handout, with supervision    Patient demonstrates a mobility limitation that significantly impairs their ability to participate in one or more mobility related activities of daily living. Patient's mobility limitation cannot be sufficiently resolved with the use of a cane, but can be sufficiently resolved with the use of a rolling walker.The use of a rolling walker will considerably improve their ability to participate in MRADLs. Patient will use the walker on a regular basis at home.       Outcome: Progressing

## 2024-10-17 NOTE — HPI
Lashanda Monaco is a 69 y.o. female with history of Right knee pain. Pain is worse with activity and weight bearing.  Patient has experienced interference of activities of daily living due to decreased range of motion and an increase in joint pain and swelling.  Patient has failed non-operative treatment including NSAIDs, corticosteroid injections, viscosupplement injections, and activity modification.  Lashanda Monaco currently ambulates using assistive device .      Relevant medical conditions of significance in perioperative period:  HTN- on medication managed by PCP  HLD- on medication managed by PCP

## 2024-10-17 NOTE — PLAN OF CARE
Pre-op complete. Waiting on anesthesia consent. Family at bedside. Bed locked in lowest position with call bell within reach.

## 2024-10-17 NOTE — PLAN OF CARE
Problem: Occupational Therapy  Goal: Occupational Therapy Goal  Description: Goals to be met by: 10/18/2024     Patient will increase functional independence with ADLs by performing:    UE Dressing with Supervision.  LE Dressing with Supervision.  Grooming while standing at sink with Supervision.  Toileting from toilet with Supervision for hygiene and clothing management.   Toilet transfer to toilet with Supervision.    Outcome: Progressing

## 2024-10-17 NOTE — NURSING
Pt arrived to unit via hospital bed from PACU.  Pt aaox4, vss, no s/s of distress noted.  Dressing to right knee cdi.  Accutherm in place.  FCDs on.  Pt instructed to call for assistance when getting up and she verbalized understanding.  Call light placed within reach.  Sister at bedside.

## 2024-10-17 NOTE — OP NOTE
[place] Right TKA  OPERATIVE NOTE    Date of Operation:   10/17/2024    Providers Performing:   Surgeon(s):  Jose David Chowdhury III, MD  Assistant: SMA Rukhsana    Operative Procedure:   Right Total Knee Arthroplasty, using Depuy VELYS robotic assisted solution, CPT 71630  Computer-assisted surgical navigational procedure for musculoskeletal procedures, image-less, CPT 92562  Surgical techniques requiring use of robotic surgical system, CPT     -22 modifier for CDC class 2 or higher obesity (BMI 44.7) requiring prolonged operative time and increased case complexity and difficulty      Preoperative Diagnosis:   Right Knee Osteoarthritis, ICD-10 M17.11    Postoperative Diagnosis:   SAME    Components Used:   Depuy  Femur: Attune PS porocoat Size 6  Tibia: Attune tibial base affixium fixed bearing Size 6  Patella: Attune medialized dome 38 mm, AOX  Tibial Insert: Attune fixed bearing PS size 7 mm, AOX  1/2 bag=20gm  Depuy CMW2 cement    Implant Name Type Inv. Item Serial No.  Lot No. LRB No. Used Action   SYS KNEE EPAK PIN ATTUNE - [id number]  SYS KNEE EPAK PIN ATTUNE  [employer] [id number] Right 1 Implanted   CEMENT BONE CMW2 20G - [id number]  CEMENT BONE CMW2 20G  [employer] [id number] Right 1 Implanted   PATELLA ATTUNE MED PAT 38MM - [id number]  PATELLA ATTUNE MED PAT 38MM  [employer] [id number] Right 1 Implanted   ATTUNE TIBIAL BASE AFFIXIUM SZ 6    [employer] [id number] Right 1 Implanted   ATTUNE FEMUR PS SZ 6R CEMENTLESS    [employer] [id number] Right 1 Implanted   ATTUNE TIBIAL INSERT SZ 6, 7MM PS    [employer] [id number] Right 1 Implanted       Anesthesia:   Spinal    CYNTHIA cocktail: RONALD    Estimated Blood Loss:   450 cc    Specimens:   None    Complications:   None    Indications:   The patient has failed non-operative measures including medications, injections and activity modifications for their knee.  After an explanation of risks and benefits of knee arthroplasty surgery, the patient wishes to proceed  with surgery.    Operative Notes:   The patient was greeted in the pre-op holding area.  The patients knee was marked with a surgical marker by the surgeon.  Anesthesia per above technique was administered by the anesthesia team.  The patient was placed in the supine position on the OR table with all bony prominences padded.  A tourniquet was not used.  IV antibiotics were administered.  The leg was prepped and draped in the usual sterile fashion.  A timeout was performed and the correct patient, site and procedure were identified.    A midline incision was made with a standard medial parapatellar arthrotomy.  The standard medial capsular release was performed along with the lateral gutter.  The patella was mobilized from the lateral parapatellar ligament and bony osteophytes were removed.  The intercondylar notch was cleared of the ACL and PCL.    The femoral and tibial guide pins where placed and the arrays were positioned and tightened to the pins. The hip was then brought through a range of motion and the hip center was identified, medial and lateral malleolus were identified and the tibia and femur were registered.     Using a tibia first with tensioner technique the joint was first tensioned manually in extension and flexion and through the full range of motion to define our gaps. Provisional cuts/implants were positioned virtually for appropriate size gaps and implant placement.    The VELYS robot was then brought into position and checkpoints were confirmed. Care was taken during the burring process to protect the soft tissue. We cut the tibia. The tensioner was then placed in the joint and the knee was again extension and flexion and through the full range of motion to define our gaps. The cuts/implants were positioned virtually for appropriate size gaps and implant placement and the femoral cuts were then made.     The PS box was cut. Meniscal remnants were removed and the knee was brought into flexion and  posterior osteophytes were removed.      At this time the trial implants were placed and knee assessed for stability, and flexion arc. The patella was prepared using free-hand technique and the three-holed lug drill guide was sized and appropriately assessed. Patella tracking was found to be acceptable. The tibial component rotation was marked. The trials and guide pins were removed. The tibial component was positioned and the keel was drilled and punched.    Cement was mixed on the back table and applied to the patella.  Implantation of the femoral, tibial components was performed sequentially with excellent primary stability, then the patella was cemented with removal of all excess cement. A trial insert was placed while the cement dried and a 0.35% betadine solution was left to soak in the surgical field.     After the cement was dry, the trial poly insert was removed. Hemostasis was obtained with bovie electrocautery.  The knee was copiously irrigated with pulsatile lavage. The final polyethylene insert was placed and the knee reduced.      1 gram of vancomycin powder was placed in the knee. The arthrotomy was closed with interrupted #1 vicryl suture and #2 quill, the subcuticular layer was closed with 2-0 vicryl suture and a running 4-0 monocryl was used to closed the skin surface.  Pin sites were closed with 4-0 monocryl and skin glue. Surgicel sealant was placed over the top of the incision and sterile dressing placed over the wound. Appropriately sized TEDs hose were placed for edema control.     Sponge and needle counts were correct.    The first-assistant was critical to all steps of the operation, including retraction and leg stabilization during exposure and bone preparation, as well as the deep and superficial wound closure.  Dr. Chowdhury performed and/or supervised the key portions of this surgical procedure, including evaluation of the bone cuts, reshaping of the bony elements, and insertion of the  provisional and final components.    Postoperative Plan:  The patient will be anticoagulated with 81mg aspirin twice daily for one month.   They will receive 24 hours of post-operative antibiotics.    Activity will be weight bearing as tolerated.      Resume rinvoq 2 weeks post op  Nutrition support   Non[facility] PT    Due patient and or surgical factors the patient will receive an Rx for cefadroxil 500mg BID x7 days after discharge per [place] data:   Adriane MM, Bernie JE, Radha M, Maddison DE LA TORRE. The [facility] Clinical Research Award: Extended Oral Antibiotics Prevent Periprosthetic Joint Infection in High-Risk Cases: 3855 Patients With 1-Year Follow-Up. J Arthroplasty. 2021 Jul;36(7S):S18-S25. doi: [email or web address]. Epub 2021 Jan 23. PMID: [id number].      Signed by: Jose David Chowdhury III, MD

## 2024-10-17 NOTE — PLAN OF CARE
Problem: Wound  Goal: Absence of Infection Signs and Symptoms  Intervention: Prevent or Manage Infection  Flowsheets (Taken 10/17/2024 1751)  Infection Management: aseptic technique maintained     Problem: Wound  Goal: Optimal Pain Control and Function  Intervention: Prevent or Manage Pain  Flowsheets (Taken 10/17/2024 1751)  Pain Management Interventions:   care clustered   cold applied   pain management plan reviewed with patient/caregiver     Problem: Pain Acute  Goal: Optimal Pain Control and Function  Intervention: Develop Pain Management Plan  Flowsheets (Taken 10/17/2024 1751)  Pain Management Interventions:   care clustered   cold applied   pain management plan reviewed with patient/caregiver     Problem: Pain Acute  Goal: Optimal Pain Control and Function  Intervention: Prevent or Manage Pain  Flowsheets (Taken 10/17/2024 1751)  Sensory Stimulation Regulation:   care clustered   lighting decreased   quiet environment promoted  Medication Review/Management: medications reviewed     Problem: Pain Acute  Goal: Optimal Pain Control and Function  Intervention: Optimize Psychosocial Wellbeing  Flowsheets (Taken 10/17/2024 1751)  Supportive Measures:   active listening utilized   positive reinforcement provided     Problem: Knee Arthroplasty  Goal: Optimal Coping  Intervention: Support Psychosocial Response to Surgery and Mobility Changes  Flowsheets (Taken 10/17/2024 1751)  Supportive Measures:   active listening utilized   positive reinforcement provided     Problem: Knee Arthroplasty  Goal: Absence of Bleeding  Intervention: Monitor and Manage Bleeding  Flowsheets (Taken 10/17/2024 1751)  Bleeding Management: dressing monitored     Problem: Knee Arthroplasty  Goal: Absence of Infection Signs and Symptoms  Intervention: Prevent or Manage Infection  Flowsheets (Taken 10/17/2024 1751)  Infection Management: aseptic technique maintained     Problem: Knee Arthroplasty  Goal: Optimal Pain Control and  Function  Intervention: Prevent or Manage Pain  Flowsheets (Taken 10/17/2024 1751)  Pain Management Interventions:   care clustered   cold applied   pain management plan reviewed with patient/caregiver     Problem: Fall Injury Risk  Goal: Absence of Fall and Fall-Related Injury  Intervention: Identify and Manage Contributors  Flowsheets (Taken 10/17/2024 1751)  Medication Review/Management: medications reviewed     Problem: Fall Injury Risk  Goal: Absence of Fall and Fall-Related Injury  Intervention: Promote Injury-Free Environment  Flowsheets (Taken 10/17/2024 1751)  Safety Promotion/Fall Prevention:   assistive device/personal item within reach   Fall Risk reviewed with patient/family   lighting adjusted   medications reviewed   side rails raised x 2   nonskid shoes/socks when out of bed   in recliner, wheels locked   instructed to call staff for mobility   Plan of care reviewed with pt, verbalized understanding. Medications and possible side effects reviewed and administered as ordered.  Purposeful rounding completed.  Safety precautions maintained.  Call light placed within reach.

## 2024-10-17 NOTE — TRANSFER OF CARE
Anesthesia Transfer of Care Note    Patient: Lashanda Monaco    Procedure(s) Performed: Procedure(s) (LRB):  ARTHROPLASTY, KNEE, TOTAL, USING [employer] COMPUTER-ASSISTED NAVIGATION: RIGHT: REED MEHTA (Right)    Patient location: PACU    Anesthesia Type: spinal    Transport from OR: Transported from OR on room air with adequate spontaneous ventilation    Post pain: adequate analgesia    Post assessment: no apparent anesthetic complications and tolerated procedure well    Post vital signs: stable    Level of consciousness: awake, alert and oriented    Nausea/Vomiting: no nausea/vomiting    Complications: none    Transfer of care protocol was followed      Last vitals: Visit Vitals  BP (!) 152/72   Pulse 74   Temp 36.1 °C (97 °F) (Temporal)   Resp 20   Ht 5' (1.524 m)   Wt 103.9 kg (229 lb)   SpO2 100%   Breastfeeding No   BMI 44.72 kg/m²

## 2024-10-17 NOTE — PT/OT/SLP EVAL
"Occupational Therapy   Evaluation    Name: Lashanda Monaco  MRN: [id number]  Admitting Diagnosis: Primary osteoarthritis of right knee  Recent Surgery: Procedure(s) (LRB):  ARTHROPLASTY, KNEE, TOTAL, USING [employer] COMPUTER-ASSISTED NAVIGATION: RIGHT: DEPUY - ATTUNE (Right) Day of Surgery    Recommendations:     Discharge Recommendations: Low Intensity Therapy  Discharge Equipment Recommendations:  none  Barriers to discharge:  None    Assessment:     Lashanda Monaco is a 69 y.o. female with a medical diagnosis of Primary osteoarthritis of right knee.  She presents with the following performance deficits affecting function: weakness, impaired endurance, impaired self care skills, impaired functional mobility, gait instability, impaired balance, pain, decreased lower extremity function. Pt was willing to participate and tolerated session fairly well overall. She was able to perform bed mobility and ambulate to chair without physical assistance. She was limited 2/2 to pain and decreased functional endurance this session. She demonstrated good safety awareness during t/f to chair c/ RW.     Rehab Prognosis: Good; patient would benefit from acute skilled OT services to address these deficits and reach maximum level of function.       Plan:     Patient to be seen daily to address the above listed problems via self-care/home management, therapeutic activities, therapeutic exercises  Plan of Care Expires: 10/18/24  Plan of Care Reviewed with: patient, sibling    Subjective     Chief Complaint: RLE pain  Patient/Family Comments/goals: "Do you want to come to my [employer] party next week?"    Occupational Profile:  Living Environment: SSH, threshold entrance through side door; WIS, standard toilet; lives c/   Previous level of function: indep  Roles and Routines: wife, sister  Equipment Used at Home: bedside commode, shower chair, walker, rolling, rollator, hip kit  Assistance upon Discharge: per pt report, brother " will be staying with pt upon d/c    Pain/Comfort:  Pain Rating 1: 3/10  Location - Side 1: Right  Location - Orientation 1: generalized  Location 1: knee  Pain Addressed 1: Reposition, Distraction, Nurse notified  Pain Rating Post-Intervention 1:  (not rated)    Patients cultural, spiritual, Confucianist conflicts given the current situation: no    Objective:     Communicated with: RN prior to session.  Patient found supine with cryotherapy, FCD, PureWick upon OT entry to room.    General Precautions: Standard, fall  Orthopedic Precautions: RLE weight bearing as tolerated  Braces: N/A  Respiratory Status: Room air    Occupational Performance:    Bed Mobility:    Patient completed Rolling/Turning to Right with contact guard assistance  Patient completed Scooting/Bridging with contact guard assistance  Patient completed Supine to Sit with contact guard assistance    Functional Mobility/Transfers:  Patient completed Sit <> Stand Transfer with contact guard assistance  with  rolling walker   Chair t/f: CGA c/ RW  Functional Mobility: ~4-5 side steps towards foot of bed then ~6-7 steps to chair; CGA c/ RW; no LOB; min vc's for walker management  Min vc's for sequencing c/ chair t/f    Activities of Daily Living:  Upper Body Dressing: moderate assistance pt donned gown as robe sitting eob  Toileting: pt c/ purewick; not needing to void at this time      Cognitive/Visual Perceptual:  Cognitive/Psychosocial Skills:     -       Oriented to: Person, Place, Time, and Situation   -       Follows Commands/attention:Follows multistep  commands  -       Safety awareness/insight to disability: intact     Physical Exam:  Balance:    -       static sitting balance: SBA ~6 min eob; no LOB  Upper Extremity Range of Motion:     -       Right Upper Extremity: WFL  -       Left Upper Extremity: WFL  Upper Extremity Strength:    -       Right Upper Extremity: WFL  -       Left Upper Extremity: WFL   Strength:    -       Right Upper  Extremity: WFL  -       Left Upper Extremity: WFL  Fine Motor Coordination:    -       Intact  Left hand, manipulation of objects and Right hand, manipulation of objects    AMPAC 6 Click ADL:  AMPAC Total Score: 18    Treatment & Education:  Pt edu on role of OT, POC, safety when performing self care tasks , benefit of performing OOB activity, and safety when performing functional transfers and mobility.  - White board updated  - Self care tasks completed-- as noted above    - pt educated on WB status    Patient left up in chair with all lines intact, call button in reach, RN notified, and sister present    GOALS:   Multidisciplinary Problems       Occupational Therapy Goals          Problem: Occupational Therapy    Goal Priority Disciplines Outcome Interventions   Occupational Therapy Goal     OT, PT/OT Progressing    Description: Goals to be met by: 10/18/2024     Patient will increase functional independence with ADLs by performing:    UE Dressing with Supervision.  LE Dressing with Supervision.  Grooming while standing at sink with Supervision.  Toileting from toilet with Supervision for hygiene and clothing management.   Toilet transfer to toilet with Supervision.                         History:     Past Medical History:   Diagnosis Date    Anxiety     Arthritis     Bronchitis 04/04/2024    Cancer     Depression     GERD (gastroesophageal reflux disease)     Hypercholesterolemia     Hypertension          Past Surgical History:   Procedure Laterality Date    BREAST BIOPSY Right     2010 & 2021    BREAST CYST ASPIRATION      CARPAL TUNNEL RELEASE Left     EPIDURAL STEROID INJECTION N/A 05/22/2024    Procedure: CAUDAL DOMINIC DIRECT REFERRAL;  Surgeon: Oswald Hoskins MD;  Location: [facility];  Service: Pain Management;  Laterality: N/A;  [phone]    hip repl Right 08/2019    HYSTERECTOMY      JOINT REPLACEMENT Bilateral     hip    RHINOPLASTY      ROTATOR CUFF REPAIR Right     and bicep    ROTATOR CUFF  REPAIR Left     SHOULDER ARTHROSCOPY Right 05/2022    right shoulder sx with bicep repair    TONSILLECTOMY, ADENOIDECTOMY      TOTAL ABDOMINAL HYSTERECTOMY W/ BILATERAL SALPINGOOPHORECTOMY      TRANSFORAMINAL EPIDURAL INJECTION OF STEROID Right 01/24/2024    Procedure: LUMBAR TRANSFORAMINAL RIGHT L3/4 AND L4/L5 DIRECT REFERRAL;  Surgeon: Oswald Hoskins MD;  Location: [facility];  Service: Pain Management;  Laterality: Right;  [phone]       Time Tracking:     OT Date of Treatment: 10/17/24  OT Start Time: 1559  OT Stop Time: 1627  OT Total Time (min): 28 min    Billable Minutes:Evaluation 10  Self Care/Home Management 18    10/17/2024

## 2024-10-17 NOTE — HOSPITAL COURSE
On 10/17/24, the patient arrived to the [facility] for proper pre-operative management.  Upon completion of pre-operative preparation, the patient was taken back to the operative theatre. A R TKA was performed without complication and the patient was transported to the post anesthesia care unit in stable condition.  After appropriate recovery from the anaesthetic agents used during the surgery, the patient was then transported to the hospital inpatient floor.  The interim of the hospital stay from arrival on the floor up to discharge has been uncomplicated. The patient has tolerated regular diet.  The patient's pain has been controlled using a multimodal approach. Currently, the patient's pain is well controlled on an oral regimen.  The patient has been voiding without difficulty.  The patient began participation in physical therapy after surgery and has progressed throughout the entire hospital stay.  Currently, the patient's progress is sufficient to allow the them to be discharged to home safely.  The patient agrees with this assessment and desires a discharge today.

## 2024-10-17 NOTE — ANESTHESIA PREPROCEDURE EVALUATION
10/17/2024  Lashanda Monaco is a 69 y.o., female.      Pre-op Assessment    I have reviewed the Patient Summary Reports.     I have reviewed the Nursing Notes. I have reviewed the NPO Status.   I have reviewed the Medications.     Review of Systems  Anesthesia Hx:  No problems with previous Anesthesia   History of prior surgery of interest to airway management or planning:          Denies Family Hx of Anesthesia complications.    Denies Personal Hx of Anesthesia complications.                    Social:  No Alcohol Use, Non-Smoker       Hematology/Oncology:  Hematology Normal   Oncology Normal                                   EENT/Dental:  EENT/Dental Normal           Cardiovascular:  Exercise tolerance: good   Hypertension           hyperlipidemia                               Pulmonary:  Pulmonary Normal                       Renal/:  Renal/ Normal                 Hepatic/GI:     GERD                Musculoskeletal:  Arthritis               Neurological:  Neurology Normal                                      Endocrine:  Endocrine Normal          Morbid Obesity / BMI > 40  Psych:   anxiety depression                Physical Exam  General: Well nourished, Cooperative, Alert and Oriented    Airway:  Mallampati: III   Mouth Opening: Normal  TM Distance: Normal  Tongue: Normal  Neck ROM: Normal ROM    Dental:  Intact    Chest/Lungs:  Clear to auscultation, Normal Respiratory Rate    Heart:  Rate: Normal  Rhythm: Regular Rhythm          Anesthesia Assessment: Preoperative EQUATION    Planned Procedure: Procedure(s) (LRB):  ARTHROPLASTY, KNEE, TOTAL, USING [employer] COMPUTER-ASSISTED NAVIGATION: RIGHT: DEPUY - ATTUNE (Right)  Requested Anesthesia Type:Regional  Surgeon: Jose David Chowdhury III, MD  Service: Orthopedics  Known or anticipated Date of Surgery:10/21/2024    Surgeon notes: reviewed    Electronic  QUestionnaire Assessment completed via nurse interview with patient.        Triage considerations:     The patient has no apparent active cardiac condition (No unstable coronary Syndrome such as severe unstable angina or recent [<1 month] myocardial infarction, decompensated CHF, severe valvular   disease or significant arrhythmia)    Previous anesthesia records:MAC   Patient reports slow to awaken and PONV and reports no  family history of anesthesia complications.   10/23/15     outside [facility]  neurectomy forearm AIN and PIN (Left: Wrist)   Airway    Mallampati Score: II.     Last PCP note: 6-12 months ago , within [facility]    Dr. Lundberg  Subspecialty notes: Ortho  Rheumatology      Dr. Callaway  Pulmonology        Dr. Galeano  Ortho Surgery      IGLESIA DE  Genetics               RODRICK Saravia [facility]  Cardiothoracic Surgery     Dr. Munoz  Bariatrics              Dr. Kelley  Pain Management              Dr. Hoskins    Other important co-morbidities: GERD, HLD, HTN, and Morbid Obesity, TAA, MDD/MARIBEL, OA, RA, cLBP w/ Rt sided sciatica, Lum DDD w/ multilevel stenosis, hx Migraines, hx BCC, foot drop post op RTHA, neuropathy (R foot), +COVID 7/15/24, marijuana edibles (one brownie nightly)      Tests already available:  Available tests,  within [facility] .  8/23/24      Sed Rate   C-Reactive protein   CMP   CBC  4/4/24        CXR  3/27/24      TTE  3/14/24      XR Arthritis Survey  3/15/24      CT Chest  3/13/24      EMG  3/1/24        EKG  1/3/24        MRI Lumbar spine  12/12/23    Xray Lumbar spine               Instructions given. (See in Nurse's note)    Optimization:  Anesthesia Preop Clinic Assessment  Indicated  Will schedule POC.    Medical Opinion Indicated       Sub-specialist consult indicated:   [facility] NP.       Plan:    Testing:  EKG and PT/INR   Pre-anesthesia  visit       Visit focus: possible regional anesthesia and/or nerve block      Consultation: . PCC NP for medical and anesthesia  optimization.     Patient  has previously scheduled Medical Appointment:   9/24   10/9    Navigation: Tests Scheduled.              Consults scheduled.             Results will be tracked by Preop Clinic.  3/27/24  Ms. Monaco is a pleasant 68 y.o. female with asymptomatic ascending aortic aneurysm.  She has no chest pain.     CT chest noncontrast shows 4.4cm ascending aorta.     We had a lengthy discussion with her regarding her aortic aneurysm and the guidelines and we agreed to continue surveillance.  We will obtain a CT chest noncontrast in one year with repeat clinic visit.       Louis Munoz MD  Cardiothoracic Surgery  [facility]     6/4/24  Dr. Maddox   Pulmonology  From a pulmonary perspective I do believe she can tolerate anesthesia if required for any future surgeries.      9/18/24   Message from Anya Callaway MD sent at 9/18/2024 12:13 PM CDT -----  Regarding: RE: total knee replacement?  Hello  She can hold Rinvoq 3 days prior to surgery.  Ok from my end to proceed with surgery.  Thanks!  Anya              9/25/24  Patient is optimized     I spent a total of 55 minutes on the day of the visit.This includes face to face time and non-face to face time preparing to see the patient (eg, review of tests), obtaining and/or reviewing separately obtained history, documenting clinical information in the electronic or other health record, independently interpreting results and communicating results to the patient/family/caregiver, or care coordinator.        Heath Monaco NP  Perioperative Medicine  [facility]     Anesthesia Plan  Type of Anesthesia, risks & benefits discussed:    Anesthesia Type: CSE, Spinal  Intra-op Monitoring Plan: Standard ASA Monitors  Post Op Pain Control Plan: multimodal analgesia and IV/PO Opioids PRN  Induction:  IV  Airway Plan: Direct  Informed Consent: Informed consent signed with the Patient and all parties understand the risks and agree with  anesthesia plan.  All questions answered.   ASA Score: 3  Day of Surgery Review of History & Physical: H&P Update referred to the surgeon/provider.    Ready For Surgery From Anesthesia Perspective.     .

## 2024-10-17 NOTE — PROGRESS NOTES
right knee 11-17  doi 11-16  discussed increased risk of wound healing issues and infection  patient understands and wishes to proceed does not want to delay surgery as is in terrible pain

## 2024-10-17 NOTE — PLAN OF CARE
Patient hit operative knee on bed last night. A bruise and abrasion is noted to the right knee. Dr. Chowdhury notified and assessed patient. Patient ok to proceed with surgery.

## 2024-10-17 NOTE — ANESTHESIA PROCEDURE NOTES
Spinal    Diagnosis: osteoarthritis  Patient location during procedure: OR  Start time: 10/17/2024 10:20 AM  Timeout: 10/17/2024 10:18 AM  End time: 10/17/2024 10:28 AM    Staffing  Authorizing Provider: Jaguar Garrison MD  Performing Provider: Jaguar Garrison MD    Staffing  Performed by: Jaguar Garrison MD  Authorized by: Jaguar Garrison MD    Preanesthetic Checklist  Completed: patient identified, IV checked, site marked, risks and benefits discussed, surgical consent, monitors and equipment checked, pre-op evaluation and timeout performed  Spinal Block  Patient position: sitting  Prep: ChloraPrep  Patient monitoring: heart rate  Approach: midline  Location: L3-4  Injection technique: single shot  CSF Fluid: clear free-flowing CSF  Needle  Needle type: pencil-tip   Needle gauge: 25 G  Needle length: 5 in  Additional Documentation: incremental injection, no paresthesia on injection and negative aspiration for heme  Needle localization: anatomical landmarks  Assessment  Ease of block: easy and difficult  Patient's tolerance of the procedure: no complaints and comfortable throughout block  Additional Notes  Used the tuohy as an introducer, very bony spine, not the easiest block placement  Medications:    Medications: mepivacaine (CARBOCAINE) injection 15 mg/mL (1.5%) - Other   3 mL - 10/17/2024 10:28:00 AM

## 2024-10-18 VITALS
HEIGHT: 60 IN | BODY MASS INDEX: 44.96 KG/M2 | RESPIRATION RATE: 16 BRPM | TEMPERATURE: 98 F | HEART RATE: 60 BPM | OXYGEN SATURATION: 95 % | DIASTOLIC BLOOD PRESSURE: 79 MMHG | WEIGHT: 229 LBS | SYSTOLIC BLOOD PRESSURE: 158 MMHG

## 2024-10-18 PROCEDURE — 97116 GAIT TRAINING THERAPY: CPT

## 2024-10-18 PROCEDURE — 97535 SELF CARE MNGMENT TRAINING: CPT

## 2024-10-18 PROCEDURE — 97110 THERAPEUTIC EXERCISES: CPT

## 2024-10-18 PROCEDURE — 63600175 PHARM REV CODE 636 W HCPCS

## 2024-10-18 PROCEDURE — 25000003 PHARM REV CODE 250: Performed by: ANESTHESIOLOGY

## 2024-10-18 PROCEDURE — 97530 THERAPEUTIC ACTIVITIES: CPT

## 2024-10-18 PROCEDURE — 25000003 PHARM REV CODE 250: Performed by: STUDENT IN AN ORGANIZED HEALTH CARE EDUCATION/TRAINING PROGRAM

## 2024-10-18 PROCEDURE — 25000003 PHARM REV CODE 250: Performed by: ORTHOPAEDIC SURGERY

## 2024-10-18 PROCEDURE — 25000003 PHARM REV CODE 250

## 2024-10-18 RX ORDER — CELECOXIB 200 MG/1
200 CAPSULE ORAL DAILY
Status: DISCONTINUED | OUTPATIENT
Start: 2024-10-18 | End: 2024-10-18 | Stop reason: HOSPADM

## 2024-10-18 RX ADMIN — ACETAMINOPHEN 1000 MG: 500 TABLET ORAL at 05:10

## 2024-10-18 RX ADMIN — SENNOSIDES AND DOCUSATE SODIUM 1 TABLET: 50; 8.6 TABLET ORAL at 08:10

## 2024-10-18 RX ADMIN — SERTRALINE 100 MG: 100 TABLET, FILM COATED ORAL at 08:10

## 2024-10-18 RX ADMIN — OXYCODONE 5 MG: 5 TABLET ORAL at 07:10

## 2024-10-18 RX ADMIN — METHOCARBAMOL 750 MG: 750 TABLET ORAL at 08:10

## 2024-10-18 RX ADMIN — ASPIRIN 81 MG: 81 TABLET, COATED ORAL at 08:10

## 2024-10-18 RX ADMIN — CELECOXIB 200 MG: 200 CAPSULE ORAL at 08:10

## 2024-10-18 RX ADMIN — AMLODIPINE BESYLATE 10 MG: 10 TABLET ORAL at 08:10

## 2024-10-18 RX ADMIN — CEFAZOLIN 2 G: 2 INJECTION, POWDER, FOR SOLUTION INTRAMUSCULAR; INTRAVENOUS at 02:10

## 2024-10-18 RX ADMIN — Medication 400 ML: at 01:10

## 2024-10-18 RX ADMIN — POLYETHYLENE GLYCOL 3350 17 G: 17 POWDER, FOR SOLUTION ORAL at 08:10

## 2024-10-18 RX ADMIN — Medication 400 ML: at 09:10

## 2024-10-18 RX ADMIN — FAMOTIDINE 20 MG: 20 TABLET, FILM COATED ORAL at 08:10

## 2024-10-18 RX ADMIN — Medication 400 ML: at 05:10

## 2024-10-18 RX ADMIN — ATORVASTATIN CALCIUM 20 MG: 20 TABLET, FILM COATED ORAL at 08:10

## 2024-10-18 RX ADMIN — MUPIROCIN 1 G: 20 OINTMENT TOPICAL at 08:10

## 2024-10-18 NOTE — PLAN OF CARE
Problem: Adult Inpatient Plan of Care  Goal: Plan of Care Review  Outcome: Progressing  Flowsheets (Taken 10/18/2024 0758)  Plan of Care Reviewed With:   patient   parent     Problem: Adult Inpatient Plan of Care  Goal: Patient-Specific Goal (Individualized)  Outcome: Progressing  Flowsheets (Taken 10/18/2024 0758)  Individualized Care Needs: mangage pain     Problem: Adult Inpatient Plan of Care  Goal: Absence of Hospital-Acquired Illness or Injury  Intervention: Identify and Manage Fall Risk  Flowsheets (Taken 10/18/2024 0758)  Safety Promotion/Fall Prevention:   assistive device/personal item within reach   Fall Risk reviewed with patient/family   in recliner, wheels locked   high risk medications identified   instructed to call staff for mobility   lighting adjusted   medications reviewed   muscle strengthening facilitated   nonskid shoes/socks when out of bed   Supervised toileting - stay within arms reach   toileting scheduled   side rails raised x 2     Problem: Adult Inpatient Plan of Care  Goal: Absence of Hospital-Acquired Illness or Injury  Intervention: Prevent Skin Injury  Flowsheets (Taken 10/18/2024 0758)  Device Skin Pressure Protection: absorbent pad utilized/changed     Problem: Adult Inpatient Plan of Care  Goal: Optimal Comfort and Wellbeing  Intervention: Monitor Pain and Promote Comfort  Flowsheets (Taken 10/18/2024 0758)  Pain Management Interventions:   care clustered   cold applied   diversional activity provided   medication offered   pain management plan reviewed with patient/caregiver   prescribed exercises encouraged   warm blanket provided     Problem: Adult Inpatient Plan of Care  Goal: Optimal Comfort and Wellbeing  Intervention: Provide Person-Centered Care  Flowsheets (Taken 10/18/2024 0758)  Trust Relationship/Rapport:   care explained   reassurance provided   thoughts/feelings acknowledged     Problem: Pain Acute  Goal: Optimal Pain Control and Function  Intervention: Develop  Pain Management Plan  Flowsheets (Taken 10/18/2024 0758)  Pain Management Interventions:   care clustered   cold applied   diversional activity provided   medication offered   pain management plan reviewed with patient/caregiver   prescribed exercises encouraged   warm blanket provided     Problem: Pain Acute  Goal: Optimal Pain Control and Function  Intervention: Prevent or Manage Pain  Flowsheets (Taken 10/18/2024 0758)  Medication Review/Management: medications reviewed     Problem: Pain Acute  Goal: Optimal Pain Control and Function  Intervention: Optimize Psychosocial Wellbeing  Flowsheets (Taken 10/18/2024 0758)  Supportive Measures:   active listening utilized   positive reinforcement provided   verbalization of feelings encouraged     Problem: Knee Arthroplasty  Goal: Absence of Bleeding  Intervention: Monitor and Manage Bleeding  Flowsheets (Taken 10/18/2024 0758)  Bleeding Management: dressing monitored     Problem: Knee Arthroplasty  Goal: Nausea and Vomiting Relief  Intervention: Prevent or Manage Nausea and Vomiting  Flowsheets (Taken 10/18/2024 0758)  Nausea/Vomiting Interventions:   nausea triggers minimized   stimuli minimized     Problem: Fall Injury Risk  Goal: Absence of Fall and Fall-Related Injury  Intervention: Identify and Manage Contributors  Flowsheets (Taken 10/18/2024 0758)  Self-Care Promotion: independence encouraged  Medication Review/Management: medications reviewed     Problem: Fall Injury Risk  Goal: Absence of Fall and Fall-Related Injury  Intervention: Promote Injury-Free Environment  Flowsheets (Taken 10/18/2024 0758)  Safety Promotion/Fall Prevention:   assistive device/personal item within reach   Fall Risk reviewed with patient/family   in recliner, wheels locked   high risk medications identified   instructed to call staff for mobility   lighting adjusted   medications reviewed   muscle strengthening facilitated   nonskid shoes/socks when out of bed   Supervised toileting -  stay within arms reach   toileting scheduled   side rails raised x 2     Problem: Anxiety  Goal: Anxiety Reduction or Resolution  Intervention: Promote Anxiety Reduction  Flowsheets (Taken 10/18/2024 3908)  Supportive Measures:   active listening utilized   positive reinforcement provided   verbalization of feelings encouraged   Plan of care reviewed with patient; verbalized understanding.   Medications reviewed and administered as ordered.  Rounding for safety and patient care per policy.   Safety precautions maintained. Patient working appropriately with PT/OT.  DME at bedside.  Call light within reach, bed wheels locked, bed in lowest position, side rails ^x2, safety maintained. NADN, Will continue monitor.

## 2024-10-18 NOTE — PROGRESS NOTES
Acute Pain Service and Perioperative Surgical Home Progress Note    Interval history  Lashanda Monaco is a 69 y.o., female, status post arthroplasty of the right knee with no acute events overnight.  On exam patient with good sensation and range of motion.  Good quad strength.  Surgical site is clean and dry.  Planning on discharge later this.    Surgery:  Procedure(s) (LRB):  ARTHROPLASTY, KNEE, TOTAL, USING [employer] COMPUTER-ASSISTED NAVIGATION: RIGHT: DEPUY - ATTUNE (Right)    Post Op Day #: 1    Problem List:    Active Hospital Problems    Diagnosis  POA    *Primary osteoarthritis of right knee [M17.11]  Yes      Resolved Hospital Problems   No resolved problems to display.       Subjective:       General appearance of alert, oriented, no complaints   Pain with rest: 1    Numbers   Pain with movement: 1    Numbers   Side Effects    1. Pruritis No    2. Nausea No    3. Motor Blockade No, 0=Ability to raise lower extremities off bed    4. Sedation No, 1=awake and alert    Schedule Medications:    acetaminophen  1,000 mg Oral Q6H    amLODIPine  10 mg Oral Daily    aspirin  81 mg Oral BID    atorvastatin  20 mg Oral Daily    celecoxib  200 mg Oral Daily    electrolytes-dextrose  400 mL Oral Q4H    famotidine  20 mg Oral BID    fluticasone furoate-vilanteroL  1 puff Inhalation Daily    methocarbamoL  750 mg Oral QID    methocarbamoL  750 mg Oral TID    mupirocin  1 g Nasal BID    polyethylene glycol  17 g Oral Daily    pregabalin  75 mg Oral QHS    senna-docusate 8.6-50 mg  1 tablet Oral BID    sertraline  100 mg Oral Daily        Continuous Infusions:       PRN Medications:    Current Facility-Administered Medications:     albuterol, 2 puff, Inhalation, Q6H PRN    ALPRAZolam, 0.5 mg, Oral, Daily PRN    bisacodyL, 10 mg, Rectal, Q12H PRN    melatonin, 6 mg, Oral, Nightly PRN    morphine, 2 mg, Intravenous, Q3H PRN    naloxone, 0.02 mg, Intravenous, PRN    ondansetron, 4 mg, Intravenous, Q8H PRN    oxyCODONE, 5 mg,  "Oral, Q3H PRN    oxyCODONE, 10 mg, Oral, Q3H PRN    prochlorperazine, 5 mg, Intravenous, Q6H PRN    traZODone, 50 mg, Oral, Nightly PRN       Antibiotics:  Antibiotics (From admission, onward)      Start     Stop Route Frequency Ordered    10/17/24 2100  mupirocin 2 % ointment 1 g         10/22/24 2059 Nasl 2 times daily 10/17/24 1552               Objective:         Vital Signs (Most Recent):  Temp: 98.6 °F (37 °C) (10/18/24 0432)  Pulse: 61 (10/18/24 0432)  Resp: 16 (10/18/24 0432)  BP: 139/81 (10/18/24 0432)  SpO2: (!) 92 % (10/18/24 0432) Vital Signs Range (Last 24H):  Temp:  [97 °F (36.1 °C)-98.8 °F (37.1 °C)]   Pulse:  [61-90]   Resp:  [15-21]   BP: (126-152)/(65-92)   SpO2:  [91 %-100 %]          I & O (Last 24H):  Intake/Output Summary (Last 24 hours) at 10/18/2024 0532  Last data filed at 10/18/2024 0115  Gross per 24 hour   Intake 3720 ml   Output 800 ml   Net 2920 ml       Physical Exam:    GA: Alert, comfortable, no acute distress.   Pulmonary: Clear to auscultation . Normal respiratory ratei.  Cardiac: regular rate and rhythm.          Laboratory: reviewed in chart  CBC: No results for input(s): "WBC", "RBC", "HGB", "HCT", "PLT", "MCV", "MCH", "MCHC" in the last 72 hours.    BMP: No results for input(s): "NA", "K", "CO2", "CL", "BUN", "CREATININE", "GLU", "MG", "PHOS", "CALCIUM" in the last 72 hours.    No results for input(s): "PT", "INR", "PROTIME", "APTT" in the last 72 hours.          Assessment:         Pain control adequate    Plan:  1) Pain: Multimodal pain regimen ordered which includes acetaminophen, celecoxib, pregabalin, and prn oxycodone.  Well controlled on current regimen.  Will continue to monitor.    2) hypertension, continue home regimen   3) anxiety and depression, no issues overnight.   4) FEN/GI: Tolerating regular diet.     5) Dispo: Pt working well with PT/OT. Case management and SW following along for setting up home health and physical therapy. Plan to discharge home this am.  "             Evaluator Nicolas Bills PA-C

## 2024-10-18 NOTE — SUBJECTIVE & OBJECTIVE
Principal Problem:Primary osteoarthritis of right knee    Principal Orthopedic Problem: as above s/p R TKA 10/17/24    Interval History: Patient was seen and examined at bedside s/p R TKA. HERMINIO. She reports that pain is well controlled. She did have an episode of muscle cramping to the posterior knee that resolved with ambulation overnight. Tolerating PO. Ambulated 25 feet with RW, per PT note. Bowel and bladder function have returned. Patient states that She is ready for discharge today.      Review of patient's allergies indicates:   Allergen Reactions    Kiwi (actinidia chinensis)     Hydrocodone bitartrate Nausea Only       Current Facility-Administered Medications   Medication    acetaminophen tablet 1,000 mg    albuterol inhaler 2 puff    ALPRAZolam tablet 0.5 mg    amLODIPine tablet 10 mg    aspirin EC tablet 81 mg    atorvastatin tablet 20 mg    bisacodyL suppository 10 mg    celecoxib capsule 200 mg    electrolytes-dextrose (Pedialyte) oral solution 400 mL    famotidine tablet 20 mg    fluticasone furoate-vilanteroL 100-25 mcg/dose diskus inhaler 1 puff    melatonin tablet 6 mg    methocarbamoL tablet 750 mg    methocarbamoL tablet 750 mg    morphine injection 2 mg    mupirocin 2 % ointment 1 g    naloxone 0.4 mg/mL injection 0.02 mg    ondansetron injection 4 mg    oxyCODONE immediate release tablet 5 mg    oxyCODONE immediate release tablet Tab 10 mg    polyethylene glycol packet 17 g    pregabalin capsule 75 mg    prochlorperazine injection Soln 5 mg    senna-docusate 8.6-50 mg per tablet 1 tablet    sertraline tablet 100 mg    traZODone tablet 50 mg     Objective:     Vital Signs (Most Recent):  Temp: 98.6 °F (37 °C) (10/18/24 0432)  Pulse: 61 (10/18/24 0432)  Resp: 16 (10/18/24 0432)  BP: 139/81 (10/18/24 0432)  SpO2: (!) 92 % (10/18/24 0432) Vital Signs (24h Range):  Temp:  [97 °F (36.1 °C)-98.8 °F (37.1 °C)] 98.6 °F (37 °C)  Pulse:  [61-90] 61  Resp:  [15-21] 16  SpO2:  [91 %-100 %] 92 %  BP:  (126-152)/(65-92) 139/81     Weight: 103.9 kg (229 lb)  Height: 5' (152.4 cm)  Body mass index is 44.72 kg/m².      Intake/Output Summary (Last 24 hours) at 10/18/2024 0702  Last data filed at 10/18/2024 0655  Gross per 24 hour   Intake 4020 ml   Output 1250 ml   Net 2770 ml        Ortho/SPM Exam  NAD  A&O x3   Breathing comfortably w/o distress     RLE Exam:    - RLE in stockinet with ice in place   - Incision with dressing in place. Clean, dry, and intact   - Skin intact throughout  - Mild Swelling  - NonTTP throughout  - AROM and PROM of the hip, knee, ankle, and foot intact without pain  - Able to perform straight leg raise  - TA/EHL/Gastroc/FHL assessed in isolation without deficit  - SILT throughout  - Compartments soft and compressible   - DP palpated  - Capillary Refill <3s      Significant Labs: All pertinent labs within the past 24 hours have been reviewed.    Significant Imaging: I have reviewed and interpreted all pertinent imaging results/findings.

## 2024-10-18 NOTE — ANESTHESIA POSTPROCEDURE EVALUATION
Anesthesia Post Evaluation    Patient: Lashanda Monaco    Procedure(s) Performed: Procedure(s) (LRB):  ARTHROPLASTY, KNEE, TOTAL, USING [employer] COMPUTER-ASSISTED NAVIGATION: RIGHT: REED MEHTA (Right)    Final Anesthesia Type: spinal      Patient location during evaluation: PACU  Patient participation: Yes- Able to Participate  Level of consciousness: awake and alert and oriented  Post-procedure vital signs: reviewed and stable  Pain management: adequate  Airway patency: patent    PONV status at discharge: No PONV  Anesthetic complications: no      Cardiovascular status: blood pressure returned to baseline and hemodynamically stable  Respiratory status: unassisted, spontaneous ventilation and room air  Hydration status: euvolemic  Follow-up not needed.              Vitals Value Taken Time   /81 10/18/24 0432   Temp 37 °C (98.6 °F) 10/18/24 0432   Pulse 61 10/18/24 0432   Resp 16 10/18/24 0716   SpO2 92 % 10/18/24 0432         Event Time   Out of Recovery 15:13:00         Pain/William Score: Pain Rating Prior to Med Admin: 5 (10/18/2024  7:16 AM)  Pain Rating Post Med Admin: 3 (10/18/2024  6:37 AM)  William Score: 9 (10/17/2024  1:30 PM)

## 2024-10-18 NOTE — PROGRESS NOTES
[facility])  Orthopedics  Progress Note    Patient Name: Lashanda Monaco  MRN: [id number]  Admission Date: 10/17/2024  Hospital Length of Stay: 0 days  Attending Provider: Jose David Chowdhury III, MD  Primary Care Provider: Wayne Lundberg MD  Follow-up For: Procedure(s) (LRB):  ARTHROPLASTY, KNEE, TOTAL, USING [employer] COMPUTER-ASSISTED NAVIGATION: RIGHT: DEPUY - ATTUNE (Right)    Post-Operative Day: 1 Day Post-Op  Subjective:     Principal Problem:Primary osteoarthritis of right knee    Principal Orthopedic Problem: as above s/p R TKA 10/17/24    Interval History: Patient was seen and examined at bedside s/p R TKA. HERMINIO. She reports that pain is well controlled. She did have an episode of muscle cramping to the posterior knee that resolved with ambulation overnight. Tolerating PO. Ambulated 25 feet with RW, per PT note. Bowel and bladder function have returned. Patient states that She is ready for discharge today.      Review of patient's allergies indicates:   Allergen Reactions    Kiwi (actinidia chinensis)     Hydrocodone bitartrate Nausea Only       Current Facility-Administered Medications   Medication    acetaminophen tablet 1,000 mg    albuterol inhaler 2 puff    ALPRAZolam tablet 0.5 mg    amLODIPine tablet 10 mg    aspirin EC tablet 81 mg    atorvastatin tablet 20 mg    bisacodyL suppository 10 mg    celecoxib capsule 200 mg    electrolytes-dextrose (Pedialyte) oral solution 400 mL    famotidine tablet 20 mg    fluticasone furoate-vilanteroL 100-25 mcg/dose diskus inhaler 1 puff    melatonin tablet 6 mg    methocarbamoL tablet 750 mg    methocarbamoL tablet 750 mg    morphine injection 2 mg    mupirocin 2 % ointment 1 g    naloxone 0.4 mg/mL injection 0.02 mg    ondansetron injection 4 mg    oxyCODONE immediate release tablet 5 mg    oxyCODONE immediate release tablet Tab 10 mg    polyethylene glycol packet 17 g    pregabalin capsule 75 mg    prochlorperazine injection Soln 5 mg     senna-docusate 8.6-50 mg per tablet 1 tablet    sertraline tablet 100 mg    traZODone tablet 50 mg     Objective:     Vital Signs (Most Recent):  Temp: 98.6 °F (37 °C) (10/18/24 0432)  Pulse: 61 (10/18/24 0432)  Resp: 16 (10/18/24 0432)  BP: 139/81 (10/18/24 0432)  SpO2: (!) 92 % (10/18/24 0432) Vital Signs (24h Range):  Temp:  [97 °F (36.1 °C)-98.8 °F (37.1 °C)] 98.6 °F (37 °C)  Pulse:  [61-90] 61  Resp:  [15-21] 16  SpO2:  [91 %-100 %] 92 %  BP: (126-152)/(65-92) 139/81     Weight: 103.9 kg (229 lb)  Height: 5' (152.4 cm)  Body mass index is 44.72 kg/m².      Intake/Output Summary (Last 24 hours) at 10/18/2024 0702  Last data filed at 10/18/2024 0655  Gross per 24 hour   Intake 4020 ml   Output 1250 ml   Net 2770 ml        Ortho/SPM Exam  NAD  A&O x3   Breathing comfortably w/o distress     RLE Exam:    - RLE in stockinet with ice in place   - Incision with dressing in place. Clean, dry, and intact   - Skin intact throughout  - Mild Swelling  - NonTTP throughout  - AROM and PROM of the hip, knee, ankle, and foot intact without pain  - Able to perform straight leg raise  - TA/EHL/Gastroc/FHL assessed in isolation without deficit  - SILT throughout  - Compartments soft and compressible   - DP palpated  - Capillary Refill <3s      Significant Labs: All pertinent labs within the past 24 hours have been reviewed.    Significant Imaging: I have reviewed and interpreted all pertinent imaging results/findings.  Assessment/Plan:     * Primary osteoarthritis of right knee  Lashanda Monaoc is a 69 y.o. female s/p R TKA    - PT/OT prior to DC   - ASA 81 mg BID for 30 days  - Multimodal pain control; PNC managed by anesthesia  - Dressing clean, dry, and intact with stockinet and ice in place. Keep dressing intact until follow-up.    - Weightbearing as tolerated  - Post-op Ancef; PO cefadroxil x7 days @ discharge   - Restart Rinvoq 2 weeks post-op    Dispo: Discharge to home today           JOSH Bennett,  MD  Orthopedics  [facility])

## 2024-10-18 NOTE — ASSESSMENT & PLAN NOTE
Lashanda Monaco is a 69 y.o. female s/p R TKA    - PT/OT prior to DC   - ASA 81 mg BID for 30 days  - Multimodal pain control; PNC managed by anesthesia  - Dressing clean, dry, and intact with stockinet and ice in place. Keep dressing intact until follow-up.    - Weightbearing as tolerated  - Post-op Ancef; PO cefadroxil x7 days @ discharge   - Restart Rinvoq 2 weeks post-op    Dispo: Discharge to home today

## 2024-10-18 NOTE — PLAN OF CARE
Throughout shift, pt remained oriented x 4, calm, respirations even and unlabored.  Safety precautions maintained throughout shift, remained free of falls/injury.  Oral electrolytes provided. Pt voiding, pure wick in place. Pain assessment completed q 2 hrs, pain managed w/meds as ordered.        Plan of Care: Patient verbalized understanding of plan of care.

## 2024-10-18 NOTE — PT/OT/SLP PROGRESS
"Occupational Therapy   Treatment and Discharge Note    Name: Lashanda Monaco  MRN: [id number]  Admitting Diagnosis:  Primary osteoarthritis of right knee  1 Day Post-Op    Recommendations:     Discharge Recommendations: Low Intensity Therapy  Discharge Equipment Recommendations:  bedside commode, shower chair, walker, rolling  Barriers to discharge:  None    Assessment:     Lashanda Monaco is a 69 y.o. female with a medical diagnosis of Primary osteoarthritis of right knee.  She presents with R TKA. Performance deficits affecting function are impaired self care skills, impaired functional mobility, orthopedic precautions. Pt was able to perform supine/sit T/F c S and Sit <> Stand Transfer with modified independence with rolling walker Bed <> Chair Transfer using Stand Pivot technique with modified independence with rolling walker Toilet Transfer Stand Pivot technique with modified independence with rolling walker.  Able to perform UB/LB dressing c modified independence  Educated pt on bathing, car transfers, and cryotherapy.       Rehab Prognosis:  Good; patient would benefit from acute skilled OT services to address these deficits and reach maximum level of function.       Plan:     Patient to be seen daily to address the above listed problems via self-care/home management, therapeutic activities, therapeutic exercises  Plan of Care Expires: 10/18/24  Plan of Care Reviewed with: patient, sibling    Subjective     Chief Complaint: R TKA  Patient/Family Comments/goals: "I need to go to the bathroom first."  Pain/Comfort:  Pain Rating 1:  (pt did not rate)    Objective:     Communicated with: RN prior to session.  Patient found supine with cryotherapy, FCD, PureWick upon OT entry to room.    General Precautions: Standard, fall    Orthopedic Precautions:RLE weight bearing as tolerated  Braces: N/A  Respiratory Status: Room air     Occupational Performance:     Bed Mobility:    Patient completed Supine to Sit with " supervision     Functional Mobility/Transfers:  Patient completed Sit <> Stand Transfer with modified independence  with  rolling walker   Patient completed Bed <> Chair Transfer using Stand Pivot technique with modified independence with rolling walker  Patient completed Toilet Transfer Stand Pivot technique with modified independence with  rolling walker and bedside commode  Functional Mobility: Pt was able to walk from bed to bathroom and then walked from bathroom to chair c CGA and RW.    Activities of Daily Living:  Grooming: modified independence to wash hands and brush teeth while standing at sink c RW.  Upper Body Dressing: modified independence to don shirt  Lower Body Dressing: modified independence to don underwear, shorts, and shoes.  Toileting: modified independence for toilet hygiene.      [facility] 6 Click ADL: 24    Patient left up in chair with all lines intact, call button in reach, RN notified, and sister present    GOALS:   Multidisciplinary Problems       Occupational Therapy Goals          Problem: Occupational Therapy    Goal Priority Disciplines Outcome Interventions   Occupational Therapy Goal     OT, PT/OT Progressing    Description: Goals to be met by: 10/18/2024     Patient will increase functional independence with ADLs by performing:    UE Dressing with Supervision.  LE Dressing with Supervision.  Grooming while standing at sink with Supervision.  Toileting from toilet with Supervision for hygiene and clothing management.   Toilet transfer to toilet with Supervision.                         Time Tracking:     OT Date of Treatment: 10/18/24  OT Start Time: 0856  OT Stop Time: 0949  OT Total Time (min): 53 min    Billable Minutes:Self Care/Home Management 40  Therapeutic Activity 13               10/18/2024

## 2024-10-18 NOTE — PLAN OF CARE
Has met unit/department guidelines for discharge from each phase of the post procedure continuum. Patient discharged.  Instructions, placed in dc folder and Prescriptions given.  IV removed, tolerated well, w/ catheter intact, no redness or swelling to area. . Dressing to right knee remains CDI. Patient verbalized understanding instructions.  AAOx3, VSS, NADN, no complaints of pain noted at this time.  Wheelchair to private vehicle in care of sister.  All personal belongings sent with pt.  Blue Bracelet given applied to pts wrist and instructions given to call # on bracelet w/any surgery related issues.

## 2024-10-18 NOTE — PT/OT/SLP PROGRESS
"Physical Therapy Treatment and Discharge    Patient Name:  Lashanda Monaco   MRN:  [id number]    Recommendations:     Discharge Recommendations: Low Intensity Therapy  Discharge Equipment Recommendations: none  Barriers to discharge: None    Assessment:     Lashanda Monaco is a 69 y.o. female admitted with a medical diagnosis of Primary osteoarthritis of right knee.  She presents with the following impairments/functional limitations: weakness, impaired functional mobility, gait instability, impaired balance, decreased lower extremity function, pain, decreased ROM, orthopedic precautions. Patient tolerated PT session well. Patient ambulated 12ft, 90ft, and 12ft with RW and supervision. No LOB or SOB noted. Patient ascended/descended 6" step with RW and contact guard assistance. Patient performed R LE therex x10 reps. Patient ready to discharge home from PT standpoint.      Rehab Prognosis: Good; patient would benefit from acute skilled PT services to address these deficits and reach maximum level of function.    Recent Surgery: Procedure(s) (LRB):  ARTHROPLASTY, KNEE, TOTAL, USING [employer] COMPUTER-ASSISTED NAVIGATION: RIGHT: DEPUY - ATTUNE (Right) 1 Day Post-Op    Plan:     During this hospitalization, patient to be seen daily to address the identified rehab impairments via gait training, therapeutic activities, therapeutic exercises and progress toward the following goals:    Plan of Care Expires:  11/16/24    Subjective     Chief Complaint: Right knee pain.   Patient/Family Comments/goals: To clean her house.   Pain/Comfort:  Pain Rating 1:  (did not rate)  Location - Side 1: Right  Location - Orientation 1: posterior  Location 1: knee  Pain Addressed 1: Pre-medicate for activity, Reposition, Distraction      Objective:     Communicated with RN prior to session. Patient found up in chair with cryotherapy upon PT entry to room. Sister present throughout PT session.     General Precautions: Standard, fall  Orthopedic " "Precautions: RLE weight bearing as tolerated  Braces: N/A  Respiratory Status: Room air     Functional Mobility:  Mat Mobility:     Supine to Sit: supervision  Sit to Supine: minimum assistance for R LE management   Transfers:     Sit to Stand:  supervision with rolling walker x2 from bedside chair, x1 from bedside commode, and x1 from mat   Toilet Transfer (bedside commode over toilet): supervision with rolling walker    Gait: Patient ambulated 12ft, 90ft, and 12ft with Rolling Walker and supervision using 3-point gait. Patient demonstrated decreased esme and decreased step length during gait due to pain, decreased ROM, and decreased strength.  Stairs:  Patient ascended/descended 6" step with RW and contact guard assistance.     AM-PAC 6 CLICK MOBILITY  Turning over in bed (including adjusting bedclothes, sheets and blankets)?: 4  Sitting down on and standing up from a chair with arms (e.g., wheelchair, bedside commode, etc.): 4  Moving from lying on back to sitting on the side of the bed?: 3  Moving to and from a bed to a chair (including a wheelchair)?: 4  Need to walk in hospital room?: 4  Climbing 3-5 steps with a railing?: 3  Basic Mobility Total Score: 22     Treatment & Education:  Patient educated in and performed R LE exercises x10 reps for ankle pumps, quad set, glute set, SAQ over bolster, heel slides, hip abd/add, SLR, and LAQ.     Patient educated in:  -PT role and POC  -safety with transfers including hand placement  -gait sequencing and RW management  -OOB activity to maximize recovery including ambulating at home to prevent DVT   -car transfer  -curb training  -HEP for therex at home with handout provided     Patient left up in chair with all lines intact, call button in reach, RN notified, and sister present.    GOALS:   Multidisciplinary Problems       Physical Therapy Goals          Problem: Physical Therapy    Goal Priority Disciplines Outcome Interventions   Physical Therapy Goal     PT, " PT/OT Progressing    Description: Goals to be met by: 10/18/24     Patient will increase functional independence with mobility by performin. Supine to sit with Supervision  2. Sit to stand transfer with Supervision  3. Gait  x 150 feet with SBA using Rolling Walker.   4. Ascend/Descend 6 inch curb step with Contact Guard Assistance using Rolling Walker.  5. Lower extremity TKA home exercise program x 10 reps per handout, with supervision    Patient demonstrates a mobility limitation that significantly impairs their ability to participate in one or more mobility related activities of daily living. Patient's mobility limitation cannot be sufficiently resolved with the use of a cane, but can be sufficiently resolved with the use of a rolling walker.The use of a rolling walker will considerably improve their ability to participate in MRADLs. Patient will use the walker on a regular basis at home.                            Time Tracking:     PT Received On: 10/18/24  PT Start Time: 1016     PT Stop Time: 1054  PT Total Time (min): 38 min     Billable Minutes: Gait Training 15, Therapeutic Activity 10, and Therapeutic Exercise 13    Treatment Type: Treatment  PT/PTA: PT     Number of PTA visits since last PT visit: 0     10/18/2024

## 2024-10-19 NOTE — DISCHARGE SUMMARY
[facility])  Orthopedics  Discharge Summary      Patient Name: Lashanda Monaco  MRN: [id number]  Admission Date: 10/17/2024  Hospital Length of Stay: 0 days  Discharge Date and Time: 10/18/2024 11:33 AM  Attending Physician: Vanessa att. providers found   Discharging Provider: JOSH Bennett MD  Primary Care Provider: Wayne Lundberg MD    HPI:   Lashanda Monaco is a 69 y.o. female with history of Right knee pain. Pain is worse with activity and weight bearing.  Patient has experienced interference of activities of daily living due to decreased range of motion and an increase in joint pain and swelling.  Patient has failed non-operative treatment including NSAIDs, corticosteroid injections, viscosupplement injections, and activity modification.  Lashanda Monaco currently ambulates using assistive device .      Relevant medical conditions of significance in perioperative period:  HTN- on medication managed by PCP  HLD- on medication managed by PCP    Procedure(s) (LRB):  ARTHROPLASTY, KNEE, TOTAL, USING [employer] COMPUTER-ASSISTED NAVIGATION: [facility]      Hospital Course:  On 10/17/24, the patient arrived to the [facility] for proper pre-operative management.  Upon completion of pre-operative preparation, the patient was taken back to the operative theatre. A R TKA was performed without complication and the patient was transported to the post anesthesia care unit in stable condition.  After appropriate recovery from the anaesthetic agents used during the surgery, the patient was then transported to the hospital inpatient floor.  The interim of the hospital stay from arrival on the floor up to discharge has been uncomplicated. The patient has tolerated regular diet.  The patient's pain has been controlled using a multimodal approach. Currently, the patient's pain is well controlled on an oral regimen.  The patient has been voiding without difficulty.  The patient began  participation in physical therapy after surgery and has progressed throughout the entire hospital stay.  Currently, the patient's progress is sufficient to allow the them to be discharged to home safely.  The patient agrees with this assessment and desires a discharge today.       Pending Diagnostic Studies:       None          Final Active Diagnoses:    Diagnosis Date Noted POA    PRINCIPAL PROBLEM:  Primary osteoarthritis of right knee [M17.11] 10/17/2024 Yes      Problems Resolved During this Admission:      Discharged Condition: good    Disposition: Home or Self Care    Follow Up: in clinic     Patient Instructions:      Protime-INR   Standing Status: Future Number of Occurrences: 1 Standing Exp. Date: 11/22/25     Activity as tolerated     Sponge bath only until clinic visit     Keep surgical extremity elevated     Lifting restrictions   Order Comments: No strenuous exercise or lifting of > 10 lbs     Weight bearing as tolerated     No driving, operating heavy equipment or signing legal documents while taking pain medication     Leave dressing on - Keep it clean, dry, and intact until clinic visit   Order Comments: Do not remove surgical dressing for 2 weeks post-op. This will be done only by MD at initial post-op visit. If dressing is completely saturated, replace with identical dressing - silver-impregnated hydrocolloid dressing.     Do not get dressings wet. Do not shower.     If dressing continues to be saturated or there are signs of infection, please call [facility] [phone] for further instructions and to make appt to be seen.     Call MD for:  temperature >100.4     Call MD for:  persistent nausea and vomiting     Call MD for:  severe uncontrolled pain     Call MD for:  difficulty breathing, headache or visual disturbances     Call MD for:  redness, tenderness, or signs of infection (pain, swelling, redness, odor or green/yellow discharge around incision site)     Call MD for:  hives     Call MD  for:  persistent dizziness or light-headedness     Call MD for:  extreme fatigue     EKG 12-lead   Standing Status: Future Standing Exp. Date: 09/23/25     Medications:  Reconciled Home Medications:      Medication List        CHANGE how you take these medications      RINVOQ 15 mg 24 hr tablet  Generic drug: upadacitinib  Take 1 tablet (15 mg total) by mouth once daily. You may restart your Rinvoq 2 weeks after surgery (10/31/24).  What changed: additional instructions            CONTINUE taking these medications      acetaminophen 650 MG Tbsr  Commonly known as: TYLENOL  Take 1 tablet (650 mg total) by mouth every 8 (eight) hours.     albuterol 90 mcg/actuation inhaler  Commonly known as: PROVENTIL/VENTOLIN HFA  Inhale 1-2 puffs into the lungs every 6 (six) hours as needed for Wheezing or Shortness of Breath. Rescue     ALPRAZolam 0.5 MG tablet  Commonly known as: XANAX  Take 1 tablet (0.5 mg total) by mouth daily as needed for Anxiety.     amLODIPine 10 MG tablet  Commonly known as: NORVASC  TAKE 1 TABLET DAILY     aspirin 81 MG EC tablet  Commonly known as: ECOTRIN  Take 1 tablet (81 mg total) by mouth 2 (two) times a day.     atorvastatin 20 MG tablet  Commonly known as: LIPITOR  TAKE 1 TABLET DAILY     cefadroxil 500 MG Cap  Commonly known as: DURICEF  Take 1 capsule (500 mg total) by mouth every 12 (twelve) hours. for 7 days     celecoxib 200 MG capsule  Commonly known as: CeleBREX  Take 1 capsule (200 mg total) by mouth once daily.     ENSURE  Generic drug: food supplemt, lactose-reduced  Drink one bottle daily for 14 days.     estradioL 2 MG tablet  Commonly known as: ESTRACE  Take 1 tablet (2 mg total) by mouth once daily.     GINGER (WITH COLLAGEN) 7-7-1.5 gram Pwpk  Generic drug: voxtu-xdci-CpFNG-collag-mv-min  Take 1 packet by mouth once daily.     methocarbamoL 750 MG Tab  Commonly known as: ROBAXIN  Take 1 tablet (750 mg total) by mouth 4 (four) times daily as needed (for muscle spasms).     omeprazole  40 MG capsule  Commonly known as: PRILOSEC  TAKE 1 CAPSULE TWICE A DAY BEFORE MEALS     ondansetron 4 MG Tbdl  Commonly known as: ZOFRAN-ODT  Take 1 tablet (4 mg total) by mouth every 6 (six) hours as needed (nausea).     ONE DAILY MULTIVITAMIN tablet  Generic drug: multivitamin  Take 1 tablet by mouth once daily.     oxyCODONE 5 MG immediate release tablet  Commonly known as: ROXICODONE  Take 1-2 tablets every 4-6 hours as needed for pain     pantoprazole 40 MG tablet  Commonly known as: PROTONIX  Take 1 tablet (40 mg total) by mouth once daily.     pregabalin 150 MG capsule  Commonly known as: LYRICA  Take 1 capsule (150 mg total) by mouth 2 (two) times daily.     SENEXON-S 8.6-50 mg per tablet  Generic drug: senna-docusate 8.6-50 mg  Take 1 tablet by mouth once daily.     sertraline 100 MG tablet  Commonly known as: ZOLOFT  Take 1 tablet (100 mg total) by mouth once daily.     traZODone 50 MG tablet  Commonly known as: DESYREL  Take 1-2 tablets ( mg total) by mouth nightly as needed for Insomnia.     TURMERIC ORAL  Take 1 tablet by mouth once daily.     VITAMIN C 500 MG tablet  Generic drug: ascorbic acid (vitamin C)  Take 2 tablets (1,000 mg total) by mouth 2 (two) times daily. for 14 days            STOP taking these medications      AdviL 200 MG tablet  Generic drug: ibuprofen     butalbital-acetaminophen-caffeine -40 mg -40 mg per tablet  Commonly known as: FIORICET, ESGIC     cyclobenzaprine 5 MG tablet  Commonly known as: FLEXERIL            ASK your doctor about these medications      fluticasone-salmeterol 250-50 mcg/dose 250-50 mcg/dose diskus inhaler  Commonly known as: ADVAIR DISKUS  Inhale 1 puff into the lungs 2 (two) times daily. Controller              JOSH Bennett MD  Orthopedics  [facility]

## 2024-10-21 ENCOUNTER — OFFICE VISIT (OUTPATIENT)
Dept: ORTHOPEDICS | Facility: CLINIC | Age: 69
End: 2024-10-21
Payer: MEDICARE

## 2024-10-21 DIAGNOSIS — Z96.651 S/P TOTAL KNEE REPLACEMENT, RIGHT: Primary | ICD-10-CM

## 2024-10-21 PROCEDURE — 99024 POSTOP FOLLOW-UP VISIT: CPT | Mod: 95,,,

## 2024-10-21 PROCEDURE — 1160F RVW MEDS BY RX/DR IN RCRD: CPT | Mod: CPTII,95,,

## 2024-10-21 PROCEDURE — 1159F MED LIST DOCD IN RCRD: CPT | Mod: CPTII,95,,

## 2024-10-21 NOTE — PROGRESS NOTES
I called the patient today regarding her  surgery with Dr. Jose David Chowdhury III. The patient had a right TKA on 10/17/2024.     Pain Scale: 4 / 10    Any issues with Fever: No.    Any issues with medications (specifically DVT prophylaxis): No.    Pain medication: yes; oxycodone 5 mg  Celebrex : yes  Protonix : yes  Resume home meds : Yes    Any issues with:   Bowel movements: No.  Passing shelby: No.  Urination: No.    Completing at home exercises: Yes    Any concerns regarding their dressing/bandage:  No.    Patient confirmed first OP-PT appointment: on 10/22/24 at 10:00 AM at [facility].     Any other concerns: No.    Blue Bracelet : yes    The patient was informed that if they have any urgent issues with their bandage, medications or any other health concerns regarding their surgery to call the 24/7 Orthopedic Post-op Hot Line at [phone]. The patient was reminded that if they have any chest pain or shortness of breath to call 911 or go to the ER.    The patient verbalized understanding and does not have any other questions

## 2024-10-24 NOTE — PLAN OF CARE
10/18/24 1016   SULTANA Message   Medicare Outpatient and Observation Notification regarding financial responsibility Given to patient/caregiver   Date SULTANA was signed 10/18/24   Time SULTANA was signed 1016     [employer] form provided by [facility] staff and signed on the above date and time.    Mercedes Aldrich RN  Case Management  [facility]  [phone]

## 2024-10-24 NOTE — PLAN OF CARE
[facility]  Discharge Final Note    Primary Care Provider: Wayne Lundberg MD    Expected Discharge Date: 10/18/2024    Final Discharge Note (most recent)       Final Note - 10/24/24 1500          Final Note    Assessment Type Final Discharge Note     What phone number can be called within the next 1-3 days to see how you are doing after discharge? --   [phone]                    Important Message from Medicare           Future Appointments   Date Time Provider Department Center   10/31/2024  4:20 PM David Kenney PA-C [facility] ORTHO [address] [place]   11/18/2024 10:35 AM LAB, [facility] LAB [facility]   11/18/2024 10:45 AM LAB, [facility] SBP LAB [facility]   11/26/2024  2:00 PM David Kenney PA-C [facility] ORTHO [address] [place]   12/3/2024  9:00 AM Anya Callaway MD [facility] RHEUM [address] [place]   1/10/2025  9:00 AM Jose David Chowdhury III, MD [facility] [address]     Patient discharged home to care of family on 10/18/24.    Mercedes Aldrich RN  Case Management  [facility]  [phone]

## 2024-10-25 ENCOUNTER — ANESTHESIA (OUTPATIENT)
Dept: SURGERY | Facility: HOSPITAL | Age: 69
End: 2024-10-25
Payer: MEDICARE

## 2024-10-25 ENCOUNTER — HOSPITAL ENCOUNTER (INPATIENT)
Facility: HOSPITAL | Age: 69
LOS: 2 days | Discharge: HOME | DRG: 908 | End: 2024-10-27
Attending: EMERGENCY MEDICINE | Admitting: HOSPITALIST
Payer: MEDICARE

## 2024-10-25 ENCOUNTER — ANESTHESIA EVENT (OUTPATIENT)
Dept: SURGERY | Facility: HOSPITAL | Age: 69
End: 2024-10-25
Payer: MEDICARE

## 2024-10-25 DIAGNOSIS — M25.569 ACUTE KNEE PAIN: ICD-10-CM

## 2024-10-25 DIAGNOSIS — Z96.651 S/P TOTAL KNEE REPLACEMENT, RIGHT: ICD-10-CM

## 2024-10-25 DIAGNOSIS — R07.9 CHEST PAIN: ICD-10-CM

## 2024-10-25 DIAGNOSIS — M79.673 ACUTE FOOT PAIN: ICD-10-CM

## 2024-10-25 DIAGNOSIS — Z01.818 PREOP TESTING: ICD-10-CM

## 2024-10-25 DIAGNOSIS — T81.31XA WOUND DEHISCENCE, SURGICAL: ICD-10-CM

## 2024-10-25 PROBLEM — D64.9 ANEMIA: Status: ACTIVE | Noted: 2024-10-25

## 2024-10-25 PROBLEM — E87.6 HYPOKALEMIA: Status: ACTIVE | Noted: 2024-10-25

## 2024-10-25 LAB
ABO + RH BLD: NORMAL
ALBUMIN SERPL BCP-MCNC: 2.8 G/DL (ref 3.5–5.2)
ALP SERPL-CCNC: 93 U/L (ref 40–150)
ALT SERPL W/O P-5'-P-CCNC: 23 U/L (ref 10–44)
ANION GAP SERPL CALC-SCNC: 10 MMOL/L (ref 8–16)
ANION GAP SERPL CALC-SCNC: 10 MMOL/L (ref 8–16)
AST SERPL-CCNC: 38 U/L (ref 10–40)
BASOPHILS # BLD AUTO: 0.02 K/UL (ref 0–0.2)
BASOPHILS # BLD AUTO: 0.02 K/UL (ref 0–0.2)
BASOPHILS NFR BLD: 0.3 % (ref 0–1.9)
BASOPHILS NFR BLD: 0.3 % (ref 0–1.9)
BILIRUB SERPL-MCNC: 0.5 MG/DL (ref 0.1–1)
BLD GP AB SCN CELLS X3 SERPL QL: NORMAL
BUN SERPL-MCNC: 11 MG/DL (ref 8–23)
BUN SERPL-MCNC: 13 MG/DL (ref 8–23)
CALCIUM SERPL-MCNC: 9 MG/DL (ref 8.7–10.5)
CALCIUM SERPL-MCNC: 9 MG/DL (ref 8.7–10.5)
CHLORIDE SERPL-SCNC: 105 MMOL/L (ref 95–110)
CHLORIDE SERPL-SCNC: 106 MMOL/L (ref 95–110)
CO2 SERPL-SCNC: 22 MMOL/L (ref 23–29)
CO2 SERPL-SCNC: 24 MMOL/L (ref 23–29)
CREAT SERPL-MCNC: 0.8 MG/DL (ref 0.5–1.4)
CREAT SERPL-MCNC: 0.9 MG/DL (ref 0.5–1.4)
DIFFERENTIAL METHOD BLD: ABNORMAL
DIFFERENTIAL METHOD BLD: ABNORMAL
EOSINOPHIL # BLD AUTO: 0.2 K/UL (ref 0–0.5)
EOSINOPHIL # BLD AUTO: 0.2 K/UL (ref 0–0.5)
EOSINOPHIL NFR BLD: 2.2 % (ref 0–8)
EOSINOPHIL NFR BLD: 3.4 % (ref 0–8)
ERYTHROCYTE [DISTWIDTH] IN BLOOD BY AUTOMATED COUNT: 14.8 % (ref 11.5–14.5)
ERYTHROCYTE [DISTWIDTH] IN BLOOD BY AUTOMATED COUNT: 15 % (ref 11.5–14.5)
EST. GFR  (NO RACE VARIABLE): >60 ML/MIN/1.73 M^2
EST. GFR  (NO RACE VARIABLE): >60 ML/MIN/1.73 M^2
GLUCOSE SERPL-MCNC: 107 MG/DL (ref 70–110)
GLUCOSE SERPL-MCNC: 143 MG/DL (ref 70–110)
HCT VFR BLD AUTO: 28.9 % (ref 37–48.5)
HCT VFR BLD AUTO: 30.8 % (ref 37–48.5)
HGB BLD-MCNC: 9.1 G/DL (ref 12–16)
HGB BLD-MCNC: 9.8 G/DL (ref 12–16)
HIV 1+2 AB+HIV1 P24 AG SERPL QL IA: NORMAL
IMM GRANULOCYTES # BLD AUTO: 0.06 K/UL (ref 0–0.04)
IMM GRANULOCYTES # BLD AUTO: 0.09 K/UL (ref 0–0.04)
IMM GRANULOCYTES NFR BLD AUTO: 0.9 % (ref 0–0.5)
IMM GRANULOCYTES NFR BLD AUTO: 1.2 % (ref 0–0.5)
INR PPP: 1 (ref 0.8–1.2)
LYMPHOCYTES # BLD AUTO: 1.4 K/UL (ref 1–4.8)
LYMPHOCYTES # BLD AUTO: 1.4 K/UL (ref 1–4.8)
LYMPHOCYTES NFR BLD: 18.1 % (ref 18–48)
LYMPHOCYTES NFR BLD: 20.5 % (ref 18–48)
MAGNESIUM SERPL-MCNC: 2 MG/DL (ref 1.6–2.6)
MCH RBC QN AUTO: 31.7 PG (ref 27–31)
MCH RBC QN AUTO: 31.7 PG (ref 27–31)
MCHC RBC AUTO-ENTMCNC: 31.5 G/DL (ref 32–36)
MCHC RBC AUTO-ENTMCNC: 31.8 G/DL (ref 32–36)
MCV RBC AUTO: 100 FL (ref 82–98)
MCV RBC AUTO: 101 FL (ref 82–98)
MONOCYTES # BLD AUTO: 1.1 K/UL (ref 0.3–1)
MONOCYTES # BLD AUTO: 1.1 K/UL (ref 0.3–1)
MONOCYTES NFR BLD: 13.8 % (ref 4–15)
MONOCYTES NFR BLD: 16 % (ref 4–15)
NEUTROPHILS # BLD AUTO: 4 K/UL (ref 1.8–7.7)
NEUTROPHILS # BLD AUTO: 5 K/UL (ref 1.8–7.7)
NEUTROPHILS NFR BLD: 58.9 % (ref 38–73)
NEUTROPHILS NFR BLD: 64.4 % (ref 38–73)
NRBC BLD-RTO: 0 /100 WBC
NRBC BLD-RTO: 0 /100 WBC
OHS QRS DURATION: 76 MS
OHS QTC CALCULATION: 436 MS
PLATELET # BLD AUTO: 191 K/UL (ref 150–450)
PLATELET # BLD AUTO: 203 K/UL (ref 150–450)
PMV BLD AUTO: 9.6 FL (ref 9.2–12.9)
PMV BLD AUTO: 9.6 FL (ref 9.2–12.9)
POTASSIUM SERPL-SCNC: 3.4 MMOL/L (ref 3.5–5.1)
POTASSIUM SERPL-SCNC: 3.6 MMOL/L (ref 3.5–5.1)
PREALB SERPL-MCNC: 16 MG/DL (ref 20–43)
PROT SERPL-MCNC: 6.3 G/DL (ref 6–8.4)
PROTHROMBIN TIME: 10.6 SEC (ref 9–12.5)
RBC # BLD AUTO: 2.87 M/UL (ref 4–5.4)
RBC # BLD AUTO: 3.09 M/UL (ref 4–5.4)
SODIUM SERPL-SCNC: 138 MMOL/L (ref 136–145)
SODIUM SERPL-SCNC: 139 MMOL/L (ref 136–145)
SPECIMEN OUTDATE: NORMAL
TRANSFERRIN SERPL-MCNC: 190 MG/DL (ref 200–375)
WBC # BLD AUTO: 6.73 K/UL (ref 3.9–12.7)
WBC # BLD AUTO: 7.77 K/UL (ref 3.9–12.7)

## 2024-10-25 PROCEDURE — 71000033 HC RECOVERY, INTIAL HOUR: Performed by: ORTHOPAEDIC SURGERY

## 2024-10-25 PROCEDURE — 25000003 PHARM REV CODE 250: Performed by: NURSE ANESTHETIST, CERTIFIED REGISTERED

## 2024-10-25 PROCEDURE — 63600175 PHARM REV CODE 636 W HCPCS

## 2024-10-25 PROCEDURE — 63600175 PHARM REV CODE 636 W HCPCS: Mod: JZ,JG | Performed by: NURSE ANESTHETIST, CERTIFIED REGISTERED

## 2024-10-25 PROCEDURE — 87389 HIV-1 AG W/HIV-1&-2 AB AG IA: CPT | Performed by: PHYSICIAN ASSISTANT

## 2024-10-25 PROCEDURE — 99900035 HC TECH TIME PER 15 MIN (STAT)

## 2024-10-25 PROCEDURE — 85610 PROTHROMBIN TIME: CPT

## 2024-10-25 PROCEDURE — 25000003 PHARM REV CODE 250

## 2024-10-25 PROCEDURE — 71000016 HC POSTOP RECOV ADDL HR: Performed by: ORTHOPAEDIC SURGERY

## 2024-10-25 PROCEDURE — 71000015 HC POSTOP RECOV 1ST HR: Performed by: ORTHOPAEDIC SURGERY

## 2024-10-25 PROCEDURE — 85025 COMPLETE CBC W/AUTO DIFF WBC: CPT | Mod: 91 | Performed by: EMERGENCY MEDICINE

## 2024-10-25 PROCEDURE — 63600175 PHARM REV CODE 636 W HCPCS: Performed by: NURSE ANESTHETIST, CERTIFIED REGISTERED

## 2024-10-25 PROCEDURE — 80053 COMPREHEN METABOLIC PANEL: CPT | Performed by: EMERGENCY MEDICINE

## 2024-10-25 PROCEDURE — 36000707: Performed by: ORTHOPAEDIC SURGERY

## 2024-10-25 PROCEDURE — 80048 BASIC METABOLIC PNL TOTAL CA: CPT | Mod: XB

## 2024-10-25 PROCEDURE — 96375 TX/PRO/DX INJ NEW DRUG ADDON: CPT

## 2024-10-25 PROCEDURE — 99223 1ST HOSP IP/OBS HIGH 75: CPT | Mod: 24,57,, | Performed by: ORTHOPAEDIC SURGERY

## 2024-10-25 PROCEDURE — 96366 THER/PROPH/DIAG IV INF ADDON: CPT

## 2024-10-25 PROCEDURE — 36000706: Performed by: ORTHOPAEDIC SURGERY

## 2024-10-25 PROCEDURE — 63600175 PHARM REV CODE 636 W HCPCS: Performed by: ANESTHESIOLOGY

## 2024-10-25 PROCEDURE — 0JDN0ZZ EXTRACTION OF RIGHT LOWER LEG SUBCUTANEOUS TISSUE AND FASCIA, OPEN APPROACH: ICD-10-PCS | Performed by: ORTHOPAEDIC SURGERY

## 2024-10-25 PROCEDURE — 25000003 PHARM REV CODE 250: Performed by: NURSE PRACTITIONER

## 2024-10-25 PROCEDURE — 11000001 HC ACUTE MED/SURG PRIVATE ROOM

## 2024-10-25 PROCEDURE — 93005 ELECTROCARDIOGRAM TRACING: CPT

## 2024-10-25 PROCEDURE — 94761 N-INVAS EAR/PLS OXIMETRY MLT: CPT

## 2024-10-25 PROCEDURE — 63600175 PHARM REV CODE 636 W HCPCS: Performed by: EMERGENCY MEDICINE

## 2024-10-25 PROCEDURE — 83735 ASSAY OF MAGNESIUM: CPT

## 2024-10-25 PROCEDURE — 25000003 PHARM REV CODE 250: Performed by: HOSPITALIST

## 2024-10-25 PROCEDURE — 86850 RBC ANTIBODY SCREEN: CPT | Performed by: EMERGENCY MEDICINE

## 2024-10-25 PROCEDURE — 27201423 OPTIME MED/SURG SUP & DEVICES STERILE SUPPLY: Performed by: ORTHOPAEDIC SURGERY

## 2024-10-25 PROCEDURE — 99285 EMERGENCY DEPT VISIT HI MDM: CPT | Mod: 25

## 2024-10-25 PROCEDURE — 84466 ASSAY OF TRANSFERRIN: CPT

## 2024-10-25 PROCEDURE — 96374 THER/PROPH/DIAG INJ IV PUSH: CPT | Mod: 59

## 2024-10-25 PROCEDURE — 37000008 HC ANESTHESIA 1ST 15 MINUTES: Performed by: ORTHOPAEDIC SURGERY

## 2024-10-25 PROCEDURE — 27000221 HC OXYGEN, UP TO 24 HOURS

## 2024-10-25 PROCEDURE — 93010 ELECTROCARDIOGRAM REPORT: CPT | Mod: ,,, | Performed by: INTERNAL MEDICINE

## 2024-10-25 PROCEDURE — 84134 ASSAY OF PREALBUMIN: CPT

## 2024-10-25 PROCEDURE — 37000009 HC ANESTHESIA EA ADD 15 MINS: Performed by: ORTHOPAEDIC SURGERY

## 2024-10-25 PROCEDURE — 71000039 HC RECOVERY, EACH ADD'L HOUR: Performed by: ORTHOPAEDIC SURGERY

## 2024-10-25 PROCEDURE — 85025 COMPLETE CBC W/AUTO DIFF WBC: CPT

## 2024-10-25 PROCEDURE — 96365 THER/PROPH/DIAG IV INF INIT: CPT

## 2024-10-25 PROCEDURE — 13160 SEC CLSR SURG WND/DEHSN XTN: CPT | Mod: 79,22,, | Performed by: ORTHOPAEDIC SURGERY

## 2024-10-25 PROCEDURE — 86901 BLOOD TYPING SEROLOGIC RH(D): CPT | Performed by: EMERGENCY MEDICINE

## 2024-10-25 RX ORDER — ACETAMINOPHEN 500 MG
1000 TABLET ORAL EVERY 8 HOURS PRN
Status: DISCONTINUED | OUTPATIENT
Start: 2024-10-25 | End: 2024-10-25

## 2024-10-25 RX ORDER — NALOXONE HCL 0.4 MG/ML
0.02 VIAL (ML) INJECTION
Status: DISCONTINUED | OUTPATIENT
Start: 2024-10-25 | End: 2024-10-27 | Stop reason: HOSPADM

## 2024-10-25 RX ORDER — SODIUM CHLORIDE 0.9 % (FLUSH) 0.9 %
10 SYRINGE (ML) INJECTION
OUTPATIENT
Start: 2024-10-25

## 2024-10-25 RX ORDER — PANTOPRAZOLE SODIUM 40 MG/1
40 TABLET, DELAYED RELEASE ORAL DAILY
Status: DISCONTINUED | OUTPATIENT
Start: 2024-10-26 | End: 2024-10-27 | Stop reason: HOSPADM

## 2024-10-25 RX ORDER — ESTRADIOL 1 MG/1
2 TABLET ORAL DAILY
Status: DISCONTINUED | OUTPATIENT
Start: 2024-10-25 | End: 2024-10-27 | Stop reason: HOSPADM

## 2024-10-25 RX ORDER — SODIUM CHLORIDE 0.9 % (FLUSH) 0.9 %
5 SYRINGE (ML) INJECTION
Status: DISCONTINUED | OUTPATIENT
Start: 2024-10-25 | End: 2024-10-27 | Stop reason: HOSPADM

## 2024-10-25 RX ORDER — SIMETHICONE 80 MG
1 TABLET,CHEWABLE ORAL 4 TIMES DAILY PRN
Status: DISCONTINUED | OUTPATIENT
Start: 2024-10-25 | End: 2024-10-27 | Stop reason: HOSPADM

## 2024-10-25 RX ORDER — HYDROMORPHONE HYDROCHLORIDE 1 MG/ML
0.5 INJECTION, SOLUTION INTRAMUSCULAR; INTRAVENOUS; SUBCUTANEOUS EVERY 4 HOURS PRN
Status: DISCONTINUED | OUTPATIENT
Start: 2024-10-25 | End: 2024-10-27 | Stop reason: HOSPADM

## 2024-10-25 RX ORDER — MUPIROCIN 20 MG/G
OINTMENT TOPICAL
OUTPATIENT
Start: 2024-10-25

## 2024-10-25 RX ORDER — CEFAZOLIN 2 G/1
2 INJECTION, POWDER, FOR SOLUTION INTRAMUSCULAR; INTRAVENOUS
OUTPATIENT
Start: 2024-10-25

## 2024-10-25 RX ORDER — ASCORBIC ACID 500 MG
1000 TABLET ORAL 2 TIMES DAILY
Status: DISCONTINUED | OUTPATIENT
Start: 2024-10-25 | End: 2024-10-27 | Stop reason: HOSPADM

## 2024-10-25 RX ORDER — TRANEXAMIC ACID 100 MG/ML
INJECTION, SOLUTION INTRAVENOUS
Status: DISCONTINUED | OUTPATIENT
Start: 2024-10-25 | End: 2024-10-25

## 2024-10-25 RX ORDER — GLUCAGON 1 MG
1 KIT INJECTION
Status: DISCONTINUED | OUTPATIENT
Start: 2024-10-25 | End: 2024-10-25 | Stop reason: HOSPADM

## 2024-10-25 RX ORDER — TALC
6 POWDER (GRAM) TOPICAL NIGHTLY PRN
Status: DISCONTINUED | OUTPATIENT
Start: 2024-10-25 | End: 2024-10-27 | Stop reason: HOSPADM

## 2024-10-25 RX ORDER — ALUMINUM HYDROXIDE, MAGNESIUM HYDROXIDE, AND SIMETHICONE 1200; 120; 1200 MG/30ML; MG/30ML; MG/30ML
30 SUSPENSION ORAL 4 TIMES DAILY PRN
Status: DISCONTINUED | OUTPATIENT
Start: 2024-10-25 | End: 2024-10-27 | Stop reason: HOSPADM

## 2024-10-25 RX ORDER — IBUPROFEN 200 MG
16 TABLET ORAL
Status: DISCONTINUED | OUTPATIENT
Start: 2024-10-25 | End: 2024-10-27 | Stop reason: HOSPADM

## 2024-10-25 RX ORDER — PROCHLORPERAZINE EDISYLATE 5 MG/ML
5 INJECTION INTRAMUSCULAR; INTRAVENOUS EVERY 30 MIN PRN
Status: DISCONTINUED | OUTPATIENT
Start: 2024-10-25 | End: 2024-10-25 | Stop reason: HOSPADM

## 2024-10-25 RX ORDER — LABETALOL HYDROCHLORIDE 5 MG/ML
INJECTION, SOLUTION INTRAVENOUS
Status: DISCONTINUED | OUTPATIENT
Start: 2024-10-25 | End: 2024-10-25

## 2024-10-25 RX ORDER — IPRATROPIUM BROMIDE AND ALBUTEROL SULFATE 2.5; .5 MG/3ML; MG/3ML
3 SOLUTION RESPIRATORY (INHALATION) EVERY 4 HOURS PRN
Status: DISCONTINUED | OUTPATIENT
Start: 2024-10-25 | End: 2024-10-27 | Stop reason: HOSPADM

## 2024-10-25 RX ORDER — DEXAMETHASONE SODIUM PHOSPHATE 4 MG/ML
INJECTION, SOLUTION INTRA-ARTICULAR; INTRALESIONAL; INTRAMUSCULAR; INTRAVENOUS; SOFT TISSUE
Status: DISCONTINUED | OUTPATIENT
Start: 2024-10-25 | End: 2024-10-25

## 2024-10-25 RX ORDER — DEXMEDETOMIDINE HYDROCHLORIDE 100 UG/ML
INJECTION, SOLUTION INTRAVENOUS
Status: DISCONTINUED | OUTPATIENT
Start: 2024-10-25 | End: 2024-10-25

## 2024-10-25 RX ORDER — CEFAZOLIN 2 G/1
2 INJECTION, POWDER, FOR SOLUTION INTRAMUSCULAR; INTRAVENOUS
Status: DISCONTINUED | OUTPATIENT
Start: 2024-10-25 | End: 2024-10-25

## 2024-10-25 RX ORDER — SERTRALINE HYDROCHLORIDE 100 MG/1
100 TABLET, FILM COATED ORAL DAILY
Status: DISCONTINUED | OUTPATIENT
Start: 2024-10-26 | End: 2024-10-27 | Stop reason: HOSPADM

## 2024-10-25 RX ORDER — HYDROMORPHONE HYDROCHLORIDE 1 MG/ML
0.5 INJECTION, SOLUTION INTRAMUSCULAR; INTRAVENOUS; SUBCUTANEOUS ONCE
Status: COMPLETED | OUTPATIENT
Start: 2024-10-25 | End: 2024-10-25

## 2024-10-25 RX ORDER — ACETAMINOPHEN 500 MG
1000 TABLET ORAL EVERY 8 HOURS
Status: DISCONTINUED | OUTPATIENT
Start: 2024-10-25 | End: 2024-10-27 | Stop reason: HOSPADM

## 2024-10-25 RX ORDER — ASPIRIN 81 MG/1
81 TABLET ORAL 2 TIMES DAILY
Status: DISCONTINUED | OUTPATIENT
Start: 2024-10-25 | End: 2024-10-27 | Stop reason: HOSPADM

## 2024-10-25 RX ORDER — ONDANSETRON HYDROCHLORIDE 2 MG/ML
INJECTION, SOLUTION INTRAVENOUS
Status: DISCONTINUED | OUTPATIENT
Start: 2024-10-25 | End: 2024-10-25

## 2024-10-25 RX ORDER — OXYCODONE HYDROCHLORIDE 10 MG/1
10 TABLET ORAL EVERY 4 HOURS PRN
Status: DISCONTINUED | OUTPATIENT
Start: 2024-10-25 | End: 2024-10-27 | Stop reason: HOSPADM

## 2024-10-25 RX ORDER — GLUCAGON 1 MG
1 KIT INJECTION
Status: DISCONTINUED | OUTPATIENT
Start: 2024-10-25 | End: 2024-10-27 | Stop reason: HOSPADM

## 2024-10-25 RX ORDER — CEFADROXIL 500 MG/1
500 CAPSULE ORAL EVERY 12 HOURS
Qty: 14 CAPSULE | Refills: 0 | Status: SHIPPED | OUTPATIENT
Start: 2024-10-26 | End: 2024-11-03

## 2024-10-25 RX ORDER — OXYCODONE HYDROCHLORIDE 10 MG/1
10 TABLET ORAL EVERY 6 HOURS PRN
Status: DISCONTINUED | OUTPATIENT
Start: 2024-10-25 | End: 2024-10-25

## 2024-10-25 RX ORDER — POLYETHYLENE GLYCOL 3350 17 G/17G
17 POWDER, FOR SOLUTION ORAL 2 TIMES DAILY PRN
Status: DISCONTINUED | OUTPATIENT
Start: 2024-10-25 | End: 2024-10-27 | Stop reason: HOSPADM

## 2024-10-25 RX ORDER — IBUPROFEN 200 MG
24 TABLET ORAL
Status: DISCONTINUED | OUTPATIENT
Start: 2024-10-25 | End: 2024-10-27 | Stop reason: HOSPADM

## 2024-10-25 RX ORDER — POTASSIUM CHLORIDE 750 MG/1
30 CAPSULE, EXTENDED RELEASE ORAL ONCE
Status: COMPLETED | OUTPATIENT
Start: 2024-10-25 | End: 2024-10-25

## 2024-10-25 RX ORDER — AMLODIPINE BESYLATE 10 MG/1
10 TABLET ORAL DAILY
Status: DISCONTINUED | OUTPATIENT
Start: 2024-10-25 | End: 2024-10-25

## 2024-10-25 RX ORDER — ONDANSETRON 8 MG/1
8 TABLET, ORALLY DISINTEGRATING ORAL EVERY 8 HOURS PRN
Status: DISCONTINUED | OUTPATIENT
Start: 2024-10-25 | End: 2024-10-27 | Stop reason: HOSPADM

## 2024-10-25 RX ORDER — HYDROMORPHONE HYDROCHLORIDE 1 MG/ML
0.2 INJECTION, SOLUTION INTRAMUSCULAR; INTRAVENOUS; SUBCUTANEOUS EVERY 5 MIN PRN
Status: DISCONTINUED | OUTPATIENT
Start: 2024-10-25 | End: 2024-10-25 | Stop reason: HOSPADM

## 2024-10-25 RX ORDER — FENTANYL CITRATE 50 UG/ML
50 INJECTION, SOLUTION INTRAMUSCULAR; INTRAVENOUS
Status: COMPLETED | OUTPATIENT
Start: 2024-10-25 | End: 2024-10-25

## 2024-10-25 RX ORDER — PROPOFOL 10 MG/ML
VIAL (ML) INTRAVENOUS CONTINUOUS PRN
Status: DISCONTINUED | OUTPATIENT
Start: 2024-10-25 | End: 2024-10-25

## 2024-10-25 RX ORDER — ONDANSETRON HYDROCHLORIDE 2 MG/ML
4 INJECTION, SOLUTION INTRAVENOUS
Status: COMPLETED | OUTPATIENT
Start: 2024-10-25 | End: 2024-10-25

## 2024-10-25 RX ORDER — HYDROMORPHONE HYDROCHLORIDE 1 MG/ML
INJECTION, SOLUTION INTRAMUSCULAR; INTRAVENOUS; SUBCUTANEOUS
Status: DISCONTINUED | OUTPATIENT
Start: 2024-10-25 | End: 2024-10-25

## 2024-10-25 RX ORDER — FENTANYL CITRATE 50 UG/ML
INJECTION, SOLUTION INTRAMUSCULAR; INTRAVENOUS
Status: DISCONTINUED | OUTPATIENT
Start: 2024-10-25 | End: 2024-10-25

## 2024-10-25 RX ORDER — CEFTRIAXONE 2 G/1
2 INJECTION, POWDER, FOR SOLUTION INTRAMUSCULAR; INTRAVENOUS
Status: DISCONTINUED | OUTPATIENT
Start: 2024-10-25 | End: 2024-10-27 | Stop reason: HOSPADM

## 2024-10-25 RX ORDER — ATORVASTATIN CALCIUM 20 MG/1
20 TABLET, FILM COATED ORAL DAILY
Status: DISCONTINUED | OUTPATIENT
Start: 2024-10-26 | End: 2024-10-27 | Stop reason: HOSPADM

## 2024-10-25 RX ORDER — ROCURONIUM BROMIDE 10 MG/ML
INJECTION, SOLUTION INTRAVENOUS
Status: DISCONTINUED | OUTPATIENT
Start: 2024-10-25 | End: 2024-10-25

## 2024-10-25 RX ORDER — ACETAMINOPHEN 325 MG/1
650 TABLET ORAL EVERY 4 HOURS PRN
Status: DISCONTINUED | OUTPATIENT
Start: 2024-10-25 | End: 2024-10-27 | Stop reason: HOSPADM

## 2024-10-25 RX ORDER — METOPROLOL TARTRATE 1 MG/ML
INJECTION, SOLUTION INTRAVENOUS
Status: DISCONTINUED | OUTPATIENT
Start: 2024-10-25 | End: 2024-10-25

## 2024-10-25 RX ORDER — BISACODYL 10 MG/1
10 SUPPOSITORY RECTAL DAILY PRN
Status: DISCONTINUED | OUTPATIENT
Start: 2024-10-25 | End: 2024-10-27 | Stop reason: HOSPADM

## 2024-10-25 RX ORDER — LIDOCAINE HYDROCHLORIDE 20 MG/ML
INJECTION, SOLUTION EPIDURAL; INFILTRATION; INTRACAUDAL; PERINEURAL
Status: DISCONTINUED | OUTPATIENT
Start: 2024-10-25 | End: 2024-10-25

## 2024-10-25 RX ORDER — DIPHENHYDRAMINE HCL 25 MG
25 CAPSULE ORAL EVERY 6 HOURS PRN
Status: DISCONTINUED | OUTPATIENT
Start: 2024-10-25 | End: 2024-10-27 | Stop reason: HOSPADM

## 2024-10-25 RX ADMIN — CEFTRIAXONE SODIUM 2 G: 2 INJECTION, POWDER, FOR SOLUTION INTRAMUSCULAR; INTRAVENOUS at 01:10

## 2024-10-25 RX ADMIN — ASPIRIN 81 MG: 81 TABLET, COATED ORAL at 08:10

## 2024-10-25 RX ADMIN — Medication 1000 MG: at 09:10

## 2024-10-25 RX ADMIN — ACETAMINOPHEN 1000 MG: 500 TABLET ORAL at 07:10

## 2024-10-25 RX ADMIN — FENTANYL CITRATE 50 MCG: 50 INJECTION, SOLUTION INTRAMUSCULAR; INTRAVENOUS at 05:10

## 2024-10-25 RX ADMIN — THERA TABS 1 TABLET: TAB at 08:10

## 2024-10-25 RX ADMIN — HYDROMORPHONE HYDROCHLORIDE 0.2 MG: 1 INJECTION, SOLUTION INTRAMUSCULAR; INTRAVENOUS; SUBCUTANEOUS at 08:10

## 2024-10-25 RX ADMIN — DEXAMETHASONE SODIUM PHOSPHATE 8 MG: 4 INJECTION INTRA-ARTICULAR; INTRALESIONAL; INTRAMUSCULAR; INTRAVENOUS; SOFT TISSUE at 05:10

## 2024-10-25 RX ADMIN — VANCOMYCIN HYDROCHLORIDE 1000 MG: 1 INJECTION, POWDER, LYOPHILIZED, FOR SOLUTION INTRAVENOUS at 01:10

## 2024-10-25 RX ADMIN — HYDROMORPHONE HYDROCHLORIDE 0.2 MG: 1 INJECTION, SOLUTION INTRAMUSCULAR; INTRAVENOUS; SUBCUTANEOUS at 07:10

## 2024-10-25 RX ADMIN — LABETALOL HYDROCHLORIDE 5 MG: 5 INJECTION, SOLUTION INTRAVENOUS at 06:10

## 2024-10-25 RX ADMIN — SODIUM CHLORIDE: 0.9 INJECTION, SOLUTION INTRAVENOUS at 05:10

## 2024-10-25 RX ADMIN — ACETAMINOPHEN 1000 MG: 500 TABLET ORAL at 09:10

## 2024-10-25 RX ADMIN — LABETALOL HYDROCHLORIDE 10 MG: 5 INJECTION, SOLUTION INTRAVENOUS at 06:10

## 2024-10-25 RX ADMIN — POTASSIUM CHLORIDE 30 MEQ: 750 CAPSULE, EXTENDED RELEASE ORAL at 03:10

## 2024-10-25 RX ADMIN — TRANEXAMIC ACID 1000 MG: 100 INJECTION, SOLUTION INTRAVENOUS at 06:10

## 2024-10-25 RX ADMIN — ALUMINUM HYDROXIDE, MAGNESIUM HYDROXIDE, AND SIMETHICONE 30 ML: 200; 200; 20 SUSPENSION ORAL at 04:10

## 2024-10-25 RX ADMIN — FENTANYL CITRATE 50 MCG: 50 INJECTION INTRAMUSCULAR; INTRAVENOUS at 12:10

## 2024-10-25 RX ADMIN — DEXMEDETOMIDINE 8 MCG: 200 INJECTION, SOLUTION INTRAVENOUS at 06:10

## 2024-10-25 RX ADMIN — VANCOMYCIN HYDROCHLORIDE 750 MG: 750 INJECTION, POWDER, LYOPHILIZED, FOR SOLUTION INTRAVENOUS at 04:10

## 2024-10-25 RX ADMIN — HYDROMORPHONE HYDROCHLORIDE 0.5 MG: 1 INJECTION, SOLUTION INTRAMUSCULAR; INTRAVENOUS; SUBCUTANEOUS at 06:10

## 2024-10-25 RX ADMIN — LIDOCAINE HYDROCHLORIDE 100 MG: 20 INJECTION, SOLUTION EPIDURAL; INFILTRATION; INTRACAUDAL at 05:10

## 2024-10-25 RX ADMIN — ROCURONIUM BROMIDE 50 MG: 10 INJECTION INTRAVENOUS at 05:10

## 2024-10-25 RX ADMIN — ONDANSETRON 4 MG: 2 INJECTION INTRAMUSCULAR; INTRAVENOUS at 12:10

## 2024-10-25 RX ADMIN — CEFAZOLIN 1 G: 2 INJECTION, POWDER, FOR SOLUTION INTRAMUSCULAR; INTRAVENOUS at 05:10

## 2024-10-25 RX ADMIN — METOPROLOL TARTRATE 1 MG: 1 INJECTION, SOLUTION INTRAVENOUS at 06:10

## 2024-10-25 RX ADMIN — ACETAMINOPHEN 1000 MG: 500 TABLET ORAL at 01:10

## 2024-10-25 RX ADMIN — PROPOFOL 25 MCG/KG/MIN: 10 INJECTION, EMULSION INTRAVENOUS at 05:10

## 2024-10-25 RX ADMIN — ESTRADIOL 2 MG: 1 TABLET ORAL at 04:10

## 2024-10-25 RX ADMIN — OXYCODONE HYDROCHLORIDE 10 MG: 10 TABLET ORAL at 08:10

## 2024-10-25 RX ADMIN — ONDANSETRON 4 MG: 2 INJECTION INTRAMUSCULAR; INTRAVENOUS at 05:10

## 2024-10-25 RX ADMIN — SUGAMMADEX 200 MG: 100 INJECTION, SOLUTION INTRAVENOUS at 06:10

## 2024-10-25 RX ADMIN — HYDROMORPHONE HYDROCHLORIDE 0.5 MG: 1 INJECTION, SOLUTION INTRAMUSCULAR; INTRAVENOUS; SUBCUTANEOUS at 04:10

## 2024-10-25 RX ADMIN — Medication 6 MG: at 09:10

## 2024-10-25 RX ADMIN — CEFAZOLIN 2 G: 2 INJECTION, POWDER, FOR SOLUTION INTRAMUSCULAR; INTRAVENOUS at 04:10

## 2024-10-25 RX ADMIN — Medication 1000 MG: at 08:10

## 2024-10-25 RX ADMIN — PROPOFOL 200 MG: 10 INJECTION, EMULSION INTRAVENOUS at 05:10

## 2024-10-25 RX ADMIN — DIPHENHYDRAMINE HYDROCHLORIDE 25 MG: 25 CAPSULE ORAL at 11:10

## 2024-10-25 RX ADMIN — TRANEXAMIC ACID 1000 MG: 100 INJECTION, SOLUTION INTRAVENOUS at 05:10

## 2024-10-25 RX ADMIN — SODIUM CHLORIDE: 0.9 INJECTION, SOLUTION INTRAVENOUS at 06:10

## 2024-10-25 NOTE — ANESTHESIA POSTPROCEDURE EVALUATION
Anesthesia Post Evaluation    Patient: Lashanda Monaco    Procedure(s) Performed: Procedure(s) (LRB):  INCISION AND DRAINAGE, KNEE - RIGHT (Right)  CLOSURE, SURGICAL WOUND OR DEHISCENCE, EXTENSIVE OR COMPLEX, SECONDARY (Right)    Final Anesthesia Type: general      Patient location during evaluation: PACU  Patient participation: Yes- Able to Participate  Level of consciousness: awake and alert and oriented  Post-procedure vital signs: reviewed and stable  Pain management: adequate  Airway patency: patent    PONV status at discharge: No PONV  Anesthetic complications: no      Cardiovascular status: blood pressure returned to baseline, hemodynamically stable and stable  Respiratory status: unassisted, room air and spontaneous ventilation  Hydration status: euvolemic  Follow-up not needed.              Vitals Value Taken Time   /63 10/25/24 0846   Temp 36.2 °C (97.2 °F) 10/25/24 0707   Pulse 63 10/25/24 0859   Resp 13 10/25/24 0859   SpO2 95 % 10/25/24 0859   Vitals shown include unfiled device data.      No case tracking events are documented in the log.      Pain/William Score: Pain Rating Prior to Med Admin: 8 (10/25/2024  8:30 AM)  Pain Rating Post Med Admin: 3 (10/25/2024  1:20 AM)  William Score: 8 (10/25/2024  7:07 AM)

## 2024-10-25 NOTE — ANESTHESIA PREPROCEDURE EVALUATION
[facility]  Anesthesia Pre-Operative Evaluation         Patient Name: Lashanda Monaco  YOB: 1955  MRN: [id number]    SUBJECTIVE:     Pre-operative evaluation for Procedure(s) (LRB):  INCISION AND DRAINAGE, KNEE - RIGHT (Right)     10/25/2024    Lashanda Monaco is a 69 y.o. female w/ a significant PMHx of GERD, HTN, anxiety, depression, arthritis, and recent knee revision who presented with concern for knee infection.    Patient now presents for the above procedure(s).      LDA:        Peripheral IV - Single Lumen 10/25/24 0017 20 G Anterior;Left;Proximal Forearm (Active)   Site Assessment Clean;Dry;Intact 10/25/24 0034   Extremity Assessment Distal to IV No abnormal discoloration;No redness;No swelling 10/25/24 0034   Dressing Status Clean;Dry;Intact 10/25/24 0034   Dressing Intervention Integrity maintained 10/25/24 0034   Number of days: 0            Peripheral IV - Single Lumen 10/25/24 0325 20 G Right Antecubital (Active)   Site Assessment Clean;Dry;Intact 10/25/24 0325   Extremity Assessment Distal to IV No abnormal discoloration;No redness;No swelling 10/25/24 0325   Dressing Status Clean;Dry;Intact 10/25/24 0325   Dressing Intervention Integrity maintained 10/25/24 0325   Number of days: 0       Prev airway: None documented.    Drips: None documented.      Patient Active Problem List   Diagnosis    Mixed hyperlipidemia    Essential hypertension, benign    GERD (gastroesophageal reflux disease)    DDD (degenerative disc disease), lumbar    Primary osteoarthritis of both knees    Primary osteoarthritis of both wrists    Moderate episode of recurrent major depressive disorder    History of bilateral hip replacement    Severe obesity (BMI 35.0-39.9) with comorbidity    Myalgia of pelvic floor    Lack of coordination    Muscle weakness    MARIBEL (generalized anxiety disorder)    Chronic right-sided low back pain without sciatica    Weakness of both hips    Impaired functional  mobility and activity tolerance    Caregiver stress    Psychophysiologic insomnia    Lumbar radiculopathy    Anxiety    Depressive disorder    Rheumatoid arthritis    Incontinence    Arthritis of right knee    Marijuana use    Primary osteoarthritis of right knee    Wound dehiscence, surgical    Hypokalemia    Anemia       Review of patient's allergies indicates:   Allergen Reactions    Kiwi (actinidia chinensis)     Hydrocodone bitartrate Nausea Only       Current Inpatient Medications:   acetaminophen  1,000 mg Oral Q8H    amLODIPine  10 mg Oral Daily    [START ON 10/26/2024] atorvastatin  20 mg Oral Daily    ceFAZolin (Ancef) IV (PEDS and ADULTS)  2 g Intravenous Q8H    [START ON 10/26/2024] pantoprazole  40 mg Oral Daily    [START ON 10/26/2024] sertraline  100 mg Oral Daily       No current facility-administered medications on file prior to encounter.     Current Outpatient Medications on File Prior to Encounter   Medication Sig Dispense Refill    acetaminophen (TYLENOL) 650 MG TbSR Take 1 tablet (650 mg total) by mouth every 8 (eight) hours. 120 tablet 0    albuterol (PROVENTIL/VENTOLIN HFA) 90 mcg/actuation inhaler Inhale 1-2 puffs into the lungs every 6 (six) hours as needed for Wheezing or Shortness of Breath. Rescue 18 g 0    ALPRAZolam (XANAX) 0.5 MG tablet Take 1 tablet (0.5 mg total) by mouth daily as needed for Anxiety. 20 tablet 1    amLODIPine (NORVASC) 10 MG tablet TAKE 1 TABLET DAILY 90 tablet 3    vghaq-spwp-YeBOM-collag-mv-min (GINGER, WITH COLLAGEN,) 7-7-1.5 gram PwPk Take 1 packet by mouth once daily. 14 packet 0    ascorbic acid, vitamin C, (VITAMIN C) 500 MG tablet Take 2 tablets (1,000 mg total) by mouth 2 (two) times daily. for 14 days 56 tablet 0    aspirin (ECOTRIN) 81 MG EC tablet Take 1 tablet (81 mg total) by mouth 2 (two) times a day. 60 tablet 0    atorvastatin (LIPITOR) 20 MG tablet TAKE 1 TABLET DAILY 90 tablet 3    cefadroxil (DURICEF) 500 MG Cap Take 1 capsule (500 mg total) by  mouth every 12 (twelve) hours. for 7 days 14 capsule 0    celecoxib (CELEBREX) 200 MG capsule Take 1 capsule (200 mg total) by mouth once daily. 30 capsule 0    estradioL (ESTRACE) 2 MG tablet Take 1 tablet (2 mg total) by mouth once daily. 90 tablet 0    fluticasone-salmeterol diskus inhaler 250-50 mcg Inhale 1 puff into the lungs 2 (two) times daily. Controller (Patient not taking: Reported on 9/24/2024) 60 each 11    food supplemt, lactose-reduced (ENSURE) Liqd Drink one bottle daily for 14 days. 3318 mL 0    methocarbamoL (ROBAXIN) 750 MG Tab Take 1 tablet (750 mg total) by mouth 4 (four) times daily as needed (for muscle spasms). 40 tablet 0    multivitamin (THERAGRAN) per tablet Take 1 tablet by mouth once daily. 14 tablet 0    omeprazole (PRILOSEC) 40 MG capsule TAKE 1 CAPSULE TWICE A DAY BEFORE MEALS 60 capsule 1    ondansetron (ZOFRAN-ODT) 4 MG TbDL Take 1 tablet (4 mg total) by mouth every 6 (six) hours as needed (nausea). 20 tablet 1    oxyCODONE (ROXICODONE) 5 MG immediate release tablet Take 1-2 tablets every 4-6 hours as needed for pain 50 tablet 0    pantoprazole (PROTONIX) 40 MG tablet Take 1 tablet (40 mg total) by mouth once daily. 30 tablet 0    pregabalin (LYRICA) 150 MG capsule Take 1 capsule (150 mg total) by mouth 2 (two) times daily. 60 capsule 6    senna-docusate 8.6-50 mg (SENNA WITH DOCUSATE SODIUM) 8.6-50 mg per tablet Take 1 tablet by mouth once daily. 30 tablet 0    sertraline (ZOLOFT) 100 MG tablet Take 1 tablet (100 mg total) by mouth once daily. 90 tablet 3    traZODone (DESYREL) 50 MG tablet Take 1-2 tablets ( mg total) by mouth nightly as needed for Insomnia. 60 tablet 5    TURMERIC ORAL Take 1 tablet by mouth once daily.      upadacitinib (RINVOQ) 15 mg 24 hr tablet Take 1 tablet (15 mg total) by mouth once daily. You may restart your Rinvoq 2 weeks after surgery (10/31/24). 30 tablet 11       Past Surgical History:   Procedure Laterality Date    ARTHROPLASTY, KNEE, TOTAL,  USING COMPUTER-ASSISTED NAVIGATION Right 10/17/2024    Procedure: ARTHROPLASTY, KNEE, TOTAL, USING bitHound[employer] COMPUTER-ASSISTED NAVIGATION: RIGHT: DEPUY - ATTUNE;  Surgeon: Jose David Chowdhury III, MD;  Location: [facility];  Service: Orthopedics;  Laterality: Right;    BREAST BIOPSY Right     2010 & 2021    BREAST CYST ASPIRATION      CARPAL TUNNEL RELEASE Left     EPIDURAL STEROID INJECTION N/A 05/22/2024    Procedure: CAUDAL DOMINIC DIRECT REFERRAL;  Surgeon: Oswald Hoskins MD;  Location: [facility];  Service: Pain Management;  Laterality: N/A;  [phone]    hip repl Right 08/2019    HYSTERECTOMY      JOINT REPLACEMENT Bilateral     hip    RHINOPLASTY      ROTATOR CUFF REPAIR Right     and bicep    ROTATOR CUFF REPAIR Left     SHOULDER ARTHROSCOPY Right 05/2022    right shoulder sx with bicep repair    TONSILLECTOMY, ADENOIDECTOMY      TOTAL ABDOMINAL HYSTERECTOMY W/ BILATERAL SALPINGOOPHORECTOMY      TRANSFORAMINAL EPIDURAL INJECTION OF STEROID Right 01/24/2024    Procedure: LUMBAR TRANSFORAMINAL RIGHT L3/4 AND L4/L5 DIRECT REFERRAL;  Surgeon: Oswald Hoskins MD;  Location: [facility];  Service: Pain Management;  Laterality: Right;  [phone]       Social History     Socioeconomic History    Marital status:      Spouse name: Fuentes    Number of children: 1   Tobacco Use    Smoking status: Never    Smokeless tobacco: Never   Substance and Sexual Activity    Alcohol use: Yes     Comment: Occasionally     Drug use: Yes     Types: Marijuana     Comment: [facility] brownie at bedtime for sleep    Sexual activity: Yes     Partners: Male     Social Drivers of Health     Financial Resource Strain: Patient Declined (10/17/2024)    Overall Financial Resource Strain (CARDIA)     Difficulty of Paying Living Expenses: Patient declined   Food Insecurity: Patient Declined (10/17/2024)    Hunger Vital Sign     Worried About Running Out of Food in the Last Year: Patient declined     Ran Out of Food in the Last Year:  Patient declined   Transportation Needs: Patient Declined (10/17/2024)    TRANSPORTATION NEEDS     Transportation : Patient declined   Physical Activity: Unknown (4/11/2024)    Exercise Vital Sign     Days of Exercise per Week: 1 day   Stress: Patient Declined (10/17/2024)    [employer] - Occupational Stress Questionnaire     Feeling of Stress : Patient declined   Housing Stability: Patient Declined (10/17/2024)    Housing Stability Vital Sign     Unable to Pay for Housing in the Last Year: Patient declined     Homeless in the Last Year: Patient declined       OBJECTIVE:     Vital Signs Range (Last 24H):  Temp:  [36.8 °C (98.2 °F)]   Pulse:  []   Resp:  [18-23]   BP: (132-175)/()   SpO2:  [95 %-96 %]       Significant Labs:  Lab Results   Component Value Date    WBC 7.77 10/25/2024    HGB 9.1 (L) 10/25/2024    HCT 28.9 (L) 10/25/2024     10/25/2024    CHOL 186 03/01/2024    TRIG 119 03/01/2024    HDL 68 03/01/2024    ALT 23 10/25/2024    AST 38 10/25/2024     10/25/2024    K 3.6 10/25/2024     10/25/2024    CREATININE 0.9 10/25/2024    BUN 13 10/25/2024    CO2 24 10/25/2024    TSH 1.293 03/01/2023    INR 1.0 10/25/2024    HGBA1C 5.5 03/01/2023       Diagnostic Studies: No relevant studies.    EKG:   Results for orders placed or performed during the hospital encounter of 09/24/24   EKG 12-lead    Collection Time: 09/24/24 11:13 AM   Result Value Ref Range    QRS Duration 82 ms    OHS QTC Calculation 455 ms    Narrative    Test Reason : I10,    Vent. Rate : 061 BPM     Atrial Rate : 061 BPM     P-R Int : 182 ms          QRS Dur : 082 ms      QT Int : 452 ms       P-R-T Axes : 038 025 028 degrees     QTc Int : 455 ms    Normal sinus rhythm  Normal ECG  When compared with ECG of 01-MAR-2024 12:18,  No significant change was found  Confirmed by Go Woodward MD ([id number]) on 9/24/2024 2:27:33 PM    Referred By: LEIGHANN CALI           Confirmed By:Go Woodward MD  "      2D ECHO:  TTE:  Results for orders placed or performed during the hospital encounter of 03/27/24   Echo   Result Value Ref Range    RA Width 2.83 cm    LA Vol (MOD) 34.37 cm3    Left Atrium Major Axis 5.28 cm    Left Atrium Minor Axis 5.28 cm    RA Major Axis 4.92 cm    LV Diastolic Volume 50.40 mL    LV Systolic Volume 25.18 mL    PV Peak D Xavier 0.28 m/s    PV Peak S Xavier 0.43 m/s    MV Peak A Xavier 0.99 m/s    TR Max Xavier 2.29 m/s    MV Peak E Xavier 0.64 m/s    Ao VTI 23.40 cm    Ao peak xavier 1.22 m/s    LVOT peak VTI 17.75 cm    LVOT peak xavier 1.04 m/s    LVOT diameter 3.38 cm    MV "A" wave duration 15.13 msec    IVRT 97.05 msec    E wave deceleration time 318.35 msec    AV mean gradient 4 mmHg    TAPSE 1.65 cm    RVDD 3.45 cm    LA size 3.34 cm    Ascending aorta 3.82 cm    STJ 3.42 cm    Sinus 4.06 cm    LVIDs 2.62 2.1 - 4.0 cm    PW 0.94 0.6 - 1.1 cm    IVS 0.81 0.6 - 1.1 cm    LVIDd 3.49 (A) 3.5 - 6.0 cm    TDI LATERAL 0.04 m/s    LA WIDTH 3.12 cm    TDI SEPTAL 0.05 m/s    LV LATERAL E/E' RATIO 16.00 m/s    LV SEPTAL E/E' RATIO 12.80 m/s    FS 25 (A) 28 - 44 %    LA Vol 46.77 cm3    LV mass 84.94 g    Left Ventricle Relative Wall Thickness 0.54 cm    AV valve area 6.80 cm²    AV Velocity Ratio 0.85     AV index (prosthetic) 0.76     E/A ratio 0.65     Mean e' 0.05 m/s    Pulm vein S/D ratio 1.54     LVOT area 9.0 cm2    LVOT stroke volume 159.18 cm3    AV peak gradient 6 mmHg    E/E' ratio 14.22 m/s    Triscuspid Valve Regurgitation Peak Gradient 21 mmHg    MUNA by Velocity Ratio 7.64 cm²    BSA 2.1 m2    LV Systolic Volume Index 12.5 mL/m2    LV Diastolic Volume Index 24.95 mL/m2    LV Mass Index 42 g/m2    LOLA 23.2 mL/m2    LOLA (MOD) 17.0 mL/m2    ZLVIDS -2.62     ZLVIDD -5.34     EF 65 %    TV resting pulmonary artery pressure 24 mmHg    RV TB RVSP 5 mmHg    Est. RA pres 3 mmHg    Narrative      Left Ventricle: The left ventricle is normal in size. Ventricular mass   is normal. Normal wall thickness. There " is concentric remodeling. Normal   wall motion. There is normal systolic function with a visually estimated   ejection fraction of 60 - 65%. Ejection fraction by visual approximation   is 65%. There is indeterminate diastolic function.    Right Ventricle: Normal right ventricular cavity size. Wall thickness   is normal. Right ventricle wall motion  is normal. Systolic function is   normal.    Right Atrium: Right atrium is mildly dilated.    Aorta: Aortic root is mildly to moderately dilated measuring 4.06 cm.   Ascending aorta is mildly dilated measuring 3.82 cm.    Pulmonary Artery: The estimated pulmonary artery systolic pressure is   24 mmHg.    IVC/SVC: Normal venous pressure at 3 mmHg.    Pericardium: There is a trivial posterior effusion.         RADHA:  No results found for this or any previous visit.    ASSESSMENT/PLAN:                                                                                                                  10/25/2024  Lashanda Monaco is a 69 y.o., female.      Pre-op Assessment    I have reviewed the Patient Summary Reports.     I have reviewed the Nursing Notes. I have reviewed the NPO Status.   I have reviewed the Medications.     Review of Systems  Anesthesia Hx:  No problems with previous Anesthesia             Denies Family Hx of Anesthesia complications.    Denies Personal Hx of Anesthesia complications.                    Social:  No Alcohol Use, Non-Smoker       Hematology/Oncology:  Hematology Normal   Oncology Normal                                   EENT/Dental:  EENT/Dental Normal           Cardiovascular:  Exercise tolerance: good   Hypertension           hyperlipidemia                               Pulmonary:  Pulmonary Normal                       Renal/:  Renal/ Normal                 Hepatic/GI:     GERD                Musculoskeletal:  Arthritis               Neurological:  Neurology Normal                                      Endocrine:  Endocrine Normal           Morbid Obesity / BMI > 40  Psych:   anxiety depression                Physical Exam  General: Well nourished, Cooperative, Alert and Oriented    Airway:  Mallampati: III   Mouth Opening: Normal  TM Distance: Normal  Tongue: Normal  Neck ROM: Normal ROM    Dental:  Intact        Anesthesia Plan  Type of Anesthesia, risks & benefits discussed:    Anesthesia Type: Gen ETT  Intra-op Monitoring Plan: Standard ASA Monitors  Post Op Pain Control Plan: multimodal analgesia  Induction:  IV  Airway Plan: Direct, Post-Induction  Informed Consent: Informed consent signed with the Patient and all parties understand the risks and agree with anesthesia plan.  All questions answered.   ASA Score: 3  Day of Surgery Review of History & Physical: H&P Update referred to the surgeon/provider.    Ready For Surgery From Anesthesia Perspective.     .

## 2024-10-25 NOTE — ANESTHESIA PROCEDURE NOTES
Intubation    Date/Time: 10/25/2024 5:30 AM    Performed by: Lara Payne CRNA  Authorized by: NATHANAEL Lundberg MD    Intubation:     Induction:  Intravenous    Intubated:  Postinduction    Mask Ventilation:  Easy mask    Attempts:  1    Attempted By:  CRNA    Method of Intubation:  Direct    Blade:  Chris 2    Laryngeal View Grade: Grade IIA - cords partially seen      Difficult Airway Encountered?: No      Complications:  None    Airway Device:  Oral endotracheal tube    Airway Device Size:  7.5    Style/Cuff Inflation:  Cuffed (inflated to minimal occlusive pressure)    Tube secured:  22    Secured at:  The teeth    Placement Verified By:  Capnometry    Complicating Factors:  Obesity    Findings Post-Intubation:  BS equal bilateral and atraumatic/condition of teeth unchanged

## 2024-10-25 NOTE — TRANSFER OF CARE
Anesthesia Transfer of Care Note    Patient: Lashanda Monaco    Procedure(s) Performed: Procedure(s) (LRB):  INCISION AND DRAINAGE, KNEE - RIGHT (Right)  CLOSURE, SURGICAL WOUND OR DEHISCENCE, EXTENSIVE OR COMPLEX, SECONDARY (Right)    Patient location: PACU    Anesthesia Type: general    Transport from OR: Transported from OR on 6-10 L/min O2 by face mask with adequate spontaneous ventilation    Post pain: adequate analgesia    Post assessment: no apparent anesthetic complications    Post vital signs: stable    Level of consciousness: awake and alert    Nausea/Vomiting: no nausea/vomiting    Complications: none    Transfer of care protocol was followed      Last vitals: Visit Vitals  BP (!) 82/44 (BP Location: Right arm, Patient Position: Lying)   Pulse 60   Temp 36.2 °C (97.2 °F) (Temporal)   Resp 14   Ht 5' (1.524 m)   Wt 103.9 kg (229 lb)   SpO2 97%   Breastfeeding No   BMI 44.72 kg/m²

## 2024-10-26 PROBLEM — D62 ACUTE BLOOD LOSS ANEMIA: Status: ACTIVE | Noted: 2024-10-26

## 2024-10-26 PROBLEM — R10.9 FLANK PAIN: Status: ACTIVE | Noted: 2024-10-26

## 2024-10-26 LAB
ABO + RH BLD: NORMAL
ANION GAP SERPL CALC-SCNC: 8 MMOL/L (ref 8–16)
BASOPHILS # BLD AUTO: 0.01 K/UL (ref 0–0.2)
BASOPHILS NFR BLD: 0.1 % (ref 0–1.9)
BLD PROD TYP BPU: NORMAL
BLOOD UNIT EXPIRATION DATE: NORMAL
BLOOD UNIT TYPE CODE: 5100
BLOOD UNIT TYPE: NORMAL
BUN SERPL-MCNC: 22 MG/DL (ref 8–23)
CALCIUM SERPL-MCNC: 8.5 MG/DL (ref 8.7–10.5)
CHLORIDE SERPL-SCNC: 106 MMOL/L (ref 95–110)
CO2 SERPL-SCNC: 23 MMOL/L (ref 23–29)
CODING SYSTEM: NORMAL
CREAT SERPL-MCNC: 0.9 MG/DL (ref 0.5–1.4)
CROSSMATCH INTERPRETATION: NORMAL
DIFFERENTIAL METHOD BLD: ABNORMAL
DISPENSE STATUS: NORMAL
EOSINOPHIL # BLD AUTO: 0.1 K/UL (ref 0–0.5)
EOSINOPHIL NFR BLD: 0.7 % (ref 0–8)
ERYTHROCYTE [DISTWIDTH] IN BLOOD BY AUTOMATED COUNT: 15.5 % (ref 11.5–14.5)
EST. GFR  (NO RACE VARIABLE): >60 ML/MIN/1.73 M^2
GLUCOSE SERPL-MCNC: 104 MG/DL (ref 70–110)
HCT VFR BLD AUTO: 22.3 % (ref 37–48.5)
HGB BLD-MCNC: 6.9 G/DL (ref 12–16)
IMM GRANULOCYTES # BLD AUTO: 0.07 K/UL (ref 0–0.04)
IMM GRANULOCYTES NFR BLD AUTO: 0.9 % (ref 0–0.5)
LYMPHOCYTES # BLD AUTO: 1.6 K/UL (ref 1–4.8)
LYMPHOCYTES NFR BLD: 19.5 % (ref 18–48)
MCH RBC QN AUTO: 32.1 PG (ref 27–31)
MCHC RBC AUTO-ENTMCNC: 30.9 G/DL (ref 32–36)
MCV RBC AUTO: 104 FL (ref 82–98)
MONOCYTES # BLD AUTO: 1.1 K/UL (ref 0.3–1)
MONOCYTES NFR BLD: 13 % (ref 4–15)
NEUTROPHILS # BLD AUTO: 5.3 K/UL (ref 1.8–7.7)
NEUTROPHILS NFR BLD: 65.8 % (ref 38–73)
NRBC BLD-RTO: 0 /100 WBC
PLATELET # BLD AUTO: 185 K/UL (ref 150–450)
PMV BLD AUTO: 10 FL (ref 9.2–12.9)
POTASSIUM SERPL-SCNC: 3.9 MMOL/L (ref 3.5–5.1)
RBC # BLD AUTO: 2.15 M/UL (ref 4–5.4)
SODIUM SERPL-SCNC: 137 MMOL/L (ref 136–145)
TRANS ERYTHROCYTES VOL PATIENT: NORMAL ML
VANCOMYCIN TROUGH SERPL-MCNC: 12.2 UG/ML (ref 10–22)
WBC # BLD AUTO: 8.06 K/UL (ref 3.9–12.7)

## 2024-10-26 PROCEDURE — 36430 TRANSFUSION BLD/BLD COMPNT: CPT

## 2024-10-26 PROCEDURE — 25000003 PHARM REV CODE 250: Performed by: NURSE PRACTITIONER

## 2024-10-26 PROCEDURE — 25000003 PHARM REV CODE 250

## 2024-10-26 PROCEDURE — 63600175 PHARM REV CODE 636 W HCPCS: Performed by: HOSPITALIST

## 2024-10-26 PROCEDURE — 63600175 PHARM REV CODE 636 W HCPCS

## 2024-10-26 PROCEDURE — P9021 RED BLOOD CELLS UNIT: HCPCS | Performed by: HOSPITALIST

## 2024-10-26 PROCEDURE — 30233N1 TRANSFUSION OF NONAUTOLOGOUS RED BLOOD CELLS INTO PERIPHERAL VEIN, PERCUTANEOUS APPROACH: ICD-10-PCS | Performed by: HOSPITALIST

## 2024-10-26 PROCEDURE — 36415 COLL VENOUS BLD VENIPUNCTURE: CPT

## 2024-10-26 PROCEDURE — 97161 PT EVAL LOW COMPLEX 20 MIN: CPT

## 2024-10-26 PROCEDURE — 86901 BLOOD TYPING SEROLOGIC RH(D): CPT | Performed by: HOSPITALIST

## 2024-10-26 PROCEDURE — 85025 COMPLETE CBC W/AUTO DIFF WBC: CPT

## 2024-10-26 PROCEDURE — 80048 BASIC METABOLIC PNL TOTAL CA: CPT

## 2024-10-26 PROCEDURE — 86920 COMPATIBILITY TEST SPIN: CPT | Performed by: HOSPITALIST

## 2024-10-26 PROCEDURE — 86900 BLOOD TYPING SEROLOGIC ABO: CPT | Performed by: HOSPITALIST

## 2024-10-26 PROCEDURE — 97116 GAIT TRAINING THERAPY: CPT

## 2024-10-26 PROCEDURE — 25000003 PHARM REV CODE 250: Performed by: HOSPITALIST

## 2024-10-26 PROCEDURE — 80202 ASSAY OF VANCOMYCIN: CPT

## 2024-10-26 PROCEDURE — 11000001 HC ACUTE MED/SURG PRIVATE ROOM

## 2024-10-26 PROCEDURE — 97535 SELF CARE MNGMENT TRAINING: CPT

## 2024-10-26 PROCEDURE — 97165 OT EVAL LOW COMPLEX 30 MIN: CPT

## 2024-10-26 RX ORDER — HYDROCODONE BITARTRATE AND ACETAMINOPHEN 500; 5 MG/1; MG/1
TABLET ORAL
Status: DISCONTINUED | OUTPATIENT
Start: 2024-10-26 | End: 2024-10-27 | Stop reason: HOSPADM

## 2024-10-26 RX ORDER — GUAIFENESIN 600 MG/1
600 TABLET, EXTENDED RELEASE ORAL 2 TIMES DAILY
Status: DISCONTINUED | OUTPATIENT
Start: 2024-10-26 | End: 2024-10-27 | Stop reason: HOSPADM

## 2024-10-26 RX ORDER — METHOCARBAMOL 750 MG/1
750 TABLET, FILM COATED ORAL 4 TIMES DAILY PRN
Status: DISCONTINUED | OUTPATIENT
Start: 2024-10-26 | End: 2024-10-27 | Stop reason: HOSPADM

## 2024-10-26 RX ORDER — TRAZODONE HYDROCHLORIDE 100 MG/1
100 TABLET ORAL NIGHTLY
Status: DISCONTINUED | OUTPATIENT
Start: 2024-10-26 | End: 2024-10-27 | Stop reason: HOSPADM

## 2024-10-26 RX ADMIN — VANCOMYCIN HYDROCHLORIDE 500 MG: 500 INJECTION, POWDER, LYOPHILIZED, FOR SOLUTION INTRAVENOUS at 07:10

## 2024-10-26 RX ADMIN — THERA TABS 1 TABLET: TAB at 08:10

## 2024-10-26 RX ADMIN — ACETAMINOPHEN 1000 MG: 500 TABLET ORAL at 02:10

## 2024-10-26 RX ADMIN — GUAIFENESIN 600 MG: 600 TABLET, EXTENDED RELEASE ORAL at 09:10

## 2024-10-26 RX ADMIN — ATORVASTATIN CALCIUM 20 MG: 20 TABLET, FILM COATED ORAL at 08:10

## 2024-10-26 RX ADMIN — CEFTRIAXONE SODIUM 2 G: 2 INJECTION, POWDER, FOR SOLUTION INTRAMUSCULAR; INTRAVENOUS at 02:10

## 2024-10-26 RX ADMIN — TRAZODONE HYDROCHLORIDE 100 MG: 100 TABLET ORAL at 10:10

## 2024-10-26 RX ADMIN — METHOCARBAMOL 750 MG: 750 TABLET ORAL at 04:10

## 2024-10-26 RX ADMIN — ASPIRIN 81 MG: 81 TABLET, COATED ORAL at 08:10

## 2024-10-26 RX ADMIN — Medication 1000 MG: at 09:10

## 2024-10-26 RX ADMIN — VANCOMYCIN HYDROCHLORIDE 750 MG: 750 INJECTION, POWDER, LYOPHILIZED, FOR SOLUTION INTRAVENOUS at 04:10

## 2024-10-26 RX ADMIN — ACETAMINOPHEN 1000 MG: 500 TABLET ORAL at 06:10

## 2024-10-26 RX ADMIN — GUAIFENESIN 600 MG: 600 TABLET, EXTENDED RELEASE ORAL at 08:10

## 2024-10-26 RX ADMIN — PANTOPRAZOLE SODIUM 40 MG: 40 TABLET, DELAYED RELEASE ORAL at 08:10

## 2024-10-26 RX ADMIN — ACETAMINOPHEN 1000 MG: 500 TABLET ORAL at 09:10

## 2024-10-26 RX ADMIN — Medication 1000 MG: at 08:10

## 2024-10-26 RX ADMIN — ESTRADIOL 2 MG: 1 TABLET ORAL at 08:10

## 2024-10-26 RX ADMIN — ASPIRIN 81 MG: 81 TABLET, COATED ORAL at 09:10

## 2024-10-26 RX ADMIN — OXYCODONE HYDROCHLORIDE 10 MG: 10 TABLET ORAL at 02:10

## 2024-10-26 RX ADMIN — OXYCODONE HYDROCHLORIDE 10 MG: 10 TABLET ORAL at 05:10

## 2024-10-26 RX ADMIN — SERTRALINE 100 MG: 100 TABLET, FILM COATED ORAL at 08:10

## 2024-10-27 VITALS
WEIGHT: 229 LBS | HEART RATE: 80 BPM | HEIGHT: 64 IN | RESPIRATION RATE: 16 BRPM | BODY MASS INDEX: 39.09 KG/M2 | SYSTOLIC BLOOD PRESSURE: 134 MMHG | DIASTOLIC BLOOD PRESSURE: 76 MMHG | OXYGEN SATURATION: 95 % | TEMPERATURE: 97 F

## 2024-10-27 LAB
ANION GAP SERPL CALC-SCNC: 8 MMOL/L (ref 8–16)
BASOPHILS # BLD AUTO: 0.05 K/UL (ref 0–0.2)
BASOPHILS NFR BLD: 0.5 % (ref 0–1.9)
BUN SERPL-MCNC: 14 MG/DL (ref 8–23)
CALCIUM SERPL-MCNC: 8.6 MG/DL (ref 8.7–10.5)
CHLORIDE SERPL-SCNC: 108 MMOL/L (ref 95–110)
CO2 SERPL-SCNC: 22 MMOL/L (ref 23–29)
CREAT SERPL-MCNC: 0.8 MG/DL (ref 0.5–1.4)
DIFFERENTIAL METHOD BLD: ABNORMAL
EOSINOPHIL # BLD AUTO: 0.3 K/UL (ref 0–0.5)
EOSINOPHIL NFR BLD: 2.7 % (ref 0–8)
ERYTHROCYTE [DISTWIDTH] IN BLOOD BY AUTOMATED COUNT: 15.7 % (ref 11.5–14.5)
EST. GFR  (NO RACE VARIABLE): >60 ML/MIN/1.73 M^2
GLUCOSE SERPL-MCNC: 84 MG/DL (ref 70–110)
HCT VFR BLD AUTO: 24.9 % (ref 37–48.5)
HGB BLD-MCNC: 7.7 G/DL (ref 12–16)
IMM GRANULOCYTES # BLD AUTO: 0.13 K/UL (ref 0–0.04)
IMM GRANULOCYTES NFR BLD AUTO: 1.2 % (ref 0–0.5)
LYMPHOCYTES # BLD AUTO: 1.8 K/UL (ref 1–4.8)
LYMPHOCYTES NFR BLD: 16.5 % (ref 18–48)
MCH RBC QN AUTO: 31.3 PG (ref 27–31)
MCHC RBC AUTO-ENTMCNC: 30.9 G/DL (ref 32–36)
MCV RBC AUTO: 101 FL (ref 82–98)
MONOCYTES # BLD AUTO: 1 K/UL (ref 0.3–1)
MONOCYTES NFR BLD: 8.9 % (ref 4–15)
NEUTROPHILS # BLD AUTO: 7.7 K/UL (ref 1.8–7.7)
NEUTROPHILS NFR BLD: 70.2 % (ref 38–73)
NRBC BLD-RTO: 0 /100 WBC
PLATELET # BLD AUTO: 172 K/UL (ref 150–450)
PMV BLD AUTO: 9.4 FL (ref 9.2–12.9)
POTASSIUM SERPL-SCNC: 4 MMOL/L (ref 3.5–5.1)
RBC # BLD AUTO: 2.46 M/UL (ref 4–5.4)
SODIUM SERPL-SCNC: 138 MMOL/L (ref 136–145)
WBC # BLD AUTO: 10.89 K/UL (ref 3.9–12.7)

## 2024-10-27 PROCEDURE — 25000003 PHARM REV CODE 250

## 2024-10-27 PROCEDURE — 63600175 PHARM REV CODE 636 W HCPCS: Performed by: HOSPITALIST

## 2024-10-27 PROCEDURE — 85025 COMPLETE CBC W/AUTO DIFF WBC: CPT

## 2024-10-27 PROCEDURE — 25000003 PHARM REV CODE 250: Performed by: NURSE PRACTITIONER

## 2024-10-27 PROCEDURE — 97116 GAIT TRAINING THERAPY: CPT | Mod: CQ

## 2024-10-27 PROCEDURE — 94761 N-INVAS EAR/PLS OXIMETRY MLT: CPT

## 2024-10-27 PROCEDURE — 80048 BASIC METABOLIC PNL TOTAL CA: CPT

## 2024-10-27 PROCEDURE — 97530 THERAPEUTIC ACTIVITIES: CPT | Mod: CQ

## 2024-10-27 PROCEDURE — 36415 COLL VENOUS BLD VENIPUNCTURE: CPT

## 2024-10-27 PROCEDURE — 99900035 HC TECH TIME PER 15 MIN (STAT)

## 2024-10-27 PROCEDURE — 25000003 PHARM REV CODE 250: Performed by: HOSPITALIST

## 2024-10-27 RX ORDER — AMLODIPINE BESYLATE 5 MG/1
10 TABLET ORAL DAILY
Status: DISCONTINUED | OUTPATIENT
Start: 2024-10-27 | End: 2024-10-27 | Stop reason: HOSPADM

## 2024-10-27 RX ADMIN — ACETAMINOPHEN 1000 MG: 500 TABLET ORAL at 05:10

## 2024-10-27 RX ADMIN — THERA TABS 1 TABLET: TAB at 09:10

## 2024-10-27 RX ADMIN — AMLODIPINE BESYLATE 10 MG: 5 TABLET ORAL at 09:10

## 2024-10-27 RX ADMIN — GUAIFENESIN 600 MG: 600 TABLET, EXTENDED RELEASE ORAL at 09:10

## 2024-10-27 RX ADMIN — VANCOMYCIN HYDROCHLORIDE 1250 MG: 1.25 INJECTION, POWDER, LYOPHILIZED, FOR SOLUTION INTRAVENOUS at 05:10

## 2024-10-27 RX ADMIN — Medication 1000 MG: at 09:10

## 2024-10-27 RX ADMIN — DIPHENHYDRAMINE HYDROCHLORIDE 25 MG: 25 CAPSULE ORAL at 12:10

## 2024-10-27 RX ADMIN — ACETAMINOPHEN 1000 MG: 500 TABLET ORAL at 01:10

## 2024-10-27 RX ADMIN — SERTRALINE 100 MG: 100 TABLET, FILM COATED ORAL at 09:10

## 2024-10-27 RX ADMIN — ESTRADIOL 2 MG: 1 TABLET ORAL at 09:10

## 2024-10-27 RX ADMIN — PANTOPRAZOLE SODIUM 40 MG: 40 TABLET, DELAYED RELEASE ORAL at 09:10

## 2024-10-27 RX ADMIN — ASPIRIN 81 MG: 81 TABLET, COATED ORAL at 09:10

## 2024-10-27 RX ADMIN — ATORVASTATIN CALCIUM 20 MG: 20 TABLET, FILM COATED ORAL at 09:10

## 2024-10-27 RX ADMIN — OXYCODONE HYDROCHLORIDE 10 MG: 10 TABLET ORAL at 01:10

## 2024-10-29 ENCOUNTER — PATIENT OUTREACH (OUTPATIENT)
Dept: ADMINISTRATIVE | Facility: CLINIC | Age: 69
End: 2024-10-29
Payer: MEDICARE

## 2024-11-01 ENCOUNTER — OFFICE VISIT (OUTPATIENT)
Dept: ORTHOPEDICS | Facility: CLINIC | Age: 69
End: 2024-11-01
Payer: MEDICARE

## 2024-11-01 DIAGNOSIS — Z51.89 VISIT FOR WOUND CHECK: Primary | ICD-10-CM

## 2024-11-01 PROCEDURE — 1160F RVW MEDS BY RX/DR IN RCRD: CPT | Mod: CPTII,S$GLB,, | Performed by: NURSE PRACTITIONER

## 2024-11-01 PROCEDURE — 1159F MED LIST DOCD IN RCRD: CPT | Mod: CPTII,S$GLB,, | Performed by: NURSE PRACTITIONER

## 2024-11-01 PROCEDURE — 99024 POSTOP FOLLOW-UP VISIT: CPT | Mod: S$GLB,,, | Performed by: NURSE PRACTITIONER

## 2024-11-01 PROCEDURE — 99999 PR PBB SHADOW E&M-EST. PATIENT-LVL III: CPT | Mod: PBBFAC,,, | Performed by: NURSE PRACTITIONER

## 2024-11-01 PROCEDURE — 1126F AMNT PAIN NOTED NONE PRSNT: CPT | Mod: CPTII,S$GLB,, | Performed by: NURSE PRACTITIONER

## 2024-11-06 ENCOUNTER — OFFICE VISIT (OUTPATIENT)
Dept: ORTHOPEDICS | Facility: CLINIC | Age: 69
End: 2024-11-06
Payer: MEDICARE

## 2024-11-06 DIAGNOSIS — Z96.651 S/P TOTAL KNEE REPLACEMENT, RIGHT: ICD-10-CM

## 2024-11-06 PROCEDURE — 99024 POSTOP FOLLOW-UP VISIT: CPT | Mod: S$GLB,,, | Performed by: NURSE PRACTITIONER

## 2024-11-06 PROCEDURE — 1160F RVW MEDS BY RX/DR IN RCRD: CPT | Mod: CPTII,S$GLB,, | Performed by: NURSE PRACTITIONER

## 2024-11-06 PROCEDURE — 99999 PR PBB SHADOW E&M-EST. PATIENT-LVL III: CPT | Mod: PBBFAC,,, | Performed by: NURSE PRACTITIONER

## 2024-11-06 PROCEDURE — 1159F MED LIST DOCD IN RCRD: CPT | Mod: CPTII,S$GLB,, | Performed by: NURSE PRACTITIONER

## 2024-11-06 RX ORDER — OXYCODONE HYDROCHLORIDE 5 MG/1
TABLET ORAL
Qty: 40 TABLET | Refills: 0 | Status: SHIPPED | OUTPATIENT
Start: 2024-11-06

## 2024-11-06 NOTE — PROGRESS NOTES
Pt returns for a 2 week f/u after wound dehiscence with washout 10/25. Incision is well healed. Staples removed without difficulty. Large bandage placed over the length of the incision until she takes a shower- at which time she will remove it. She does not need a refill on her pain medication. She has a cane and will start bringing that with her to PT to work on getting off of the walker. ROM: 0-90. She will continue PT and f/u as scheduled or sooner as needed.

## 2024-11-08 ENCOUNTER — OFFICE VISIT (OUTPATIENT)
Dept: PRIMARY CARE CLINIC | Facility: CLINIC | Age: 69
End: 2024-11-08
Payer: MEDICARE

## 2024-11-08 VITALS
WEIGHT: 232.5 LBS | BODY MASS INDEX: 39.69 KG/M2 | HEART RATE: 90 BPM | OXYGEN SATURATION: 100 % | SYSTOLIC BLOOD PRESSURE: 156 MMHG | DIASTOLIC BLOOD PRESSURE: 84 MMHG | HEIGHT: 64 IN | RESPIRATION RATE: 18 BRPM | TEMPERATURE: 97 F

## 2024-11-08 DIAGNOSIS — K21.9 GASTROESOPHAGEAL REFLUX DISEASE, UNSPECIFIED WHETHER ESOPHAGITIS PRESENT: ICD-10-CM

## 2024-11-08 DIAGNOSIS — Z96.651 STATUS POST RIGHT KNEE REPLACEMENT: Primary | ICD-10-CM

## 2024-11-08 DIAGNOSIS — T81.31XD POSTOPERATIVE WOUND DEHISCENCE, SUBSEQUENT ENCOUNTER: ICD-10-CM

## 2024-11-08 DIAGNOSIS — I10 ESSENTIAL HYPERTENSION, BENIGN: ICD-10-CM

## 2024-11-08 DIAGNOSIS — Z96.651 S/P TOTAL KNEE REPLACEMENT, RIGHT: ICD-10-CM

## 2024-11-08 DIAGNOSIS — D62 ACUTE BLOOD LOSS ANEMIA: ICD-10-CM

## 2024-11-08 PROBLEM — M17.11 ARTHRITIS OF RIGHT KNEE: Status: RESOLVED | Noted: 2024-09-24 | Resolved: 2024-11-08

## 2024-11-08 PROBLEM — E87.6 HYPOKALEMIA: Status: RESOLVED | Noted: 2024-10-25 | Resolved: 2024-11-08

## 2024-11-08 PROBLEM — R10.9 FLANK PAIN: Status: RESOLVED | Noted: 2024-10-26 | Resolved: 2024-11-08

## 2024-11-08 PROCEDURE — 99999 PR PBB SHADOW E&M-EST. PATIENT-LVL IV: CPT | Mod: PBBFAC,,, | Performed by: FAMILY MEDICINE

## 2024-11-08 RX ORDER — PANTOPRAZOLE SODIUM 40 MG/1
40 TABLET, DELAYED RELEASE ORAL DAILY
Qty: 90 TABLET | Refills: 3 | Status: SHIPPED | OUTPATIENT
Start: 2024-11-08

## 2024-11-08 NOTE — PROGRESS NOTES
Lashanda Monaco presents 1 week post-op following a right total knee arthroplasty 10/17 with wound dehiscence following a fall 10/25 performed by Dr. Chowdhury She has jackie in place. The incision is well healed with no drainage or erythema. Prevena wound vac and brace removed. She can restart PT at this point.    Exam:     Ambulating well with assistive device.  Incision is clean and dry without drainage or erythema.   ROM:0-90    A/P:  1 week s/p right total knee replacement with washout and reclosure following a fall with wound dehiscence.    - The patient was advised to keep the dressing clean and dry for the next 7 days after which she will return for staple removal  - PT: restarting with home health  - Continue aspirin for 1 month post op.  - Pain medication refill not needed  - Reviewed antibiotic prophylaxis   - Follow up in 1 week with me for staple removal. Pt will call clinic with problems/concerns.

## 2024-11-08 NOTE — PROGRESS NOTES
Assessment:       1. Status post right knee replacement    2. Postoperative wound dehiscence, subsequent encounter    3. Acute blood loss anemia    4. Essential hypertension, benign    5. S/P total knee replacement, right    6. Gastroesophageal reflux disease, unspecified whether esophagitis present         Plan:       Status post right knee replacement    Postoperative wound dehiscence, subsequent encounter    Acute blood loss anemia  -     CBC Auto Differential; Future; Expected date: 12/20/2024    Essential hypertension, benign    S/P total knee replacement, right    Gastroesophageal reflux disease, unspecified whether esophagitis present  -     pantoprazole (PROTONIX) 40 MG tablet; Take 1 tablet (40 mg total) by mouth once daily.  Dispense: 90 tablet; Refill: 3      Assessment & Plan    - Assessed post-operative recovery from right total knee arthroplasty, complicated by fall and wound dehiscence requiring additional surgery  - Evaluated right lower extremity swelling and bruising, consistent with resolving hematoma and expected post-operative changes  - Considered normal lymphatic drainage disruption following knee surgery as contributing factor to persistent swelling  - Noted patient's progress in knee flexion (119 degrees) as ahead of expectations despite setback  - Determined overall recovery trajectory to be on upper side of normal range due to post-operative complications    KNEE REPLACEMENT RECOVERY:   Explained expected timeline for swelling reduction, emphasizing progress measured in weeks rather than days.   Discussed full recovery from knee replacement typically takes up to 8 months.   Clarified reasons for persistent swelling, including surgical intervention, soft tissue injury, and lymphatic drainage disruption.   Addressed skin sensitivity and burning sensation with ice application as normal due to stretched, sensitive skin.   Patient to continue elevation of affected limb to aid in swelling  reduction.   Patient may use compression socks if tolerated, but not mandatory.    MEDICATIONS/SUPPLEMENTS:   Started pantoprazole as replacement for omeprazole.   Continued baby aspirin daily.    ANEMIA:   Complete blood count ordered for 6 weeks to recheck hemoglobin levels post-transfusion.    FOLLOW UP:   Follow up in 6 weeks for blood count recheck.       Medication List with Changes/Refills   Current Medications    ACETAMINOPHEN (TYLENOL) 650 MG TBSR    Take 1 tablet (650 mg total) by mouth every 8 (eight) hours.    ALBUTEROL (PROVENTIL/VENTOLIN HFA) 90 MCG/ACTUATION INHALER    Inhale 1-2 puffs into the lungs every 6 (six) hours as needed for Wheezing or Shortness of Breath. Rescue    ALPRAZOLAM (XANAX) 0.5 MG TABLET    Take 1 tablet (0.5 mg total) by mouth daily as needed for Anxiety.    AMLODIPINE (NORVASC) 10 MG TABLET    TAKE 1 TABLET DAILY    BIKBS-EFAZ-CARXZ-COLLAG-MV-MIN (GINGER, WITH COLLAGEN,) 7-7-1.5 GRAM PWPK    Take 1 packet by mouth once daily.    ASPIRIN (ECOTRIN) 81 MG EC TABLET    Take 1 tablet (81 mg total) by mouth 2 (two) times a day.    ATORVASTATIN (LIPITOR) 20 MG TABLET    TAKE 1 TABLET DAILY    CELECOXIB (CELEBREX) 200 MG CAPSULE    Take 1 capsule (200 mg total) by mouth once daily.    ESTRADIOL (ESTRACE) 2 MG TABLET    Take 1 tablet (2 mg total) by mouth once daily.    FOOD SUPPLEMT, LACTOSE-REDUCED (ENSURE) LIQD    Drink one bottle daily for 14 days.    METHOCARBAMOL (ROBAXIN) 750 MG TAB    Take 1 tablet (750 mg total) by mouth 4 (four) times daily as needed (for muscle spasms).    MULTIVITAMIN (THERAGRAN) PER TABLET    Take 1 tablet by mouth once daily.    ONDANSETRON (ZOFRAN-ODT) 4 MG TBDL    Take 1 tablet (4 mg total) by mouth every 6 (six) hours as needed (nausea).    OXYCODONE (ROXICODONE) 5 MG IMMEDIATE RELEASE TABLET    Take 1-2 tablets every 4-6 hours as needed for pain    SERTRALINE (ZOLOFT) 100 MG TABLET    Take 1 tablet (100 mg total) by mouth once daily.    TRAZODONE  (DESYREL) 50 MG TABLET    Take 1-2 tablets ( mg total) by mouth nightly as needed for Insomnia.    UPADACITINIB (RINVOQ) 15 MG 24 HR TABLET    Take 1 tablet (15 mg total) by mouth once daily. You may restart your Rinvoq 2 weeks after surgery (10/31/24).   Changed and/or Refilled Medications    Modified Medication Previous Medication    PANTOPRAZOLE (PROTONIX) 40 MG TABLET pantoprazole (PROTONIX) 40 MG tablet       Take 1 tablet (40 mg total) by mouth once daily.    Take 1 tablet (40 mg total) by mouth once daily.   Discontinued Medications    ASCORBIC ACID, VITAMIN C, (VITAMIN C) 500 MG TABLET    Take 2 tablets (1,000 mg total) by mouth 2 (two) times daily. for 14 days    CEFADROXIL (DURICEF) 500 MG CAP    Take 1 capsule (500 mg total) by mouth every 12 (twelve) hours. for 7 days    OMEPRAZOLE (PRILOSEC) 40 MG CAPSULE    TAKE 1 CAPSULE TWICE A DAY BEFORE MEALS    PREGABALIN (LYRICA) 150 MG CAPSULE    Take 1 capsule (150 mg total) by mouth 2 (two) times daily.    SENNA-DOCUSATE 8.6-50 MG (SENNA WITH DOCUSATE SODIUM) 8.6-50 MG PER TABLET    Take 1 tablet by mouth once daily.    TURMERIC ORAL    Take 1 tablet by mouth once daily.         Subjective:    Patient ID: Lashanda Monaco is a 69 y.o. female.  Chief Complaint: Hospital Follow Up    HPI  History of Present Illness    CHIEF COMPLAINT:  Patient presents today for hospital follow-up after right total knee arthroplasty and subsequent fall.    SURGICAL HISTORY:  She underwent right total knee arthroplasty on October 17th without complications. Following discharge, she experienced a fall at home while attempting to get into bed when her hands slipped on a bed pad. This resulted in localized pain in the right knee, dehiscence, and bleeding of the surgical wound. She underwent a second surgery on October 25th. Postoperatively, she received 24 hours of intravenous antibiotics followed by one week of oral antibiotics after discharge. She required transfusion of 1  "unit of packed RBCs due to postoperative anemia.    CURRENT SYMPTOMS:  She reports significant emotional distress related to her current condition. She experiences pain, primarily in her foot, which worsens with movement in the morning despite elevation throughout the day. She notes swelling that increases with activity. Examination reveals diffuse swelling and bruising on the dorsum of the right foot and toes, with localized tenderness and soft tissue swelling over the distal third and fourth metatarsals.    REHABILITATION:  Outpatient therapy was initially held for one week post-second surgery but has since resumed following follow-up with the surgeon.    MEDICATIONS:  She takes baby aspirin daily in the afternoon and is currently on pantoprazole, requesting a prescription renewal. She also takes a collagen and arginine supplement for joint healing, which she began approximately a week ago. She was given 14 packs of this supplement but has not been taking it daily due to her recent hospitalization and recovery at home.      ROS:  Musculoskeletal: +joint pain, +limb swelling  Psychiatric: +emotional lability       Review of Systems    Objective:      Vitals:    11/08/24 1140 11/08/24 1226   BP: (!) 158/82 (!) 156/84   BP Location: Left arm Left arm   Patient Position: Sitting Sitting   Pulse: 90    Resp: 18    Temp: 97.2 °F (36.2 °C)    TempSrc: Temporal    SpO2: 100%    Weight: 105.4 kg (232 lb 7.6 oz)    Height: 5' 4" (1.626 m)      BP Readings from Last 5 Encounters:   11/08/24 (!) 156/84   10/27/24 134/76   10/18/24 (!) 158/79   09/24/24 (!) 142/82   06/10/24 (!) 147/92     Wt Readings from Last 5 Encounters:   11/08/24 105.4 kg (232 lb 7.6 oz)   10/25/24 103.9 kg (229 lb)   10/17/24 103.9 kg (229 lb)   09/24/24 104.2 kg (229 lb 13.3 oz)   09/17/24 103.8 kg (228 lb 13.4 oz)     Physical Exam  Physical Exam    General: Well-developed. Well-nourished. No acute distress.  Eyes: EOMI. Sclerae anicteric.  HENT: " Normocephalic. Atraumatic. Nares patent. Moist oral mucosa.  Cardiovascular: Regular rate. Regular rhythm. No murmurs. No rubs. No gallops. Normal S1, S2.  Respiratory: Normal respiratory effort. Clear to auscultation bilaterally. No rales. No rhonchi. No wheezing.  Musculoskeletal: No  obvious deformity.  Extremities: No lower extremity edema.  Neurological: Alert & oriented x3. No slurred speech. Normal gait.  Psychiatric: Normal mood. Normal affect. Good insight. Good judgment.  Skin: Warm. Dry. No rash.  MSK: Foot/Ankle - Right: Diffuse swelling in right lower extremity. Localized tenderness and soft tissue swelling over the distal third and fourth metatarsals. Erythema over distal third and fourth metatarsals. No warmth in right foot. Bruising to the dorsum of the right foot and toes.  MSK: Knee - Right: Flexion: 119 degrees. Wound appears normal.         Lab Results   Component Value Date    WBC 10.89 10/27/2024    HGB 7.7 (L) 10/27/2024    HCT 24.9 (L) 10/27/2024     10/27/2024    CHOL 186 03/01/2024    TRIG 119 03/01/2024    HDL 68 03/01/2024    ALT 23 10/25/2024    AST 38 10/25/2024     10/27/2024    K 4.0 10/27/2024     10/27/2024    CREATININE 0.8 10/27/2024    BUN 14 10/27/2024    CO2 22 (L) 10/27/2024    TSH 1.293 03/01/2023    INR 1.0 10/25/2024    HGBA1C 5.5 03/01/2023      This note was generated with the assistance of ambient listening technology. Verbal consent was obtained by the patient and accompanying visitor(s) for the recording of patient appointment to facilitate this note. I attest to having reviewed and edited the generated note for accuracy, though some syntax or spelling errors may persist. Please contact the author of this note for any clarification.

## 2024-11-18 NOTE — TELEPHONE ENCOUNTER
----- Message from Latha sent at 11/18/2024 12:28 PM CST -----  Type: RX Refill Request    Who Called: pt     Have you contacted your pharmacy:    Refill or New Rx:estradioL (ESTRACE) 2 MG tablet     RX Name and Strength:    How is the patient currently taking it? (ex. 1XDay):    Is this a 30 day or 90 day RX:    Preferred Pharmacy with phone number:  [phone]  [place] - [address]  [address] [facility] [place] [address]  Phone: [phone] Fax: [phone]        Local or Mail Order:    Ordering Provider:    Would the patient rather a call back or a response via [facility]? call    Best Call Back Number:[phone] (home)       Additional Information:

## 2024-11-18 NOTE — TELEPHONE ENCOUNTER
No care due was identified.  [facility] Embedded Care Due Messages. Reference number: [id number].   11/18/2024 1:02:17 PM CST

## 2024-11-19 ENCOUNTER — PATIENT MESSAGE (OUTPATIENT)
Dept: RHEUMATOLOGY | Facility: CLINIC | Age: 69
End: 2024-11-19
Payer: MEDICARE

## 2024-11-19 RX ORDER — ESTRADIOL 2 MG/1
2 TABLET ORAL DAILY
Qty: 90 TABLET | Refills: 0 | Status: SHIPPED | OUTPATIENT
Start: 2024-11-19

## 2024-11-19 NOTE — TELEPHONE ENCOUNTER
Refill Routing Note   Medication(s) are not appropriate for processing by [facility] for the following reason(s):      Required vitals abnormal    [facility] action(s):    Care Due:  None identified            Appointments  past 12m or future 3m with PCP    Date Provider   Last Visit   11/8/2024 Wayne Lundberg MD   Next Visit   Visit date not found Wayne Lundberg MD   ED visits in past 90 days: 0        Note composed:8:29 PM 11/18/2024

## 2024-11-19 NOTE — TELEPHONE ENCOUNTER
Patient needs to establish with OBGYN any further refills.  Please let her know and set up inappropriate appointment.  [facility] should have availability.

## 2024-11-21 PROBLEM — M21.379 FOOT DROP: Status: ACTIVE | Noted: 2024-11-21

## 2024-11-21 PROBLEM — I71.20 THORACIC AORTIC ANEURYSM WITHOUT RUPTURE: Status: ACTIVE | Noted: 2024-11-21

## 2024-11-22 ENCOUNTER — PATIENT MESSAGE (OUTPATIENT)
Dept: RHEUMATOLOGY | Facility: CLINIC | Age: 69
End: 2024-11-22
Payer: MEDICARE

## 2024-11-26 ENCOUNTER — OFFICE VISIT (OUTPATIENT)
Dept: ORTHOPEDICS | Facility: CLINIC | Age: 69
End: 2024-11-26
Payer: MEDICARE

## 2024-11-26 ENCOUNTER — CLINICAL SUPPORT (OUTPATIENT)
Dept: PRIMARY CARE CLINIC | Facility: CLINIC | Age: 69
End: 2024-11-26
Payer: MEDICARE

## 2024-11-26 ENCOUNTER — HOSPITAL ENCOUNTER (OUTPATIENT)
Dept: RADIOLOGY | Facility: HOSPITAL | Age: 69
Discharge: HOME | End: 2024-11-26
Payer: MEDICARE

## 2024-11-26 VITALS — HEIGHT: 64 IN | WEIGHT: 232.38 LBS | BODY MASS INDEX: 39.67 KG/M2

## 2024-11-26 VITALS — DIASTOLIC BLOOD PRESSURE: 72 MMHG | SYSTOLIC BLOOD PRESSURE: 116 MMHG

## 2024-11-26 DIAGNOSIS — Z96.651 S/P TOTAL KNEE REPLACEMENT, RIGHT: ICD-10-CM

## 2024-11-26 DIAGNOSIS — Z96.651 S/P TOTAL KNEE REPLACEMENT, RIGHT: Primary | ICD-10-CM

## 2024-11-26 DIAGNOSIS — I10 ESSENTIAL HYPERTENSION, BENIGN: Primary | ICD-10-CM

## 2024-11-26 PROCEDURE — 99999 PR PBB SHADOW E&M-EST. PATIENT-LVL III: CPT | Mod: PBBFAC,,,

## 2024-11-26 PROCEDURE — 73562 X-RAY EXAM OF KNEE 3: CPT | Mod: TC,RT

## 2024-11-26 PROCEDURE — 99024 POSTOP FOLLOW-UP VISIT: CPT | Mod: S$GLB,,,

## 2024-11-26 PROCEDURE — 1159F MED LIST DOCD IN RCRD: CPT | Mod: CPTII,S$GLB,,

## 2024-11-26 PROCEDURE — 1160F RVW MEDS BY RX/DR IN RCRD: CPT | Mod: CPTII,S$GLB,,

## 2024-11-26 PROCEDURE — 73562 X-RAY EXAM OF KNEE 3: CPT | Mod: 26,RT,, | Performed by: RADIOLOGY

## 2024-11-26 PROCEDURE — 1126F AMNT PAIN NOTED NONE PRSNT: CPT | Mod: CPTII,S$GLB,,

## 2024-11-26 NOTE — PROGRESS NOTES
HPI:  Lashanda Monaco presents for 6-week post-operative visit following a right total knee arthroplasty performed by Dr. Chowdhury on 10/17/2024. She is also status post incision and drainage of the right knee after wound dehiscence performed by Dr. Chowdhury on 10/25/2024. Patient reports an overall satisfactory recovery.   Medications: Tylenol prn    Assistive Devices: none   PT: Completed at [facility]. Continuing HEP.    Limitations: none    Exam:   There were no vitals taken for this visit.   Gait: normal/limp/antalgic/trendelenburg with no assistive device  Incision: healed, clean, and dry without drainage or erythema. Area of delayed healing proximally possibly consistent with monocryl suture spitting, healing well  Stability:  Knee stable anterior-posterior varus and valgus stresses, no extensor lag    Current ROM: 0-135  Pre-op ROM: 5-110  PT ROM: 0-130  Knee alignment: normal     Imaging:  Radiographs taken today and reviewed revealing a well fixed and aligned prosthesis.    A/P:  6 weeks s/p right total knee arthroplasty    - Patient's recovery is progressing well with ROM of 0-130 and a well healing incision.   - Outpatient PT: Discharged from [facility]  - Pain medication: none refilled   - Follow up in 6 weeks with Dr. Chowdhury. Pt will call clinic with any problems/concerns.

## 2024-11-27 DIAGNOSIS — M06.9 RHEUMATOID ARTHRITIS FLARE: Primary | ICD-10-CM

## 2024-12-02 ENCOUNTER — TELEPHONE (OUTPATIENT)
Dept: RHEUMATOLOGY | Facility: CLINIC | Age: 69
End: 2024-12-02
Payer: MEDICARE

## 2024-12-03 ENCOUNTER — OFFICE VISIT (OUTPATIENT)
Dept: RHEUMATOLOGY | Facility: CLINIC | Age: 69
End: 2024-12-03
Payer: MEDICARE

## 2024-12-03 VITALS
SYSTOLIC BLOOD PRESSURE: 108 MMHG | DIASTOLIC BLOOD PRESSURE: 74 MMHG | HEART RATE: 92 BPM | HEIGHT: 64 IN | WEIGHT: 228.63 LBS | BODY MASS INDEX: 39.03 KG/M2

## 2024-12-03 DIAGNOSIS — D84.821 IMMUNODEFICIENCY DUE TO DRUG THERAPY: Primary | ICD-10-CM

## 2024-12-03 DIAGNOSIS — M06.9 RHEUMATOID ARTHRITIS INVOLVING MULTIPLE JOINTS: ICD-10-CM

## 2024-12-03 DIAGNOSIS — Z79.899 IMMUNODEFICIENCY DUE TO DRUG THERAPY: Primary | ICD-10-CM

## 2024-12-03 PROCEDURE — 1159F MED LIST DOCD IN RCRD: CPT | Mod: CPTII,S$GLB,, | Performed by: INTERNAL MEDICINE

## 2024-12-03 PROCEDURE — 3288F FALL RISK ASSESSMENT DOCD: CPT | Mod: CPTII,S$GLB,, | Performed by: INTERNAL MEDICINE

## 2024-12-03 PROCEDURE — 1101F PT FALLS ASSESS-DOCD LE1/YR: CPT | Mod: CPTII,S$GLB,, | Performed by: INTERNAL MEDICINE

## 2024-12-03 PROCEDURE — G2211 COMPLEX E/M VISIT ADD ON: HCPCS | Mod: S$GLB,,, | Performed by: INTERNAL MEDICINE

## 2024-12-03 PROCEDURE — 1125F AMNT PAIN NOTED PAIN PRSNT: CPT | Mod: CPTII,S$GLB,, | Performed by: INTERNAL MEDICINE

## 2024-12-03 PROCEDURE — 3074F SYST BP LT 130 MM HG: CPT | Mod: CPTII,S$GLB,, | Performed by: INTERNAL MEDICINE

## 2024-12-03 PROCEDURE — 99214 OFFICE O/P EST MOD 30 MIN: CPT | Mod: S$GLB,,, | Performed by: INTERNAL MEDICINE

## 2024-12-03 PROCEDURE — 99999 PR PBB SHADOW E&M-EST. PATIENT-LVL III: CPT | Mod: PBBFAC,,, | Performed by: INTERNAL MEDICINE

## 2024-12-03 PROCEDURE — 3008F BODY MASS INDEX DOCD: CPT | Mod: CPTII,S$GLB,, | Performed by: INTERNAL MEDICINE

## 2024-12-03 PROCEDURE — 3078F DIAST BP <80 MM HG: CPT | Mod: CPTII,S$GLB,, | Performed by: INTERNAL MEDICINE

## 2024-12-03 NOTE — PROGRESS NOTES
12/2/2024    11:08 PM   Rapid3 Question Responses and Scores   MDHAQ Score 0.9   Psychologic Score 3.3   Pain Score 5   When you awakened in the morning OVER THE LAST WEEK, did you feel stiff? Yes   If Yes, please indicate the number of hours until you are as limber as you will be for the day 1   Fatigue Score 3.5   Global Health Score 8   RAPID3 Score 5.33     Answers submitted by the patient for this visit:  Rheumatology Questionnaire (Submitted on 12/2/2024)  fever: No  eye redness: No  mouth sores: No  headaches: No  shortness of breath: Yes  chest pain: No  trouble swallowing: No  diarrhea: No  constipation: Yes  unexpected weight change: Yes  genital sore: No  dysuria: No  During the last 3 days, have you had a skin rash?: No  Bruises or bleeds easily: Yes  cough: No

## 2024-12-03 NOTE — PROGRESS NOTES
"Subjective:      Patient ID: Lashanda Monaco is a 68 y.o. female.    Chief Complaint: Disease Management    HPI  68 year old F  with PMH of HL, HTN, basal cell cancer,  bilateral hip replacement, lumbar arthritis,  right rotator cuff tear and bicep tear, left CT release,  here for evaluation. She reports she needs right knee replacement.   She gets pain in neck, shoulders, hands, wrists, right knee, both ankles, lower back, and right foot.  She has been having since she was 64. Pain level  can be as high 7/10.   Reports swelling in ankles and feet for years. Denies any rashes, oral ulcers, fevers, raynauds, or photosensitivity. Reports some hair thinning.   She takes meloxicam 15 mg a day for about  5 years. Denies smoking.     Family hx: sister: RA    Interval history She had right knee replacement on October 17th complicated by fall. This was complicated by wound dehiscence with washout 10/25.   She has been on Rinvoq for about 10 days.   Past Medical History:   Diagnosis Date    Arthritis     Hypercholesterolemia     Hypertension        Review of Systems see HPI     Objective:   BP (!) 157/98   Pulse 97   Ht 5' 4" (1.626 m)   Wt 89.8 kg (198 lb)   BMI 33.99 kg/m²   Physical Exam   Constitutional: She is oriented to person, place, and time.   HENT:   Head: Normocephalic and atraumatic.   Right Ear: External ear normal.   Left Ear: External ear normal.   Nose: Nose normal.   Mouth/Throat: Oropharynx is clear and moist. No oropharyngeal exudate.   Eyes: Pupils are equal, round, and reactive to light. Conjunctivae are normal. Right eye exhibits no discharge. Left eye exhibits no discharge. No scleral icterus.   Neck: No JVD present. No thyromegaly present.   Cardiovascular: Normal rate, regular rhythm and normal heart sounds. Exam reveals no gallop and no friction rub.   No murmur heard.  Pulmonary/Chest: Effort normal and breath sounds normal. No respiratory distress. She has no wheezes. She has no rales. She " exhibits no tenderness.   Abdominal: Soft. Bowel sounds are normal. She exhibits no distension and no mass. There is no abdominal tenderness. There is no rebound and no guarding.   Musculoskeletal:         General: Swelling and tenderness present.      Cervical back: Neck supple.   Lymphadenopathy:     She has no cervical adenopathy.   Neurological: She is alert and oriented to person, place, and time. No cranial nerve deficit. Gait normal. Coordination normal.   Skin: Skin is dry. No rash noted. No erythema. No pallor.   Psychiatric: Affect and judgment normal.   TENDERNESS IN MCPS, PIPS, WRISTS, ANKLES , MTPS WITH SWELLING    No data to display     Assessment:   69  year old F  with PMH of HL, HTN, basal cell cancer,  bilateral hip replacement, lumbar arthritis,  right rotator cuff tear and bicep tear, left CT release,  here for follow up of seronegative RA.  She had right knee replacement on October 17th complicated by fall. This was complicated by wound dehiscence with washout 10/25.   She has been on Rinvoq for about 10 days.     # Seronegative RA:She is coming in with bilateral symmetrical arthritis consistent  RHEUMATOID ARTHRITIS. PATIENT HAS EROSIVE CHANGES IN WRISTS CONSISTENT WITH SERONEGATIVE RA.  GIVEN ABNORMAL CT CHEST, WILL AVOID MTX AS THIS CAN MAKE NODULES WORSE AND CAN AFFECT THE LUNG.She has not been on Rinvoq consistently so will wait before making changes.       - no anti-TNF given basal cell cancer  -no MTX given nodules  -continue Rinvoq 11 mg po q day  --plan to get labs 4 weeks    #Obesity: risks of obesity discussed including increased inflammation.  -discussed diet including anti-inflammatory diet and handout was given  -encouraged daily exercise 30 minutes a day       Immunodeficiency due to drug-   -monitor carefully for infection and any toxicities associated with immunosuppressants  -advised age appropriate cancer screenings including yearly skin exam and age appropriate  vaccinations  -advised to seek immediate care in the setting of infection and hold immunosuppressive medications if there is infection      40 * minutes of total time spent on the encounter, which includes face to face time and non-face to face time preparing to see the patient (eg, review of tests), Obtaining and/or reviewing separately obtained history, Documenting clinical information in the electronic or other health record, Independently interpreting results (not separately reported) and communicating results to the patient/family/caregiver, or Care coordination (not separately reported).

## 2024-12-19 ENCOUNTER — PATIENT MESSAGE (OUTPATIENT)
Dept: RHEUMATOLOGY | Facility: CLINIC | Age: 69
End: 2024-12-19
Payer: MEDICARE

## 2024-12-24 ENCOUNTER — PATIENT MESSAGE (OUTPATIENT)
Dept: ADMINISTRATIVE | Facility: OTHER | Age: 69
End: 2024-12-24
Payer: MEDICARE

## 2024-12-26 ENCOUNTER — TELEPHONE (OUTPATIENT)
Dept: RHEUMATOLOGY | Facility: CLINIC | Age: 69
End: 2024-12-26
Payer: MEDICARE

## 2024-12-26 NOTE — TELEPHONE ENCOUNTER
Left voicemail asking patient to contact the office back.      ----- Message from Timbo Kaufman MD sent at 12/26/2024  3:35 PM CST -----  Regarding: RE: Return Call  Contact: [phone]  Let her know  SILVIA is out of town and she should contact her primary doctor.  ----- Message -----  From: Kelle Urrutia MA  Sent: 12/26/2024   1:18 PM CST  To: Anya Callaway MD  Subject: FW: Return Call                                  Patient is asking can you send some medication for her UTI.     Please advise message below  ----- Message -----  From: Dominguez Torres  Sent: 12/26/2024  12:38 PM CST  To: [facility] Joyner Staff  Subject: Return Call                                      Type:  Patient Returning Call    Who Called:The pt   Who Left Message for Patient:Kelle Urrutia MA  Does the patient know what this is regarding?:Return Call   Would the patient rather a call back or a response via [facility]? Call back   Best Call Back Number: [phone]  Additional Information: Pt says she has another UTI, but is out of town. Pt is asking the office to send an order to the pharmacy at :  [place] [address]  Phone number [phone]   Thank you.

## 2024-12-26 NOTE — TELEPHONE ENCOUNTER
Left voicemail asking patient to contact the office back.         ----- Message from Timbo Kaufman MD sent at 12/24/2024  4:48 PM CST -----  Contact: [phone]  Her blood work does not show a reason for the fatigue.  Her anemia is actually better than it was before.  ----- Message -----  From: Kelle Urrutia MA  Sent: 12/24/2024  10:48 AM CST  To: Anya Callaway MD    Please advise message below.  ----- Message -----  From: Xiao Melendez MA  Sent: 12/24/2024  10:37 AM CST  To: [facility]ner Staff      ----- Message -----  From: Haley Limon  Sent: 12/24/2024  10:29 AM CST  To: Tamika Black Staff    .[email or web address]     Patient is calling for Medical Advice regarding: Patient would like the provider to read her blood work results and to give her her call to discuss it today because she is feeling very  lethargic  and not being able to stay awake.    How long has patient had these symptoms: 2 days    Patient wants a call back or thru [facility]:call    Comments:Patient is out of town and had surgery and did recive a pint of blood, lab was drawn on Dec 13, 2024    Please advise patient replies from provider may take up to 48 hours.

## 2025-01-10 ENCOUNTER — PATIENT MESSAGE (OUTPATIENT)
Dept: ORTHOPEDICS | Facility: CLINIC | Age: 70
End: 2025-01-10

## 2025-01-10 DIAGNOSIS — F51.04 PSYCHOPHYSIOLOGIC INSOMNIA: ICD-10-CM

## 2025-01-10 DIAGNOSIS — Z96.651 S/P TOTAL KNEE REPLACEMENT, RIGHT: ICD-10-CM

## 2025-01-11 NOTE — TELEPHONE ENCOUNTER
Care Due:                  Date            Visit Type   Department     Provider  --------------------------------------------------------------------------------                                [facility]  Last Visit: 11-      FOLLOW UP    PRIMARY CARE   Wayne MARK                              [facility]  Next Visit: 01-      FICE VISIT   PRIMARY CARE   Wayne Lundberg                                                            Last  Test          Frequency    Reason                     Performed    Due Date  --------------------------------------------------------------------------------    Lipid Panel.  12 months..  atorvastatin.............  03- 02-    [facility] Embedded Care Due Messages. Reference number: [id number].   1/10/2025 8:15:06 PM CST

## 2025-01-13 RX ORDER — TRAZODONE HYDROCHLORIDE 50 MG/1
50-100 TABLET ORAL NIGHTLY PRN
Qty: 60 TABLET | Refills: 5 | Status: SHIPPED | OUTPATIENT
Start: 2025-01-13

## 2025-01-13 RX ORDER — METHOCARBAMOL 750 MG/1
750 TABLET, FILM COATED ORAL 4 TIMES DAILY PRN
Qty: 40 TABLET | Refills: 0 | Status: SHIPPED | OUTPATIENT
Start: 2025-01-13

## 2025-01-20 ENCOUNTER — PATIENT MESSAGE (OUTPATIENT)
Dept: ORTHOPEDICS | Facility: CLINIC | Age: 70
End: 2025-01-20

## 2025-01-24 ENCOUNTER — PATIENT MESSAGE (OUTPATIENT)
Dept: ORTHOPEDICS | Facility: CLINIC | Age: 70
End: 2025-01-24

## 2025-01-27 ENCOUNTER — OFFICE VISIT (OUTPATIENT)
Dept: OBSTETRICS AND GYNECOLOGY | Facility: CLINIC | Age: 70
End: 2025-01-27
Payer: MEDICARE

## 2025-01-27 VITALS
SYSTOLIC BLOOD PRESSURE: 125 MMHG | DIASTOLIC BLOOD PRESSURE: 94 MMHG | BODY MASS INDEX: 36.89 KG/M2 | WEIGHT: 216.06 LBS | HEIGHT: 64 IN

## 2025-01-27 DIAGNOSIS — Z01.419 ROUTINE GYNECOLOGICAL EXAMINATION: Primary | ICD-10-CM

## 2025-01-27 DIAGNOSIS — N32.81 OAB (OVERACTIVE BLADDER): ICD-10-CM

## 2025-01-27 DIAGNOSIS — N95.1 MENOPAUSAL VASOMOTOR SYNDROME: ICD-10-CM

## 2025-01-27 DIAGNOSIS — N39.498 OTHER SPECIFIED URINARY INCONTINENCE: ICD-10-CM

## 2025-01-27 DIAGNOSIS — N39.41 URGE INCONTINENCE: ICD-10-CM

## 2025-01-27 LAB
BACTERIA #/AREA URNS AUTO: NORMAL /HPF
BILIRUB UR QL STRIP: NEGATIVE
CLARITY UR REFRACT.AUTO: CLEAR
COLOR UR AUTO: YELLOW
GLUCOSE UR QL STRIP: NEGATIVE
HGB UR QL STRIP: NEGATIVE
HYALINE CASTS UR QL AUTO: 0 /LPF
KETONES UR QL STRIP: NEGATIVE
LEUKOCYTE ESTERASE UR QL STRIP: NEGATIVE
MICROSCOPIC COMMENT: NORMAL
NITRITE UR QL STRIP: NEGATIVE
PH UR STRIP: 7 [PH] (ref 5–8)
PROT UR QL STRIP: ABNORMAL
RBC #/AREA URNS AUTO: 1 /HPF (ref 0–4)
SP GR UR STRIP: 1.02 (ref 1–1.03)
SQUAMOUS #/AREA URNS AUTO: 8 /HPF
URN SPEC COLLECT METH UR: ABNORMAL
WBC #/AREA URNS AUTO: 1 /HPF (ref 0–5)

## 2025-01-27 PROCEDURE — 3080F DIAST BP >= 90 MM HG: CPT | Mod: CPTII,S$GLB,, | Performed by: STUDENT IN AN ORGANIZED HEALTH CARE EDUCATION/TRAINING PROGRAM

## 2025-01-27 PROCEDURE — 1126F AMNT PAIN NOTED NONE PRSNT: CPT | Mod: CPTII,S$GLB,, | Performed by: STUDENT IN AN ORGANIZED HEALTH CARE EDUCATION/TRAINING PROGRAM

## 2025-01-27 PROCEDURE — 3288F FALL RISK ASSESSMENT DOCD: CPT | Mod: CPTII,S$GLB,, | Performed by: STUDENT IN AN ORGANIZED HEALTH CARE EDUCATION/TRAINING PROGRAM

## 2025-01-27 PROCEDURE — G0101 CA SCREEN;PELVIC/BREAST EXAM: HCPCS | Mod: S$GLB,,, | Performed by: STUDENT IN AN ORGANIZED HEALTH CARE EDUCATION/TRAINING PROGRAM

## 2025-01-27 PROCEDURE — 81001 URINALYSIS AUTO W/SCOPE: CPT | Performed by: STUDENT IN AN ORGANIZED HEALTH CARE EDUCATION/TRAINING PROGRAM

## 2025-01-27 PROCEDURE — 3074F SYST BP LT 130 MM HG: CPT | Mod: CPTII,S$GLB,, | Performed by: STUDENT IN AN ORGANIZED HEALTH CARE EDUCATION/TRAINING PROGRAM

## 2025-01-27 PROCEDURE — 1101F PT FALLS ASSESS-DOCD LE1/YR: CPT | Mod: CPTII,S$GLB,, | Performed by: STUDENT IN AN ORGANIZED HEALTH CARE EDUCATION/TRAINING PROGRAM

## 2025-01-27 PROCEDURE — 1160F RVW MEDS BY RX/DR IN RCRD: CPT | Mod: CPTII,S$GLB,, | Performed by: STUDENT IN AN ORGANIZED HEALTH CARE EDUCATION/TRAINING PROGRAM

## 2025-01-27 PROCEDURE — 99999 PR PBB SHADOW E&M-EST. PATIENT-LVL III: CPT | Mod: PBBFAC,,, | Performed by: STUDENT IN AN ORGANIZED HEALTH CARE EDUCATION/TRAINING PROGRAM

## 2025-01-27 PROCEDURE — 1159F MED LIST DOCD IN RCRD: CPT | Mod: CPTII,S$GLB,, | Performed by: STUDENT IN AN ORGANIZED HEALTH CARE EDUCATION/TRAINING PROGRAM

## 2025-01-27 PROCEDURE — 87086 URINE CULTURE/COLONY COUNT: CPT | Performed by: STUDENT IN AN ORGANIZED HEALTH CARE EDUCATION/TRAINING PROGRAM

## 2025-01-27 RX ORDER — ESTRADIOL 2 MG/1
2 TABLET ORAL DAILY
Qty: 90 TABLET | Refills: 3 | Status: SHIPPED | OUTPATIENT
Start: 2025-01-27

## 2025-01-27 RX ORDER — OXYBUTYNIN CHLORIDE 5 MG/1
5 TABLET, EXTENDED RELEASE ORAL DAILY
Qty: 30 TABLET | Refills: 11 | Status: SHIPPED | OUTPATIENT
Start: 2025-01-27 | End: 2026-01-27

## 2025-01-27 NOTE — PROGRESS NOTES
"HPI: Pt is a 69 y.o.  female who presents for routine annual exam.   Hysterectomy for POP.  Reports kept ovaries.    Today   Due for HRT refill. On Estradiol 2 mg daily. Has tried to come off recently but symptoms were intolerable - hot flashes interfering with ADLs.  C/o urinary frequency and urgency and UUI  Reports history of bladder suspension in the past    Cervical cancer screening: n/a   History abnormal: no  Breast cancer screening: UTD TC 3.24%  Colon cancer screening: UTD ; 10-yr interval  DXA: UTD  WNL    Family history breast cancer: no  Family history ovarian cancer: yes, son  Family history colon cancer: no      OB History    Para Term  AB Living   1 1 1         SAB IAB Ectopic Multiple Live Births                  # Outcome Date GA Lbr Chilo/2nd Weight Sex Type Anes PTL Lv   1 Term                 BP (!) 125/94   Ht 5' 4" (1.626 m)   Wt 98 kg (216 lb 0.8 oz)   BMI 37.09 kg/m²        PE:   APPEARANCE: Well nourished, well developed female in no acute distress.  NODES: no inguinal lymphadenopathy  BREASTS: Symmetrical, no skin changes or visible lesions. No palpable masses, nipple discharge or adenopathy bilaterally.  ABDOMEN: Soft. No tenderness or masses. No distention.   VULVA: No lesions. Normal external female genitalia.  URETHRAL MEATUS: Normal size and location, no lesions, no prolapse.  URETHRA: No masses, tenderness, or prolapse.  VAGINA: Moist. No lesions or lacerations noted. No abnormal discharge present. No odor present.  +apical prolapse grade 2 with valsalva  CERVIX: absent  UTERUS: absent  ADNEXA: No tenderness. No fullness or masses palpated in the adnexal regions.   ANUS PERINEUM: Normal.      Diagnosis:  1. Routine gynecological examination    2. Menopausal vasomotor syndrome    3. OAB (overactive bladder)    4. Urge incontinence    5. Other specified urinary incontinence        Plan:     - pap not indicated  - MMG, colonoscopy, DXA UTD  - saw urogynecology in " past, declines f/u or repeat referral at this time  - amenable to trying oxybutynin XL - had side effects with short acting int he past, will start low dose  - UA/UCx ordered    Patient was counseled today on the current  ACS guidelines for cervical cytology screening as well as the current recommendations for breast cancer screening.   She was counseled to follow up with her PCP for other routine health maintenance.    Pap/ Pap+HPV next due: n/a, can be deferred indefinitely (hysterectomy) with no history of severe dysplasia/cervical procedres    RTC 1 year or sooner PRN

## 2025-01-29 LAB
BACTERIA UR CULT: NORMAL
BACTERIA UR CULT: NORMAL

## 2025-01-30 DIAGNOSIS — Z00.00 ENCOUNTER FOR MEDICARE ANNUAL WELLNESS EXAM: ICD-10-CM

## 2025-02-05 ENCOUNTER — PATIENT MESSAGE (OUTPATIENT)
Dept: RHEUMATOLOGY | Facility: CLINIC | Age: 70
End: 2025-02-05
Payer: MEDICARE

## 2025-02-05 DIAGNOSIS — R91.8 PULMONARY NODULES: Primary | ICD-10-CM

## 2025-02-06 ENCOUNTER — OFFICE VISIT (OUTPATIENT)
Dept: SPINE | Facility: CLINIC | Age: 70
End: 2025-02-06
Payer: MEDICARE

## 2025-02-06 VITALS
WEIGHT: 216.06 LBS | DIASTOLIC BLOOD PRESSURE: 77 MMHG | SYSTOLIC BLOOD PRESSURE: 127 MMHG | HEART RATE: 85 BPM | BODY MASS INDEX: 36.89 KG/M2 | RESPIRATION RATE: 19 BRPM | OXYGEN SATURATION: 100 % | HEIGHT: 64 IN

## 2025-02-06 DIAGNOSIS — M48.062 LUMBAR STENOSIS WITH NEUROGENIC CLAUDICATION: Primary | ICD-10-CM

## 2025-02-06 PROCEDURE — 1101F PT FALLS ASSESS-DOCD LE1/YR: CPT | Mod: CPTII,S$GLB,, | Performed by: ANESTHESIOLOGY

## 2025-02-06 PROCEDURE — 99214 OFFICE O/P EST MOD 30 MIN: CPT | Mod: S$GLB,,, | Performed by: ANESTHESIOLOGY

## 2025-02-06 PROCEDURE — 3074F SYST BP LT 130 MM HG: CPT | Mod: CPTII,S$GLB,, | Performed by: ANESTHESIOLOGY

## 2025-02-06 PROCEDURE — 1159F MED LIST DOCD IN RCRD: CPT | Mod: CPTII,S$GLB,, | Performed by: ANESTHESIOLOGY

## 2025-02-06 PROCEDURE — 99999 PR PBB SHADOW E&M-EST. PATIENT-LVL IV: CPT | Mod: PBBFAC,,, | Performed by: ANESTHESIOLOGY

## 2025-02-06 PROCEDURE — 3008F BODY MASS INDEX DOCD: CPT | Mod: CPTII,S$GLB,, | Performed by: ANESTHESIOLOGY

## 2025-02-06 PROCEDURE — 3078F DIAST BP <80 MM HG: CPT | Mod: CPTII,S$GLB,, | Performed by: ANESTHESIOLOGY

## 2025-02-06 PROCEDURE — 3288F FALL RISK ASSESSMENT DOCD: CPT | Mod: CPTII,S$GLB,, | Performed by: ANESTHESIOLOGY

## 2025-02-06 PROCEDURE — 1125F AMNT PAIN NOTED PAIN PRSNT: CPT | Mod: CPTII,S$GLB,, | Performed by: ANESTHESIOLOGY

## 2025-02-06 NOTE — PROGRESS NOTES
Chronic Pain - New Consult    Referring Physician: Malathi Candelaria MD    Chief Complaint:   Chief Complaint   Patient presents with    Low-back Pain        SUBJECTIVE:    Lashanda Monaco with history of Rheumatoid arthritis, HTN presents to the clinic for the evaluation of back pain. Patient reports this pain began >10 years ago without known inciting event, and since that time, symptoms have been worsening. Recently had right TKA 10/17/24 and completed physical therapy for that.      Pain Description:  The pain emanates from the bilateral low back and radiates towards b/l posterior hips and sometimes anterior groin/hips. She does reports intermittent burning/shooting pain down R>L posterior legs to her feet. The pain is described as constant sharp and throbbing with associated tightness/spasms. She does endorse myalgias of proximal thighs/buttock with walking distances greater than 150ft.  Symptoms interfere with daily activity and sleeping. Pain is exacerbated with Standing, Walking, and Extension. Pain is alleviated with bending forward, resting, NSAIDs      At BEST  4/10   At WORST  10/10 on the WORST day.    On average pain is rated as 5/10.   Today the pain is rated as 6/10    Patient denies night fever/night sweats, urinary incontinence, bowel incontinence, significant weight loss, significant motor weakness, and loss of sensations.  Patient denies any suicidal or homicidal ideations    Pain Medications:  - Opioids: previously oxycodone 5mg (post-operative)  - NSAIDs: none since starting Rinvoq  - Anti-Depressants: sertraline, trazodone  - Anti-Convulsants: (previously tried lyrica with benefit) reports significant Side effects with gabapentin  - Others: methocarbamol      Physical Therapy/Home Exercise:   yes  performs HEP with regularity     report:  Reviewed and consistent with medication use as prescribed.    Pain Procedures:   1/24/2024: Right L3/4 and L4/5 TFESI: 80% relief x 2-3  weeks  5/22/2024: caudal DOMINIC : 80% relief x 2 weeks    Tried in the past:  Dry needling with benefit    Imaging:   MRI LUMBAR SPINE WITHOUT CONTRAST 12/12/2023     CLINICAL HISTORY:  Radiculopathy, lumbar regionLumbar radiculopathy, symptoms persist with conservative treatment;     TECHNIQUE:  Sagittal T1, sagittal T2, sagittal STIR, axial T1 and axial T2 weighted images of the lumbar spine obtained without contrast.     COMPARISON:  Radiograph 12/12/2023     FINDINGS:  There is rightward scoliotic curvature of the lumbar spine.  Grade 1 anterolisthesis of L5 on S1 and of L4 on L5. The vertebral body heights are well maintained, with no fracture.  There is multilevel degenerative endplate signal changes mostly related to fatty metaplasia throughout the mid and upper lumbar spine.     The conus is normal in appearance.  The adjacent soft tissue structures show no significant abnormalities.     Bilateral peripelvic renal cysts are present.     L1-L2: Spondylosis and facet arthropathy without disc herniation.No significant central canal or neural foraminal narrowing.     L2-L3: Spondylosis and facet arthropathy without disc herniation.  Moderate central spinal stenosis and left-sided foraminal stenosis.     L3-L4: Spondylosis and facet arthropathy.  Prominent lumbar epidural fat contributes to severe central spinal stenosis.  Mild to moderate bilateral foraminal stenosis.     L4-L5: Spondylosis and severe facet arthropathy.  Moderate central spinal stenosis and mild left-sided foraminal stenosis.     L5-S1: Spondylosis and severe facet arthropathy.  No central spinal stenosis.  Severe right foraminal stenosis.     Impression:     Scoliosis, spondylosis and facet arthritis throughout the lumbar spine contributing to multilevel central canal and foraminal stenosis most severe centrally at L3-4. and involving the right neural foramen at L5-S1.       XR CERVICAL SPINE 5 VIEW WITH FLEX AND EXT     FINDINGS:  Vertebral  bodies are intact.  No instability in flexion and extension.  Narrowing of the C 3-C4, C4-C5 and C6-C7 levels.  Arthritic changes seen around the facet joints.    Past Medical History:   Diagnosis Date    Anemia 10/25/2024    Anxiety     Arthritis     Bronchitis 04/04/2024    Cancer     Depression     GERD (gastroesophageal reflux disease)     Hypercholesterolemia     Hypertension      Past Surgical History:   Procedure Laterality Date    ARTHROPLASTY, KNEE, TOTAL, USING COMPUTER-ASSISTED NAVIGATION Right 10/17/2024    Procedure: ARTHROPLASTY, KNEE, TOTAL, USING Trellie COMPUTER-ASSISTED NAVIGATION: RIGHT: DEPUY - [employer];  Surgeon: Jose David Chowdhury III, MD;  Location: [facility];  Service: Orthopedics;  Laterality: Right;    BREAST BIOPSY Right     2010 & 2021    BREAST CYST ASPIRATION      CARPAL TUNNEL RELEASE Left     CLOSURE, SURGICAL WOUND OR DEHISCENCE, EXTENSIVE OR COMPLEX, SECONDARY Right 10/25/2024    Procedure: CLOSURE, SURGICAL WOUND OR DEHISCENCE, EXTENSIVE OR COMPLEX, SECONDARY;  Surgeon: Jose David Chowdhury III, MD;  Location: [facility] OR [address] [place] [address];  Service: Orthopedics;  Laterality: Right;    EPIDURAL STEROID INJECTION N/A 05/22/2024    Procedure: CAUDAL DOMINIC DIRECT REFERRAL;  Surgeon: Oswald Hoskins MD;  Location: [facility];  Service: Pain Management;  Laterality: N/A;  [phone]    hip repl Right 08/2019    HYSTERECTOMY      INCISION AND DRAINAGE OF KNEE Right 10/25/2024    Procedure: INCISION AND DRAINAGE, KNEE - RIGHT;  Surgeon: Jose David Chowdhury III, MD;  Location: [facility] OR [address] [place] [address];  Service: Orthopedics;  Laterality: Right;    JOINT REPLACEMENT Bilateral     hip    RHINOPLASTY      ROTATOR CUFF REPAIR Right     and bicep    ROTATOR CUFF REPAIR Left     SHOULDER ARTHROSCOPY Right 05/2022    right shoulder sx with bicep repair    TONSILLECTOMY, ADENOIDECTOMY      TOTAL ABDOMINAL HYSTERECTOMY W/ BILATERAL SALPINGOOPHORECTOMY      TRANSFORAMINAL EPIDURAL INJECTION OF STEROID Right 01/24/2024     Procedure: LUMBAR TRANSFORAMINAL RIGHT L3/4 AND L4/L5 DIRECT REFERRAL;  Surgeon: Oswald Hoskins MD;  Location: [facility] T;  Service: Pain Management;  Laterality: Right;  [phone]     Social History     Socioeconomic History    Marital status:      Spouse name: Fuentes    Number of children: 1   Tobacco Use    Smoking status: Never    Smokeless tobacco: Never   Substance and Sexual Activity    Alcohol use: Yes     Comment: Occasionally     Drug use: Not Currently     Types: Marijuana     Comment: [facility] brownie at bedtime for sleep    Sexual activity: Not Currently     Partners: Male     Social Drivers of Health     Financial Resource Strain: Low Risk  (10/25/2024)    Overall Financial Resource Strain (CARDIA)     Difficulty of Paying Living Expenses: Not hard at all   Food Insecurity: No Food Insecurity (10/25/2024)    Hunger Vital Sign     Worried About Running Out of Food in the Last Year: Never true     Ran Out of Food in the Last Year: Never true   Transportation Needs: No Transportation Needs (10/25/2024)    TRANSPORTATION NEEDS     Transportation : No   Physical Activity: Inactive (10/25/2024)    Exercise Vital Sign     Days of Exercise per Week: 0 days     Minutes of Exercise per Session: 0 min   Stress: No Stress Concern Present (10/25/2024)    [employer] - Occupational Stress Questionnaire     Feeling of Stress : Not at all   Housing Stability: Low Risk  (10/25/2024)    Housing Stability Vital Sign     Unable to Pay for Housing in the Last Year: No     Homeless in the Last Year: No     Family History   Problem Relation Name Age of Onset    Hyperlipidemia Father      Hypertension Father      Heart attack Father      Hyperlipidemia Mother      Hypertension Mother      Aortic aneurysm Mother  80    Diabetes Brother      Arthritis Brother      Heart disease Brother      Melanoma Brother      Lymphoma Brother          Non-Hodgkin's Lymphoma    Diabetes Brother       Arthritis Brother      Heart disease Brother      Heart disease Brother      Arthritis Sister      Arthritis Sister      Diabetes Sister      Aortic aneurysm Sister      Other (Kidney issues) Sister      Osteoarthritis Sister      Heart disease Sister      Colon cancer Son          no genetic testing    Heart attack Other      Tourette syndrome Other      Tourette syndrome Other      Tourette syndrome Other      Tourette syndrome Other      Breast cancer Neg Hx         Review of patient's allergies indicates:   Allergen Reactions    Kiwi (actinidia chinensis)     Hydrocodone bitartrate Nausea Only       Current Outpatient Medications   Medication Sig    acetaminophen (TYLENOL) 650 MG TbSR Take 1 tablet (650 mg total) by mouth every 8 (eight) hours.    albuterol (PROVENTIL/VENTOLIN HFA) 90 mcg/actuation inhaler Inhale 1-2 puffs into the lungs every 6 (six) hours as needed for Wheezing or Shortness of Breath. Rescue    ALPRAZolam (XANAX) 0.5 MG tablet Take 1 tablet (0.5 mg total) by mouth daily as needed for Anxiety.    amLODIPine (NORVASC) 10 MG tablet TAKE 1 TABLET DAILY    atorvastatin (LIPITOR) 20 MG tablet TAKE 1 TABLET DAILY    estradioL (ESTRACE) 2 MG tablet Take 1 tablet (2 mg total) by mouth once daily.    methocarbamoL (ROBAXIN) 750 MG Tab Take 1 tablet (750 mg total) by mouth 4 (four) times daily as needed (for muscle spasms).    multivitamin (THERAGRAN) per tablet Take 1 tablet by mouth once daily.    ondansetron (ZOFRAN-ODT) 4 MG TbDL Take 1 tablet (4 mg total) by mouth every 6 (six) hours as needed (nausea).    oxybutynin (DITROPAN-XL) 5 MG TR24 Take 1 tablet (5 mg total) by mouth once daily.    oxyCODONE (ROXICODONE) 5 MG immediate release tablet Take 1-2 tablets every 4-6 hours as needed for pain    pantoprazole (PROTONIX) 40 MG tablet Take 1 tablet (40 mg total) by mouth once daily.    sertraline (ZOLOFT) 100 MG tablet Take 1 tablet (100 mg total) by mouth once daily.    traZODone (DESYREL) 50 MG  "tablet Take 1-2 tablets ( mg total) by mouth nightly as needed for Insomnia.    upadacitinib (RINVOQ) 15 mg 24 hr tablet Take 1 tablet (15 mg total) by mouth once daily. You may restart your Rinvoq 2 weeks after surgery (10/31/24).    aspirin (ECOTRIN) 81 MG EC tablet Take 1 tablet (81 mg total) by mouth 2 (two) times a day.     No current facility-administered medications for this visit.       REVIEW OF SYSTEMS:    GENERAL:  No weight loss, malaise or fevers.  HEENT:   No recent changes in vision or hearing  NECK:  Negative for lumps, no difficulty with swallowing.  RESPIRATORY:  Negative for cough, wheezing or shortness of breath, patient denies any recent URI.  CARDIOVASCULAR:  Negative for chest pain, leg swelling or palpitations.  GI:  Negative for abdominal discomfort, blood in stools or black stools or change in bowel habits.  MUSCULOSKELETAL:  See HPI.  SKIN:  Negative for lesions, rash, and itching.  PSYCH:  No mood disorder or recent psychosocial stressors.  Patients sleep is not disturbed secondary to pain.  HEMATOLOGY/LYMPHOLOGY:  Negative for prolonged bleeding, bruising easily or swollen nodes.  Patient is not currently taking any anti-coagulants  NEURO:   No history of headaches, syncope, paralysis, seizures or tremors.  All other reviewed and negative other than HPI.    OBJECTIVE:    /77 (BP Location: Right arm, Patient Position: Sitting)   Pulse 85   Resp 19   Ht 5' 4" (1.626 m)   Wt 98 kg (216 lb 0.8 oz)   SpO2 100%   BMI 37.09 kg/m²     PHYSICAL EXAMINATION:    GENERAL: Well appearing, in no acute distress, alert and oriented x3.  PSYCH:  Mood and affect appropriate.  SKIN: Skin color, texture, turgor normal, no rashes or lesions.  HEAD/FACE:  Normocephalic, atraumatic. Cranial nerves grossly intact.  CV: RRR with palpation of the radial artery.  PULM: No evidence of respiratory difficulty, symmetric chest rise.  GI:  Soft and non-tender.  BACK: AROM limited in all planes most " significantly with extension and axial rotation. Facet loading mildly positive b/l. Straight leg raising in the sitting positions is negative to radicular pain. TTP over lumbosacral region as well as gluteal musculature  MUSKULOSKELETAL:  - No obvious deformity or signs of trauma, Normal lumbar lordotic curve  - Positive spinous or paraspinous tenderness over the lumbar area.  - Positive SI joint tenderness  - Antalgic gait uses walker     EXTREMITIES: Peripheral joint ROM is full and pain free without obvious instability or laxity in all four extremities. No deformities, edema, or skin discoloration. Good capillary refill.    NEURO:   MENTAL STATUS: A x O x 3, good concentration, speech is fluent and goal directed  MEMORY: recent and remote are intact  Strength: 5/5 in BLE  DTRs 2+ and symmetric in bilateral upper and lower extremities.  Plantar response are downgoing. No clonus.    Sensation to light touch intact.  GAIT: antalgic. Walks with walker      ASSESSMENT: 69 y.o. year old female with chronic back pain, consistent with     1. Lumbar stenosis with neurogenic claudication  Ambulatory referral/consult to Neurosurgery        DISCUSSION: Ms. Monaco is a very nice woman with hx of RA. She has come to us as a direct referral for several epidurals with excellent but short relief term relief. She has low back and thigh pain after walking 1 block which requires her to sit down. MRI revealed severe canal stenosis at L3/4 and moderate stenosis at L4/5. There is minimal contribution from the ligamentum.       PLAN:   1) Reviewed Lumbar MRI with patient   2) Discussed that surgery is really the only way to alleviate this.   3) Referral to Dr. Fried for an minimally invasive options  4) Continue with HEP/stretching program  5) RTC prn    The above plan and management options were discussed at length with patient. Patient is in agreement with the above and verbalized understanding. It will be communicated with the  referring physician via electronic record, fax, or mail.    Malathi Candelaria  02/06/2025

## 2025-02-07 ENCOUNTER — TELEPHONE (OUTPATIENT)
Dept: CARDIOLOGY | Facility: CLINIC | Age: 70
End: 2025-02-07
Payer: MEDICARE

## 2025-02-07 ENCOUNTER — OFFICE VISIT (OUTPATIENT)
Dept: ORTHOPEDICS | Facility: CLINIC | Age: 70
End: 2025-02-07
Payer: MEDICARE

## 2025-02-07 VITALS — HEIGHT: 64 IN | BODY MASS INDEX: 37.16 KG/M2 | WEIGHT: 217.63 LBS

## 2025-02-07 DIAGNOSIS — Z96.651 S/P TOTAL KNEE REPLACEMENT, RIGHT: Primary | ICD-10-CM

## 2025-02-07 DIAGNOSIS — R06.02 SOB (SHORTNESS OF BREATH): Primary | ICD-10-CM

## 2025-02-07 PROCEDURE — 1159F MED LIST DOCD IN RCRD: CPT | Mod: CPTII,S$GLB,, | Performed by: ORTHOPAEDIC SURGERY

## 2025-02-07 PROCEDURE — 99213 OFFICE O/P EST LOW 20 MIN: CPT | Mod: S$GLB,,, | Performed by: ORTHOPAEDIC SURGERY

## 2025-02-07 PROCEDURE — 1126F AMNT PAIN NOTED NONE PRSNT: CPT | Mod: CPTII,S$GLB,, | Performed by: ORTHOPAEDIC SURGERY

## 2025-02-07 PROCEDURE — 3008F BODY MASS INDEX DOCD: CPT | Mod: CPTII,S$GLB,, | Performed by: ORTHOPAEDIC SURGERY

## 2025-02-07 PROCEDURE — 1101F PT FALLS ASSESS-DOCD LE1/YR: CPT | Mod: CPTII,S$GLB,, | Performed by: ORTHOPAEDIC SURGERY

## 2025-02-07 PROCEDURE — 99999 PR PBB SHADOW E&M-EST. PATIENT-LVL III: CPT | Mod: PBBFAC,,, | Performed by: ORTHOPAEDIC SURGERY

## 2025-02-07 PROCEDURE — 3288F FALL RISK ASSESSMENT DOCD: CPT | Mod: CPTII,S$GLB,, | Performed by: ORTHOPAEDIC SURGERY

## 2025-02-07 NOTE — PROGRESS NOTES
Subjective:     HPI:   Lashanda Monaco is a 69 y.o. female who presents 3.5 months out from right TKA    Date of surgery:   R VTKA 10/17/24 PF, fall/superficial washout/closure 10/25/24  genetics no CMT, rheum rinvoq  R ant AURELIANO 2020 Junius - ?fem nerve v foot drop post op  L post AURELIANO 2010 Geremias    History of Present Illness    CHIEF COMPLAINT:  - Left total knee arthroplasty (TKA) follow-up.    HPI:  Ms. Monaco is presenting for follow-up approximately 3.5 months after knee surgery. She reports seeing a pain clinic the day before this appointment. When asked about her knee, she states it's performing well and expresses satisfaction with the progress. She denies any wound issues or current use of pain medication specifically for the knee.    She reports using a walker, but clarifies it's for her back, not the knee. She confirms having completed therapy for the knee. She recalls her first day of therapy positively, attributing her progress to the surgical team.    She inquires about kneeling on the knee replacement, particularly mentioning getting out of the bathtub.    Her medical history includes the use of rheumatologic medications, which is mentioned in the context of needing antibiotics for dental work or invasive procedures.    Ms. Monaco denies any medical diagnoses.    PREVIOUS TREATMENTS:  - Ms. Monaco completed physical therapy for the knee  - Ms. Monaco used a walker for back issues, not for the knee    SURGICAL HISTORY:  - Knee replacement surgery: approximately 3.5 months ago         Medications: none for knee    Assistive Devices: walker for back, not knee     PT: finished    Saw pain clinic yesterday, spinal stenosis with claudication, ref to NSGY   MRI revealed severe canal stenosis at L3/4 and moderate stenosis at L4/5.     Knee doing well, happy with progress  No wound issues             Objective:   Body mass index is 37.35 kg/m².  Exam:    Gait: limp/antalgic none - healed  beautifully     Incision: healed    Stability:  Knee stable anterior-posterior varus and valgus stresses, no extensor lag    Extension: 0    Flexion: 130    Pre op 5-110  6 week 0-135    Physical Exam    Musculoskeletal: Incision has healed beautifully. Amazing range of motion.  IMAGING:  - X-rays of the knee were taken at different time points:  - 1. Before surgery  - 2. Day of surgery  - 3. Six-week follow-up  - Current x-rays (approximately 3.5 months post-surgery) show everything looking solid and good         Imaging:  None today    Results                Assessment:       ICD-10-CM ICD-9-CM   1. S/P total knee replacement, right  Z96.651 V43.65      Doing well, would never know she had skin dehiscence     Plan:       Patient is doing very well with their total knee arthroplasty.  They will continue with their routine care of the knee replacement and see me back for their follow-up at the routine interval.  If there are problems in the interim they will see me back sooner. Prophylactic antibiotic protocol given and explained to patient.     Assessment & Plan    RIGHT ARTIFICIAL KNEE JOINT:   Reviewed x-rays from before surgery, day of surgery, and 6-week follow-up.   Use something soft to pad the knee when kneeling if necessary.   Rest the knee if it feels overused.   Take antibiotics before dental work or invasive procedures.    FOLLOW-UP CARE:   Follow up in 9 months for 1-year anniversary x-rays.   Follow up every 5 years after the 1-year ekta.         9 month follow up for annual xrays     This note was generated with the assistance of ambient listening technology. Verbal consent was obtained by the patient and accompanying visitor(s) for the recording of patient appointment to facilitate this note. I attest to having reviewed and edited the generated note for accuracy, though some syntax or spelling errors may persist. Please contact the author of this note for any clarification.      No orders of the  defined types were placed in this encounter.            Past Medical History:   Diagnosis Date    Anemia 10/25/2024    Anxiety     Arthritis     Bronchitis 04/04/2024    Cancer     Depression     GERD (gastroesophageal reflux disease)     Hypercholesterolemia     Hypertension        Past Surgical History:   Procedure Laterality Date    ARTHROPLASTY, KNEE, TOTAL, USING COMPUTER-ASSISTED NAVIGATION Right 10/17/2024    Procedure: ARTHROPLASTY, KNEE, TOTAL, USING VELYS COMPUTER-ASSISTED NAVIGATION: RIGHT: DEPUY - ATTUNE;  Surgeon: Jose David Chowdhury III, MD;  Location: [facility];  Service: Orthopedics;  Laterality: Right;    BREAST BIOPSY Right     2010 & 2021    BREAST CYST ASPIRATION      CARPAL TUNNEL RELEASE Left     CLOSURE, SURGICAL WOUND OR DEHISCENCE, EXTENSIVE OR COMPLEX, SECONDARY Right 10/25/2024    Procedure: CLOSURE, SURGICAL WOUND OR DEHISCENCE, EXTENSIVE OR COMPLEX, SECONDARY;  Surgeon: Jose David Chowdhury III, MD;  Location: [facility] OR [address] [place] [address];  Service: Orthopedics;  Laterality: Right;    EPIDURAL STEROID INJECTION N/A 05/22/2024    Procedure: CAUDAL DOMINIC DIRECT REFERRAL;  Surgeon: Oswald Hoskins MD;  Location: [facility];  Service: Pain Management;  Laterality: N/A;  [phone]    hip repl Right 08/2019    HYSTERECTOMY      INCISION AND DRAINAGE OF KNEE Right 10/25/2024    Procedure: INCISION AND DRAINAGE, KNEE - RIGHT;  Surgeon: Jose David Chowdhury III, MD;  Location: [facility] OR [address] [place] [address];  Service: Orthopedics;  Laterality: Right;    JOINT REPLACEMENT Bilateral     hip    RHINOPLASTY      ROTATOR CUFF REPAIR Right     and bicep    ROTATOR CUFF REPAIR Left     SHOULDER ARTHROSCOPY Right 05/2022    right shoulder sx with bicep repair    TONSILLECTOMY, ADENOIDECTOMY      TOTAL ABDOMINAL HYSTERECTOMY W/ BILATERAL SALPINGOOPHORECTOMY      TRANSFORAMINAL EPIDURAL INJECTION OF STEROID Right 01/24/2024    Procedure: LUMBAR TRANSFORAMINAL RIGHT L3/4 AND L4/L5 DIRECT REFERRAL;  Surgeon: Oswald Hoskins MD;   [facility];  Service: Pain Management;  Laterality: Right;  [phone]       Family History   Problem Relation Name Age of Onset    Hyperlipidemia Father      Hypertension Father      Heart attack Father      Hyperlipidemia Mother      Hypertension Mother      Aortic aneurysm Mother  80    Diabetes Brother      Arthritis Brother      Heart disease Brother      Melanoma Brother      Lymphoma Brother          Non-Hodgkin's Lymphoma    Diabetes Brother      Arthritis Brother      Heart disease Brother      Heart disease Brother      Arthritis Sister      Arthritis Sister      Diabetes Sister      Aortic aneurysm Sister      Other (Kidney issues) Sister      Osteoarthritis Sister      Heart disease Sister      Colon cancer Son          no genetic testing    Heart attack Other      Tourette syndrome Other      Tourette syndrome Other      Tourette syndrome Other      Tourette syndrome Other      Breast cancer Neg Hx         Social History     Socioeconomic History    Marital status:      Spouse name: Fuentes    Number of children: 1   Tobacco Use    Smoking status: Never    Smokeless tobacco: Never   Substance and Sexual Activity    Alcohol use: Yes     Comment: Occasionally     Drug use: Not Currently     Types: Marijuana     Comment: [facility] brownie at bedtime for sleep    Sexual activity: Not Currently     Partners: Male     Social Drivers of Health     Financial Resource Strain: Low Risk  (10/25/2024)    Overall Financial Resource Strain (CARDIA)     Difficulty of Paying Living Expenses: Not hard at all   Food Insecurity: No Food Insecurity (10/25/2024)    Hunger Vital Sign     Worried About Running Out of Food in the Last Year: Never true     Ran Out of Food in the Last Year: Never true   Transportation Needs: No Transportation Needs (10/25/2024)    TRANSPORTATION NEEDS     Transportation : No   Physical Activity: Inactive (10/25/2024)    Exercise Vital Sign     Days of Exercise per Week: 0 days      Minutes of Exercise per Session: 0 min   Stress: No Stress Concern Present (10/25/2024)    [employer] - Occupational Stress Questionnaire     Feeling of Stress : Not at all   Housing Stability: Low Risk  (10/25/2024)    Housing Stability Vital Sign     Unable to Pay for Housing in the Last Year: No     Homeless in the Last Year: No

## 2025-02-10 ENCOUNTER — TELEPHONE (OUTPATIENT)
Dept: CARDIOLOGY | Facility: CLINIC | Age: 70
End: 2025-02-10
Payer: MEDICARE

## 2025-02-12 ENCOUNTER — OFFICE VISIT (OUTPATIENT)
Dept: CARDIOLOGY | Facility: CLINIC | Age: 70
End: 2025-02-12
Payer: MEDICARE

## 2025-02-12 ENCOUNTER — HOSPITAL ENCOUNTER (OUTPATIENT)
Dept: CARDIOLOGY | Facility: CLINIC | Age: 70
Discharge: HOME | End: 2025-02-12
Payer: MEDICARE

## 2025-02-12 VITALS
OXYGEN SATURATION: 97 % | HEART RATE: 91 BPM | SYSTOLIC BLOOD PRESSURE: 126 MMHG | BODY MASS INDEX: 37.26 KG/M2 | DIASTOLIC BLOOD PRESSURE: 75 MMHG | WEIGHT: 218.25 LBS | HEIGHT: 64 IN

## 2025-02-12 DIAGNOSIS — R06.02 SOB (SHORTNESS OF BREATH): ICD-10-CM

## 2025-02-12 DIAGNOSIS — R06.02 SOB (SHORTNESS OF BREATH): Primary | ICD-10-CM

## 2025-02-12 DIAGNOSIS — E78.5 HYPERLIPIDEMIA: ICD-10-CM

## 2025-02-12 LAB
OHS QRS DURATION: 80 MS
OHS QTC CALCULATION: 459 MS

## 2025-02-12 PROCEDURE — 93010 ELECTROCARDIOGRAM REPORT: CPT | Mod: S$GLB,,, | Performed by: INTERNAL MEDICINE

## 2025-02-12 PROCEDURE — 99999 PR PBB SHADOW E&M-EST. PATIENT-LVL IV: CPT | Mod: PBBFAC,GC,, | Performed by: STUDENT IN AN ORGANIZED HEALTH CARE EDUCATION/TRAINING PROGRAM

## 2025-02-12 NOTE — PROGRESS NOTES
Clinic Note  3/3/2025      Subjective:       Patient ID:  Lashanda is a 69 y.o. female being seen for an urgent care visit.    Chief Complaint: Shortness of Breath    69 F  with PMH of HL, HTN, basal cell cancer,  bilateral hip replacement, lumbar arthritis,  right rotator cuff tear and bicep tear, left CT release, right TKA,  here for [facility].   Patients states recurrent shortness of breath and palpitation on exertion. She can only walk around 100 feet before experiencing the symptoms. Symptoms resolve with rest.   These symptoms have been occurring for the past 1 year but worse over the past 1 month.       Today she denies a palpitations, presyncope, syncope, leg swelling, PND, or orthopnea.           Family hx:   Father; MI 50's.   Mother; AAA in 60's.   Brother; MI in 30's.   sister: MI in her 60's.         Past Medical History:   Diagnosis Date    Anemia 10/25/2024    Anxiety     Arthritis     Bronchitis 04/04/2024    Cancer     Depression     GERD (gastroesophageal reflux disease)     Hypercholesterolemia     Hypertension      Past Surgical History:   Procedure Laterality Date    ARTHROPLASTY, KNEE, TOTAL, USING COMPUTER-ASSISTED NAVIGATION Right 10/17/2024    Procedure: ARTHROPLASTY, KNEE, TOTAL, USING TopTenREVIEWS COMPUTER-ASSISTED NAVIGATION: RIGHT: DEPUY - ATTUNE;  Surgeon: Jose David Chowdhury III, MD;  Location: [facility] OR;  Service: Orthopedics;  Laterality: Right;    BREAST BIOPSY Right     2010 & 2021    BREAST CYST ASPIRATION      CARPAL TUNNEL RELEASE Left     CLOSURE, SURGICAL WOUND OR DEHISCENCE, EXTENSIVE OR COMPLEX, SECONDARY Right 10/25/2024    Procedure: CLOSURE, SURGICAL WOUND OR DEHISCENCE, EXTENSIVE OR COMPLEX, SECONDARY;  Surgeon: Jose David Chowdhury III, MD;  Location: [facility] OR [address] [place] [address];  Service: Orthopedics;  Laterality: Right;    EPIDURAL STEROID INJECTION N/A 05/22/2024    Procedure: CAUDAL DOMINIC DIRECT REFERRAL;  Surgeon: Oswald Hoskins MD;  Location: [facility] PAIN MGT;  Service: Pain Management;   Laterality: N/A;  [phone]    hip repl Right 08/2019    HYSTERECTOMY      INCISION AND DRAINAGE OF KNEE Right 10/25/2024    Procedure: INCISION AND DRAINAGE, KNEE - RIGHT;  Surgeon: Jose David Chowdhury III, MD;  Location: [facility] OR [address] [place] [address];  Service: Orthopedics;  Laterality: Right;    JOINT REPLACEMENT Bilateral     hip    RHINOPLASTY      ROTATOR CUFF REPAIR Right     and bicep    ROTATOR CUFF REPAIR Left     SHOULDER ARTHROSCOPY Right 05/2022    right shoulder sx with bicep repair    TONSILLECTOMY, ADENOIDECTOMY      TOTAL ABDOMINAL HYSTERECTOMY W/ BILATERAL SALPINGOOPHORECTOMY      TRANSFORAMINAL EPIDURAL INJECTION OF STEROID Right 01/24/2024    Procedure: LUMBAR TRANSFORAMINAL RIGHT L3/4 AND L4/L5 DIRECT REFERRAL;  Surgeon: Oswald Hoskins MD;  Location: [facility];  Service: Pain Management;  Laterality: Right;  [phone]     Current Outpatient Medications on File Prior to Visit   Medication Sig Dispense Refill    acetaminophen (TYLENOL) 650 MG TbSR Take 1 tablet (650 mg total) by mouth every 8 (eight) hours. 120 tablet 0    ALPRAZolam (XANAX) 0.5 MG tablet Take 1 tablet (0.5 mg total) by mouth daily as needed for Anxiety. 20 tablet 1    amLODIPine (NORVASC) 10 MG tablet TAKE 1 TABLET DAILY 90 tablet 3    atorvastatin (LIPITOR) 20 MG tablet TAKE 1 TABLET DAILY 90 tablet 3    multivitamin (THERAGRAN) per tablet Take 1 tablet by mouth once daily. 14 tablet 0    ondansetron (ZOFRAN-ODT) 4 MG TbDL Take 1 tablet (4 mg total) by mouth every 6 (six) hours as needed (nausea). 20 tablet 1    pantoprazole (PROTONIX) 40 MG tablet Take 1 tablet (40 mg total) by mouth once daily. 90 tablet 3    sertraline (ZOLOFT) 100 MG tablet Take 1 tablet (100 mg total) by mouth once daily. 90 tablet 3    traZODone (DESYREL) 50 MG tablet Take 1-2 tablets ( mg total) by mouth nightly as needed for Insomnia. 60 tablet 5    albuterol (PROVENTIL/VENTOLIN HFA) 90 mcg/actuation inhaler Inhale 1-2 puffs into the lungs every 6  (six) hours as needed for Wheezing or Shortness of Breath. Rescue 18 g 0    aspirin (ECOTRIN) 81 MG EC tablet Take 1 tablet (81 mg total) by mouth 2 (two) times a day. 60 tablet 0    methocarbamoL (ROBAXIN) 750 MG Tab Take 1 tablet (750 mg total) by mouth 4 (four) times daily as needed (for muscle spasms). 40 tablet 0    oxyCODONE (ROXICODONE) 5 MG immediate release tablet Take 1-2 tablets every 4-6 hours as needed for pain 40 tablet 0     No current facility-administered medications on file prior to visit.     Review of patient's allergies indicates:   Allergen Reactions    Kiwi (actinidia chinensis)     Hydrocodone bitartrate Nausea Only     Family History   Problem Relation Name Age of Onset    Hyperlipidemia Father      Hypertension Father      Heart attack Father      Hyperlipidemia Mother      Hypertension Mother      Aortic aneurysm Mother  80    Diabetes Brother      Arthritis Brother      Heart disease Brother      Melanoma Brother      Lymphoma Brother          Non-Hodgkin's Lymphoma    Diabetes Brother      Arthritis Brother      Heart disease Brother      Heart disease Brother      Arthritis Sister      Arthritis Sister      Diabetes Sister      Aortic aneurysm Sister      Other (Kidney issues) Sister      Osteoarthritis Sister      Heart disease Sister      Colon cancer Son          no genetic testing    Heart attack Other      Tourette syndrome Other      Tourette syndrome Other      Tourette syndrome Other      Tourette syndrome Other      Breast cancer Neg Hx         Review of Systems   Constitutional:  Positive for malaise/fatigue. Negative for chills, fever and weight loss.   HENT:  Negative for ear pain, hearing loss, sinus pain and sore throat.    Eyes:  Negative for blurred vision.   Respiratory:  Positive for shortness of breath. Negative for cough, sputum production and wheezing.    Cardiovascular:  Negative for chest pain, orthopnea and leg swelling.   Gastrointestinal:  Negative for  abdominal pain, diarrhea, heartburn, nausea and vomiting.   Genitourinary:  Negative for dysuria and urgency.   Musculoskeletal:  Negative for back pain, falls, myalgias and neck pain.   Skin:  Negative for rash.   Neurological:  Negative for dizziness, weakness and headaches.   Psychiatric/Behavioral:  Negative for depression.        Medication List with Changes/Refills   New Medications    PREGABALIN (LYRICA) 150 MG CAPSULE    Take 1 capsule (150 mg total) by mouth 2 (two) times daily.    PREGABALIN (LYRICA) 75 MG CAPSULE    Take 1 capsule (75 mg total) by mouth 2 (two) times daily.   Current Medications    ACETAMINOPHEN (TYLENOL) 650 MG TBSR    Take 1 tablet (650 mg total) by mouth every 8 (eight) hours.    ALBUTEROL (PROVENTIL/VENTOLIN HFA) 90 MCG/ACTUATION INHALER    Inhale 1-2 puffs into the lungs every 6 (six) hours as needed for Wheezing or Shortness of Breath. Rescue    ALPRAZOLAM (XANAX) 0.5 MG TABLET    Take 1 tablet (0.5 mg total) by mouth daily as needed for Anxiety.    AMLODIPINE (NORVASC) 10 MG TABLET    TAKE 1 TABLET DAILY    ASPIRIN (ECOTRIN) 81 MG EC TABLET    Take 1 tablet (81 mg total) by mouth 2 (two) times a day.    ATORVASTATIN (LIPITOR) 20 MG TABLET    TAKE 1 TABLET DAILY    METHOCARBAMOL (ROBAXIN) 750 MG TAB    Take 1 tablet (750 mg total) by mouth 4 (four) times daily as needed (for muscle spasms).    MULTIVITAMIN (THERAGRAN) PER TABLET    Take 1 tablet by mouth once daily.    ONDANSETRON (ZOFRAN-ODT) 4 MG TBDL    Take 1 tablet (4 mg total) by mouth every 6 (six) hours as needed (nausea).    OXYCODONE (ROXICODONE) 5 MG IMMEDIATE RELEASE TABLET    Take 1-2 tablets every 4-6 hours as needed for pain    PANTOPRAZOLE (PROTONIX) 40 MG TABLET    Take 1 tablet (40 mg total) by mouth once daily.    SERTRALINE (ZOLOFT) 100 MG TABLET    Take 1 tablet (100 mg total) by mouth once daily.    TRAZODONE (DESYREL) 50 MG TABLET    Take 1-2 tablets ( mg total) by mouth nightly as needed for Insomnia.  "  Changed and/or Refilled Medications    Modified Medication Previous Medication    ESTRADIOL (ESTRACE) 2 MG TABLET estradioL (ESTRACE) 2 MG tablet       Take 1 tablet (2 mg total) by mouth once daily.    Take 1 tablet (2 mg total) by mouth once daily.    OXYBUTYNIN (DITROPAN-XL) 5 MG TR24 oxybutynin (DITROPAN-XL) 5 MG TR24       Take 1 tablet (5 mg total) by mouth once daily.    Take 1 tablet (5 mg total) by mouth once daily.    UPADACITINIB (RINVOQ) 15 MG 24 HR TABLET upadacitinib (RINVOQ) 15 mg 24 hr tablet       Take 1 tablet (15 mg total) by mouth once daily.    Take 1 tablet (15 mg total) by mouth once daily. You may restart your Rinvoq 2 weeks after surgery (10/31/24).       Patient Active Problem List   Diagnosis    Mixed hyperlipidemia    Essential hypertension, benign    GERD (gastroesophageal reflux disease)    DDD (degenerative disc disease), lumbar    Primary osteoarthritis of both knees    Primary osteoarthritis of both wrists    Moderate episode of recurrent major depressive disorder    History of bilateral hip replacement    Severe obesity (BMI 35.0-39.9) with comorbidity    Myalgia of pelvic floor    Lack of coordination    Muscle weakness    MARIBEL (generalized anxiety disorder)    Chronic right-sided low back pain without sciatica    Weakness of both hips    Impaired functional mobility and activity tolerance    Caregiver stress    Psychophysiologic insomnia    Lumbar radiculopathy    Anxiety    Depressive disorder    Rheumatoid arthritis    Incontinence    Marijuana use    Primary osteoarthritis of right knee    Wound dehiscence, surgical    Status post right knee replacement    Thoracic aortic aneurysm without rupture    Foot drop           Objective:      /75 (Patient Position: Sitting)   Pulse 91   Ht 5' 4" (1.626 m)   Wt 99 kg (218 lb 4.1 oz)   SpO2 97%   BMI 37.46 kg/m²   Estimated body mass index is 37.46 kg/m² as calculated from the following:    Height as of this encounter: 5' 4" " (1.626 m).    Weight as of this encounter: 99 kg (218 lb 4.1 oz).  Physical Exam  Vitals and nursing note reviewed.   Constitutional:       General: She is not in acute distress.     Appearance: Normal appearance. She is not diaphoretic.   HENT:      Head: Normocephalic and atraumatic.      Mouth/Throat:      Mouth: Mucous membranes are moist.   Eyes:      General: No scleral icterus.     Extraocular Movements: Extraocular movements intact.   Neck:      Vascular: No carotid bruit, hepatojugular reflux or JVD.   Cardiovascular:      Rate and Rhythm: Normal rate and regular rhythm.      Pulses: Normal pulses.           Carotid pulses are 2+ on the right side and 2+ on the left side.       Radial pulses are 2+ on the right side and 2+ on the left side.        Femoral pulses are 2+ on the right side and 2+ on the left side.       Popliteal pulses are 2+ on the right side and 2+ on the left side.        Dorsalis pedis pulses are 2+ on the right side and 2+ on the left side.        Posterior tibial pulses are 2+ on the right side and 2+ on the left side.      Heart sounds: Murmur (systolic murmur) heard.      No friction rub.   Pulmonary:      Effort: Pulmonary effort is normal. No respiratory distress.      Breath sounds: Normal breath sounds. No wheezing.   Chest:      Chest wall: No tenderness.   Abdominal:      General: Abdomen is flat. Bowel sounds are normal. There is no distension.      Tenderness: There is no abdominal tenderness.   Musculoskeletal:      Right lower leg: No edema.      Left lower leg: No edema.   Skin:     General: Skin is warm and dry.      Capillary Refill: Capillary refill takes less than 2 seconds.      Findings: No rash or wound.   Neurological:      General: No focal deficit present.      Mental Status: She is alert and oriented to person, place, and time.   Psychiatric:         Mood and Affect: Mood normal.         Thought Content: Thought content normal.           Assessment and Plan:      PARDO   Given symptoms are worse with exercise will order stress echo to rule our ischemic disease   Discussed ordering lipid panel as well   Emphasized diet and exercise     HTN  Bp at goal       Problem List Items Addressed This Visit    None  Visit Diagnoses         SOB (shortness of breath)    -  Primary    Relevant Orders    Stress Echo Which stress agent will be used? Pharmacological; Color Flow Doppler? No (Completed)      Hyperlipidemia                No problem-specific Assessment & Plan notes found for this encounter.      Follow Up:   No follow-ups on file.        Plan discussed with attending Dr. Woodward, further recommendations as per attending addendum. Please feel free to call with any questions or concerns.        Vee Gibbs MD  Cardiology - PGY5  [facility]

## 2025-02-13 ENCOUNTER — PATIENT MESSAGE (OUTPATIENT)
Dept: RHEUMATOLOGY | Facility: CLINIC | Age: 70
End: 2025-02-13
Payer: MEDICARE

## 2025-02-13 ENCOUNTER — TELEPHONE (OUTPATIENT)
Dept: NEUROSURGERY | Facility: CLINIC | Age: 70
End: 2025-02-13
Payer: MEDICARE

## 2025-02-17 ENCOUNTER — OFFICE VISIT (OUTPATIENT)
Dept: PRIMARY CARE CLINIC | Facility: CLINIC | Age: 70
End: 2025-02-17
Payer: MEDICARE

## 2025-02-17 ENCOUNTER — RESULTS FOLLOW-UP (OUTPATIENT)
Dept: PRIMARY CARE CLINIC | Facility: CLINIC | Age: 70
End: 2025-02-17

## 2025-02-17 VITALS
HEIGHT: 64 IN | DIASTOLIC BLOOD PRESSURE: 89 MMHG | BODY MASS INDEX: 37.55 KG/M2 | OXYGEN SATURATION: 98 % | WEIGHT: 219.94 LBS | SYSTOLIC BLOOD PRESSURE: 120 MMHG | HEART RATE: 104 BPM

## 2025-02-17 DIAGNOSIS — F33.1 MODERATE EPISODE OF RECURRENT MAJOR DEPRESSIVE DISORDER: ICD-10-CM

## 2025-02-17 DIAGNOSIS — Z12.31 ENCOUNTER FOR SCREENING MAMMOGRAM FOR BREAST CANCER: ICD-10-CM

## 2025-02-17 DIAGNOSIS — R79.89 ELEVATED SERUM CREATININE: ICD-10-CM

## 2025-02-17 DIAGNOSIS — M51.360 DEGENERATION OF INTERVERTEBRAL DISC OF LUMBAR REGION WITH DISCOGENIC BACK PAIN: Primary | ICD-10-CM

## 2025-02-17 DIAGNOSIS — E66.01 SEVERE OBESITY (BMI 35.0-39.9) WITH COMORBIDITY: ICD-10-CM

## 2025-02-17 DIAGNOSIS — E78.2 MIXED HYPERLIPIDEMIA: ICD-10-CM

## 2025-02-17 DIAGNOSIS — M06.9 RHEUMATOID ARTHRITIS INVOLVING MULTIPLE JOINTS: ICD-10-CM

## 2025-02-17 PROBLEM — D62 ACUTE BLOOD LOSS ANEMIA: Status: RESOLVED | Noted: 2024-10-25 | Resolved: 2025-02-17

## 2025-02-17 PROCEDURE — 3074F SYST BP LT 130 MM HG: CPT | Mod: CPTII,S$GLB,, | Performed by: FAMILY MEDICINE

## 2025-02-17 PROCEDURE — 1125F AMNT PAIN NOTED PAIN PRSNT: CPT | Mod: CPTII,S$GLB,, | Performed by: FAMILY MEDICINE

## 2025-02-17 PROCEDURE — 1101F PT FALLS ASSESS-DOCD LE1/YR: CPT | Mod: CPTII,S$GLB,, | Performed by: FAMILY MEDICINE

## 2025-02-17 PROCEDURE — 3008F BODY MASS INDEX DOCD: CPT | Mod: CPTII,S$GLB,, | Performed by: FAMILY MEDICINE

## 2025-02-17 PROCEDURE — 3288F FALL RISK ASSESSMENT DOCD: CPT | Mod: CPTII,S$GLB,, | Performed by: FAMILY MEDICINE

## 2025-02-17 PROCEDURE — 99214 OFFICE O/P EST MOD 30 MIN: CPT | Mod: S$GLB,,, | Performed by: FAMILY MEDICINE

## 2025-02-17 PROCEDURE — 3079F DIAST BP 80-89 MM HG: CPT | Mod: CPTII,S$GLB,, | Performed by: FAMILY MEDICINE

## 2025-02-17 RX ORDER — PREGABALIN 75 MG/1
75 CAPSULE ORAL 2 TIMES DAILY
Qty: 60 CAPSULE | Refills: 0 | Status: SHIPPED | OUTPATIENT
Start: 2025-02-17 | End: 2025-03-19

## 2025-02-17 RX ORDER — PREGABALIN 150 MG/1
150 CAPSULE ORAL 2 TIMES DAILY
Qty: 60 CAPSULE | Refills: 5 | Status: SHIPPED | OUTPATIENT
Start: 2025-03-19

## 2025-02-17 NOTE — PROGRESS NOTES
Assessment:       1. Degeneration of intervertebral disc of lumbar region with discogenic back pain    2. Rheumatoid arthritis involving multiple joints    3. Moderate episode of recurrent major depressive disorder    4. Severe obesity (BMI 35.0-39.9) with comorbidity    5. Elevated serum creatinine    6. Mixed hyperlipidemia    7. Encounter for screening mammogram for breast cancer         Plan:       Degeneration of intervertebral disc of lumbar region with discogenic back pain  -     pregabalin (LYRICA) 75 MG capsule; Take 1 capsule (75 mg total) by mouth 2 (two) times daily.  Dispense: 60 capsule; Refill: 0  -     pregabalin (LYRICA) 150 MG capsule; Take 1 capsule (150 mg total) by mouth 2 (two) times daily.  Dispense: 60 capsule; Refill: 5    Rheumatoid arthritis involving multiple joints    Moderate episode of recurrent major depressive disorder    Severe obesity (BMI 35.0-39.9) with comorbidity    Elevated serum creatinine  -     Renal Function Panel; Future; Expected date: 02/17/2025    Mixed hyperlipidemia  -     Lipid Panel; Future; Expected date: 02/17/2025    Encounter for screening mammogram for breast cancer  -     Mammo Digital Screening Bilat w/ Jhonatan (XPD); Future; Expected date: 04/16/2025      Assessment & Plan    - Assessed kidney function and considered impact on medication choices, particularly NSAIDs  - Evaluated effectiveness and tolerability of pregabalin vs gabapentin for pain management  - Evaluated cardiac workup results and potential need for further evaluation  - Noted progression of patient's 's dementia and its impact on patient's health and care responsibilities    SEVERE BACK PAIN:   Assessed the patient's severe back pain, which is affecting mobility and requiring the use of a walker.   The pain radiates to hips and thighs, affecting both sides.   Noted that pain management evaluated the patient but could not offer treatment.   Referred the patient to a neurosurgeon for  evaluation of non-invasive treatment options.   Advised the patient to continue using a heating pad for pain relief.   Prescribed pregabalin 75 mg twice daily for 30 days, then increase to 150 mg twice daily.   Instructed the patient to use Tylenol 650 mg as needed for pain relief.   Encouraged the patient to attempt exercise for pain management.   Scheduled a follow-up with neurosurgery appointment on Friday to discuss potential non-invasive treatments for back pain.   Noted the patient's report of pain in thighs.   Noted the patient's report of difficulty sleeping due to pain.   Noted that the patient uses a walker for mobility.   Recommend exercise as tolerated, recognizing current mobility limitations.    RHEUMATOID ARTHRITIS:   Noted that the patient is taking RINVOC for rheumatoid arthritis.   Instructed the patient to reapply for RINVOC medication due to affordability issues.    KIDNEY FUNCTION CONCERNS:   Advised against taking ibuprofen due to kidney function concerns, as per rheumatologist's recommendation.   Scheduled rheumatologist to recheck kidney function.   Explained the difference between ibuprofen and naproxen (Aleve) as NSAIDs with similar effects.   Discussed potential impact of NSAIDs on kidney function.   Discontinued ibuprofen temporarily pending kidney function results.   Advised that ibuprofen or naproxen may be restarted if kidney function returns to normal.   Ordered renal function panel and lipid panel.   Instructed the patient to contact the office if kidney function results are normal to discuss restarting NSAIDs.   Patient to stay hydrated to support kidney function.    DEPRESSION:   Noted that the patient reports experiencing depression.   Acknowledged the patient's difficulties in managing spouse with dementia.   Arranged for a temporary home health aide to assist with spouse's care.    SHORTNESS OF BREATH:   Noted the patient's reports of shortness of breath and shaking during  physical activity.   Observed no coughing or wheezing, only shortness of breath with exertion.   Initiated cardiac workup for breathing issues.   Ordered a stress test as recommended by the cardiologist.    FOOT PAIN AND INSTABILITY:   Noted the patient's report of foot pain and instability.    FOLLOW UP:   Scheduled a follow-up with neurosurgery appointment on Friday to discuss potential non-invasive treatments for back pain.   Instructed the patient to contact the office if kidney function results are normal to discuss restarting NSAIDs.       Medication List with Changes/Refills   New Medications    PREGABALIN (LYRICA) 150 MG CAPSULE    Take 1 capsule (150 mg total) by mouth 2 (two) times daily.    PREGABALIN (LYRICA) 75 MG CAPSULE    Take 1 capsule (75 mg total) by mouth 2 (two) times daily.   Current Medications    ACETAMINOPHEN (TYLENOL) 650 MG TBSR    Take 1 tablet (650 mg total) by mouth every 8 (eight) hours.    ALBUTEROL (PROVENTIL/VENTOLIN HFA) 90 MCG/ACTUATION INHALER    Inhale 1-2 puffs into the lungs every 6 (six) hours as needed for Wheezing or Shortness of Breath. Rescue    ALPRAZOLAM (XANAX) 0.5 MG TABLET    Take 1 tablet (0.5 mg total) by mouth daily as needed for Anxiety.    AMLODIPINE (NORVASC) 10 MG TABLET    TAKE 1 TABLET DAILY    ASPIRIN (ECOTRIN) 81 MG EC TABLET    Take 1 tablet (81 mg total) by mouth 2 (two) times a day.    ATORVASTATIN (LIPITOR) 20 MG TABLET    TAKE 1 TABLET DAILY    ESTRADIOL (ESTRACE) 2 MG TABLET    Take 1 tablet (2 mg total) by mouth once daily.    METHOCARBAMOL (ROBAXIN) 750 MG TAB    Take 1 tablet (750 mg total) by mouth 4 (four) times daily as needed (for muscle spasms).    MULTIVITAMIN (THERAGRAN) PER TABLET    Take 1 tablet by mouth once daily.    ONDANSETRON (ZOFRAN-ODT) 4 MG TBDL    Take 1 tablet (4 mg total) by mouth every 6 (six) hours as needed (nausea).    OXYBUTYNIN (DITROPAN-XL) 5 MG TR24    Take 1 tablet (5 mg total) by mouth once daily.    OXYCODONE  (ROXICODONE) 5 MG IMMEDIATE RELEASE TABLET    Take 1-2 tablets every 4-6 hours as needed for pain    PANTOPRAZOLE (PROTONIX) 40 MG TABLET    Take 1 tablet (40 mg total) by mouth once daily.    SERTRALINE (ZOLOFT) 100 MG TABLET    Take 1 tablet (100 mg total) by mouth once daily.    TRAZODONE (DESYREL) 50 MG TABLET    Take 1-2 tablets ( mg total) by mouth nightly as needed for Insomnia.    UPADACITINIB (RINVOQ) 15 MG 24 HR TABLET    Take 1 tablet (15 mg total) by mouth once daily. You may restart your Rinvoq 2 weeks after surgery (10/31/24).         Subjective:    Patient ID: Lashanda Monaco is a 69 y.o. female.  Chief Complaint: Shortness of Breath (Would like to review kidney function lab result and Obgyn suggested a follow up with PCP. )    HPI  History of Present Illness    CHIEF COMPLAINT:  Patient presents today for multiple concerns including back pain, breathing issues, and medication management.    BACK PAIN AND MOBILITY:  She reports severe back pain significantly impacting mobility. Pain radiates to bilateral hips and thighs, initially worse on right side but now diffuse. Pain is tolerable at rest but exacerbated by walking distances. She requires roller walker for ambulation and frequently needs to sit on walker for relief. Pain management interventions have been unsuccessful. She experiences frequent foot cracking and episodes where her foot feels unstable, requiring support from nearby objects.    RESPIRATORY:  She reports shortness of breath with exertion, particularly when walking distances, accompanied by shaking episodes during periods of breathlessness. She denies coughing, wheezing, and difficulty swallowing.    MEDICATIONS:  She currently takes RINVOC for rheumatoid arthritis and Tylenol 650mg two tablets as needed for pain management. She previously used pregabalin (Lyrica) but has no refills remaining. She discontinued ibuprofen 200mg at night due to kidney function concerns per  "rheumatologist's advice.    MEDICAL CONDITIONS:  She reports abnormal kidney function.    WEIGHT MANAGEMENT:  She reports significant weight gain attributed to immobility.    MENTAL HEALTH:  She reports worsening depression related to caregiver stress. She experiences significant sleep disturbances due to caring for her  with dementia who gets up 3-4 times per night, coming into her room at various hours. She expresses difficulty managing her 's care, particularly with bathing and showering due to her back issues. Her  will not allow her to sleep in the same bed and looks for her when she is not at home.      ROS:  Constitutional: +weight gain  ENT: -difficulty swallowing  Respiratory: +shortness of breath, -cough, -wheezing  Musculoskeletal: +back pain  Psychiatric: +depression, +sleep difficulty       Review of Systems    Objective:      Vitals:    02/17/25 1103   BP: 120/89   BP Location: Left arm   Patient Position: Sitting   Pulse: 104   SpO2: 98%   Weight: 99.7 kg (219 lb 14.5 oz)   Height: 5' 4" (1.626 m)     BP Readings from Last 5 Encounters:   02/17/25 120/89   02/12/25 126/75   02/06/25 127/77   01/27/25 (!) 125/94   12/03/24 108/74     Wt Readings from Last 5 Encounters:   02/17/25 99.7 kg (219 lb 14.5 oz)   02/12/25 99 kg (218 lb 4.1 oz)   02/07/25 98.7 kg (217 lb 9.5 oz)   02/06/25 98 kg (216 lb 0.8 oz)   01/27/25 98 kg (216 lb 0.8 oz)     Physical Exam  Physical Exam    General: Well-developed. Well-nourished. No acute distress.  Eyes: EOMI. Sclerae anicteric.  HENT: Normocephalic. Atraumatic. Nares patent. Moist oral mucosa.  Cardiovascular: Regular rate. Regular rhythm. No murmurs. No rubs. No gallops. Normal S1, S2.  Respiratory: Normal respiratory effort. Clear to auscultation bilaterally. No rales. No rhonchi. No wheezing.  Musculoskeletal: No  obvious deformity.  Extremities: No lower extremity edema.  Neurological: Alert & oriented x3. No slurred speech. Normal " gait.  Psychiatric: Normal mood. Normal affect. Good insight. Good judgment.  Skin: Warm. Dry. No rash.         Lab Results   Component Value Date    WBC 7.96 02/03/2025    HGB 12.0 02/03/2025    HCT 37.8 02/03/2025     02/03/2025    CHOL 221 (H) 02/17/2025    TRIG 158 (H) 02/17/2025    HDL 93 (H) 02/17/2025    ALT 14 02/03/2025    AST 20 02/03/2025     02/17/2025    K 4.1 02/17/2025     02/17/2025    CREATININE 0.9 02/17/2025    BUN 18 02/17/2025    CO2 26 02/17/2025    TSH 1.293 03/01/2023    INR 1.0 10/25/2024    HGBA1C 5.5 03/01/2023      This note was generated with the assistance of ambient listening technology. Verbal consent was obtained by the patient and accompanying visitor(s) for the recording of patient appointment to facilitate this note. I attest to having reviewed and edited the generated note for accuracy, though some syntax or spelling errors may persist. Please contact the author of this note for any clarification.

## 2025-02-19 ENCOUNTER — TELEPHONE (OUTPATIENT)
Dept: NEUROSURGERY | Facility: CLINIC | Age: 70
End: 2025-02-19
Payer: MEDICARE

## 2025-02-21 ENCOUNTER — OFFICE VISIT (OUTPATIENT)
Dept: NEUROSURGERY | Facility: CLINIC | Age: 70
End: 2025-02-21
Attending: STUDENT IN AN ORGANIZED HEALTH CARE EDUCATION/TRAINING PROGRAM
Payer: MEDICARE

## 2025-02-21 VITALS
WEIGHT: 222.69 LBS | OXYGEN SATURATION: 97 % | HEART RATE: 64 BPM | DIASTOLIC BLOOD PRESSURE: 88 MMHG | SYSTOLIC BLOOD PRESSURE: 147 MMHG | HEIGHT: 64 IN | BODY MASS INDEX: 38.02 KG/M2

## 2025-02-21 DIAGNOSIS — M48.07 SPINAL STENOSIS, LUMBOSACRAL REGION: Primary | ICD-10-CM

## 2025-02-21 DIAGNOSIS — M06.9 RHEUMATOID ARTHRITIS FLARE: ICD-10-CM

## 2025-02-21 DIAGNOSIS — M48.062 LUMBAR STENOSIS WITH NEUROGENIC CLAUDICATION: ICD-10-CM

## 2025-02-21 RX ORDER — UPADACITINIB 15 MG/1
15 TABLET, EXTENDED RELEASE ORAL DAILY
Qty: 30 TABLET | Refills: 11 | Status: ACTIVE | OUTPATIENT
Start: 2025-02-21

## 2025-02-24 ENCOUNTER — HOSPITAL ENCOUNTER (OUTPATIENT)
Dept: CARDIOLOGY | Facility: HOSPITAL | Age: 70
Discharge: HOME | End: 2025-02-24
Attending: STUDENT IN AN ORGANIZED HEALTH CARE EDUCATION/TRAINING PROGRAM
Payer: MEDICARE

## 2025-02-24 VITALS
WEIGHT: 216 LBS | DIASTOLIC BLOOD PRESSURE: 85 MMHG | HEART RATE: 76 BPM | HEIGHT: 64 IN | BODY MASS INDEX: 36.88 KG/M2 | SYSTOLIC BLOOD PRESSURE: 120 MMHG | RESPIRATION RATE: 18 BRPM

## 2025-02-24 DIAGNOSIS — R06.02 SOB (SHORTNESS OF BREATH): ICD-10-CM

## 2025-02-24 LAB
ASCENDING AORTA: 3.7 CM
AV LVOT MEAN GRADIENT: 2 MMHG
AV LVOT PEAK GRADIENT: 4 MMHG
BSA FOR ECHO PROCEDURE: 2.1 M2
CV ECHO LV RWT: 0.42 CM
CV PHARM DOSE: 20 MG
CV STRESS BASE HR: 70 BPM
DIASTOLIC BLOOD PRESSURE: 85 MMHG
DOP CALC LVOT AREA: 3.5 CM2
DOP CALC LVOT DIAMETER: 2.1 CM
DOP CALC LVOT PEAK VEL: 1 M/S
DOP CALC LVOT STROKE VOLUME: 75.5 CM3
DOP CALCLVOT PEAK VEL VTI: 21.8 CM
E WAVE DECELERATION TIME: 232 MS
E/A RATIO: 0.62
E/E' RATIO: 9 M/S
ECHO EF ESTIMATED: 59 %
ECHO LV POSTERIOR WALL: 0.9 CM (ref 0.6–1.1)
EJECTION FRACTION: 60 %
FRACTIONAL SHORTENING: 32.6 % (ref 28–44)
INTERVENTRICULAR SEPTUM: 0.8 CM (ref 0.6–1.1)
LA MAJOR: 5.2 CM
LA MINOR: 5.3 CM
LA WIDTH: 4.1 CM
LEFT ATRIUM SIZE: 3.5 CM
LEFT ATRIUM VOLUME INDEX MOD: 33 ML/M2
LEFT ATRIUM VOLUME INDEX: 32 ML/M2
LEFT ATRIUM VOLUME MOD: 67 ML
LEFT ATRIUM VOLUME: 64 CM3
LEFT INTERNAL DIMENSION IN SYSTOLE: 2.9 CM (ref 2.1–4)
LEFT VENTRICLE DIASTOLIC VOLUME INDEX: 40.1 ML/M2
LEFT VENTRICLE DIASTOLIC VOLUME: 81 ML
LEFT VENTRICLE MASS INDEX: 56.5 G/M2
LEFT VENTRICLE SYSTOLIC VOLUME INDEX: 16.3 ML/M2
LEFT VENTRICLE SYSTOLIC VOLUME: 33 ML
LEFT VENTRICULAR INTERNAL DIMENSION IN DIASTOLE: 4.3 CM (ref 3.5–6)
LEFT VENTRICULAR MASS: 114.2 G
LV LATERAL E/E' RATIO: 9.9
LV SEPTAL E/E' RATIO: 8.6
MV A" WAVE DURATION": 145.58 MS
MV PEAK A VEL: 1.12 M/S
MV PEAK E VEL: 0.69 M/S
OHS CV CPX 1 MINUTE RECOVERY HEART RATE: 133 BPM
OHS CV CPX 85 PERCENT MAX PREDICTED HEART RATE MALE: 128
OHS CV CPX MAX PREDICTED HEART RATE: 150
OHS CV CPX PATIENT IS FEMALE: 1
OHS CV CPX PATIENT IS MALE: 0
OHS CV CPX PEAK DIASTOLIC BLOOD PRESSURE: 60 MMHG
OHS CV CPX PEAK HEAR RATE: 137 BPM
OHS CV CPX PEAK RATE PRESSURE PRODUCT: NORMAL
OHS CV CPX PEAK SYSTOLIC BLOOD PRESSURE: 105 MMHG
OHS CV CPX PERCENT MAX PREDICTED HEART RATE ACHIEVED: 95
OHS CV CPX RATE PRESSURE PRODUCT PRESENTING: 8400
OHS CV INITIAL DOSE: 10 MCG/KG/MIN
OHS CV PEAK DOSE: 20 MCG/KG/MIN
OHS CV RV/LV RATIO: 0.72 CM
PISA TR MAX VEL: 2.5 M/S
PULM VEIN A" WAVE DURATION": 145.58 MS
PULM VEIN S/D RATIO: 1.62
PULMONIC VEIN PEAK A VELOCITY: 0.4 M/S
PV PEAK D VEL: 0.29 M/S
PV PEAK S VEL: 0.47 M/S
RA MAJOR: 4.75 CM
RA PRESSURE ESTIMATED: 3 MMHG
RA WIDTH: 3.77 CM
RIGHT ATRIAL AREA: 16.5 CM2
RIGHT VENTRICLE DIASTOLIC BASEL DIMENSION: 3.1 CM
RV TB RVSP: 6 MMHG
RV TISSUE DOPPLER FREE WALL SYSTOLIC VELOCITY 1 (APICAL 4 CHAMBER VIEW): 12.69 CM/S
SINUS: 4.1 CM
STJ: 3.75 CM
SYSTOLIC BLOOD PRESSURE: 120 MMHG
TDI LATERAL: 0.07 M/S
TDI SEPTAL: 0.08 M/S
TDI: 0.08 M/S
TRICUSPID ANNULAR PLANE SYSTOLIC EXCURSION: 2.14 CM
TV PEAK GRADIENT: 26 MMHG
TV REST PULMONARY ARTERY PRESSURE: 28 MMHG
Z-SCORE OF LEFT VENTRICULAR DIMENSION IN END DIASTOLE: -3.25
Z-SCORE OF LEFT VENTRICULAR DIMENSION IN END SYSTOLE: -1.82

## 2025-02-24 PROCEDURE — 93351 STRESS TTE COMPLETE: CPT | Mod: 26,,, | Performed by: INTERNAL MEDICINE

## 2025-02-24 PROCEDURE — 63600175 PHARM REV CODE 636 W HCPCS: Performed by: STUDENT IN AN ORGANIZED HEALTH CARE EDUCATION/TRAINING PROGRAM

## 2025-02-24 PROCEDURE — 93351 STRESS TTE COMPLETE: CPT

## 2025-02-24 RX ORDER — DOBUTAMINE HYDROCHLORIDE 400 MG/100ML
40 INJECTION, SOLUTION INTRAVENOUS
Status: COMPLETED | OUTPATIENT
Start: 2025-02-24 | End: 2025-02-24

## 2025-02-24 RX ADMIN — DOBUTAMINE 40 MCG/KG/MIN: 12.5 INJECTION, SOLUTION, CONCENTRATE INTRAVENOUS at 04:02

## 2025-02-25 NOTE — PROGRESS NOTES
[facility]  Neurosurgery    SUBJECTIVE:     History of Present Illness:  Lashanda Monaco is a 69 y.o. female past medical history significant for chronic back pain who presents as a referral from Dr. Vita Candelaria.  She has had a number of epidural steroid injections and ablations going back to 2006.    She has a history of hip replacements, and chronic right hip pain especially going back to this time.  Her right leg hurts worse than the left.  She has pain in the center of her back.  Her back pain is much more significant than her leg pain.    Epidural steroid injections typically have helped for about a week at a time.  Again, her main complaint is back pain.  She has pain that shoots down the lateral aspect of the thigh and has a stabbing quality to it.  She does not have any pain from the knee down.  Her back pain is worse with movement, suggesting a mechanical component.    She also endorses significant balance issues and gait imbalance.  She has pins and needles that occur in her hand, especially while driving.      (Not in a hospital admission)      Review of patient's allergies indicates:   Allergen Reactions    Kiwi (actinidia chinensis)     Hydrocodone bitartrate Nausea Only       Past Medical History:   Diagnosis Date    Anemia 10/25/2024    Anxiety     Arthritis     Bronchitis 04/04/2024    Cancer     Depression     GERD (gastroesophageal reflux disease)     Hypercholesterolemia     Hypertension      Past Surgical History:   Procedure Laterality Date    ARTHROPLASTY, KNEE, TOTAL, USING COMPUTER-ASSISTED NAVIGATION Right 10/17/2024    Procedure: ARTHROPLASTY, KNEE, TOTAL, USING [employer] COMPUTER-ASSISTED NAVIGATION: [facility];  Surgeon: Jose David Chowdhury III, MD;  Location: [facility];  Service: Orthopedics;  Laterality: Right;    BREAST BIOPSY Right     2010 & 2021    BREAST CYST ASPIRATION      CARPAL TUNNEL RELEASE Left     CLOSURE, SURGICAL WOUND OR DEHISCENCE, EXTENSIVE OR COMPLEX,  SECONDARY Right 10/25/2024    Procedure: CLOSURE, SURGICAL WOUND OR DEHISCENCE, EXTENSIVE OR COMPLEX, SECONDARY;  Surgeon: Jose David Chowdhury III, MD;  [facility];  Service: Orthopedics;  Laterality: Right;    EPIDURAL STEROID INJECTION N/A 05/22/2024    Procedure: CAUDAL DOMINIC DIRECT REFERRAL;  Surgeon: Oswald Hoskins MD;  Location: [facility] PAIN MGT;  Service: Pain Management;  Laterality: N/A;  [phone]    hip repl Right 08/2019    HYSTERECTOMY      INCISION AND DRAINAGE OF KNEE Right 10/25/2024    Procedure: INCISION AND DRAINAGE, KNEE - RIGHT;  Surgeon: Jose David Chowdhury III, MD;  [facility];  Service: Orthopedics;  Laterality: Right;    JOINT REPLACEMENT Bilateral     hip    RHINOPLASTY      ROTATOR CUFF REPAIR Right     and bicep    ROTATOR CUFF REPAIR Left     SHOULDER ARTHROSCOPY Right 05/2022    right shoulder sx with bicep repair    TONSILLECTOMY, ADENOIDECTOMY      TOTAL ABDOMINAL HYSTERECTOMY W/ BILATERAL SALPINGOOPHORECTOMY      TRANSFORAMINAL EPIDURAL INJECTION OF STEROID Right 01/24/2024    Procedure: LUMBAR TRANSFORAMINAL RIGHT L3/4 AND L4/L5 DIRECT REFERRAL;  Surgeon: Oswald Hoskins MD;  Location: [facility] PAIN [facility]GT;  Service: Pain Management;  Laterality: Right;  [phone]     Family History   Problem Relation Name Age of Onset    Hyperlipidemia Father      Hypertension Father      Heart attack Father      Hyperlipidemia Mother      Hypertension Mother      Aortic aneurysm Mother  80    Diabetes Brother      Arthritis Brother      Heart disease Brother      Melanoma Brother      Lymphoma Brother          Non-Hodgkin's Lymphoma    Diabetes Brother      Arthritis Brother      Heart disease Brother      Heart disease Brother      Arthritis Sister      Arthritis Sister      Diabetes Sister      Aortic aneurysm Sister      Other (Kidney issues) Sister      Osteoarthritis Sister      Heart disease Sister      Colon cancer Son          no genetic testing    Heart attack  Other      Tourette syndrome Other      Tourette syndrome Other      Tourette syndrome Other      Tourette syndrome Other      Breast cancer Neg Hx       Social History[1]     Review of Systems:  As noted in HPI    OBJECTIVE:     Vital Signs (Most Recent):  Pulse: 64 (02/21/25 1022)  BP: (!) 147/88 (02/21/25 1022)  SpO2: 97 % (02/21/25 1022)    Physical Exam:  General: well developed, well nourished, no distress  Head: normocephalic, atraumatic  Neurologic: Alert and oriented. Thought content appropriate  Speech: Fluent  Cranial nerves: face symmetric   Eyes: pupils equal  Pulmonary: normal respirations  Sensory: intact to light touch throughout  Motor Strength: Moves all extremities spontaneously with good tone. No abnormal movements seen.      Delt Bi Tri Wrist ext.  IO  RUE:   5    5   5        5          5    5  LUE:      5    5   5        5          5    5   HF KE EHL DF PF  RLE   4-    5     5     5    5  LLE:  4    5     5     5    5    DTR's - patellar 3+  Rg: present   Clonus: absent    Gait: antalgic, using walker    Diagnostic Results:  I have personally reviewed imaging. My impression is as follows.    MRI lumbar spine dated 1/3/24    Grade 1 spondylolisthesis L5/S1.  Moderate to severe right-sided foraminal stenosis L5-S1  Grade 1 spondylolisthesis L4/5 without nerve compression.  Broad-based disc bulge at L3/4 with moderate canal stenosis, moderate left-sided foraminal stenosis  L2/3 moderate left-sided foraminal stenosis    Lumbar spine x-rays dated 12 12/23 show bilateral hip replacements.  She has a coronal dextroscoliosis with the apex at L3.  No dynamic instability on flexion-extension    EMG of the right leg was performed 03/13/2024.  This was suggestive of polyneuropathy of unclear etiology.  There was no evidence of major nerve injury or lumbar radiculopathy, presumptive diagnosis meralgia paresthetica.    ASSESSMENT/PLAN:     Lashanda Monaco is a 69 y.o. female who presents with  chronic low back pain, right-sided proximal radiculopathy, lumbar spondylosis at multiple levels, signs and symptoms of cervical myelopathy  -acute intervention not indicated  -since it has been an extended time since imaging, I would like to repeat MRI cervical and lumbar spine  -obtain standing scoliosis x-rays  -obtained flexion-extension x-rays lumbar spine  -obtain CT lumbar spine  -we will have patient return to clinic when imaging is complete so that we can discuss possible options  -imaging was reviewed in detail and I answered all the patient's questions to the best of my ability.    Please feel free to call with any further questions.      Time spent on this encounter: 45 minutes. This includes face-to-face time and non-face to face time preparing to see the patient (eg, review of tests), obtaining and/or reviewing separately obtained history, documenting clinical information in the electronic or other health record, independently interpreting results and communicating results to the patient/family/caregiver, or care coordinator.      Jhonatan LA Mangham [facility]  Department of Neurosurgery  [phone]    Disclaimer: This note was dictated by speech recognition. Minor errors in transcription may be present.  Please call with any questions.         [1]   Social History  Tobacco Use    Smoking status: Never    Smokeless tobacco: Never   Substance Use Topics    Alcohol use: Yes     Comment: Occasionally     Drug use: Not Currently     Types: Marijuana     Comment: [facility] brownie at bedtime for sleep

## 2025-02-26 DIAGNOSIS — N39.41 URGE INCONTINENCE: ICD-10-CM

## 2025-02-26 DIAGNOSIS — N95.1 MENOPAUSAL VASOMOTOR SYNDROME: ICD-10-CM

## 2025-02-26 DIAGNOSIS — N32.81 OAB (OVERACTIVE BLADDER): ICD-10-CM

## 2025-02-26 RX ORDER — ESTRADIOL 2 MG/1
2 TABLET ORAL DAILY
Qty: 90 TABLET | Refills: 3 | Status: SHIPPED | OUTPATIENT
Start: 2025-02-26

## 2025-02-26 RX ORDER — OXYBUTYNIN CHLORIDE 5 MG/1
5 TABLET, EXTENDED RELEASE ORAL DAILY
Qty: 30 TABLET | Refills: 11 | Status: SHIPPED | OUTPATIENT
Start: 2025-02-26 | End: 2026-02-26

## 2025-02-28 ENCOUNTER — PATIENT MESSAGE (OUTPATIENT)
Dept: NEUROSURGERY | Facility: CLINIC | Age: 70
End: 2025-02-28
Payer: MEDICARE

## 2025-03-03 ENCOUNTER — HOSPITAL ENCOUNTER (OUTPATIENT)
Dept: RADIOLOGY | Facility: HOSPITAL | Age: 70
Discharge: HOME | End: 2025-03-03
Attending: STUDENT IN AN ORGANIZED HEALTH CARE EDUCATION/TRAINING PROGRAM
Payer: MEDICARE

## 2025-03-03 DIAGNOSIS — M48.07 SPINAL STENOSIS, LUMBOSACRAL REGION: ICD-10-CM

## 2025-03-03 PROCEDURE — 72131 CT LUMBAR SPINE W/O DYE: CPT | Mod: TC

## 2025-03-03 PROCEDURE — 72148 MRI LUMBAR SPINE W/O DYE: CPT | Mod: 26,,, | Performed by: RADIOLOGY

## 2025-03-03 PROCEDURE — 72114 X-RAY EXAM L-S SPINE BENDING: CPT | Mod: 26,59,, | Performed by: RADIOLOGY

## 2025-03-03 PROCEDURE — 72131 CT LUMBAR SPINE W/O DYE: CPT | Mod: 26,,, | Performed by: RADIOLOGY

## 2025-03-03 PROCEDURE — 72141 MRI NECK SPINE W/O DYE: CPT | Mod: 26,,, | Performed by: RADIOLOGY

## 2025-03-03 PROCEDURE — 72114 X-RAY EXAM L-S SPINE BENDING: CPT | Mod: TC

## 2025-03-03 PROCEDURE — 72082 X-RAY EXAM ENTIRE SPI 2/3 VW: CPT | Mod: TC

## 2025-03-03 PROCEDURE — 72082 X-RAY EXAM ENTIRE SPI 2/3 VW: CPT | Mod: 26,59,, | Performed by: RADIOLOGY

## 2025-03-03 PROCEDURE — 72141 MRI NECK SPINE W/O DYE: CPT | Mod: TC

## 2025-03-03 PROCEDURE — 72148 MRI LUMBAR SPINE W/O DYE: CPT | Mod: TC

## 2025-03-11 ENCOUNTER — RESULTS FOLLOW-UP (OUTPATIENT)
Dept: RHEUMATOLOGY | Facility: CLINIC | Age: 70
End: 2025-03-11

## 2025-03-12 ENCOUNTER — PATIENT MESSAGE (OUTPATIENT)
Dept: ADMINISTRATIVE | Facility: OTHER | Age: 70
End: 2025-03-12
Payer: MEDICARE

## 2025-03-12 ENCOUNTER — PATIENT MESSAGE (OUTPATIENT)
Dept: PRIMARY CARE CLINIC | Facility: CLINIC | Age: 70
End: 2025-03-12
Payer: MEDICARE

## 2025-03-17 ENCOUNTER — PATIENT MESSAGE (OUTPATIENT)
Dept: ADMINISTRATIVE | Facility: HOSPITAL | Age: 70
End: 2025-03-17
Payer: MEDICARE

## 2025-03-20 ENCOUNTER — TELEPHONE (OUTPATIENT)
Dept: NEUROSURGERY | Facility: CLINIC | Age: 70
End: 2025-03-20
Payer: MEDICARE

## 2025-03-20 ENCOUNTER — OFFICE VISIT (OUTPATIENT)
Dept: PODIATRY | Facility: CLINIC | Age: 70
End: 2025-03-20
Payer: MEDICARE

## 2025-03-20 VITALS
WEIGHT: 216.06 LBS | DIASTOLIC BLOOD PRESSURE: 85 MMHG | BODY MASS INDEX: 36.89 KG/M2 | HEART RATE: 98 BPM | SYSTOLIC BLOOD PRESSURE: 141 MMHG | HEIGHT: 64 IN

## 2025-03-20 DIAGNOSIS — M76.62 ACHILLES TENDINITIS OF LEFT LOWER EXTREMITY: Primary | ICD-10-CM

## 2025-03-20 DIAGNOSIS — M54.16 LUMBAR RADICULOPATHY: ICD-10-CM

## 2025-03-20 DIAGNOSIS — Z74.09 IMPAIRED FUNCTIONAL MOBILITY AND ACTIVITY TOLERANCE: Chronic | ICD-10-CM

## 2025-03-20 DIAGNOSIS — M06.9 RHEUMATOID ARTHRITIS, INVOLVING UNSPECIFIED SITE, UNSPECIFIED WHETHER RHEUMATOID FACTOR PRESENT: ICD-10-CM

## 2025-03-20 DIAGNOSIS — M79.672 LEFT FOOT PAIN: ICD-10-CM

## 2025-03-20 DIAGNOSIS — M79.672 LEFT FOOT PAIN: Primary | ICD-10-CM

## 2025-03-20 DIAGNOSIS — M76.62 ACHILLES TENDINITIS OF LEFT LOWER EXTREMITY: Chronic | ICD-10-CM

## 2025-03-20 DIAGNOSIS — Z74.09 IMPAIRED FUNCTIONAL MOBILITY AND ACTIVITY TOLERANCE: ICD-10-CM

## 2025-03-20 PROCEDURE — 99999 PR PBB SHADOW E&M-EST. PATIENT-LVL IV: CPT | Mod: PBBFAC,,, | Performed by: PODIATRIST

## 2025-03-20 RX ORDER — LEG BRACE
1 EACH MISCELLANEOUS DAILY
Qty: 1 EACH | Refills: 0 | Status: SHIPPED | OUTPATIENT
Start: 2025-03-20

## 2025-03-20 NOTE — PROGRESS NOTES
"PODIATRY    PATIENT ID:  Lashanda Monaco is a 69 y.o. female.     CHIEF CONCERN:   Foot Pain (Left heel and right foot)         MEDICAL DECISION MAKING:      Problem List Items Addressed This Visit       Impaired functional mobility and activity tolerance    Lumbar radiculopathy    Rheumatoid arthritis     Other Visit Diagnoses         Left foot pain    -  Primary    Relevant Medications    leg brace [place] BRACE[place] [facility]    Other Relevant Orders    X-Ray Foot Complete Left      Achilles tendinitis of left lower extremity  (Chronic)       Relevant Medications    leg brace ([place]LE BRACE[place] [facility]            I counseled the patient on the patient's conditions, their implications and medical management.    RIGHT foot xrays reviewed.  No LEFT foot xray in the past year.  LEFT foot xray ordered.  Final results will be in patient portal.  OTC topical diclofenac as needed for pain.  Recommend below knee compression stocking, rest and elevate.  Continue stretching exercises to loose achilles tendon.   Shoe and activity modification as needed for relief.  Avoid flats and flip flops.   Ankle brace fitted for LEFT.    She defers Physical therapy.  Dry needling is also too painful for her.    She see Neurosurgery tomorrow.   Return to office as needed.            HISTORY OF PRESENT ILLNESS:  Lashanda Monaco is a 69 y.o. female with concerns regarding:  LEFT heel pain for the pas six months.  She has tried a soft ankle brace and wrapping but ankle too swollen and hurts when wrap is tight.    No acute trauma to the area.  She occasionally feels a debilitating "spasm" to the affected area.    Also RIGHT foot pain, reported as on top and bottom of foot.  Spasm and tingling sensation.  This has been present for years.   She has had steroid injections a long time ago.   She has several chronic conditions that impedes ambulation.        Patient Active Problem List   Diagnosis    Mixed hyperlipidemia    Essential hypertension, benign    " GERD (gastroesophageal reflux disease)    DDD (degenerative disc disease), lumbar    Primary osteoarthritis of both knees    Primary osteoarthritis of both wrists    Moderate episode of recurrent major depressive disorder    History of bilateral hip replacement    Severe obesity (BMI 35.0-39.9) with comorbidity    Myalgia of pelvic floor    Lack of coordination    Muscle weakness    MARIBEL (generalized anxiety disorder)    Chronic right-sided low back pain without sciatica    Weakness of both hips    Impaired functional mobility and activity tolerance    Caregiver stress    Psychophysiologic insomnia    Lumbar radiculopathy    Anxiety    Depressive disorder    Rheumatoid arthritis    Incontinence    Marijuana use    Primary osteoarthritis of right knee    Wound dehiscence, surgical    Status post right knee replacement    Thoracic aortic aneurysm without rupture    Foot drop       Current Outpatient Medications on File Prior to Visit   Medication Sig Dispense Refill    acetaminophen (TYLENOL) 650 MG TbSR Take 1 tablet (650 mg total) by mouth every 8 (eight) hours. 120 tablet 0    albuterol (PROVENTIL/VENTOLIN HFA) 90 mcg/actuation inhaler Inhale 1-2 puffs into the lungs every 6 (six) hours as needed for Wheezing or Shortness of Breath. Rescue 18 g 0    ALPRAZolam (XANAX) 0.5 MG tablet Take 1 tablet (0.5 mg total) by mouth daily as needed for Anxiety. 20 tablet 1    amLODIPine (NORVASC) 10 MG tablet TAKE 1 TABLET DAILY 90 tablet 3    atorvastatin (LIPITOR) 20 MG tablet TAKE 1 TABLET DAILY 90 tablet 3    estradioL (ESTRACE) 2 MG tablet Take 1 tablet (2 mg total) by mouth once daily. 90 tablet 3    methocarbamoL (ROBAXIN) 750 MG Tab Take 1 tablet (750 mg total) by mouth 4 (four) times daily as needed (for muscle spasms). 40 tablet 0    multivitamin (THERAGRAN) per tablet Take 1 tablet by mouth once daily. 14 tablet 0    ondansetron (ZOFRAN-ODT) 4 MG TbDL Take 1 tablet (4 mg total) by mouth every 6 (six) hours as needed  "(nausea). 20 tablet 1    oxybutynin (DITROPAN-XL) 5 MG TR24 Take 1 tablet (5 mg total) by mouth once daily. 30 tablet 11    oxyCODONE (ROXICODONE) 5 MG immediate release tablet Take 1-2 tablets every 4-6 hours as needed for pain 40 tablet 0    pantoprazole (PROTONIX) 40 MG tablet Take 1 tablet (40 mg total) by mouth once daily. 90 tablet 3    pregabalin (LYRICA) 150 MG capsule Take 1 capsule (150 mg total) by mouth 2 (two) times daily. 60 capsule 5    sertraline (ZOLOFT) 100 MG tablet Take 1 tablet (100 mg total) by mouth once daily. 90 tablet 3    traZODone (DESYREL) 50 MG tablet Take 1-2 tablets ( mg total) by mouth nightly as needed for Insomnia. 60 tablet 5    upadacitinib (RINVOQ) 15 mg 24 hr tablet Take 1 tablet (15 mg total) by mouth once daily. 30 tablet 11    aspirin (ECOTRIN) 81 MG EC tablet Take 1 tablet (81 mg total) by mouth 2 (two) times a day. 60 tablet 0    pregabalin (LYRICA) 75 MG capsule Take 1 capsule (75 mg total) by mouth 2 (two) times daily. 60 capsule 0     No current facility-administered medications on file prior to visit.       Review of patient's allergies indicates:   Allergen Reactions    Kiwi (actinidia chinensis)     Hydrocodone bitartrate Nausea Only         ROS:   General ROS: negative for - chills, fever or night sweats  Respiratory ROS: no cough, shortness of breath, or wheezing  Cardiovascular ROS: no chest pain or dyspnea on exertion      EXAM:     Vitals:    03/20/25 1223   BP: (!) 141/85   Pulse: 98   Weight: 98 kg (216 lb 0.8 oz)   Height: 5' 4" (1.626 m)        General:  Alert and Oriented x 3;  No acute distress    Vascular:   Dorsalis Pedis:  present   Posterior Tibial:  present  Capillary refill time:  3 seconds  Temperature of toes warm to touch  Edema:  2+ and pitting       Neurological:     Sharp touch:  normal  Light touch: normal  Tinels Sign:  Absent  Mulders Click:   Absent      Dermatological:   Skin: thin and atrophic  Wounds/Ulcers:  Absent  Bruising:  " Absent  Erythema:  Absent  ++ varicosities/spider veins       Musculoskeletal:   Metatarsophalangeal range of motion:   diminished range of motion  Subtalar joint range of motion: full range of motion  Ankle joint range of motion:  diminished range of motion without pain.   5/5 muscle strength  Pes planus bilateral  Bilateral bunions present.   Patient uses  a walker for assistance.   Gait is normal for condition.

## 2025-03-21 ENCOUNTER — OFFICE VISIT (OUTPATIENT)
Dept: NEUROSURGERY | Facility: CLINIC | Age: 70
End: 2025-03-21
Attending: STUDENT IN AN ORGANIZED HEALTH CARE EDUCATION/TRAINING PROGRAM
Payer: MEDICARE

## 2025-03-21 VITALS
OXYGEN SATURATION: 95 % | SYSTOLIC BLOOD PRESSURE: 134 MMHG | BODY MASS INDEX: 37.09 KG/M2 | DIASTOLIC BLOOD PRESSURE: 85 MMHG | HEART RATE: 74 BPM | HEIGHT: 64 IN

## 2025-03-21 DIAGNOSIS — M48.062 LUMBAR STENOSIS WITH NEUROGENIC CLAUDICATION: Primary | ICD-10-CM

## 2025-03-21 NOTE — PROGRESS NOTES
[facility]  Neurosurgery    SUBJECTIVE:     Interval history 03/21/2025:  Patient presents in follow up.  She reiterates to me that her chief complaint is low back pain.  She has rheumatoid arthritis in his recently started back on Rinvoq in his hoping this will make a difference.  The pain is located on the right side of her low back and will come across both sides and a bandlike tension fashion.  The pain seems especially localized in the right sacroiliac joint area.  Pain will shoot down the lateral leg on the right side.  She thinks this may have been going on ever since her hip replacement.  The back pain bothers her much worse than the leg pain.  She does not have any pain down the front of the thighs.    She has never had a sacroiliac joint injection.  In January of 2024 she had L3-4 and L5-S1 transforaminal injections.  She states these did not really help very much.  In May 20, 2024 she had a caudal epidural steroid injection which did seem to help for a brief period of time.    She comes in today with new imaging including CT lumbar spine, scoliosis x-rays, flexion-extension x-rays, MRI cervical spine.    History of Present Illness:  Lashanda Monaco is a 69 y.o. female past medical history significant for chronic back pain who presents as a referral from Dr. Vita Candelaria.  She has had a number of epidural steroid injections and ablations going back to 2006.    She has a history of hip replacements, and chronic right hip pain especially going back to this time.  Her right leg hurts worse than the left.  She has pain in the center of her back.  Her back pain is much more significant than her leg pain.    Epidural steroid injections typically have helped for about a week at a time.  Again, her main complaint is back pain.  She has pain that shoots down the lateral aspect of the thigh and has a stabbing quality to it.  She does not have any pain from the knee down.  Her back pain is worse with  movement, suggesting a mechanical component.    She also endorses significant balance issues and gait imbalance.  She has pins and needles that occur in her hand, especially while driving.      (Not in a hospital admission)      Review of patient's allergies indicates:   Allergen Reactions    Kiwi (actinidia chinensis)     Hydrocodone bitartrate Nausea Only       Past Medical History:   Diagnosis Date    Anemia 10/25/2024    Anxiety     Arthritis     Bronchitis 04/04/2024    Cancer     Depression     GERD (gastroesophageal reflux disease)     Hypercholesterolemia     Hypertension      Past Surgical History:   Procedure Laterality Date    ARTHROPLASTY, KNEE, TOTAL, USING COMPUTER-ASSISTED NAVIGATION Right 10/17/2024    Procedure: ARTHROPLASTY, KNEE, TOTAL, USING [place] COMPUTER-ASSISTED NAVIGATION: RIGHT: DEPUY - ATTUNE;  Surgeon: Jose David Chowdhury III, MD;  Location: [facility];  Service: Orthopedics;  Laterality: Right;    BREAST BIOPSY Right     2010 & 2021    BREAST CYST ASPIRATION      CARPAL TUNNEL RELEASE Left     CLOSURE, SURGICAL WOUND OR DEHISCENCE, EXTENSIVE OR COMPLEX, SECONDARY Right 10/25/2024    Procedure: CLOSURE, SURGICAL WOUND OR DEHISCENCE, EXTENSIVE OR COMPLEX, SECONDARY;  Surgeon: Jose David Chowdhury III, MD;  Location: [facility] OR [address] [place] [address];  Service: Orthopedics;  Laterality: Right;    EPIDURAL STEROID INJECTION N/A 05/22/2024    Procedure: CAUDAL DOMINIC DIRECT REFERRAL;  Surgeon: Oswald Hoskins MD;  Location: [facility] PAIN MGT;  Service: Pain Management;  Laterality: N/A;  [phone]    hip repl Right 08/2019    HYSTERECTOMY      INCISION AND DRAINAGE OF KNEE Right 10/25/2024    Procedure: INCISION AND DRAINAGE, KNEE - RIGHT;  Surgeon: Jose David Chowdhury III, MD;  [facility];  Service: Orthopedics;  Laterality: Right;    JOINT REPLACEMENT Bilateral     hip    RHINOPLASTY      ROTATOR CUFF REPAIR Right     and bicep    ROTATOR CUFF REPAIR Left     SHOULDER ARTHROSCOPY Right 05/2022    right  shoulder sx with bicep repair    TONSILLECTOMY, ADENOIDECTOMY      TOTAL ABDOMINAL HYSTERECTOMY W/ BILATERAL SALPINGOOPHORECTOMY      TRANSFORAMINAL EPIDURAL INJECTION OF STEROID Right 01/24/2024    Procedure: LUMBAR TRANSFORAMINAL RIGHT L3/4 AND L4/L5 DIRECT REFERRAL;  Surgeon: Oswald Hoskins MD;  [facility]: [facility];  Service: Pain Management;  Laterality: Right;  [phone]     Family History   Problem Relation Name Age of Onset    Hyperlipidemia Father      Hypertension Father      Heart attack Father      Hyperlipidemia Mother      Hypertension Mother      Aortic aneurysm Mother  80    Diabetes Brother      Arthritis Brother      Heart disease Brother      Melanoma Brother      Lymphoma Brother          Non-Hodgkin's Lymphoma    Diabetes Brother      Arthritis Brother      Heart disease Brother      Heart disease Brother      Arthritis Sister      Arthritis Sister      Diabetes Sister      Aortic aneurysm Sister      Other (Kidney issues) Sister      Osteoarthritis Sister      Heart disease Sister      Colon cancer Son          no genetic testing    Heart attack Other      Tourette syndrome Other      Tourette syndrome Other      Tourette syndrome Other      Tourette syndrome Other      Breast cancer Neg Hx       Social History[1]     Review of Systems:  As noted in HPI    OBJECTIVE:     Vital Signs (Most Recent):  Pulse: 74 (03/21/25 0950)  BP: 134/85 (03/21/25 0950)  SpO2: 95 % (03/21/25 0950)    Physical Exam:  General: well developed, well nourished, no distress  Head: normocephalic, atraumatic  Neurologic: Alert and oriented. Thought content appropriate  Speech: Fluent  Cranial nerves: face symmetric   Eyes: pupils equal  Pulmonary: normal respirations  Sensory: intact to light touch throughout  Motor Strength: Moves all extremities spontaneously with good tone. No abnormal movements seen.      Delt Bi Tri Wrist ext.  IO  RUE:   5    5   5        5          5    5  LUE:      5    5   5         5          5    5   HF KE EHL DF PF  RLE   4-    5     5     5    5  LLE:  4    5     5     5    5    DTR's - patellar 3+  Rg: present   Clonus: absent    Gait: antalgic, using walker    Diagnostic Results:  I have personally reviewed imaging. My impression is as follows.    MRI cervical spine dated 03/03/2025 did not show any spinal cord or significant nerve compression.  There is significant spondylosis and degenerative changes.    CT lumbar spine was dated 03/03/2025.  There is significant degenerative change with vacuum disc at every lumbar level.  Hounsfield units at L5 measure 114, but are anywhere from 125-173 elsewhere in the lumbar spine, suggesting reasonable bone density.  There is coronal collapse on the left at L3/4 and on the right at L5-S1.    Repeat MRI lumbar spine dated 03/03/2025- essentially nondiagnostic.  It looks like there may be significant stenosis at the L3/4 level but there is so much motion artifact i really can not interpret the study    Flexion-extension x-rays dated 03/03/2025 show spondylosis but no instability in my opinion.  Radiology makes mention of instability at L3-4 but I am having a hard time seeing this.    Standing scoliosis x-rays obtained 03/03/2025.    SVA + 2.2cm  PI 55  LL 54  L4-S1 lordosis 34.3  Coronal imbalance leaning toward the left at L3/4    MRI lumbar spine dated 1/3/24  Grade 1 spondylolisthesis L5/S1.  severe right-sided foraminal stenosis L5-S1  Grade 1 spondylolisthesis L4/5 without nerve compression.  Broad-based disc bulge at L3/4 with moderate canal stenosis, moderate left-sided foraminal stenosis  L2/3 moderate left-sided foraminal stenosis    EMG of the right leg was performed 03/13/2024.  This was suggestive of polyneuropathy of unclear etiology.  There was no evidence of major nerve injury or lumbar radiculopathy, presumptive diagnosis meralgia paresthetica.    ASSESSMENT/PLAN:     Lashanda Monaco is a 69 y.o. female who presents with  chronic low back pain, right-sided radiculopathy, lumbar spondylosis at every level  -no surgical intervention indicated at this time  -I am referring the patient back to the interventional pain team to consider sacroiliac joint injections  -I will also refer the patient to my partner Dr. Mai for his opinion as he has experience with sacroiliac joint fusion as well as complex spinal deformity  -in my opinion, I cannot identify a clear pain generator and thus have no surgical option to offer this very nice patient    Please feel free to call with any further questions.    Time spent on this encounter: 45 minutes. This includes face-to-face time and non-face to face time preparing to see the patient (eg, review of tests), obtaining and/or reviewing separately obtained history, documenting clinical information in the electronic or other health record, independently interpreting results and communicating results to the patient/family/caregiver, or care coordinator.      Jhnoatan LA Mangham [facility]  Department of Neurosurgery  [phone]    Disclaimer: This note was dictated by speech recognition. Minor errors in transcription may be present.  Please call with any questions.           [1]   Social History  Tobacco Use    Smoking status: Never    Smokeless tobacco: Never   Substance Use Topics    Alcohol use: Yes     Comment: Occasionally     Drug use: Not Currently     Types: Marijuana     Comment: [facility] brownie at bedtime for sleep

## 2025-03-25 ENCOUNTER — PATIENT MESSAGE (OUTPATIENT)
Dept: NEUROSURGERY | Facility: CLINIC | Age: 70
End: 2025-03-25
Payer: MEDICARE

## 2025-03-28 ENCOUNTER — PATIENT MESSAGE (OUTPATIENT)
Dept: PODIATRY | Facility: CLINIC | Age: 70
End: 2025-03-28
Payer: MEDICARE

## 2025-04-21 ENCOUNTER — OFFICE VISIT (OUTPATIENT)
Dept: PAIN MEDICINE | Facility: CLINIC | Age: 70
End: 2025-04-21
Payer: MEDICARE

## 2025-04-21 VITALS
HEART RATE: 72 BPM | DIASTOLIC BLOOD PRESSURE: 98 MMHG | WEIGHT: 230 LBS | SYSTOLIC BLOOD PRESSURE: 183 MMHG | BODY MASS INDEX: 39.48 KG/M2 | RESPIRATION RATE: 15 BRPM | OXYGEN SATURATION: 100 % | TEMPERATURE: 98 F

## 2025-04-21 DIAGNOSIS — M48.062 SPINAL STENOSIS OF LUMBAR REGION WITH NEUROGENIC CLAUDICATION: ICD-10-CM

## 2025-04-21 DIAGNOSIS — M53.3 SACROILIAC JOINT DYSFUNCTION OF BOTH SIDES: Primary | ICD-10-CM

## 2025-04-21 DIAGNOSIS — M51.360 DEGENERATION OF INTERVERTEBRAL DISC OF LUMBAR REGION WITH DISCOGENIC BACK PAIN: ICD-10-CM

## 2025-04-21 PROCEDURE — 99999 PR PBB SHADOW E&M-EST. PATIENT-LVL IV: CPT | Mod: PBBFAC,,, | Performed by: ANESTHESIOLOGY

## 2025-04-21 RX ORDER — METHOCARBAMOL 750 MG/1
750 TABLET, FILM COATED ORAL 3 TIMES DAILY
Qty: 90 TABLET | Refills: 2 | Status: SHIPPED | OUTPATIENT
Start: 2025-04-21 | End: 2025-07-20

## 2025-04-21 NOTE — PROGRESS NOTES
Chronic Pain - New Consult    Referring Physician: No ref. provider found    Chief Complaint:   Chief Complaint   Patient presents with    Low-back Pain     Pain only comes when she's been sitting or laying for long periods of time and gets up to walk         SUBJECTIVE:    Lashanda Monaco with history of Rheumatoid arthritis, HTN presents to the clinic for the evaluation of back pain. Patient reports this pain began >10 years ago without known inciting event, and since that time, symptoms have been worsening. Recently had right TKA 10/17/24 and completed physical therapy for that.      Pain Description:  The pain emanates from the bilateral low back and radiates towards b/l posterior hips and sometimes anterior groin/hips. She does reports intermittent burning/shooting pain down R>L posterior legs to her feet. The pain is described as constant sharp and throbbing with associated tightness/spasms. She does endorse myalgias of proximal thighs/buttock with walking distances greater than 150ft.  Symptoms interfere with daily activity and sleeping. Pain is exacerbated with Standing, Walking, and Extension. Pain is alleviated with bending forward, resting, NSAIDs      At BEST  4/10   At WORST  10/10 on the WORST day.    On average pain is rated as 5/10.   Today the pain is rated as 6/10    Patient denies night fever/night sweats, urinary incontinence, bowel incontinence, significant weight loss, significant motor weakness, and loss of sensations.  Patient denies any suicidal or homicidal ideations    _________________________________________________________________________    Interval history 4/21/25:   Lashanda Monaco returns to clinic for chronic low back pain. Pain is localized to the right lumbar region with occasional radiation down the right leg, mainly over bilateral PSIS. She describes the pain as sharp in nature. She reports that the pain is 10/10 in severity. Pain worse with activity and walking. Pain  better with rest. She reports that she was doing PT about 8 months ago with some relief of pain. She reports that she takes Tylenol which helps slightly. She does endorse urge incontinence which has been present for about 1 year which she sees urology. She was recently seen by Neurosurgery which suggested that patient might benefit from SI joint injections. She denies any new focal weakness, paresthesias, changes in bowel function, saddle anesthesia.         Pain Medications:  - Opioids: previously oxycodone 5mg (post-operative)  - NSAIDs: none since starting Rinvoq  - Anti-Depressants: sertraline, trazodone  - Anti-Convulsants: (previously tried lyrica with benefit) reports significant Side effects with gabapentin  - Others: methocarbamol      Physical Therapy/Home Exercise:   yes  performs HEP with regularity     report:  Reviewed and consistent with medication use as prescribed.    Pain Procedures:   1/24/2024: Right L3/4 and L4/5 TFESI: 80% relief x 2-3 weeks  5/22/2024: caudal DOMINIC : 80% relief x 2 weeks    Tried in the past:  Dry needling with benefit    Imaging:   MRI LUMBAR SPINE WITHOUT CONTRAST 12/12/2023     CLINICAL HISTORY:  Radiculopathy, lumbar regionLumbar radiculopathy, symptoms persist with conservative treatment;     TECHNIQUE:  Sagittal T1, sagittal T2, sagittal STIR, axial T1 and axial T2 weighted images of the lumbar spine obtained without contrast.     COMPARISON:  Radiograph 12/12/2023     FINDINGS:  There is rightward scoliotic curvature of the lumbar spine.  Grade 1 anterolisthesis of L5 on S1 and of L4 on L5. The vertebral body heights are well maintained, with no fracture.  There is multilevel degenerative endplate signal changes mostly related to fatty metaplasia throughout the mid and upper lumbar spine.     The conus is normal in appearance.  The adjacent soft tissue structures show no significant abnormalities.     Bilateral peripelvic renal cysts are present.     L1-L2: Spondylosis  and facet arthropathy without disc herniation.No significant central canal or neural foraminal narrowing.     L2-L3: Spondylosis and facet arthropathy without disc herniation.  Moderate central spinal stenosis and left-sided foraminal stenosis.     L3-L4: Spondylosis and facet arthropathy.  Prominent lumbar epidural fat contributes to severe central spinal stenosis.  Mild to moderate bilateral foraminal stenosis.     L4-L5: Spondylosis and severe facet arthropathy.  Moderate central spinal stenosis and mild left-sided foraminal stenosis.     L5-S1: Spondylosis and severe facet arthropathy.  No central spinal stenosis.  Severe right foraminal stenosis.     Impression:     Scoliosis, spondylosis and facet arthritis throughout the lumbar spine contributing to multilevel central canal and foraminal stenosis most severe centrally at L3-4. and involving the right neural foramen at L5-S1.       XR CERVICAL SPINE 5 VIEW WITH FLEX AND EXT     FINDINGS:  Vertebral bodies are intact.  No instability in flexion and extension.  Narrowing of the C 3-C4, C4-C5 and C6-C7 levels.  Arthritic changes seen around the facet joints.    Past Medical History:   Diagnosis Date    Anemia 10/25/2024    Anxiety     Arthritis     Bronchitis 04/04/2024    Cancer     Depression     GERD (gastroesophageal reflux disease)     Hypercholesterolemia     Hypertension      Past Surgical History:   Procedure Laterality Date    ARTHROPLASTY, KNEE, TOTAL, USING COMPUTER-ASSISTED NAVIGATION Right 10/17/2024    Procedure: ARTHROPLASTY, KNEE, TOTAL, USING [employer] COMPUTER-ASSISTED NAVIGATION: RIGHT: Efficient Power Conversion;  Surgeon: Jose David Chowdhury III, MD;  Location: [facility];  Service: Orthopedics;  Laterality: Right;    BREAST BIOPSY Right     2010 & 2021    BREAST CYST ASPIRATION      CARPAL TUNNEL RELEASE Left     CLOSURE, SURGICAL WOUND OR DEHISCENCE, EXTENSIVE OR COMPLEX, SECONDARY Right 10/25/2024    Procedure: CLOSURE, SURGICAL WOUND OR DEHISCENCE, EXTENSIVE  OR COMPLEX, SECONDARY;  Surgeon: Jose David Chowdhury III, MD;  [facility];  Service: Orthopedics;  Laterality: Right;    EPIDURAL STEROID INJECTION N/A 05/22/2024    Procedure: CAUDAL DOMINIC DIRECT REFERRAL;  Surgeon: Oswald Hoskins MD;  Location: [facility];  Service: Pain Management;  Laterality: N/A;  [phone]    hip repl Right 08/2019    HYSTERECTOMY      INCISION AND DRAINAGE OF KNEE Right 10/25/2024    Procedure: INCISION AND DRAINAGE, KNEE - RIGHT;  Surgeon: Jose David Chowdhury III, MD;  [facility];  Service: Orthopedics;  Laterality: Right;    JOINT REPLACEMENT Bilateral     hip    RHINOPLASTY      ROTATOR CUFF REPAIR Right     and bicep    ROTATOR CUFF REPAIR Left     SHOULDER ARTHROSCOPY Right 05/2022    right shoulder sx with bicep repair    TONSILLECTOMY, ADENOIDECTOMY      TOTAL ABDOMINAL HYSTERECTOMY W/ BILATERAL SALPINGOOPHORECTOMY      TRANSFORAMINAL EPIDURAL INJECTION OF STEROID Right 01/24/2024    Procedure: LUMBAR TRANSFORAMINAL RIGHT L3/4 AND L4/L5 DIRECT REFERRAL;  Surgeon: Oswald Hoskins MD;  Location: [facility];  Service: Pain Management;  Laterality: Right;  [phone]     Social History     Socioeconomic History    Marital status:      Spouse name: Fuentes    Number of children: 1   Tobacco Use    Smoking status: Never    Smokeless tobacco: Never   Substance and Sexual Activity    Alcohol use: Not Currently     Comment: Occasionally     Drug use: Yes     Types: Marijuana     Comment: [facility] brownie at bedtime for sleep    Sexual activity: Not Currently     Partners: Male     Social Drivers of Health     Financial Resource Strain: Low Risk  (10/25/2024)    Overall Financial Resource Strain (CARDIA)     Difficulty of Paying Living Expenses: Not hard at all   Food Insecurity: No Food Insecurity (10/25/2024)    Hunger Vital Sign     Worried About Running Out of Food in the Last Year: Never true     Ran Out of Food in the Last Year: Never true    Transportation Needs: No Transportation Needs (10/25/2024)    TRANSPORTATION NEEDS     Transportation : No   Physical Activity: Inactive (10/25/2024)    Exercise Vital Sign     Days of Exercise per Week: 0 days     Minutes of Exercise per Session: 0 min   Stress: No Stress Concern Present (10/25/2024)    [employer] - Occupational Stress Questionnaire     Feeling of Stress : Not at all   Housing Stability: Unknown (10/25/2024)    Housing Stability Vital Sign     Unable to Pay for Housing in the Last Year: No     Homeless in the Last Year: No     Family History   Problem Relation Name Age of Onset    Hyperlipidemia Father      Hypertension Father      Heart attack Father      Hyperlipidemia Mother      Hypertension Mother      Aortic aneurysm Mother  80    Diabetes Brother      Arthritis Brother      Heart disease Brother      Melanoma Brother      Lymphoma Brother          Non-Hodgkin's Lymphoma    Diabetes Brother      Arthritis Brother      Heart disease Brother      Heart disease Brother      Arthritis Sister      Arthritis Sister      Diabetes Sister      Aortic aneurysm Sister      Other (Kidney issues) Sister      Osteoarthritis Sister      Heart disease Sister      Colon cancer Son          no genetic testing    Heart attack Other      Tourette syndrome Other      Tourette syndrome Other      Tourette syndrome Other      Tourette syndrome Other      Breast cancer Neg Hx         Review of patient's allergies indicates:   Allergen Reactions    Kiwi (actinidia chinensis)     Hydrocodone bitartrate Nausea Only       Current Outpatient Medications   Medication Sig    acetaminophen (TYLENOL) 650 MG TbSR Take 1 tablet (650 mg total) by mouth every 8 (eight) hours.    albuterol (PROVENTIL/VENTOLIN HFA) 90 mcg/actuation inhaler Inhale 1-2 puffs into the lungs every 6 (six) hours as needed for Wheezing or Shortness of Breath. Rescue    ALPRAZolam (XANAX) 0.5 MG tablet Take 1 tablet (0.5  mg total) by mouth daily as needed for Anxiety.    amLODIPine (NORVASC) 10 MG tablet TAKE 1 TABLET DAILY    atorvastatin (LIPITOR) 20 MG tablet TAKE 1 TABLET DAILY    estradioL (ESTRACE) 2 MG tablet Take 1 tablet (2 mg total) by mouth once daily.    leg brace (ANKLE BRACE) Misc 1 Units by Misc.(Non-Drug; Combo Route) route once daily.    methocarbamoL (ROBAXIN) 750 MG Tab Take 1 tablet (750 mg total) by mouth 4 (four) times daily as needed (for muscle spasms).    multivitamin (THERAGRAN) per tablet Take 1 tablet by mouth once daily.    ondansetron (ZOFRAN-ODT) 4 MG TbDL Take 1 tablet (4 mg total) by mouth every 6 (six) hours as needed (nausea).    oxyCODONE (ROXICODONE) 5 MG immediate release tablet Take 1-2 tablets every 4-6 hours as needed for pain    pantoprazole (PROTONIX) 40 MG tablet Take 1 tablet (40 mg total) by mouth once daily.    pregabalin (LYRICA) 150 MG capsule Take 1 capsule (150 mg total) by mouth 2 (two) times daily.    sertraline (ZOLOFT) 100 MG tablet Take 1 tablet (100 mg total) by mouth once daily.    upadacitinib (RINVOQ) 15 mg 24 hr tablet Take 1 tablet (15 mg total) by mouth once daily.    aspirin (ECOTRIN) 81 MG EC tablet Take 1 tablet (81 mg total) by mouth 2 (two) times a day.    methocarbamoL (ROBAXIN) 750 MG Tab Take 1 tablet (750 mg total) by mouth 3 (three) times daily.    oxybutynin (DITROPAN-XL) 5 MG TR24 Take 1 tablet (5 mg total) by mouth once daily.    traZODone (DESYREL) 50 MG tablet Take 1-2 tablets ( mg total) by mouth nightly as needed for Insomnia.     No current facility-administered medications for this visit.       REVIEW OF SYSTEMS:    GENERAL:  No weight loss, malaise or fevers.  HEENT:   No recent changes in vision or hearing  NECK:  Negative for lumps, no difficulty with swallowing.  RESPIRATORY:  Negative for cough, wheezing or shortness of breath, patient denies any recent URI.  CARDIOVASCULAR:  Negative for chest pain, leg swelling or palpitations.  GI:   Negative for abdominal discomfort, blood in stools or black stools or change in bowel habits.  MUSCULOSKELETAL:  See HPI.  SKIN:  Negative for lesions, rash, and itching.  PSYCH:  No mood disorder or recent psychosocial stressors.  Patients sleep is not disturbed secondary to pain.  HEMATOLOGY/LYMPHOLOGY:  Negative for prolonged bleeding, bruising easily or swollen nodes.  Patient is not currently taking any anti-coagulants  NEURO:   No history of headaches, syncope, paralysis, seizures or tremors.  All other reviewed and negative other than HPI.    OBJECTIVE:    BP (!) 183/98 (BP Location: Left arm, Patient Position: Sitting)   Pulse 72   Temp 97.6 °F (36.4 °C) (Oral)   Resp 15   Wt 104.3 kg (230 lb)   SpO2 100%   BMI 39.48 kg/m²     PHYSICAL EXAMINATION:    GENERAL: Well appearing, in no acute distress, alert and oriented x3.  PSYCH:  Mood and affect appropriate.  SKIN: Skin color, texture, turgor normal, no rashes or lesions.  HEAD/FACE:  Normocephalic, atraumatic. Cranial nerves grossly intact.  CV: RRR with palpation of the radial artery.  PULM: No evidence of respiratory difficulty, symmetric chest rise.  GI:  Soft and non-tender.  BACK: PROM/AROM limited in all planes most significantly with extension and axial rotation. Negative SLR/FADIR. TTP along the bilateral SIJ, right greater than left. +BOZENA/Yeoman/Thigh thrust/Sacral thrust  MUSKULOSKELETAL:  - No obvious deformity or signs of trauma, Normal lumbar lordotic curve  - Antalgic gait uses walker     EXTREMITIES: Peripheral joint ROM is full and pain free without obvious instability or laxity in all four extremities. No deformities, edema, or skin discoloration. Good capillary refill.    NEURO:   MENTAL STATUS: A x O x 3, good concentration, speech is fluent and goal directed  MEMORY: recent and remote are intact  Strength: 5/5 in BLE  DTRs 2+ and symmetric in bilateral upper and lower extremities.  Plantar response are downgoing. No clonus.     Sensation to light touch intact.  GAIT: antalgic. Walks with walker      ASSESSMENT: 69 y.o. year old female with chronic back pain, consistent with     1. Sacroiliac joint dysfunction of both sides  methocarbamoL (ROBAXIN) 750 MG Tab    Procedure Order to Pain Management    Ambulatory Referral/Consult to Physical Therapy      2. Degeneration of intervertebral disc of lumbar region with discogenic back pain        3. Spinal stenosis of lumbar region with neurogenic claudication            DISCUSSION: Ms. Monaco is a very nice woman with hx of RA. She returns to clinic for chronic low back pain that is worse on the right than the left. MRI revealed severe canal stenosis at L3/4 and moderate stenosis at L4/5. There is minimal contribution from the ligamentum. I had her seen by Dr. Fried who did not recommend surgery at this time. On follow up, the majority of her pain appears to be axial in nature and localized over bilateral SI joints with positive provacative maneuvers for SI joint dysfunction.       PLAN:   1) Reviewed note from Dr. Fried  2) Schedule patient for bilateral SI joint injections.    3) Referral to PT to work on core and pelvic strengthening and stabilization.   4) Prescription for Robaxin 750 mg TID PRN for pain and spasms.   5) RTC 2 weeks for follow up after injection     The above plan and management options were discussed at length with patient. Patient is in agreement with the above and verbalized understanding. It will be communicated with the referring physician via electronic record, fax, or mail.    Catracho Christine, DO   [employer] [facility] PGY-4

## 2025-04-21 NOTE — H&P (VIEW-ONLY)
Chronic Pain - New Consult    Referring Physician: No ref. provider found    Chief Complaint:   Chief Complaint   Patient presents with    Low-back Pain     Pain only comes when she's been sitting or laying for long periods of time and gets up to walk         SUBJECTIVE:    Lashanda Monaco with history of Rheumatoid arthritis, HTN presents to the clinic for the evaluation of back pain. Patient reports this pain began >10 years ago without known inciting event, and since that time, symptoms have been worsening. Recently had right TKA 10/17/24 and completed physical therapy for that.      Pain Description:  The pain emanates from the bilateral low back and radiates towards b/l posterior hips and sometimes anterior groin/hips. She does reports intermittent burning/shooting pain down R>L posterior legs to her feet. The pain is described as constant sharp and throbbing with associated tightness/spasms. She does endorse myalgias of proximal thighs/buttock with walking distances greater than 150ft.  Symptoms interfere with daily activity and sleeping. Pain is exacerbated with Standing, Walking, and Extension. Pain is alleviated with bending forward, resting, NSAIDs      At BEST  4/10   At WORST  10/10 on the WORST day.    On average pain is rated as 5/10.   Today the pain is rated as 6/10    Patient denies night fever/night sweats, urinary incontinence, bowel incontinence, significant weight loss, significant motor weakness, and loss of sensations.  Patient denies any suicidal or homicidal ideations    _________________________________________________________________________    Interval history 4/21/25:   Lashanda Monaco returns to clinic for chronic low back pain. Pain is localized to the right lumbar region with occasional radiation down the right leg, mainly over bilateral PSIS. She describes the pain as sharp in nature. She reports that the pain is 10/10 in severity. Pain worse with activity and walking. Pain  better with rest. She reports that she was doing PT about 8 months ago with some relief of pain. She reports that she takes Tylenol which helps slightly. She does endorse urge incontinence which has been present for about 1 year which she sees urology. She was recently seen by Neurosurgery which suggested that patient might benefit from SI joint injections. She denies any new focal weakness, paresthesias, changes in bowel function, saddle anesthesia.         Pain Medications:  - Opioids: previously oxycodone 5mg (post-operative)  - NSAIDs: none since starting Rinvoq  - Anti-Depressants: sertraline, trazodone  - Anti-Convulsants: (previously tried lyrica with benefit) reports significant Side effects with gabapentin  - Others: methocarbamol      Physical Therapy/Home Exercise:   yes  performs HEP with regularity     report:  Reviewed and consistent with medication use as prescribed.    Pain Procedures:   1/24/2024: Right L3/4 and L4/5 TFESI: 80% relief x 2-3 weeks  5/22/2024: caudal DOMINIC : 80% relief x 2 weeks    Tried in the past:  Dry needling with benefit    Imaging:   MRI LUMBAR SPINE WITHOUT CONTRAST 12/12/2023     CLINICAL HISTORY:  Radiculopathy, lumbar regionLumbar radiculopathy, symptoms persist with conservative treatment;     TECHNIQUE:  Sagittal T1, sagittal T2, sagittal STIR, axial T1 and axial T2 weighted images of the lumbar spine obtained without contrast.     COMPARISON:  Radiograph 12/12/2023     FINDINGS:  There is rightward scoliotic curvature of the lumbar spine.  Grade 1 anterolisthesis of L5 on S1 and of L4 on L5. The vertebral body heights are well maintained, with no fracture.  There is multilevel degenerative endplate signal changes mostly related to fatty metaplasia throughout the mid and upper lumbar spine.     The conus is normal in appearance.  The adjacent soft tissue structures show no significant abnormalities.     Bilateral peripelvic renal cysts are present.     L1-L2: Spondylosis  and facet arthropathy without disc herniation.No significant central canal or neural foraminal narrowing.     L2-L3: Spondylosis and facet arthropathy without disc herniation.  Moderate central spinal stenosis and left-sided foraminal stenosis.     L3-L4: Spondylosis and facet arthropathy.  Prominent lumbar epidural fat contributes to severe central spinal stenosis.  Mild to moderate bilateral foraminal stenosis.     L4-L5: Spondylosis and severe facet arthropathy.  Moderate central spinal stenosis and mild left-sided foraminal stenosis.     L5-S1: Spondylosis and severe facet arthropathy.  No central spinal stenosis.  Severe right foraminal stenosis.     Impression:     Scoliosis, spondylosis and facet arthritis throughout the lumbar spine contributing to multilevel central canal and foraminal stenosis most severe centrally at L3-4. and involving the right neural foramen at L5-S1.       XR CERVICAL SPINE 5 VIEW WITH FLEX AND EXT     FINDINGS:  Vertebral bodies are intact.  No instability in flexion and extension.  Narrowing of the C 3-C4, C4-C5 and C6-C7 levels.  Arthritic changes seen around the facet joints.    Past Medical History:   Diagnosis Date    Anemia 10/25/2024    Anxiety     Arthritis     Bronchitis 04/04/2024    Cancer     Depression     GERD (gastroesophageal reflux disease)     Hypercholesterolemia     Hypertension      Past Surgical History:   Procedure Laterality Date    ARTHROPLASTY, KNEE, TOTAL, USING COMPUTER-ASSISTED NAVIGATION Right 10/17/2024    Procedure: ARTHROPLASTY, KNEE, TOTAL, USING [place] COMPUTER-ASSISTED NAVIGATION: RIGHT: [employer];  Surgeon: Jose David Chowdhury III, MD;  Location: [facility];  Service: Orthopedics;  Laterality: Right;    BREAST BIOPSY Right     2010 & 2021    BREAST CYST ASPIRATION      CARPAL TUNNEL RELEASE Left     CLOSURE, SURGICAL WOUND OR DEHISCENCE, EXTENSIVE OR COMPLEX, SECONDARY Right 10/25/2024    Procedure: CLOSURE, SURGICAL WOUND OR DEHISCENCE, EXTENSIVE  OR COMPLEX, SECONDARY;  Surgeon: Jose David Chowdhury III, MD;  [facility];  Service: Orthopedics;  Laterality: Right;    EPIDURAL STEROID INJECTION N/A 05/22/2024    Procedure: CAUDAL DOMINIC DIRECT REFERRAL;  Surgeon: Oswald Hoskins MD;  Location: [facility];  Service: Pain Management;  Laterality: N/A;  [phone]    hip repl Right 08/2019    HYSTERECTOMY      INCISION AND DRAINAGE OF KNEE Right 10/25/2024    Procedure: INCISION AND DRAINAGE, KNEE - RIGHT;  Surgeon: Jose David Chowdhury III, MD;  [facility];  Service: Orthopedics;  Laterality: Right;    JOINT REPLACEMENT Bilateral     hip    RHINOPLASTY      ROTATOR CUFF REPAIR Right     and bicep    ROTATOR CUFF REPAIR Left     SHOULDER ARTHROSCOPY Right 05/2022    right shoulder sx with bicep repair    TONSILLECTOMY, ADENOIDECTOMY      TOTAL ABDOMINAL HYSTERECTOMY W/ BILATERAL SALPINGOOPHORECTOMY      TRANSFORAMINAL EPIDURAL INJECTION OF STEROID Right 01/24/2024    Procedure: LUMBAR TRANSFORAMINAL RIGHT L3/4 AND L4/L5 DIRECT REFERRAL;  Surgeon: Oswald Hoskins MD;  Location: [facility];  Service: Pain Management;  Laterality: Right;  [phone]     Social History     Socioeconomic History    Marital status:      Spouse name: Fuentes    Number of children: 1   Tobacco Use    Smoking status: Never    Smokeless tobacco: Never   Substance and Sexual Activity    Alcohol use: Not Currently     Comment: Occasionally     Drug use: Yes     Types: Marijuana     Comment: [facility] brownie at bedtime for sleep    Sexual activity: Not Currently     Partners: Male     Social Drivers of Health     Financial Resource Strain: Low Risk  (10/25/2024)    Overall Financial Resource Strain (CARDIA)     Difficulty of Paying Living Expenses: Not hard at all   Food Insecurity: No Food Insecurity (10/25/2024)    Hunger Vital Sign     Worried About Running Out of Food in the Last Year: Never true     Ran Out of Food in the Last Year: Never true    Transportation Needs: No Transportation Needs (10/25/2024)    TRANSPORTATION NEEDS     Transportation : No   Physical Activity: Inactive (10/25/2024)    Exercise Vital Sign     Days of Exercise per Week: 0 days     Minutes of Exercise per Session: 0 min   Stress: No Stress Concern Present (10/25/2024)    [employer] - Occupational Stress Questionnaire     Feeling of Stress : Not at all   Housing Stability: Unknown (10/25/2024)    Housing Stability Vital Sign     Unable to Pay for Housing in the Last Year: No     Homeless in the Last Year: No     Family History   Problem Relation Name Age of Onset    Hyperlipidemia Father      Hypertension Father      Heart attack Father      Hyperlipidemia Mother      Hypertension Mother      Aortic aneurysm Mother  80    Diabetes Brother      Arthritis Brother      Heart disease Brother      Melanoma Brother      Lymphoma Brother          Non-Hodgkin's Lymphoma    Diabetes Brother      Arthritis Brother      Heart disease Brother      Heart disease Brother      Arthritis Sister      Arthritis Sister      Diabetes Sister      Aortic aneurysm Sister      Other (Kidney issues) Sister      Osteoarthritis Sister      Heart disease Sister      Colon cancer Son          no genetic testing    Heart attack Other      Tourette syndrome Other      Tourette syndrome Other      Tourette syndrome Other      Tourette syndrome Other      Breast cancer Neg Hx         Review of patient's allergies indicates:   Allergen Reactions    Kiwi (actinidia chinensis)     Hydrocodone bitartrate Nausea Only       Current Outpatient Medications   Medication Sig    acetaminophen (TYLENOL) 650 MG TbSR Take 1 tablet (650 mg total) by mouth every 8 (eight) hours.    albuterol (PROVENTIL/VENTOLIN HFA) 90 mcg/actuation inhaler Inhale 1-2 puffs into the lungs every 6 (six) hours as needed for Wheezing or Shortness of Breath. Rescue    ALPRAZolam (XANAX) 0.5 MG tablet Take 1 tablet (0.5  mg total) by mouth daily as needed for Anxiety.    amLODIPine (NORVASC) 10 MG tablet TAKE 1 TABLET DAILY    atorvastatin (LIPITOR) 20 MG tablet TAKE 1 TABLET DAILY    estradioL (ESTRACE) 2 MG tablet Take 1 tablet (2 mg total) by mouth once daily.    leg brace (ANKLE BRACE) Misc 1 Units by Misc.(Non-Drug; Combo Route) route once daily.    methocarbamoL (ROBAXIN) 750 MG Tab Take 1 tablet (750 mg total) by mouth 4 (four) times daily as needed (for muscle spasms).    multivitamin (THERAGRAN) per tablet Take 1 tablet by mouth once daily.    ondansetron (ZOFRAN-ODT) 4 MG TbDL Take 1 tablet (4 mg total) by mouth every 6 (six) hours as needed (nausea).    oxyCODONE (ROXICODONE) 5 MG immediate release tablet Take 1-2 tablets every 4-6 hours as needed for pain    pantoprazole (PROTONIX) 40 MG tablet Take 1 tablet (40 mg total) by mouth once daily.    pregabalin (LYRICA) 150 MG capsule Take 1 capsule (150 mg total) by mouth 2 (two) times daily.    sertraline (ZOLOFT) 100 MG tablet Take 1 tablet (100 mg total) by mouth once daily.    upadacitinib (RINVOQ) 15 mg 24 hr tablet Take 1 tablet (15 mg total) by mouth once daily.    aspirin (ECOTRIN) 81 MG EC tablet Take 1 tablet (81 mg total) by mouth 2 (two) times a day.    methocarbamoL (ROBAXIN) 750 MG Tab Take 1 tablet (750 mg total) by mouth 3 (three) times daily.    oxybutynin (DITROPAN-XL) 5 MG TR24 Take 1 tablet (5 mg total) by mouth once daily.    traZODone (DESYREL) 50 MG tablet Take 1-2 tablets ( mg total) by mouth nightly as needed for Insomnia.     No current facility-administered medications for this visit.       REVIEW OF SYSTEMS:    GENERAL:  No weight loss, malaise or fevers.  HEENT:   No recent changes in vision or hearing  NECK:  Negative for lumps, no difficulty with swallowing.  RESPIRATORY:  Negative for cough, wheezing or shortness of breath, patient denies any recent URI.  CARDIOVASCULAR:  Negative for chest pain, leg swelling or palpitations.  GI:   Negative for abdominal discomfort, blood in stools or black stools or change in bowel habits.  MUSCULOSKELETAL:  See HPI.  SKIN:  Negative for lesions, rash, and itching.  PSYCH:  No mood disorder or recent psychosocial stressors.  Patients sleep is not disturbed secondary to pain.  HEMATOLOGY/LYMPHOLOGY:  Negative for prolonged bleeding, bruising easily or swollen nodes.  Patient is not currently taking any anti-coagulants  NEURO:   No history of headaches, syncope, paralysis, seizures or tremors.  All other reviewed and negative other than HPI.    OBJECTIVE:    BP (!) 183/98 (BP Location: Left arm, Patient Position: Sitting)   Pulse 72   Temp 97.6 °F (36.4 °C) (Oral)   Resp 15   Wt 104.3 kg (230 lb)   SpO2 100%   BMI 39.48 kg/m²     PHYSICAL EXAMINATION:    GENERAL: Well appearing, in no acute distress, alert and oriented x3.  PSYCH:  Mood and affect appropriate.  SKIN: Skin color, texture, turgor normal, no rashes or lesions.  HEAD/FACE:  Normocephalic, atraumatic. Cranial nerves grossly intact.  CV: RRR with palpation of the radial artery.  PULM: No evidence of respiratory difficulty, symmetric chest rise.  GI:  Soft and non-tender.  BACK: PROM/AROM limited in all planes most significantly with extension and axial rotation. Negative SLR/FADIR. TTP along the bilateral SIJ, right greater than left. +BOZENA/Yeoman/Thigh thrust/Sacral thrust  MUSKULOSKELETAL:  - No obvious deformity or signs of trauma, Normal lumbar lordotic curve  - Antalgic gait uses walker     EXTREMITIES: Peripheral joint ROM is full and pain free without obvious instability or laxity in all four extremities. No deformities, edema, or skin discoloration. Good capillary refill.    NEURO:   MENTAL STATUS: A x O x 3, good concentration, speech is fluent and goal directed  MEMORY: recent and remote are intact  Strength: 5/5 in BLE  DTRs 2+ and symmetric in bilateral upper and lower extremities.  Plantar response are downgoing. No clonus.     Sensation to light touch intact.  GAIT: antalgic. Walks with walker      ASSESSMENT: 69 y.o. year old female with chronic back pain, consistent with     1. Sacroiliac joint dysfunction of both sides  methocarbamoL (ROBAXIN) 750 MG Tab    Procedure Order to Pain Management    Ambulatory Referral/Consult to Physical Therapy      2. Degeneration of intervertebral disc of lumbar region with discogenic back pain        3. Spinal stenosis of lumbar region with neurogenic claudication            DISCUSSION: Ms. Monaco is a very nice woman with hx of RA. She returns to clinic for chronic low back pain that is worse on the right than the left. MRI revealed severe canal stenosis at L3/4 and moderate stenosis at L4/5. There is minimal contribution from the ligamentum. I had her seen by Dr. Fried who did not recommend surgery at this time. On follow up, the majority of her pain appears to be axial in nature and localized over bilateral SI joints with positive provacative maneuvers for SI joint dysfunction.       PLAN:   1) Reviewed note from Dr. Fried  2) Schedule patient for bilateral SI joint injections.    3) Referral to PT to work on core and pelvic strengthening and stabilization.   4) Prescription for Robaxin 750 mg TID PRN for pain and spasms.   5) RTC 2 weeks for follow up after injection     The above plan and management options were discussed at length with patient. Patient is in agreement with the above and verbalized understanding. It will be communicated with the referring physician via electronic record, fax, or mail.    Catracho Christine, DO   [employer] [facility] PGY-4

## 2025-04-22 ENCOUNTER — OFFICE VISIT (OUTPATIENT)
Dept: UROLOGY | Facility: CLINIC | Age: 70
End: 2025-04-22
Payer: MEDICARE

## 2025-04-22 VITALS
BODY MASS INDEX: 39.11 KG/M2 | HEART RATE: 91 BPM | SYSTOLIC BLOOD PRESSURE: 125 MMHG | DIASTOLIC BLOOD PRESSURE: 83 MMHG | HEIGHT: 64 IN | WEIGHT: 229.06 LBS

## 2025-04-22 DIAGNOSIS — N81.10 FEMALE CYSTOCELE: ICD-10-CM

## 2025-04-22 DIAGNOSIS — M79.18 MYALGIA OF PELVIC FLOOR: ICD-10-CM

## 2025-04-22 DIAGNOSIS — N39.46 MIXED STRESS AND URGE URINARY INCONTINENCE: Primary | ICD-10-CM

## 2025-04-22 LAB
BILIRUB SERPL-MCNC: NORMAL MG/DL
BLOOD URINE, POC: NORMAL
CLARITY, POC UA: NORMAL
COLOR, POC UA: COLORLESS
GLUCOSE UR QL STRIP: NORMAL
KETONES UR QL STRIP: NORMAL
LEUKOCYTE ESTERASE URINE, POC: NORMAL
NITRITE, POC UA: NORMAL
PH, POC UA: 5
POC RESIDUAL URINE VOLUME: 1 ML (ref 0–100)
PROTEIN, POC: NORMAL
SPECIFIC GRAVITY, POC UA: 1
UROBILINOGEN, POC UA: NORMAL

## 2025-04-22 PROCEDURE — 1159F MED LIST DOCD IN RCRD: CPT | Mod: CPTII,S$GLB,, | Performed by: NURSE PRACTITIONER

## 2025-04-22 PROCEDURE — 87086 URINE CULTURE/COLONY COUNT: CPT | Performed by: NURSE PRACTITIONER

## 2025-04-22 PROCEDURE — 3288F FALL RISK ASSESSMENT DOCD: CPT | Mod: CPTII,S$GLB,, | Performed by: NURSE PRACTITIONER

## 2025-04-22 PROCEDURE — 3079F DIAST BP 80-89 MM HG: CPT | Mod: CPTII,S$GLB,, | Performed by: NURSE PRACTITIONER

## 2025-04-22 PROCEDURE — 81002 URINALYSIS NONAUTO W/O SCOPE: CPT | Mod: S$GLB,,, | Performed by: NURSE PRACTITIONER

## 2025-04-22 PROCEDURE — 1160F RVW MEDS BY RX/DR IN RCRD: CPT | Mod: CPTII,S$GLB,, | Performed by: NURSE PRACTITIONER

## 2025-04-22 PROCEDURE — 51798 US URINE CAPACITY MEASURE: CPT | Mod: S$GLB,,, | Performed by: NURSE PRACTITIONER

## 2025-04-22 PROCEDURE — 3008F BODY MASS INDEX DOCD: CPT | Mod: CPTII,S$GLB,, | Performed by: NURSE PRACTITIONER

## 2025-04-22 PROCEDURE — 1101F PT FALLS ASSESS-DOCD LE1/YR: CPT | Mod: CPTII,S$GLB,, | Performed by: NURSE PRACTITIONER

## 2025-04-22 PROCEDURE — 99999 PR PBB SHADOW E&M-EST. PATIENT-LVL V: CPT | Mod: PBBFAC,,, | Performed by: NURSE PRACTITIONER

## 2025-04-22 PROCEDURE — 1126F AMNT PAIN NOTED NONE PRSNT: CPT | Mod: CPTII,S$GLB,, | Performed by: NURSE PRACTITIONER

## 2025-04-22 PROCEDURE — 99204 OFFICE O/P NEW MOD 45 MIN: CPT | Mod: S$GLB,,, | Performed by: NURSE PRACTITIONER

## 2025-04-22 PROCEDURE — 3074F SYST BP LT 130 MM HG: CPT | Mod: CPTII,S$GLB,, | Performed by: NURSE PRACTITIONER

## 2025-04-22 RX ORDER — MIRABEGRON 50 MG/1
1 TABLET, FILM COATED, EXTENDED RELEASE ORAL DAILY
Qty: 30 TABLET | Refills: 11 | Status: SHIPPED | OUTPATIENT
Start: 2025-04-22 | End: 2026-04-22

## 2025-04-22 NOTE — PROGRESS NOTES
Patient ID: Lashanda Monaco is a 69 y.o. female.    Chief Complaint: Urinary Incontinence    History of Present Illness    CHIEF COMPLAINT:  Patient presents today for urinary incontinence issues    URINARY INCONTINENCE:  She experiences daytime urinary incontinence requiring pull-ups, with episodes occurring when unable to reach bathroom in time, though maintains some degree of control. Nighttime symptoms are more severe with complete loss of control, experiencing 8-10 episodes per night with saturated pull-ups. She reports immediate urinary leakage upon standing from bed.    MEDICAL HISTORY:  She has a history of bladder suspension surgery with good response, pelvic organ prolapse diagnosed in 2021, and two urinary tract infections in early 2023.    PREVIOUS TREATMENTS:  She discontinued physical therapy after three sessions due to significant discomfort with the exercises and treatments. She previously tried Myrbetriq (Mirabegron) 50mg in 2021 and Ditropan (oxybutynin), but discontinued both. Ditropan was stopped due to severe dry mouth symptoms.    CURRENT MEDICATIONS:  She recently started Hydrochlorothiazide for blood pressure management as prescribed by Dr. Lundberg, beginning yesterday.      ROS:  General: -fever, -chills, -fatigue, -weight gain, -weight loss, +dry mouth  Eyes: -vision changes, -redness, -discharge  ENT: -ear pain, -nasal congestion, -sore throat  Cardiovascular: -chest pain, -palpitations, -lower extremity edema  Respiratory: -cough, -shortness of breath  Gastrointestinal: -abdominal pain, -nausea, -vomiting, -diarrhea, -constipation, -blood in stool  Genitourinary: -dysuria, -hematuria, -frequency, +urinary incontinence, +excessive urination  Musculoskeletal: -joint pain, -muscle pain  Skin: -rash, -lesion  Neurological: -headache, -dizziness, -numbness, -tingling  Psychiatric: -anxiety, -depression, -sleep difficulty         Physical Exam    General: No acute distress. Well-developed.  Well-nourished.  Respiratory: Normal respiratory effort.   Abdomen: Soft. Non-tender. Non-distended.   Psychiatric: Normal mood. Normal affect. Good insight. Good judgment.  Skin: Warm. Dry. No rash.         Assessment & Plan        IMPRESSION:  - Considered treatment options for urinary incontinence and pelvic organ prolapse, noting limited options due to inability to tolerate pelvic floor physical therapy and oral medications.  - Discussed potential for surgical repair or intravesical Botox injections as next steps if conservative measures fail.  - Recommend pessary as possible non-surgical option to address prolapse symptoms.    URINARY INCONTINENCE:  - Explained that pelvic floor exercises are crucial for strengthening muscles and improving urinary control.  - Referred to pelvic floor physical therapy.    FEMALE GENITAL PROLAPSE:  - Referred to urogynecology for further evaluation and possible pessary fitting.    OVERACTIVE BLADDER:  - Started Myrbetriq (mirabegron) 50 mg daily per patient request  - Discontinue if side effects occur and contact office for alternative medication.  - Contact the office if Myrbetriq is too expensive.    URINARY TRACT INFECTION:  - Ordered urinalysis to rule out UTI as contributing factor to symptoms.    FOLLOW-UP:  - Follow up when contacted by urogynecology and physical therapy departments for appointment scheduling.            This note was generated with the assistance of ambient listening technology. Verbal consent was obtained by the patient and accompanying visitor(s) for the recording of patient appointment to facilitate this note. I attest to having reviewed and edited the generated note for accuracy, though some syntax or spelling errors may persist. Please contact the author of this note for any clarification.    1.  Mixed urinary incontinence, urge > stress:    --Empty bladder every 3 hours.  Empty well: wait a minute, lean forward on toilet.    --Avoid dietary  irritants (see sheet).  Keep diary x 3-5 days to determine your irritants.  -start pelvic floor PT with  SHARON Smith  (p) [phone].  Or [phone].   --URGE: trial myrbetriq 50 mg daily  --STRESS:  Pessary vs. Sling.      2. Vaginal atrophy (dryness):  Use 1 gram of estrogen cream in vagina nightly x 2 weeks, then twice a week thereafter.       3. Constipation / fecal smearing  --Take 1 fiber pill/day x 3 days.  Then 2 pills/day x 3 days.  Then 3 pills/day x 3 days...increasing in this fashion to max 6 pills a day.  STOP when you find dose that makes stool easy to pass (this may be 1 pill a day or may be 4 pills/day).  Continue at this dose FOREVER.  Additionally, take 1-2 stool softener pills (EX: colace) OTC daily.  AVOID laxatives.      4. Midline cystocele, rectocele  --consider pessary use  --could consider a/p repair  --would need surgery records  --would need urodynamics     5. --Nocturia (nighttime urination): stop fluids 2 hours before bed.  If have leg swelling:  Elevate feet above chest x 1 hour before bed to get excess fluid off.  Can also use support hose (knee highs).       6. Snoring  --referral to sleep study     7. RTC 3 months for follow up

## 2025-04-23 DIAGNOSIS — M53.3 SACROILIAC JOINT DYSFUNCTION: Primary | ICD-10-CM

## 2025-04-24 ENCOUNTER — RESULTS FOLLOW-UP (OUTPATIENT)
Dept: UROLOGY | Facility: CLINIC | Age: 70
End: 2025-04-24

## 2025-04-24 LAB — BACTERIA UR CULT: NORMAL

## 2025-05-01 ENCOUNTER — PATIENT MESSAGE (OUTPATIENT)
Dept: PAIN MEDICINE | Facility: OTHER | Age: 70
End: 2025-05-01
Payer: MEDICARE

## 2025-05-05 DIAGNOSIS — F41.1 GAD (GENERALIZED ANXIETY DISORDER): ICD-10-CM

## 2025-05-05 DIAGNOSIS — F33.1 MODERATE EPISODE OF RECURRENT MAJOR DEPRESSIVE DISORDER: ICD-10-CM

## 2025-05-05 RX ORDER — SERTRALINE HYDROCHLORIDE 100 MG/1
100 TABLET, FILM COATED ORAL
Qty: 90 TABLET | Refills: 3 | Status: SHIPPED | OUTPATIENT
Start: 2025-05-05

## 2025-05-05 NOTE — TELEPHONE ENCOUNTER
No care due was identified.  [facility] Embedded Care Due Messages. Reference number: [id number].   5/05/2025 2:39:54 PM CDT

## 2025-05-06 ENCOUNTER — HOSPITAL ENCOUNTER (OUTPATIENT)
Facility: OTHER | Age: 70
Discharge: HOME | End: 2025-05-06
Attending: ANESTHESIOLOGY | Admitting: ANESTHESIOLOGY
Payer: MEDICARE

## 2025-05-06 VITALS
DIASTOLIC BLOOD PRESSURE: 79 MMHG | BODY MASS INDEX: 39.27 KG/M2 | HEART RATE: 65 BPM | SYSTOLIC BLOOD PRESSURE: 123 MMHG | RESPIRATION RATE: 16 BRPM | HEIGHT: 64 IN | OXYGEN SATURATION: 95 % | TEMPERATURE: 98 F | WEIGHT: 230 LBS

## 2025-05-06 DIAGNOSIS — G89.29 CHRONIC PAIN: ICD-10-CM

## 2025-05-06 DIAGNOSIS — M46.1 SACROILIITIS: Primary | ICD-10-CM

## 2025-05-06 PROCEDURE — 27096 INJECT SACROILIAC JOINT: CPT | Mod: 50,,, | Performed by: ANESTHESIOLOGY

## 2025-05-06 PROCEDURE — 63600175 PHARM REV CODE 636 W HCPCS: Performed by: ANESTHESIOLOGY

## 2025-05-06 PROCEDURE — 25500020 PHARM REV CODE 255: Performed by: ANESTHESIOLOGY

## 2025-05-06 PROCEDURE — 27096 INJECT SACROILIAC JOINT: CPT | Mod: 50 | Performed by: ANESTHESIOLOGY

## 2025-05-06 RX ORDER — SODIUM CHLORIDE 9 MG/ML
INJECTION, SOLUTION INTRAVENOUS CONTINUOUS
Status: DISCONTINUED | OUTPATIENT
Start: 2025-05-06 | End: 2025-05-06 | Stop reason: HOSPADM

## 2025-05-06 RX ORDER — FENTANYL CITRATE 50 UG/ML
INJECTION, SOLUTION INTRAMUSCULAR; INTRAVENOUS
Status: DISCONTINUED | OUTPATIENT
Start: 2025-05-06 | End: 2025-05-06 | Stop reason: HOSPADM

## 2025-05-06 RX ORDER — BUPIVACAINE HYDROCHLORIDE 2.5 MG/ML
INJECTION, SOLUTION EPIDURAL; INFILTRATION; INTRACAUDAL; PERINEURAL
Status: DISCONTINUED | OUTPATIENT
Start: 2025-05-06 | End: 2025-05-06 | Stop reason: HOSPADM

## 2025-05-06 RX ORDER — LIDOCAINE HYDROCHLORIDE 20 MG/ML
INJECTION, SOLUTION INFILTRATION; PERINEURAL
Status: DISCONTINUED | OUTPATIENT
Start: 2025-05-06 | End: 2025-05-06 | Stop reason: HOSPADM

## 2025-05-06 RX ORDER — TRIAMCINOLONE ACETONIDE 40 MG/ML
INJECTION, SUSPENSION INTRA-ARTICULAR; INTRAMUSCULAR
Status: DISCONTINUED | OUTPATIENT
Start: 2025-05-06 | End: 2025-05-06 | Stop reason: HOSPADM

## 2025-05-06 RX ORDER — MIDAZOLAM HYDROCHLORIDE 1 MG/ML
INJECTION INTRAMUSCULAR; INTRAVENOUS
Status: DISCONTINUED | OUTPATIENT
Start: 2025-05-06 | End: 2025-05-06 | Stop reason: HOSPADM

## 2025-05-06 NOTE — OP NOTE
Sacroiliac Joint Injection under Fluoroscopic Guidance    The procedure, risks, benefits, and options were discussed with the patient. There are no contraindications to the procedure. The patent expressed understanding and agreed to the procedure. Informed written consent was obtained prior to the start of the procedure and can be found in the patient's chart.    PATIENT NAME: Lashanda Monaco   MRN: [id number]     DATE OF PROCEDURE: 05/06/2025    PROCEDURE: Bilateral Sacroiliac Joint Injection under Fluoroscopic Guidance    PRE-OP DIAGNOSIS: Sacroiliac joint dysfunction [M53.3]    POST-OP DIAGNOSIS: Same    PHYSICIAN: Malathi Candelaria MD    ASSISTANTS: Hamzah Ramawad, MD [facility]      MEDICATIONS INJECTED: Preservative-free Kenalog 40mg with 3cc of Bupivacine 0.25%     LOCAL ANESTHETIC INJECTED: Xylocaine 2%     SEDATION: Versed 1mg and Fentanyl 50mcg                                                                                                                                                                                     Conscious sedation ordered by M.D. Patient re-evaluation prior to administration of conscious sedation. No changes noted in patient's status from initial evaluation. The patient's vital signs were monitored by RN and patient remained hemodynamically stable throughout the procedure.    Event Time In   Sedation Start 0946   Sedation End 0953       ESTIMATED BLOOD LOSS: None    COMPLICATIONS: None    TECHNIQUE: Time-out was performed to identify the patient and procedure to be performed. With the patient laying in a prone position, the surgical area was prepped and draped in the usual sterile fashion using ChloraPrep and a fenestrated drape. The sacroiliac joint was determined under fluoroscopy guidance. Skin anesthesia was achieved by injecting Lidocaine 2% over the injection site. The sacroiliac joint was  then approached with a 25 gauge, 3.5 inch spinal quinke needle that was  introduced under fluoroscopic guidance in the AP and Lateral views. Once the needle tip was in the area of the joint, and there was no blood, contrast dye Omnipaque (300mg/mL) was injected to confirm placement and there was no vascular runoff. Fluoroscopic imaging in the AP and lateral views revealed a clear outline of the joint space. 4 mL of the medication mixture listed above was injected slowly intraarticular and philip-articular. Displacement of the radio opaque contrast after injection of the medication confirmed that the medication went into the area of the joint. The needles were removed and bleeding was nil. The same procedure was repeated on the contralateral side. A sterile dressing was applied. No specimens collected. The patient tolerated the procedure well.       The patient was monitored after the procedure in the recovery area. They were given post-procedure and discharge instructions to follow at home. The patient was discharged in a stable condition.    Leobardo Pearson MD     I reviewed and edited the fellow's note. I conducted my own interview and physical examination. I agree with the findings. I was present and supervising all critical portions of the procedure.

## 2025-05-06 NOTE — DISCHARGE SUMMARY
Discharge Note  Short Stay      SUMMARY     Admit Date: 5/6/2025    Attending Physician: Malathi Candelaria MD    Discharge Physician: Malathi Candelaria MD      Discharge Date: 5/6/2025 9:36 AM    Procedure(s) (LRB):  INJECTION,SACROILIAC JOINT BILATERAL (Bilateral)    Final Diagnosis: Sacroiliac joint dysfunction [M53.3]    Disposition: Home or self care    Patient Instructions:   Current Discharge Medication List        CONTINUE these medications which have NOT CHANGED    Details   acetaminophen (TYLENOL) 650 MG TbSR Take 1 tablet (650 mg total) by mouth every 8 (eight) hours.  Qty: 120 tablet, Refills: 0    Associated Diagnoses: S/P total knee replacement, right      albuterol (PROVENTIL/VENTOLIN HFA) 90 mcg/actuation inhaler Inhale 1-2 puffs into the lungs every 6 (six) hours as needed for Wheezing or Shortness of Breath. Rescue  Qty: 18 g, Refills: 0      ALPRAZolam (XANAX) 0.5 MG tablet Take 1 tablet (0.5 mg total) by mouth daily as needed for Anxiety.  Qty: 20 tablet, Refills: 1    Associated Diagnoses: Caregiver stress      amLODIPine (NORVASC) 10 MG tablet TAKE 1 TABLET DAILY  Qty: 90 tablet, Refills: 3    Comments: .  Associated Diagnoses: Essential hypertension, benign      aspirin (ECOTRIN) 81 MG EC tablet Take 1 tablet (81 mg total) by mouth 2 (two) times a day.  Qty: 60 tablet, Refills: 0    Comments: Please fill at bedside day of surgery (10/17).  Associated Diagnoses: S/P total knee replacement, right      atorvastatin (LIPITOR) 20 MG tablet TAKE 1 TABLET DAILY  Qty: 90 tablet, Refills: 3    Associated Diagnoses: Mixed hyperlipidemia      estradioL (ESTRACE) 2 MG tablet Take 1 tablet (2 mg total) by mouth once daily.  Qty: 90 tablet, Refills: 3    Associated Diagnoses: Menopausal vasomotor syndrome; Urge incontinence      leg brace (ANKLE BRACE) Misc 1 Units by Misc.(Non-Drug; Combo Route) route once daily.  Qty: 1 each, Refills: 0    Associated Diagnoses: Left foot pain; Achilles tendinitis of left  lower extremity      !! methocarbamoL (ROBAXIN) 750 MG Tab Take 1 tablet (750 mg total) by mouth 4 (four) times daily as needed (for muscle spasms).  Qty: 40 tablet, Refills: 0    Associated Diagnoses: S/P total knee replacement, right      !! methocarbamoL (ROBAXIN) 750 MG Tab Take 1 tablet (750 mg total) by mouth 3 (three) times daily.  Qty: 90 tablet, Refills: 2    Associated Diagnoses: Sacroiliac joint dysfunction of both sides      mirabegron (MYRBETRIQ) 50 mg Tb24 Take 1 tablet (50 mg total) by mouth once daily.  Qty: 30 tablet, Refills: 11    Associated Diagnoses: Mixed stress and urge urinary incontinence      multivitamin (THERAGRAN) per tablet Take 1 tablet by mouth once daily.  Qty: 14 tablet, Refills: 0    Associated Diagnoses: S/P total knee replacement, right      ondansetron (ZOFRAN-ODT) 4 MG TbDL Take 1 tablet (4 mg total) by mouth every 6 (six) hours as needed (nausea).  Qty: 20 tablet, Refills: 1    Associated Diagnoses: Nausea      oxyCODONE (ROXICODONE) 5 MG immediate release tablet Take 1-2 tablets every 4-6 hours as needed for pain  Qty: 40 tablet, Refills: 0    Comments: Quantity prescribed more than 7 day supply? No  Associated Diagnoses: S/P total knee replacement, right      pantoprazole (PROTONIX) 40 MG tablet Take 1 tablet (40 mg total) by mouth once daily.  Qty: 90 tablet, Refills: 3    Associated Diagnoses: Gastroesophageal reflux disease, unspecified whether esophagitis present      pregabalin (LYRICA) 150 MG capsule Take 1 capsule (150 mg total) by mouth 2 (two) times daily.  Qty: 60 capsule, Refills: 5    Associated Diagnoses: Degeneration of intervertebral disc of lumbar region with discogenic back pain      sertraline (ZOLOFT) 100 MG tablet Take 1 tablet by mouth once daily  Qty: 90 tablet, Refills: 3    Associated Diagnoses: Moderate episode of recurrent major depressive disorder; MARIBEL (generalized anxiety disorder)      upadacitinib (RINVOQ) 15 mg 24 hr tablet Take 1 tablet (15 mg  total) by mouth once daily.  Qty: 30 tablet, Refills: 11    Associated Diagnoses: Rheumatoid arthritis flare       !! - Potential duplicate medications found. Please discuss with provider.              Discharge Diagnosis: Sacroiliac joint dysfunction [M53.3]  Condition on Discharge: Stable with no complications to procedure   Diet on Discharge: Same as before.  Activity: as per instruction sheet.  Discharge to: Home with a responsible adult.  Follow up: 2-4 weeks       Please call my office or pager at [phone] if experienced any weakness or loss of sensation, fever > 101.5, pain uncontrolled with oral medications, persistent nausea/vomiting/or diarrhea, redness or drainage from the incisions, or any other worrisome concerns. If physician on call was not reached or could not communicate with our office for any reason please go to the nearest emergency department     Leobardo Pearson MD

## 2025-05-06 NOTE — TELEPHONE ENCOUNTER
Refill Decision Note   Lashanda Monaco  is requesting a refill authorization.  Brief Assessment and Rationale for Refill:  Approve     Medication Therapy Plan:         Comments:     Note composed:10:31 PM 05/05/2025

## 2025-05-06 NOTE — DISCHARGE INSTRUCTIONS

## 2025-05-07 ENCOUNTER — HOSPITAL ENCOUNTER (OUTPATIENT)
Dept: RADIOLOGY | Facility: HOSPITAL | Age: 70
Discharge: HOME | End: 2025-05-07
Attending: FAMILY MEDICINE
Payer: MEDICARE

## 2025-05-07 ENCOUNTER — PATIENT MESSAGE (OUTPATIENT)
Dept: PRIMARY CARE CLINIC | Facility: CLINIC | Age: 70
End: 2025-05-07

## 2025-05-07 ENCOUNTER — PATIENT MESSAGE (OUTPATIENT)
Dept: RHEUMATOLOGY | Facility: CLINIC | Age: 70
End: 2025-05-07
Payer: MEDICARE

## 2025-05-07 ENCOUNTER — OFFICE VISIT (OUTPATIENT)
Dept: PRIMARY CARE CLINIC | Facility: CLINIC | Age: 70
End: 2025-05-07
Payer: MEDICARE

## 2025-05-07 VITALS
BODY MASS INDEX: 39.33 KG/M2 | HEART RATE: 76 BPM | TEMPERATURE: 97 F | OXYGEN SATURATION: 98 % | WEIGHT: 230.38 LBS | DIASTOLIC BLOOD PRESSURE: 86 MMHG | SYSTOLIC BLOOD PRESSURE: 138 MMHG | RESPIRATION RATE: 18 BRPM | HEIGHT: 64 IN

## 2025-05-07 DIAGNOSIS — E66.01 SEVERE OBESITY (BMI 35.0-39.9) WITH COMORBIDITY: ICD-10-CM

## 2025-05-07 DIAGNOSIS — R27.9 LACK OF COORDINATION: Primary | ICD-10-CM

## 2025-05-07 DIAGNOSIS — R73.9 ELEVATED SERUM GLUCOSE: ICD-10-CM

## 2025-05-07 DIAGNOSIS — M54.16 LUMBAR RADICULOPATHY: ICD-10-CM

## 2025-05-07 DIAGNOSIS — R60.9 FLUID RETENTION: ICD-10-CM

## 2025-05-07 DIAGNOSIS — I10 ESSENTIAL HYPERTENSION, BENIGN: Primary | ICD-10-CM

## 2025-05-07 DIAGNOSIS — M06.9 RHEUMATOID ARTHRITIS INVOLVING MULTIPLE JOINTS: ICD-10-CM

## 2025-05-07 DIAGNOSIS — Z12.31 ENCOUNTER FOR SCREENING MAMMOGRAM FOR BREAST CANCER: ICD-10-CM

## 2025-05-07 PROCEDURE — 1159F MED LIST DOCD IN RCRD: CPT | Mod: CPTII,S$GLB,, | Performed by: FAMILY MEDICINE

## 2025-05-07 PROCEDURE — 1160F RVW MEDS BY RX/DR IN RCRD: CPT | Mod: CPTII,S$GLB,, | Performed by: FAMILY MEDICINE

## 2025-05-07 PROCEDURE — 77067 SCR MAMMO BI INCL CAD: CPT | Mod: TC

## 2025-05-07 PROCEDURE — 3288F FALL RISK ASSESSMENT DOCD: CPT | Mod: CPTII,S$GLB,, | Performed by: FAMILY MEDICINE

## 2025-05-07 PROCEDURE — 1101F PT FALLS ASSESS-DOCD LE1/YR: CPT | Mod: CPTII,S$GLB,, | Performed by: FAMILY MEDICINE

## 2025-05-07 PROCEDURE — 99999 PR PBB SHADOW E&M-EST. PATIENT-LVL V: CPT | Mod: PBBFAC,,, | Performed by: FAMILY MEDICINE

## 2025-05-07 PROCEDURE — 3079F DIAST BP 80-89 MM HG: CPT | Mod: CPTII,S$GLB,, | Performed by: FAMILY MEDICINE

## 2025-05-07 PROCEDURE — 1126F AMNT PAIN NOTED NONE PRSNT: CPT | Mod: CPTII,S$GLB,, | Performed by: FAMILY MEDICINE

## 2025-05-07 PROCEDURE — 99214 OFFICE O/P EST MOD 30 MIN: CPT | Mod: S$GLB,,, | Performed by: FAMILY MEDICINE

## 2025-05-07 PROCEDURE — 77063 BREAST TOMOSYNTHESIS BI: CPT | Mod: 26,,, | Performed by: RADIOLOGY

## 2025-05-07 PROCEDURE — 3008F BODY MASS INDEX DOCD: CPT | Mod: CPTII,S$GLB,, | Performed by: FAMILY MEDICINE

## 2025-05-07 PROCEDURE — 3075F SYST BP GE 130 - 139MM HG: CPT | Mod: CPTII,S$GLB,, | Performed by: FAMILY MEDICINE

## 2025-05-07 PROCEDURE — 77067 SCR MAMMO BI INCL CAD: CPT | Mod: 26,,, | Performed by: RADIOLOGY

## 2025-05-07 RX ORDER — HYDROCHLOROTHIAZIDE 25 MG/1
25 TABLET ORAL DAILY
COMMUNITY
End: 2025-05-07 | Stop reason: SDUPTHER

## 2025-05-07 RX ORDER — HYDROCHLOROTHIAZIDE 25 MG/1
25 TABLET ORAL DAILY
Qty: 90 TABLET | Refills: 3 | Status: SHIPPED | OUTPATIENT
Start: 2025-05-07

## 2025-05-07 NOTE — TELEPHONE ENCOUNTER
LOV with Wayne Lundberg MD , 5/7/2025   Pt is requesting orders PT if possible. I pended the order for your review. Pt will send specific facility information when she gets to [place]

## 2025-05-07 NOTE — PROGRESS NOTES
Assessment:       1. Essential hypertension, benign    2. Lumbar radiculopathy    3. Elevated serum glucose    4. Fluid retention    5. Severe obesity (BMI 35.0-39.9) with comorbidity         Plan:       Essential hypertension, benign  -     Basic Metabolic Panel; Future; Expected date: 05/07/2025  -     hydroCHLOROthiazide (HYDRODIURIL) 25 MG tablet; Take 1 tablet (25 mg total) by mouth once daily.  Dispense: 90 tablet; Refill: 3    Lumbar radiculopathy    Elevated serum glucose  -     Hemoglobin A1C; Future; Expected date: 05/07/2025    Fluid retention  -     TSH; Future; Expected date: 05/07/2025    Severe obesity (BMI 35.0-39.9) with comorbidity      Assessment & Plan    - Assessed and continued HCTZ for fluid retention and BP management.  - HCTZ is a relatively mild and safer diuretic compared to more potent options like Lasix and Bumex.  - HCTZ may affect electrolytes and renal function, requiring monitoring.  - Weight gain attributed primarily to carbohydrate intake and possible insulin resistance.  - Ruled out thyroid disorder as a cause of weight gain based on previous tests.    LUMBAGO WITH SCIATICA:   Monitored patient's response to epidural injections administered yesterday in the sciatic region bilaterally.   Patient reports no pain after walking from downstairs, up the elevator, and down the trotter, and hopes the effect of the epidurals lasts.   Continuing NSAID therapy for pain management related to back issues.    EDEMA:   Monitored ankle edema, which patient reports has been present for a prolonged period with one ankle previously twice its normal size.   Swelling has reduced after restarting hydrochlorothiazide.   Will continue hydrochlorothiazide for edema and fluid management, with monitoring of electrolytes and renal function.    OBESITY AND METABOLIC HEALTH:   Monitored weight, noting a reduction from 238 lbs to 230 lbs.   Patient is overweight with some degree of insulin resistance.   Discussed  dietary recommendations, emphasizing that weight loss is primarily dependent on diet (90%) rather than exercise.   Advised to focus on consuming meats and vegetables while avoiding foods made from grains.   Recommend limiting fruit intake, especially high-sugar fruits like bananas, grapes, melon, and pineapple, noting that apples are relatively low in sugar compared to other fruits.   Ordered thyroid function test and A1C test to assess metabolic health.    HYPERLIPIDEMIA:   Continued atorvastatin prescription for hyperlipidemia management.    FOLLOW-UP AND MONITORING:   Ordered comprehensive metabolic panel to evaluate overall health status and basic metabolic panel to check renal function and electrolytes.       Medication List with Changes/Refills   Current Medications    ACETAMINOPHEN (TYLENOL) 650 MG TBSR    Take 1 tablet (650 mg total) by mouth every 8 (eight) hours.    ALBUTEROL (PROVENTIL/VENTOLIN HFA) 90 MCG/ACTUATION INHALER    Inhale 1-2 puffs into the lungs every 6 (six) hours as needed for Wheezing or Shortness of Breath. Rescue    ALPRAZOLAM (XANAX) 0.5 MG TABLET    Take 1 tablet (0.5 mg total) by mouth daily as needed for Anxiety.    AMLODIPINE (NORVASC) 10 MG TABLET    TAKE 1 TABLET DAILY    ASPIRIN (ECOTRIN) 81 MG EC TABLET    Take 1 tablet (81 mg total) by mouth 2 (two) times a day.    ATORVASTATIN (LIPITOR) 20 MG TABLET    TAKE 1 TABLET DAILY    ESTRADIOL (ESTRACE) 2 MG TABLET    Take 1 tablet (2 mg total) by mouth once daily.    METHOCARBAMOL (ROBAXIN) 750 MG TAB    Take 1 tablet (750 mg total) by mouth 4 (four) times daily as needed (for muscle spasms).    METHOCARBAMOL (ROBAXIN) 750 MG TAB    Take 1 tablet (750 mg total) by mouth 3 (three) times daily.    MULTIVITAMIN (THERAGRAN) PER TABLET    Take 1 tablet by mouth once daily.    ONDANSETRON (ZOFRAN-ODT) 4 MG TBDL    Take 1 tablet (4 mg total) by mouth every 6 (six) hours as needed (nausea).    PANTOPRAZOLE (PROTONIX) 40 MG TABLET    Take 1  tablet (40 mg total) by mouth once daily.    PREGABALIN (LYRICA) 150 MG CAPSULE    Take 1 capsule (150 mg total) by mouth 2 (two) times daily.    SERTRALINE (ZOLOFT) 100 MG TABLET    Take 1 tablet by mouth once daily    UPADACITINIB (RINVOQ) 15 MG 24 HR TABLET    Take 1 tablet (15 mg total) by mouth once daily.   Changed and/or Refilled Medications    Modified Medication Previous Medication    HYDROCHLOROTHIAZIDE (HYDRODIURIL) 25 MG TABLET hydroCHLOROthiazide (HYDRODIURIL) 25 MG tablet       Take 1 tablet (25 mg total) by mouth once daily.    Take 25 mg by mouth once daily.   Discontinued Medications    LEG BRACE (ANKLE BRACE) MISC    1 Units by Misc.(Non-Drug; Combo Route) route once daily.    MIRABEGRON (MYRBETRIQ) 50 MG TB24    Take 1 tablet (50 mg total) by mouth once daily.    OXYCODONE (ROXICODONE) 5 MG IMMEDIATE RELEASE TABLET    Take 1-2 tablets every 4-6 hours as needed for pain         Subjective:    Patient ID: Lashanda Monaco is a 69 y.o. female.  Chief Complaint: Follow-up (Discuss hydrochlorothiazide ) and Weight Loss    Follow-up  Associated symptoms include arthralgias, joint swelling and weakness. Pertinent negatives include no chest pain, headaches, neck pain or vomiting.     History of Present Illness    CHIEF COMPLAINT:  Patient presents today for follow-up on back pain and leg swelling    BACK PAIN:  She recently received epidural injections bilaterally in the sciatic region. She reports improved mobility, being able to walk from downstairs, up the elevator, and down the trotter without pain. She was evaluated by neurosurgery who recommended against surgical intervention, which aligns with her preference for conservative management.    PERIPHERAL EDEMA:  She has a long-standing history of bilateral ankle swelling, recently experiencing severe edema with one ankle becoming twice its normal size. She restarted hydrochlorothiazide one week ago with noted improvement in swelling.    WEIGHT  "MANAGEMENT:  She reports weight reduction from 238 to 230 lbs. She drinks water and zero-calorie beverages to curb appetite and denies regular consumption of soft drinks.    CURRENT MEDICATIONS:  She continues atorvastatin with adequate supply and hydrochlorothiazide.      ROS:  Respiratory: -shortness of breath  Cardiovascular: -chest pain, +lower extremity edema  Musculoskeletal: +back pain  Neurological: +sleep difficulty, +difficulty falling asleep  Psychiatric: +anxiety  Endocrine: +excessive urination       Review of Systems   Constitutional:  Positive for activity change and unexpected weight change.   HENT:  Negative for hearing loss, rhinorrhea and trouble swallowing.    Eyes:  Negative for discharge and visual disturbance.   Respiratory:  Negative for chest tightness and wheezing.    Cardiovascular:  Positive for palpitations. Negative for chest pain.   Gastrointestinal:  Negative for blood in stool, constipation, diarrhea and vomiting.   Endocrine: Positive for polyuria. Negative for polydipsia.   Genitourinary:  Negative for difficulty urinating, dysuria, hematuria and menstrual problem.   Musculoskeletal:  Positive for arthralgias and joint swelling. Negative for neck pain.   Neurological:  Positive for weakness. Negative for headaches.   Psychiatric/Behavioral:  Negative for confusion and dysphoric mood.        Objective:      Vitals:    05/07/25 0817   BP: 138/86   BP Location: Left arm   Patient Position: Sitting   Pulse: 76   Resp: 18   Temp: 97.4 °F (36.3 °C)   TempSrc: Temporal   SpO2: 98%   Weight: 104.5 kg (230 lb 6.1 oz)   Height: 5' 4" (1.626 m)     BP Readings from Last 5 Encounters:   05/07/25 138/86   05/06/25 123/79   04/22/25 125/83   04/21/25 (!) 183/98   03/21/25 134/85     Wt Readings from Last 5 Encounters:   05/07/25 104.5 kg (230 lb 6.1 oz)   05/06/25 104.3 kg (230 lb)   04/22/25 103.9 kg (229 lb 0.9 oz)   04/21/25 104.3 kg (230 lb)   03/20/25 98 kg (216 lb 0.8 oz)     Physical " Exam  Physical Exam    Vitals: Weight: 230 lbs.  General: Well-developed. Well-nourished. No acute distress.  Eyes: EOMI. Sclerae anicteric.  HENT: Normocephalic. Atraumatic. Nares patent. Moist oral mucosa.  Cardiovascular: Regular rate. Regular rhythm. No murmurs. No rubs. No gallops. Normal S1, S2.  Respiratory: Normal respiratory effort. Clear to auscultation bilaterally. No rales. No rhonchi. No wheezing.  Musculoskeletal: No  obvious deformity.  Extremities: No lower extremity edema.  Neurological: Alert & oriented x3. No slurred speech. Normal gait.  Psychiatric: Normal mood. Normal affect. Good insight. Good judgment.  Skin: Warm. Dry. No rash.         Lab Results   Component Value Date    WBC 7.96 02/03/2025    HGB 12.0 02/03/2025    HCT 37.8 02/03/2025     02/03/2025    CHOL 221 (H) 02/17/2025    TRIG 158 (H) 02/17/2025    HDL 93 (H) 02/17/2025    ALT 12 03/06/2025    AST 15 03/06/2025     03/06/2025    K 3.6 03/06/2025     03/06/2025    CREATININE 0.9 03/06/2025    BUN 17 03/06/2025    CO2 25 03/06/2025    TSH 1.293 03/01/2023    INR 1.0 10/25/2024    HGBA1C 5.5 03/01/2023      This note was generated with the assistance of ambient listening technology. Verbal consent was obtained by the patient and accompanying visitor(s) for the recording of patient appointment to facilitate this note. I attest to having reviewed and edited the generated note for accuracy, though some syntax or spelling errors may persist. Please contact the author of this note for any clarification.

## 2025-05-07 NOTE — TELEPHONE ENCOUNTER
LOV with Wayne Lundberg MD , 5/7/2025; no mention of PT noted    Pt would like to know if PCP can [place]

## 2025-05-08 ENCOUNTER — TELEPHONE (OUTPATIENT)
Dept: PRIMARY CARE CLINIC | Facility: CLINIC | Age: 70
End: 2025-05-08
Payer: MEDICARE

## 2025-05-08 NOTE — TELEPHONE ENCOUNTER
----- Message from Celine sent at 5/8/2025  8:08 AM CDT -----  Contact: [phone]  [email or web address] Patient is calling for Medical Advice regarding:pt had a call this morning How long has patient had these symptoms:Pharmacy name and phone#:Patient [facility] a call back or [facility]:call back Comments:Please advise patient replies from provider may take up to 48 hours.

## 2025-05-18 ENCOUNTER — PATIENT MESSAGE (OUTPATIENT)
Dept: UROLOGY | Facility: CLINIC | Age: 70
End: 2025-05-18
Payer: MEDICARE

## 2025-05-19 ENCOUNTER — PATIENT MESSAGE (OUTPATIENT)
Dept: PRIMARY CARE CLINIC | Facility: CLINIC | Age: 70
End: 2025-05-19
Payer: MEDICARE

## 2025-05-20 ENCOUNTER — E-VISIT (OUTPATIENT)
Dept: PRIMARY CARE CLINIC | Facility: CLINIC | Age: 70
End: 2025-05-20
Payer: MEDICARE

## 2025-05-20 DIAGNOSIS — N30.00 ACUTE CYSTITIS WITHOUT HEMATURIA: Primary | ICD-10-CM

## 2025-05-21 RX ORDER — SULFAMETHOXAZOLE AND TRIMETHOPRIM 800; 160 MG/1; MG/1
1 TABLET ORAL 2 TIMES DAILY
Qty: 10 TABLET | Refills: 0 | Status: SHIPPED | OUTPATIENT
Start: 2025-05-21 | End: 2025-05-26

## 2025-05-21 RX ORDER — PHENAZOPYRIDINE HYDROCHLORIDE 200 MG/1
200 TABLET, FILM COATED ORAL 3 TIMES DAILY
Qty: 6 TABLET | Refills: 0 | Status: SHIPPED | OUTPATIENT
Start: 2025-05-21 | End: 2025-05-23

## 2025-05-21 NOTE — PROGRESS NOTES
@Patient ID: Lashanda Monaco is a 69 y.o. female.    Chief Complaint: General Illness (Entered automatically based on patient selection in [facility].)    The patient initiated a request through [facility] on 5/20/2025 for evaluation and management with a chief complaint of General Illness (Entered automatically based on patient selection in [facility].)     I evaluated the questionnaire submission on 5/21/25.    [facility]    5/20/2025  6:58 PM CDT - Filed by Patient   What do you need help with? Urinary Symptoms   Do you agree to participate in an E-Visit? Yes   If you have any of the following symptoms, please go to the nearest emergency room or call 911: I acknowledge   What is the main issue you would like addressed today? UTI. Vaginal pressure, pain when urinating feels like my vagina is being pulled when trying to urinating   Do you have any of the following symptoms? Discomfort or pain passing urine;  Passing urine more frequently;  Cloudy urine   When did your concern begin? 5/17/2025   What medications or treatments have you used to help your symptoms? Cranberry products;  Drinking more fluids;  Other   What other medications or treatments have you used for your symptoms? AZO cranberry   What does your urine look like? Light yellow   Have you had any of the following symptoms during the past 24 hours?   Urgency (a sudden and uncontrollable urge to urinate) Severe   Frequency (going to the toilet very often) Moderate   Burning pain when urinating Moderate   Incomplete bladder emptying (still feel like you need to urinate again after urination) (None, Mild, Moderate, Severe) Moderate   Pain in the lower belly when youre not urinating. Moderate   Discomfort from your urinary symptoms. Moderate   Have you had any of the following symptoms during the past 24 hours?   Blood seen in the urine None   Pain in the lower back on one or both sides (flank pain) Moderate   Abnormal Vaginal  Discharge (abnormal amount, color and/or odor) Mild   Discharge from the opening you urinate from (urethra) when not urinating. Mild   Have your symptoms interfered with your every day activities? Mild   Do you have a fever? No   Are you a diabetic? No   Are you currently experiencing any of the following while completing this questionnaire? None   Do you have a history of kidney stones? No   Provide any additional information you feel is important. I have also been taking Activia twice a day and I thought it was helping but the pressure and burning come back   Please attach any relevant images or files (if you have performed a home test for UTI, please submit a photo of results)    Are you able to take your vital signs? No         Encounter Diagnosis   Name Primary?    Acute cystitis without hematuria Yes        No orders of the defined types were placed in this encounter.     Medications Ordered This Encounter   Medications    phenazopyridine (PYRIDIUM) 200 MG tablet     Sig: Take 1 tablet (200 mg total) by mouth 3 (three) times daily. for 2 days     Dispense:  6 tablet     Refill:  0    sulfamethoxazole-trimethoprim 800-160mg (BACTRIM DS) 800-160 mg Tab     Sig: Take 1 tablet by mouth 2 (two) times daily. for 5 days     Dispense:  10 tablet     Refill:  0        No follow-ups on file.      E-Visit Time Tracking:    Day 1 Time (in minutes): 5    Total Time (in minutes): 5

## 2025-05-23 ENCOUNTER — TELEPHONE (OUTPATIENT)
Dept: PRIMARY CARE CLINIC | Facility: CLINIC | Age: 70
End: 2025-05-23
Payer: MEDICARE

## 2025-05-23 NOTE — TELEPHONE ENCOUNTER
----- Message from Health  Dayana sent at 5/23/2025  9:29 AM CDT -----  Regarding: Notice of patient discharge from [facility]  Dr. Wayne Lundberg, Unfortunately, Mrs. Lashanda Monaco has failed to meet basic participation requirements for the [facility] Program and will be discharged from the program. We have tried to contact her on multiple occasions without success. Unfortunately, she is not a good candidate for digital medicine monitoring. Thank you for your support of Digital Medicine! Please contact me if you have any questions or concerns.Sincerely,Dayana Solis

## 2025-07-01 ENCOUNTER — PATIENT MESSAGE (OUTPATIENT)
Dept: RHEUMATOLOGY | Facility: CLINIC | Age: 70
End: 2025-07-01
Payer: MEDICARE

## 2025-07-11 ENCOUNTER — TELEPHONE (OUTPATIENT)
Dept: RHEUMATOLOGY | Facility: CLINIC | Age: 70
End: 2025-07-11
Payer: MEDICARE

## 2025-07-11 NOTE — TELEPHONE ENCOUNTER
Left voicemail asking patient to contact the office back to confirm appointment offered on July 14 @ 2:30.

## 2025-07-14 ENCOUNTER — OFFICE VISIT (OUTPATIENT)
Dept: RHEUMATOLOGY | Facility: CLINIC | Age: 70
End: 2025-07-14
Payer: MEDICARE

## 2025-07-14 ENCOUNTER — LAB VISIT (OUTPATIENT)
Dept: LAB | Facility: HOSPITAL | Age: 70
End: 2025-07-14
Attending: INTERNAL MEDICINE
Payer: MEDICARE

## 2025-07-14 VITALS
HEIGHT: 64 IN | DIASTOLIC BLOOD PRESSURE: 75 MMHG | BODY MASS INDEX: 39.78 KG/M2 | SYSTOLIC BLOOD PRESSURE: 107 MMHG | HEART RATE: 89 BPM | WEIGHT: 233 LBS

## 2025-07-14 DIAGNOSIS — Z79.899 IMMUNODEFICIENCY DUE TO DRUG THERAPY: ICD-10-CM

## 2025-07-14 DIAGNOSIS — M06.9 RHEUMATOID ARTHRITIS INVOLVING MULTIPLE JOINTS: Primary | ICD-10-CM

## 2025-07-14 DIAGNOSIS — M06.9 RHEUMATOID ARTHRITIS FLARE: ICD-10-CM

## 2025-07-14 DIAGNOSIS — D84.821 IMMUNODEFICIENCY DUE TO DRUG THERAPY: ICD-10-CM

## 2025-07-14 DIAGNOSIS — M06.9 RHEUMATOID ARTHRITIS FLARE: Primary | ICD-10-CM

## 2025-07-14 DIAGNOSIS — R79.9 ABNORMAL FINDING OF BLOOD CHEMISTRY, UNSPECIFIED: ICD-10-CM

## 2025-07-14 DIAGNOSIS — Z87.898 PERSONAL HISTORY OF OTHER SPECIFIED CONDITIONS: ICD-10-CM

## 2025-07-14 DIAGNOSIS — R79.89 OTHER SPECIFIED ABNORMAL FINDINGS OF BLOOD CHEMISTRY: ICD-10-CM

## 2025-07-14 LAB
ABSOLUTE EOSINOPHIL (OHS): 0.1 K/UL
ABSOLUTE MONOCYTE (OHS): 0.88 K/UL (ref 0.3–1)
ABSOLUTE NEUTROPHIL COUNT (OHS): 3.23 K/UL (ref 1.8–7.7)
ALBUMIN SERPL BCP-MCNC: 3.6 G/DL (ref 3.5–5.2)
ALP SERPL-CCNC: 98 UNIT/L (ref 40–150)
ALT SERPL W/O P-5'-P-CCNC: 17 UNIT/L (ref 10–44)
ANION GAP (OHS): 8 MMOL/L (ref 8–16)
AST SERPL-CCNC: 20 UNIT/L (ref 11–45)
BASOPHILS # BLD AUTO: 0.03 K/UL
BASOPHILS NFR BLD AUTO: 0.5 %
BILIRUB SERPL-MCNC: 0.2 MG/DL (ref 0.1–1)
BUN SERPL-MCNC: 29 MG/DL (ref 8–23)
CALCIUM SERPL-MCNC: 9.4 MG/DL (ref 8.7–10.5)
CHLORIDE SERPL-SCNC: 105 MMOL/L (ref 95–110)
CO2 SERPL-SCNC: 23 MMOL/L (ref 23–29)
CREAT SERPL-MCNC: 1.3 MG/DL (ref 0.5–1.4)
CRP SERPL-MCNC: 4.8 MG/L
ERYTHROCYTE [DISTWIDTH] IN BLOOD BY AUTOMATED COUNT: 15.2 % (ref 11.5–14.5)
ERYTHROCYTE [SEDIMENTATION RATE] IN BLOOD BY PHOTOMETRIC METHOD: 52 MM/HR
GFR SERPLBLD CREATININE-BSD FMLA CKD-EPI: 44 ML/MIN/1.73/M2
GLUCOSE SERPL-MCNC: 87 MG/DL (ref 70–110)
HBV CORE AB SERPL QL IA: NORMAL
HBV SURFACE AG SERPL QL IA: NORMAL
HCT VFR BLD AUTO: 35.7 % (ref 37–48.5)
HGB BLD-MCNC: 11.4 GM/DL (ref 12–16)
IMM GRANULOCYTES # BLD AUTO: 0.04 K/UL (ref 0–0.04)
IMM GRANULOCYTES NFR BLD AUTO: 0.6 % (ref 0–0.5)
LYMPHOCYTES # BLD AUTO: 2.09 K/UL (ref 1–4.8)
MCH RBC QN AUTO: 30.2 PG (ref 27–31)
MCHC RBC AUTO-ENTMCNC: 31.9 G/DL (ref 32–36)
MCV RBC AUTO: 95 FL (ref 82–98)
NUCLEATED RBC (/100WBC) (OHS): 0 /100 WBC
PLATELET # BLD AUTO: 263 K/UL (ref 150–450)
PMV BLD AUTO: 10.5 FL (ref 9.2–12.9)
POTASSIUM SERPL-SCNC: 4.2 MMOL/L (ref 3.5–5.1)
PROT SERPL-MCNC: 7.6 GM/DL (ref 6–8.4)
RBC # BLD AUTO: 3.77 M/UL (ref 4–5.4)
RELATIVE EOSINOPHIL (OHS): 1.6 %
RELATIVE LYMPHOCYTE (OHS): 32.8 % (ref 18–48)
RELATIVE MONOCYTE (OHS): 13.8 % (ref 4–15)
RELATIVE NEUTROPHIL (OHS): 50.7 % (ref 38–73)
SODIUM SERPL-SCNC: 136 MMOL/L (ref 136–145)
WBC # BLD AUTO: 6.37 K/UL (ref 3.9–12.7)

## 2025-07-14 PROCEDURE — 99215 OFFICE O/P EST HI 40 MIN: CPT | Mod: 25,S$GLB,, | Performed by: INTERNAL MEDICINE

## 2025-07-14 PROCEDURE — 1125F AMNT PAIN NOTED PAIN PRSNT: CPT | Mod: CPTII,S$GLB,, | Performed by: INTERNAL MEDICINE

## 2025-07-14 PROCEDURE — 82310 ASSAY OF CALCIUM: CPT

## 2025-07-14 PROCEDURE — 36415 COLL VENOUS BLD VENIPUNCTURE: CPT

## 2025-07-14 PROCEDURE — 1101F PT FALLS ASSESS-DOCD LE1/YR: CPT | Mod: CPTII,S$GLB,, | Performed by: INTERNAL MEDICINE

## 2025-07-14 PROCEDURE — 96372 THER/PROPH/DIAG INJ SC/IM: CPT | Mod: S$GLB,,, | Performed by: INTERNAL MEDICINE

## 2025-07-14 PROCEDURE — 86704 HEP B CORE ANTIBODY TOTAL: CPT

## 2025-07-14 PROCEDURE — 3008F BODY MASS INDEX DOCD: CPT | Mod: CPTII,S$GLB,, | Performed by: INTERNAL MEDICINE

## 2025-07-14 PROCEDURE — 85025 COMPLETE CBC W/AUTO DIFF WBC: CPT

## 2025-07-14 PROCEDURE — 86480 TB TEST CELL IMMUN MEASURE: CPT

## 2025-07-14 PROCEDURE — 3288F FALL RISK ASSESSMENT DOCD: CPT | Mod: CPTII,S$GLB,, | Performed by: INTERNAL MEDICINE

## 2025-07-14 PROCEDURE — 99999 PR PBB SHADOW E&M-EST. PATIENT-LVL III: CPT | Mod: PBBFAC,,, | Performed by: INTERNAL MEDICINE

## 2025-07-14 PROCEDURE — 85652 RBC SED RATE AUTOMATED: CPT

## 2025-07-14 PROCEDURE — 3078F DIAST BP <80 MM HG: CPT | Mod: CPTII,S$GLB,, | Performed by: INTERNAL MEDICINE

## 2025-07-14 PROCEDURE — 3074F SYST BP LT 130 MM HG: CPT | Mod: CPTII,S$GLB,, | Performed by: INTERNAL MEDICINE

## 2025-07-14 PROCEDURE — 87340 HEPATITIS B SURFACE AG IA: CPT

## 2025-07-14 PROCEDURE — 86140 C-REACTIVE PROTEIN: CPT

## 2025-07-14 PROCEDURE — 3044F HG A1C LEVEL LT 7.0%: CPT | Mod: CPTII,S$GLB,, | Performed by: INTERNAL MEDICINE

## 2025-07-14 PROCEDURE — 1159F MED LIST DOCD IN RCRD: CPT | Mod: CPTII,S$GLB,, | Performed by: INTERNAL MEDICINE

## 2025-07-14 RX ORDER — KETOROLAC TROMETHAMINE 15 MG/ML
15 INJECTION, SOLUTION INTRAMUSCULAR; INTRAVENOUS
Status: DISCONTINUED | OUTPATIENT
Start: 2025-07-14 | End: 2025-07-14

## 2025-07-14 RX ORDER — KETOROLAC TROMETHAMINE 15 MG/ML
30 INJECTION, SOLUTION INTRAMUSCULAR; INTRAVENOUS
Status: DISCONTINUED | OUTPATIENT
Start: 2025-07-14 | End: 2025-07-14

## 2025-07-14 RX ORDER — KETOROLAC TROMETHAMINE 30 MG/ML
15 INJECTION, SOLUTION INTRAMUSCULAR; INTRAVENOUS
Status: SHIPPED | OUTPATIENT
Start: 2025-07-14

## 2025-07-14 RX ORDER — TRAMADOL HYDROCHLORIDE 50 MG/1
50 TABLET, FILM COATED ORAL EVERY 12 HOURS PRN
Qty: 60 TABLET | Refills: 5 | Status: SHIPPED | OUTPATIENT
Start: 2025-07-14 | End: 2025-08-13

## 2025-07-14 RX ORDER — KETOROLAC TROMETHAMINE 30 MG/ML
30 INJECTION, SOLUTION INTRAMUSCULAR; INTRAVENOUS
Status: COMPLETED | OUTPATIENT
Start: 2025-07-14 | End: 2025-07-14

## 2025-07-14 RX ADMIN — KETOROLAC TROMETHAMINE 15 MG: 30 INJECTION, SOLUTION INTRAMUSCULAR; INTRAVENOUS at 03:07

## 2025-07-14 NOTE — PROGRESS NOTES
"Subjective:      Patient ID: Lashanda Monaco is a 68 y.o. female.    Chief Complaint: Disease Management    HPI  68 year old F  with PMH of HL, HTN, basal cell cancer,  bilateral hip replacement, lumbar arthritis,  right rotator cuff tear and bicep tear, left CT release,  here for evaluation. She reports she needs right knee replacement.   She gets pain in neck, shoulders, hands, wrists, right knee, both ankles, lower back, and right foot.  She has been having since she was 64. Pain level  can be as high 7/10.   Reports swelling in ankles and feet for years. Denies any rashes, oral ulcers, fevers, raynauds, or photosensitivity. Reports some hair thinning.   She takes meloxicam 15 mg a day for about  5 years. Denies smoking.     Family hx: sister: RA    Interval history: She has pain in feet, left ankle, and hands. She also has pain in wrists and right shoulder.  She gets swelling in feet  and hands. Pain is 6/10. She has been consistent with Rinvoq.    Past Medical History:   Diagnosis Date    Arthritis     Hypercholesterolemia     Hypertension        Review of Systems see HPI     Objective:   BP (!) 157/98   Pulse 97   Ht 5' 4" (1.626 m)   Wt 89.8 kg (198 lb)   BMI 33.99 kg/m²   Physical Exam   Constitutional: She is oriented to person, place, and time.   HENT:   Head: Normocephalic and atraumatic.   Right Ear: External ear normal.   Left Ear: External ear normal.   Nose: Nose normal.   Mouth/Throat: Oropharynx is clear and moist. No oropharyngeal exudate.   Eyes: Pupils are equal, round, and reactive to light. Conjunctivae are normal. Right eye exhibits no discharge. Left eye exhibits no discharge. No scleral icterus.   Neck: No JVD present. No thyromegaly present.   Cardiovascular: Normal rate, regular rhythm and normal heart sounds. Exam reveals no gallop and no friction rub.   No murmur heard.  Pulmonary/Chest: Effort normal and breath sounds normal. No respiratory distress. She has no wheezes. She has no " rales. She exhibits no tenderness.   Abdominal: Soft. Bowel sounds are normal. She exhibits no distension and no mass. There is no abdominal tenderness. There is no rebound and no guarding.   Musculoskeletal:         General: Swelling and tenderness present.      Cervical back: Neck supple.   Lymphadenopathy:     She has no cervical adenopathy.   Neurological: She is alert and oriented to person, place, and time. No cranial nerve deficit. Gait normal. Coordination normal.   Skin: Skin is dry. No rash noted. No erythema. No pallor.   Psychiatric: Affect and judgment normal.   TENDERNESS IN MCPS, PIPS, WRISTS, ANKLES , MTPS WITH SWELLING    No data to display     Assessment:   69  year old F  with PMH of HL, HTN, basal cell cancer,  bilateral hip replacement, lumbar arthritis,  right rotator cuff tear and bicep tear, left CT release,  here for follow up of seronegative RA.  She had right knee replacement on October 17th complicated by fall. This was complicated by wound dehiscence with washout 10/25.2024.   She has been on Rinvoq  and is coming in with severe flare.    # Seronegative RA:She is coming in with bilateral symmetrical arthritis consistent  RHEUMATOID ARTHRITIS. PATIENT HAS EROSIVE CHANGES IN WRISTS CONSISTENT WITH SERONEGATIVE RA.  GIVEN ABNORMAL CT CHEST, WILL AVOID MTX AS THIS CAN MAKE NODULES WORSE AND CAN AFFECT THE LUNG.  Flaring on Rinvoq so will switch to  Actemra 162 mg sub q once a  week (Risk discussed with patient and not limited to cell count abnormalities, GI perforation, HL,  malignancy, allergic  reaction to medication, and increased risk of infection. Patient agrees with starting medication.)       - no anti-TNF given basal cell cancer  -no MTX given nodules  --plan to get labs 4 weeks after she starts   Toradol 15 mg IM x1     #Obesity: risks of obesity discussed including increased inflammation.  -discussed diet including anti-inflammatory diet and handout was given  -encouraged daily  exercise 30 minutes a day       Immunodeficiency due to drug-   -monitor carefully for infection and any toxicities associated with immunosuppressants  -advised age appropriate cancer screenings including yearly skin exam and age appropriate vaccinations  -advised to seek immediate care in the setting of infection and hold immunosuppressive medications if there is infection      40 * minutes of total time spent on the encounter, which includes face to face time and non-face to face time preparing to see the patient (eg, review of tests), Obtaining and/or reviewing separately obtained history, Documenting clinical information in the electronic or other health record, Independently interpreting results (not separately reported) and communicating results to the patient/family/caregiver, or Care coordination (not separately reported).

## 2025-07-14 NOTE — PROGRESS NOTES
"Subjective:      Patient ID: Lashanda Monaco is a 68 y.o. female.    Chief Complaint: Disease Management    HPI  68 year old F  with PMH of HL, HTN, basal cell cancer,  bilateral hip replacement, lumbar arthritis,  right rotator cuff tear and bicep tear, left CT release,  here for evaluation. She reports she needs right knee replacement.   She gets pain in neck, shoulders, hands, wrists, right knee, both ankles, lower back, and right foot.  She has been having since she was 64. Pain level  can be as high 7/10.   Reports swelling in ankles and feet for years. Denies any rashes, oral ulcers, fevers, raynauds, or photosensitivity. Reports some hair thinning.   She takes meloxicam 15 mg a day for about  5 years. Denies smoking.     Family hx: sister: RA    Interval history She had right knee replacement on October 17th complicated by fall. This was complicated by wound dehiscence with washout 10/25.   Past Medical History:   Diagnosis Date    Arthritis     Hypercholesterolemia     Hypertension        Review of Systems see HPI     Objective:   BP (!) 157/98   Pulse 97   Ht 5' 4" (1.626 m)   Wt 89.8 kg (198 lb)   BMI 33.99 kg/m²   Physical Exam   Constitutional: She is oriented to person, place, and time.   HENT:   Head: Normocephalic and atraumatic.   Right Ear: External ear normal.   Left Ear: External ear normal.   Nose: Nose normal.   Mouth/Throat: Oropharynx is clear and moist. No oropharyngeal exudate.   Eyes: Pupils are equal, round, and reactive to light. Conjunctivae are normal. Right eye exhibits no discharge. Left eye exhibits no discharge. No scleral icterus.   Neck: No JVD present. No thyromegaly present.   Cardiovascular: Normal rate, regular rhythm and normal heart sounds. Exam reveals no gallop and no friction rub.   No murmur heard.  Pulmonary/Chest: Effort normal and breath sounds normal. No respiratory distress. She has no wheezes. She has no rales. She exhibits no tenderness.   Abdominal: Soft. " Bowel sounds are normal. She exhibits no distension and no mass. There is no abdominal tenderness. There is no rebound and no guarding.   Musculoskeletal:         General: Swelling and tenderness present.      Cervical back: Neck supple.   Lymphadenopathy:     She has no cervical adenopathy.   Neurological: She is alert and oriented to person, place, and time. No cranial nerve deficit. Gait normal. Coordination normal.   Skin: Skin is dry. No rash noted. No erythema. No pallor.   Psychiatric: Affect and judgment normal.   TENDERNESS IN MCPS, PIPS, WRISTS, ANKLES , MTPS WITH SWELLING    No data to display     Assessment:   69  year old F  with PMH of HL, HTN, basal cell cancer,  bilateral hip replacement, lumbar arthritis,  right rotator cuff tear and bicep tear, left CT release,  here for follow up of seronegative RA.  She had right knee replacement on October 17th complicated by fall. This was complicated by wound dehiscence with washout 10/25.   She has been on Rinvoq for about 10 days.     # Seronegative RA:She is coming in with bilateral symmetrical arthritis consistent  RHEUMATOID ARTHRITIS. PATIENT HAS EROSIVE CHANGES IN WRISTS CONSISTENT WITH SERONEGATIVE RA.  GIVEN ABNORMAL CT CHEST, WILL AVOID MTX AS THIS CAN MAKE NODULES WORSE AND CAN AFFECT THE LUNG.She has not been on Rinvoq consistently so will wait before making changes.       - no anti-TNF given basal cell cancer  -no MTX given nodules  -continue Rinvoq 11 mg po q day  --plan to get labs 4 weeks    #Obesity: risks of obesity discussed including increased inflammation.  -discussed diet including anti-inflammatory diet and handout was given  -encouraged daily exercise 30 minutes a day       Immunodeficiency due to drug-   -monitor carefully for infection and any toxicities associated with immunosuppressants  -advised age appropriate cancer screenings including yearly skin exam and age appropriate vaccinations  -advised to seek immediate care in the  setting of infection and hold immunosuppressive medications if there is infection      40 * minutes of total time spent on the encounter, which includes face to face time and non-face to face time preparing to see the patient (eg, review of tests), Obtaining and/or reviewing separately obtained history, Documenting clinical information in the electronic or other health record, Independently interpreting results (not separately reported) and communicating results to the patient/family/caregiver, or Care coordination (not separately reported).

## 2025-07-14 NOTE — PROGRESS NOTES
"Per order, patient to receive 15 mg of Toradol (ketorolac tromethamine) On hand, 30 mg/mL. The area of injection was palpated using the medial fold and the iliac crest as anatomical landmarks. Patient was advised to relax the muscle. The area was cleaned with alcohol and allowed to dry. Using a 25g 1.5" needle, 0.5 mL of Toradol (ketorolac tromethamine) was placed intramuscularly into the left upper outer gluteal quadrant. Patient advised to wait for 15 minutes after injection. Patient experienced no complications and was discharged in stable condition.  Lot: [id number] Exp: 12/25     "

## 2025-07-15 LAB
MITOGEN MINUS NIL (OHS): 9.85
NIL TB SYNCED (OHS): 0.15
QUANTIFERON GOLD INTERP (OHS): NEGATIVE
TB1 AG MINUS NIL (OHS): <0
TB2 AG MINUS NIL (OHS): <0

## 2025-07-23 ENCOUNTER — PATIENT MESSAGE (OUTPATIENT)
Dept: RHEUMATOLOGY | Facility: CLINIC | Age: 70
End: 2025-07-23
Payer: MEDICARE

## 2025-07-23 DIAGNOSIS — M06.9 RHEUMATOID ARTHRITIS INVOLVING MULTIPLE JOINTS: Primary | ICD-10-CM

## 2025-07-23 DIAGNOSIS — M06.9 RHEUMATOID ARTHRITIS FLARE: Primary | ICD-10-CM

## 2025-07-23 RX ORDER — SARILUMAB 200 MG/1.14ML
200 INJECTION, SOLUTION SUBCUTANEOUS
Qty: 2 PEN | Refills: 11 | Status: ACTIVE | OUTPATIENT
Start: 2025-07-23 | End: 2025-07-25 | Stop reason: SDUPTHER

## 2025-07-25 DIAGNOSIS — M06.9 RHEUMATOID ARTHRITIS FLARE: ICD-10-CM

## 2025-07-25 RX ORDER — SARILUMAB 200 MG/1.14ML
200 INJECTION, SOLUTION SUBCUTANEOUS
Qty: 2 PEN | Refills: 6 | Status: SHIPPED | OUTPATIENT
Start: 2025-07-25 | End: 2025-08-24

## 2025-07-30 RX ORDER — EPINEPHRINE 0.3 MG/.3ML
0.3 INJECTION SUBCUTANEOUS ONCE AS NEEDED
OUTPATIENT
Start: 2025-07-30

## 2025-07-30 RX ORDER — DIPHENHYDRAMINE HYDROCHLORIDE 50 MG/ML
50 INJECTION, SOLUTION INTRAMUSCULAR; INTRAVENOUS ONCE AS NEEDED
OUTPATIENT
Start: 2025-07-30

## 2025-07-30 RX ORDER — METHYLPREDNISOLONE SOD SUCC 125 MG
100 VIAL (EA) INJECTION
OUTPATIENT
Start: 2025-07-30

## 2025-07-30 RX ORDER — HEPARIN 100 UNIT/ML
500 SYRINGE INTRAVENOUS
OUTPATIENT
Start: 2025-07-30

## 2025-07-30 RX ORDER — SODIUM CHLORIDE 0.9 % (FLUSH) 0.9 %
10 SYRINGE (ML) INJECTION
OUTPATIENT
Start: 2025-07-30

## 2025-07-30 RX ORDER — DEXTROMETHORPHAN HYDROBROMIDE, GUAIFENESIN 5; 100 MG/5ML; MG/5ML
650 LIQUID ORAL
OUTPATIENT
Start: 2025-07-30 | End: 2025-07-30

## 2025-07-30 RX ORDER — DIPHENHYDRAMINE HYDROCHLORIDE 50 MG/ML
50 INJECTION, SOLUTION INTRAMUSCULAR; INTRAVENOUS
OUTPATIENT
Start: 2025-07-30

## 2025-08-04 ENCOUNTER — TELEPHONE (OUTPATIENT)
Dept: INFECTIOUS DISEASES | Facility: HOSPITAL | Age: 70
End: 2025-08-04
Payer: MEDICARE

## 2025-08-11 ENCOUNTER — TELEPHONE (OUTPATIENT)
Dept: INFECTIOUS DISEASES | Facility: HOSPITAL | Age: 70
End: 2025-08-11
Payer: MEDICARE

## 2025-08-12 ENCOUNTER — PATIENT MESSAGE (OUTPATIENT)
Dept: RHEUMATOLOGY | Facility: CLINIC | Age: 70
End: 2025-08-12
Payer: MEDICARE

## 2025-08-19 ENCOUNTER — TELEPHONE (OUTPATIENT)
Dept: PRIMARY CARE CLINIC | Facility: CLINIC | Age: 70
End: 2025-08-19
Payer: MEDICARE

## 2025-08-20 DIAGNOSIS — R30.0 DYSURIA: Primary | ICD-10-CM

## 2025-08-21 ENCOUNTER — PATIENT MESSAGE (OUTPATIENT)
Dept: RHEUMATOLOGY | Facility: CLINIC | Age: 70
End: 2025-08-21
Payer: MEDICARE

## 2025-08-23 DIAGNOSIS — M51.360 DEGENERATION OF INTERVERTEBRAL DISC OF LUMBAR REGION WITH DISCOGENIC BACK PAIN: ICD-10-CM

## 2025-08-24 ENCOUNTER — PATIENT MESSAGE (OUTPATIENT)
Dept: RHEUMATOLOGY | Facility: CLINIC | Age: 70
End: 2025-08-24
Payer: MEDICARE

## 2025-08-25 ENCOUNTER — TELEPHONE (OUTPATIENT)
Dept: PRIMARY CARE CLINIC | Facility: CLINIC | Age: 70
End: 2025-08-25
Payer: MEDICARE

## 2025-08-25 ENCOUNTER — PATIENT MESSAGE (OUTPATIENT)
Dept: INFECTIOUS DISEASES | Facility: HOSPITAL | Age: 70
End: 2025-08-25
Payer: MEDICARE

## 2025-08-26 RX ORDER — PREGABALIN 150 MG/1
150 CAPSULE ORAL 2 TIMES DAILY
Qty: 60 CAPSULE | Refills: 5 | Status: SHIPPED | OUTPATIENT
Start: 2025-08-26

## 2025-08-29 ENCOUNTER — OFFICE VISIT (OUTPATIENT)
Dept: PRIMARY CARE CLINIC | Facility: CLINIC | Age: 70
End: 2025-08-29
Payer: MEDICARE

## 2025-08-29 VITALS
TEMPERATURE: 98 F | RESPIRATION RATE: 18 BRPM | DIASTOLIC BLOOD PRESSURE: 78 MMHG | SYSTOLIC BLOOD PRESSURE: 114 MMHG | HEART RATE: 109 BPM | OXYGEN SATURATION: 97 % | HEIGHT: 64 IN | BODY MASS INDEX: 39.63 KG/M2 | WEIGHT: 232.13 LBS

## 2025-08-29 DIAGNOSIS — E66.01 SEVERE OBESITY (BMI 35.0-39.9) WITH COMORBIDITY: Primary | ICD-10-CM

## 2025-08-29 DIAGNOSIS — F50.83 PICA IN ADULTS: ICD-10-CM

## 2025-08-29 DIAGNOSIS — R53.83 FATIGUE, UNSPECIFIED TYPE: ICD-10-CM

## 2025-08-29 DIAGNOSIS — R06.83 LOUD SNORING: ICD-10-CM

## 2025-08-29 PROBLEM — T81.31XA WOUND DEHISCENCE, SURGICAL: Status: RESOLVED | Noted: 2024-10-25 | Resolved: 2025-08-29

## 2025-08-29 PROCEDURE — 99999 PR PBB SHADOW E&M-EST. PATIENT-LVL IV: CPT | Mod: PBBFAC,,, | Performed by: FAMILY MEDICINE

## (undated) DEVICE — PIN VELYS ARRAY DRILL 4X125MM
Type: IMPLANTABLE DEVICE | Site: KNEE | Status: NON-FUNCTIONAL
Removed: 2024-10-17

## (undated) DEVICE — DRAPE T EXTRM SURG 121X128X90

## (undated) DEVICE — SOL BETADINE 5%

## (undated) DEVICE — DRESSING TRANS 4X4 TEGADERM

## (undated) DEVICE — SUT MCRYL PLUS 4-0 PS2 27IN

## (undated) DEVICE — SUT 2/0 36IN COATED VICRYL

## (undated) DEVICE — SPONGE LAP 18X18 PREWASHED

## (undated) DEVICE — MARKER SKIN RULER STERILE

## (undated) DEVICE — TRAY MINOR ORTHO OMC

## (undated) DEVICE — TAPE SURG DURAPORE 2 X10YD

## (undated) DEVICE — PULSAVAC ZIMMER

## (undated) DEVICE — ADHESIVE DERMABOND ADVANCED

## (undated) DEVICE — DRAPE INCISE IOBAN 2 23X33IN

## (undated) DEVICE — DRAPE THREE-QTR REINF 53X77IN

## (undated) DEVICE — ELECTRODE REM PLYHSV RETURN 9

## (undated) DEVICE — TIP YANKAUERS BULB NO VENT

## (undated) DEVICE — UNDERGLOVES BIOGEL PI SIZE 8.5

## (undated) DEVICE — SUT VICRYL 3-0 27 SH

## (undated) DEVICE — DRAPE TOP 53X102IN

## (undated) DEVICE — BRUSH SCRUB HIBICLENS 4%

## (undated) DEVICE — GOWN AERO CHROME W/ TOWEL XL

## (undated) DEVICE — DRAPE SURG W/TWL 17 5/8X23

## (undated) DEVICE — SOL NACL IRR 1000ML BTL

## (undated) DEVICE — BLADE RECIP DS OFST 70X1X12.5

## (undated) DEVICE — DRESSING AQUACEL AG RBBN 2X45

## (undated) DEVICE — PIN VELYS ARRAY DRILL 4X175MM
Type: IMPLANTABLE DEVICE | Site: KNEE | Status: NON-FUNCTIONAL
Removed: 2024-10-17

## (undated) DEVICE — SPONGE COTTON TRAY 4X4IN

## (undated) DEVICE — SPONGE GAUZE 16PLY 4X4

## (undated) DEVICE — DRAPE STERI U-SHAPED 47X51IN

## (undated) DEVICE — TOWEL OR XRAY WHITE 17X26IN

## (undated) DEVICE — BRACE KNEE T SCOPE PREMIER

## (undated) DEVICE — SET VELYS PURESIGHT ARRAY KNEE

## (undated) DEVICE — SUT VICRYL BR 1 GEN 27 CT-1

## (undated) DEVICE — BANDAGE ACE ELASTIC 6"

## (undated) DEVICE — UNDERGLOVES BIOGEL PI SIZE 7.5

## (undated) DEVICE — SUT STRATAFIX PDS 1 CTX 18IN

## (undated) DEVICE — PAD KNEE POLAR XL

## (undated) DEVICE — LAVAGE WOUND BACTISURE 1L BAG

## (undated) DEVICE — GLOVE BIOGEL PI MICRO SZ 7

## (undated) DEVICE — TOWEL OR DISP STRL BLUE 4/PK

## (undated) DEVICE — SUT VICRYL 3-0 27 CT-1

## (undated) DEVICE — DRAPE U SPLIT SHEET 54X76IN

## (undated) DEVICE — NDL HYPO STD REG BVL 18GX1.5IN

## (undated) DEVICE — HOOD T7 W/ PEEL AWAY LENS

## (undated) DEVICE — TUBE SUCTION FRAZIER VENT 10FR

## (undated) DEVICE — SYS IRRISEPT 450ML0.05% CHG

## (undated) DEVICE — BLADE VELYS OSCILLATING SAW

## (undated) DEVICE — TAPE SILK 3IN

## (undated) DEVICE — SOL VASHE INSTILLATION WND 16

## (undated) DEVICE — STAPLER SKIN PROXIMATE WIDE

## (undated) DEVICE — PAD CAST SPECIALIST STRL 4

## (undated) DEVICE — IRRIGATION SET Y-TYPE TUR/BLAD

## (undated) DEVICE — BLADE DUAL CUT SAG 35X64X.89MM

## (undated) DEVICE — DRAPE VELYS DEVICE STERILE

## (undated) DEVICE — GLOVE BIOGEL SKINSENSE PI 8.0

## (undated) DEVICE — COVER LIGHT HANDLE 80/CA

## (undated) DEVICE — SUT 1 36IN COATED VICRYL UN

## (undated) DEVICE — DRAPE VELYS SATELLITE STATION

## (undated) DEVICE — DRESSING TELFA N ADH 3X8

## (undated) DEVICE — KIT TOTAL KNEE TKOFG OMC

## (undated) DEVICE — SYS REVOLUTION CEMENT MIXING

## (undated) DEVICE — KIT PREVENA PLUS

## (undated) DEVICE — ALCOHOL 70% ANTISEPTIC ISO 4OZ

## (undated) DEVICE — MIXER BONE CEMENT

## (undated) DEVICE — SEE MEDLINE ITEM 152515

## (undated) DEVICE — GOWN SMARTGOWN 3XL XLONG

## (undated) DEVICE — DRESSING LEUKOPLAST FLEX 1X3IN

## (undated) DEVICE — GOWN ORBIS LVL 4 XXL 49IN

## (undated) DEVICE — SYR 50CC LL